# Patient Record
Sex: MALE | Race: BLACK OR AFRICAN AMERICAN | Employment: UNEMPLOYED | URBAN - METROPOLITAN AREA
[De-identification: names, ages, dates, MRNs, and addresses within clinical notes are randomized per-mention and may not be internally consistent; named-entity substitution may affect disease eponyms.]

---

## 2017-04-13 PROBLEM — G89.29 BILATERAL CHRONIC KNEE PAIN: Status: ACTIVE | Noted: 2017-04-13

## 2017-04-13 PROBLEM — E29.1 HYPOGONADISM IN MALE: Status: ACTIVE | Noted: 2017-04-13

## 2017-04-13 PROBLEM — M25.561 BILATERAL CHRONIC KNEE PAIN: Status: ACTIVE | Noted: 2017-04-13

## 2017-04-13 PROBLEM — M25.562 BILATERAL CHRONIC KNEE PAIN: Status: ACTIVE | Noted: 2017-04-13

## 2017-04-13 PROBLEM — M17.0 PRIMARY OSTEOARTHRITIS OF BOTH KNEES: Status: ACTIVE | Noted: 2017-04-13

## 2017-07-24 ENCOUNTER — HOSPITAL ENCOUNTER (OUTPATIENT)
Dept: GENERAL RADIOLOGY | Age: 52
Discharge: HOME | End: 2017-07-24
Payer: COMMERCIAL

## 2017-07-24 DIAGNOSIS — M25.511 RIGHT SHOULDER PAIN: ICD-10-CM

## 2017-07-24 PROCEDURE — 73030 X-RAY EXAM OF SHOULDER: CPT

## 2017-07-24 NOTE — PROGRESS NOTES
His xray of his right shoulder is normal.  He needs physical therapy. Please let me know after you've had a chance to talk w/ him so I can set this up.

## 2017-08-04 ENCOUNTER — HOSPITAL ENCOUNTER (OUTPATIENT)
Dept: PHYSICAL THERAPY | Age: 52
Discharge: HOME | End: 2017-08-04
Payer: COMMERCIAL

## 2017-08-04 DIAGNOSIS — M25.511 CHRONIC RIGHT SHOULDER PAIN: ICD-10-CM

## 2017-08-04 DIAGNOSIS — G89.29 CHRONIC RIGHT SHOULDER PAIN: ICD-10-CM

## 2017-08-04 PROCEDURE — 97162 PT EVAL MOD COMPLEX 30 MIN: CPT

## 2017-08-04 NOTE — PROGRESS NOTES
Sonia Jackson  : 1965 [facility] [address]  [address] [place] [address]  [phone]   [phone]        OUTPATIENT PHYSICAL THERAPY:Initial Assessment 2017        ICD-10: Treatment Diagnosis: Pain in right shoulder (M25.511); Sprain of right acromioclavicular joint, sequela (S43.51XS)  Precautions/Allergies:   Review of patient's allergies indicates no known allergies. Fall Risk Score: 1 (? 5 = High Risk)  MD Orders: evaluate and treat MEDICAL/REFERRING DIAGNOSIS:  Chronic right shoulder pain [M25.511, G89.29]   DATE OF ONSET: chronic, 6+ month history  REFERRING PHYSICIAN: Karlie Estrella*  RETURN PHYSICIAN APPOINTMENT: to be determined     INITIAL ASSESSMENT:  Mr. Kala Solis presents to therapy with right shoulder pain secondary to [facility] joint irritation, inflammation of the biceps tendon insertion and impaired dynamic stability of the glenohumeral joint. He has impaired shoulder mobility, specifically into flexion, secondary to increased compression of the biceps tendon on the acromion and restricted mobility of the [facility] joint. He has weakness throughout the posterior rotator cuff contributing to increased irritation with lifting and carrying tasks. Skilled therapy is required to return to prior level of function. PROBLEM LIST (Impacting functional limitations):  1. Decreased Strength  2. Decreased ADL/Functional Activities  3. Increased Pain  4. Decreased Activity Tolerance  5. Decreased Flexibility/Joint Mobility INTERVENTIONS PLANNED:  1. Cryotherapy  2. Electrical Stimulation  3. Family Education  4. Heat  5. Home Exercise Program (HEP)  6. Manual Therapy  7. Neuromuscular Re-education/Strengthening  8. Range of Motion (ROM)  9. Therapeutic Activites  10. Therapeutic Exercise/Strengthening  11. modalities   TREATMENT PLAN:  Effective Dates: 17 TO 17.   Frequency/Duration: 2 times a week for 6 weeks  GOALS: (Goals have been discussed and agreed upon with patient.)  Short-Term Functional Goals: Time Frame: 2 weeks  1. Patient will be independent with HEP. 2. Patient will report pain <3/10 with daily activity. Discharge Goals: Time Frame: 6 weeks  1. Patient will have no pain with palpation and mobilization of the [facility] joint to restore mobility and decrease symptom irritability. 2. Patient will improve shoulder ER to 5/5 to restore dynamic stability of the glenohumeral joint necessary for work related activities that involve lifting. 3. Patient will report no pain with daily activity. Rehabilitation Potential For Stated Goals: Excellent  Regarding Guero Bazan's therapy, I certify that the treatment plan above will be carried out by a therapist or under their direction. Thank you for this referral,  Bette Pat DPT       Referring Physician Signature: Pj Jean*              Date                      HISTORY:   History of Present Injury/Illness (Reason for Referral):  Patient presents to therapy with primary complaint of right shoulder pain. Initial symptoms began approximately 6 months ago but he denies any specific injury or mechanism. He does report he has been living with his sister and sleeping on the couch over the past few months and generally wakes with increased shoulder pain. 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 He denies any specific loss of strength, but does have pain with reaching overhead or to the side. .   Past Medical History/Comorbidities:   Mr. Emelina Bermudez  has a past medical history of Allergic rhinitis due to animal (cat) (dog) hair and dander (11/19/14); Allergic rhinitis due to other allergen (11/19/14); Allergic rhinitis due to pollen (11/19/14); Arthritis; Asthma; Chronic kidney disease, stage III (moderate); Diabetes ([facility] [address]  Diastolic CHF, chronic ([facility] (6/1/2016); Encounter for ophthalmic examination and evaluation (last summer 2014); Gout, unspecified (10/29/14); Hypertension; Hypertension, uncontrolled (4/6/2015); Morbid obesity ([facility] (08/24/2016); Sleep apnea; Type II or unspecified type diabetes mellitus without mention of complication, uncontrolled (11/03/14); Unspecified asthma (11/3/2014); Unspecified essential hypertension (11/3/2014); Unspecified sleep apnea; and Unspecified vitamin D deficiency (11/03/14). Mr. Teena Jade  has a past surgical history that includes tonsillectomy; premalig/benign skin lesion excision (Right); amputation (Right); colsc flx w/removal lesion by hot bx forceps (8/26/2016); and colonoscopy (N/A, 8/26/2016). Social History/Living Environment:     Patient lives independently with his family. Prior Level of Function/Work/Activity:  Patient is independent. He works part time. Dominant Side:         RIGHT    Current Medications:    Current Outpatient Prescriptions:     magnesium oxide (MAG-OX) 400 mg tablet, TAKE ONE TABLET BY MOUTH TWICE DAILY, Disp: 60 Tab, Rfl: 6    ketoconazole (NIZORAL) 2 % topical cream, Apply to feet daily up to 1 week until rash resolves, Disp: 30 g, Rfl: 5    carvedilol (COREG) 25 mg tablet, Take 1 Tab by mouth two (2) times daily (with meals). Indications: high blood pressure, Disp: 60 Tab, Rfl: 2    cloNIDine HCl (CATAPRES) 0.3 mg tablet, Take 1 Tab by mouth two (2) times a day. Indications: for high blood pressure, Disp: 60 Tab, Rfl: 2    allopurinol (ZYLOPRIM) 300 mg tablet, Take 1 Tab by mouth every morning. Indications: for gout attack prevention, Disp: 30 Tab, Rfl: 2    losartan (COZAAR) 100 mg tablet, Take 1 Tab by mouth every morning. Indications: for high blood pressure, Disp: 30 Tab, Rfl: 2    atorvastatin (LIPITOR) 10 mg tablet, Take 1 Tab by mouth every morning. Indications: high cholesterol, Disp: 30 Tab, Rfl: 2    meloxicam (MOBIC) 15 mg tablet, Take 1 Tab by mouth daily. Take w/ food  Indications: bilateral knee pain, Disp: 30 Tab, Rfl: 2    amLODIPine (NORVASC) 10 mg tablet, Take 1 Tab by mouth every morning. Indications: for high blood pressure, Disp: 90 Tab, Rfl: 0    metFORMIN (GLUCOPHAGE) 500 mg tablet, Take 1 Tab by mouth two (2) times a day. Indications: type 2 diabetes mellitus, Disp: 60 Tab, Rfl: 2    cholecalciferol, VITAMIN D3, (VITAMIN D3) 5,000 unit tab tablet, Take  by mouth daily. , Disp: , Rfl:     cpap machine kit, by Does Not Apply route., Disp: , Rfl:    Date Last Reviewed:  8/4/2017   # of Personal Factors/Comorbidities that affect the Plan of Care: 1-2: MODERATE COMPLEXITY   EXAMINATION:   Observation/Orthostatic Postural Assessment:          Right scapula is abducted relative to L. No atrophy or deformity is noted. Palpation:          Tenderness present at the posterior glenohumeral joint. ROM:     Shoulder flexion: 160 ° R with mild discomfort on overpressure; 175 ° L  Shoulder ER: 70 ° R; 80 ° L  Shoulder IR: 60 ° R; 80 ° L      Strength:     Scaption: 4/5 R; 5/5 L  ER: 4/5 R; 5/5 L  IR: 4+/5 R; 5/5 L  Elbow flexion: 5/5 B  Elbow extension: 5/5 B      Special Tests:          Neer (+); Avila (+); Biceps load (-). AC compression (+)  Neurological Screen:        Grossly intact  Functional Mobility:         Independent  Balance: WNL   Body Structures Involved:  1. Nerves  2. Bones  3. Joints  4. Muscles  5. Ligaments Body Functions Affected:  1. Sensory/Pain  2. Neuromusculoskeletal  3. Movement Related Activities and Participation Affected:  1. General Tasks and Demands  2. Mobility  3. Self Care  4. Domestic Life  5. Interpersonal Interactions and Relationships   # of elements that affect the Plan of Care: 3: MODERATE COMPLEXITY   CLINICAL PRESENTATION:   Presentation: Evolving clinical presentation with changing clinical characteristics: MODERATE COMPLEXITY   CLINICAL DECISION MAKING:   Outcome Measure:    Tool Used: SPADI  Score:  Initial: please fill out next visit Most Recent: X (Date: -- )   Interpretation of Score: N/A  Outcome Measure Conversion Site      Medical Necessity:   · Patient is expected to demonstrate progress in strength, range of motion, balance and coordination to increase independence with community mobility and return to prior level of function. Reason for Services/Other Comments:  · Patient has demonstrated an improvement in functional level by independent performance of HEP. Use of outcome tool(s) and clinical judgement create a POC that gives a: Questionable prediction of patient's progress: MODERATE COMPLEXITY   TREATMENT:   (In addition to Assessment/Re-Assessment sessions the following treatments were rendered)  THERAPEUTIC EXERCISE: (see flow sheet below for minutes):  Exercises per grid below to improve mobility, strength, balance and coordination. Required minimal verbal and manual cues to promote proper body alignment, promote proper body posture and promote proper body mechanics. Progressed resistance, range, repetitions and complexity of movement as indicated. MANUAL THERAPY: (see flow sheet below for minutes): Joint mobilization and Soft tissue mobilization was utilized and necessary because of the patient's restricted joint motion, painful spasm, loss of articular motion and restricted motion of soft tissue. MODALITIES: (see below for minutes):      to decrease pain     Date: 08/04/17       Modalities:                                Therapeutic Exercise: 5 min       Horizontal adduction stretch [id number]       Band pull aparts Blue tband 30x       External rotation Next visit       Extension Next visit       Row Next visit               Proprioceptive Activities:                                Manual Therapy:        Taping kineseotaping for [facility] joint stabilization               Therapeutic Activities:                                        HEP: see 08/04/17 flow sheet for specifics.   Treatment/Session Assessment:  Patient is independent with performance of above described home program.    · Pain/ Symptoms: Initial:   4/10 Post Session:  No increase following today's treatment session. ·   Compliance with Program/Exercises: Will assess as treatment progresses. · Recommendations/Intent for next treatment session: \"Next visit will focus on advancements to more challenging activities\".   Total Treatment Duration:PT Patient Time In/Time Out  Time In: 1095  Time Out: 632 [facility]

## 2017-08-04 NOTE — PROGRESS NOTES
Ambulatory/Rehab Services H2 Model Falls Risk Assessment    Risk Factor Pts. ·   Confusion/Disorientation/Impulsivity  []    4 ·   Symptomatic Depression  []   2 ·   Altered Elimination  []   1 ·   Dizziness/Vertigo  []   1 ·   Gender (Male)  [x]   1 ·   Any administered antiepileptics (anticonvulsants):  []   2 ·   Any administered benzodiazepines:  []   1 ·   Visual Impairment (specify):  []   1 ·   Portable Oxygen Use  []   1 ·   Orthostatic ? BP  []   1 ·   History of Recent Falls (within 3 mos.)  []   5     Ability to Rise from Chair (choose one) Pts. ·   Ability to rise in a single movement  [x]   0 ·   Pushes up, successful in one attempt  []   1 ·   Multiple attempts, but successful  []   3 ·   Unable to rise without assistance  []   4   Total: (5 or greater = High Risk) 1     Falls Prevention Plan:   []                Physical Limitations to Exercise (specify):   []                Mobility Assistance Device (type):   []                Exercise/Equipment Adaptation (specify):    ©2010 [facility] [place] Patent #[id number].  Federal Law prohibits the replication, distribution or use without written permission from [facility] [employer]

## 2017-09-12 NOTE — PROGRESS NOTES
Sonia Jackson   [phone]) [phone] [facility] @ [address]  [facility]  [phone]   [phone]           PHYSICIAN COMMUNICATION and discontinuation    REFERRING PHYSICIAN: Mark Allan*  Return Physician Appointment: to be determined  MEDICAL/REFERRING DIAGNOSIS:  · Chronic right shoulder pain [M25.511, G89.29]  ATTENDANCE: Sonia Jackson has attended 1 out of 2 visits, with 0 cancellation(s) and 1 no shows. ASSESSMENT:  DATE: 2017    PROGRESS: Sonia Jackson was seen for an initial physical therapy evaluation for right shoulder pain. He did not return for further scheduled treatment. At the time of his initial visit, he was instructed in self stretching and strengthening exercises to decrease his pain and improve his shoulder function. RECOMMENDATIONS: Will discharge back to your care PRN.      Thank you for this referral,  Ree Read DPT

## 2017-10-20 PROBLEM — E29.1 HYPOGONADISM IN MALE: Status: RESOLVED | Noted: 2017-04-13 | Resolved: 2017-10-20

## 2018-01-24 PROBLEM — E11.21 TYPE 2 DIABETES MELLITUS WITH NEPHROPATHY (HCC): Status: ACTIVE | Noted: 2018-01-24

## 2018-05-21 PROBLEM — R52 BODY ACHES: Status: ACTIVE | Noted: 2018-05-21

## 2018-05-21 PROBLEM — R60.0 BILATERAL LOWER EXTREMITY EDEMA: Status: ACTIVE | Noted: 2018-05-21

## 2018-08-10 PROBLEM — R60.0 BILATERAL LOWER EXTREMITY EDEMA: Status: RESOLVED | Noted: 2018-05-21 | Resolved: 2018-08-10

## 2018-08-27 ENCOUNTER — HOSPITAL ENCOUNTER (OUTPATIENT)
Dept: PHYSICAL THERAPY | Age: 53
End: 2018-08-27

## 2018-08-29 ENCOUNTER — APPOINTMENT (OUTPATIENT)
Dept: PHYSICAL THERAPY | Age: 53
End: 2018-08-29

## 2018-09-04 ENCOUNTER — APPOINTMENT (OUTPATIENT)
Dept: PHYSICAL THERAPY | Age: 53
End: 2018-09-04

## 2018-09-06 ENCOUNTER — APPOINTMENT (OUTPATIENT)
Dept: PHYSICAL THERAPY | Age: 53
End: 2018-09-06

## 2018-09-28 ENCOUNTER — HOSPITAL ENCOUNTER (OUTPATIENT)
Dept: PHYSICAL THERAPY | Age: 53
Discharge: HOME | End: 2018-09-28
Payer: COMMERCIAL

## 2018-09-28 DIAGNOSIS — H81.12 BENIGN PAROXYSMAL POSITIONAL VERTIGO OF LEFT EAR: ICD-10-CM

## 2018-09-28 DIAGNOSIS — M54.50 CHRONIC RIGHT-SIDED LOW BACK PAIN WITHOUT SCIATICA: ICD-10-CM

## 2018-09-28 DIAGNOSIS — G89.29 CHRONIC RIGHT-SIDED LOW BACK PAIN WITHOUT SCIATICA: ICD-10-CM

## 2018-09-28 PROCEDURE — 97162 PT EVAL MOD COMPLEX 30 MIN: CPT

## 2018-09-28 NOTE — PROGRESS NOTES
Ambulatory/Rehab Services H2 Model Falls Risk Assessment    Risk Factor Pts. ·   Confusion/Disorientation/Impulsivity  []    4 ·   Symptomatic Depression  []   2 ·   Altered Elimination  []   1 ·   Dizziness/Vertigo  [x]   1 ·   Gender (Male)  [x]   1 ·   Any administered antiepileptics (anticonvulsants):  []   2 ·   Any administered benzodiazepines:  []   1 ·   Visual Impairment (specify):  []   1 ·   Portable Oxygen Use  []   1 ·   Orthostatic ? BP  []   1 ·   History of Recent Falls (within 3 mos.)  []   5     Ability to Rise from Chair (choose one) Pts. ·   Ability to rise in a single movement  []   0 ·   Pushes up, successful in one attempt  [x]   1 ·   Multiple attempts, but successful  []   3 ·   Unable to rise without assistance  []   4   Total: (5 or greater = High Risk) 3     Falls Prevention Plan:   []                Physical Limitations to Exercise (specify):   []                Mobility Assistance Device (type):   []                Exercise/Equipment Adaptation (specify):    ©2010 [facility] [place] Patent #[id number].  Federal Law prohibits the replication, distribution or use without written permission from [facility] [employer]

## 2018-10-17 ENCOUNTER — HOSPITAL ENCOUNTER (OUTPATIENT)
Dept: PHYSICAL THERAPY | Age: 53
Discharge: HOME | End: 2018-10-17
Payer: COMMERCIAL

## 2018-10-17 PROCEDURE — 97110 THERAPEUTIC EXERCISES: CPT

## 2018-10-17 NOTE — PROGRESS NOTES
Luis Miguel Cadet : 1965 Primary: [place] Absolute Total Care Secondary:  [facility] at [facility], [address] [facility], [address] [phone]   [phone] OUTPATIENT PHYSICAL THERAPY:Progress Report 10/17/2018 ICD-10: Treatment Diagnosis: Dizziness and giddiness (R42) Low back pain (M54.5) Precautions/Allergies:  
Patient has no known allergies. Fall Risk Score: 3 (? 5 = High Risk) MD Orders: BPV x 2-3 weeks, lower back pain/tightness x 6+ months MEDICAL/REFERRING DIAGNOSIS: 
Benign paroxysmal vertigo, left ear [H81.12] Low back pain [M54.5] Other chronic pain [G89.29] DATE OF ONSET: few months ago for back and dizziness REFERRING PHYSICIAN: Duy Marcano* RETURN PHYSICIAN APPOINTMENT: 18 ASSESSMENT (Date: 10/17/18):  Mr. Kirk Lizarraga has been seen in physical therapy for 2 visits since 18. His vertigo is now resolved and he has met all of his short term goals. Therapy will now focus on his back pain as indicated below. At initial evaluation on 18: Mr. Kirk Lizarraga presents to physical therapy with complaints of dizziness and right sided low back pain. He was positive for BPPV on the R and would benefit from 2-3 therapy sessions to address his vertigo. Upon resolving the vertigo, he would benefit from a few months of skilled PT to address his back pain. Pain increases with extension and is localized to the right side of the low back. He would benefit from skilled PT to address his pain, improve his functional mobility and return to his PLOF. PROBLEM LIST (Impacting functional limitations): 1. Decreased Strength 2. Decreased ADL/Functional Activities 3. Decreased Transfer Abilities 4. Decreased Ambulation Ability/Technique 5. Increased Pain 6. Decreased Flexibility/Joint Mobility 7. Decreased Knowledge of Precautions 8.  Decreased [place] with Home Exercise Program INTERVENTIONS PLANNED: 
 1. Balance Exercise 2. Bed Mobility 3. Cold 4. Electrical Stimulation 5. Gait Training 6. Heat 7. Home Exercise Program (HEP) 8. Manual Therapy 9. Range of Motion (ROM) 10. Therapeutic Activites 11. Therapeutic Exercise/Strengthening 12. Transfer Training TREATMENT PLAN: 
Effective Dates: 9/28/2018 TO 11/27/2018 (60 days). Frequency/Duration: 2 times a week for 60 Days GOALS: (Goals have been discussed and agreed upon with patient.) Short-Term Functional Goals: Time Frame: 30 days 1. Patient will be compliant with HEP. MET 2. Patient will have a decrease on the [address] to 20/100 indicating a reduction in symptoms. MET 3. Patient will demonstrate sit to supine and supine to sit with no dizziness indicating symptoms are resolved. MET Discharge Goals: Time Frame: 60 days 1. Patient will be independent with HEP. 2. Patient will have a decrease on the Oswestry to 5/50 indicating improved functional mobility. 3. Patient will verbalize pain no greater than 3/10 while lying supine with modifications indicating improved position tolerance. Rehabilitation Potential For Stated Goals: Good Regarding Guero Bazan's therapy, I certify that the treatment plan above will be carried out by a therapist or under their direction. Thank you for this referral, 
Aaron Sen DPT, NCS    
  
    
 
 
HISTORY:  
Patient Stated Goal: 
      Get rid of the back pain and dizziness History of Present Injury/Illness (Reason for Referral): 
Patient reports his low back hurts. More on the R side. He reports he is staying with his sister and he is sleeping on a love seat. When he goes to his girlfriends house, he sleeps in a bed and it hurts. This has been going on for a couple of weeks. He takes muscle relaxer. Difficulty getting dressed when its hurting. Pain 8/10. Supine is worse than sitting or standing He gets room spinning dizziness when he wakes up.   Thinks it might be worse on his right side. Last 30 seconds to a minute. Past Medical History/Comorbidities:  
Mr. Lexi Ac  has a past medical history of Allergic rhinitis due to animal (cat) (dog) hair and dander, Allergic rhinitis due to other allergen, Allergic rhinitis due to pollen, Arthritis, Asthma, Chronic kidney disease, stage III (moderate), Diabetes ([address] Diastolic CHF, chronic ([address] Encounter for ophthalmic examination and evaluation, Gout, unspecified, Hypertension, Hypertension, uncontrolled, Morbid obesity ([address] Sleep apnea, Type II or unspecified type diabetes mellitus without mention of complication, uncontrolled, Unspecified asthma(493.90), Unspecified essential hypertension, Unspecified sleep apnea, and Unspecified vitamin D deficiency. Mr. Lexi Ac  has a past surgical history that includes hx tonsillectomy; hx premalig/benign skin lesion excision (Right); hx amputation (Right); pr colsc flx w/removal lesion by hot bx forceps (8/26/2016); COLONOSCOPY / BMI 47 (N/A, 8/26/2016); and ENDOSCOPIC POLYPECTOMY (N/A, 8/26/2016). Social History/Living Environment:  
  staying with his sister currently. Prior Level of Function/Work/Activity: 
Worked at [employer] Current Medications:   
Current Outpatient Medications:  
  spironolactone (ALDACTONE) 25 mg tablet, Take 1 Tab by mouth two (2) times a day., Disp: 60 Tab, Rfl: 5 
  cloNIDine HCl (CATAPRES) 0.2 mg tablet, Take 2 Tabs by mouth two (2) times a day., Disp: 120 Tab, Rfl: 5   cyclobenzaprine (FLEXERIL) 10 mg tablet, Take 1 Tab by mouth two (2) times daily as needed for Muscle Spasm(s). Indications: Muscle Spasm, Disp: 30 Tab, Rfl: 0 
  atorvastatin (LIPITOR) 10 mg tablet, Take 1 Tab by mouth every morning. Indications: high cholesterol, Disp: 30 Tab, Rfl: 2   carvedilol (COREG) 25 mg tablet, Take 1 Tab by mouth two (2) times daily (with meals). Indications: high blood pressure, Disp: 60 Tab, Rfl: 2   allopurinol (ZYLOPRIM) 100 mg tablet, Take 2 Tabs by mouth every morning. Indications: for gout attack prevention, Disp: 60 Tab, Rfl: 2 
  metFORMIN (GLUCOPHAGE) 500 mg tablet, Take 1 Tab by mouth two (2) times daily (with meals). , Disp: 60 Tab, Rfl: 2 
  losartan-hydroCHLOROthiazide (HYZAAR) 100-25 mg per tablet, Take 1 Tab by mouth daily. , Disp: 30 Tab, Rfl: 11 
  magnesium oxide (MAG-OX) 400 mg tablet, Take 1 Tab by mouth two (2) times a day., Disp: 60 Tab, Rfl: 11 
  cpap machine kit, by Does Not Apply route., Disp: , Rfl:   
 
Date Last Reviewed:  10/17/2018 Number of Personal Factors/Comorbidities that affect the Plan of Care: 1-2: MODERATE COMPLEXITY EXAMINATION:  
Observation/Orthostatic Postural Assessment:   
      Overweight Mental Status: WFL Palpation:   
      Tender to touch over R lateral low back coming around to the front Sensation:  
      Grossly intact with some reports of tingling in L fingers. Skin Integrity:   
      intact Vision:   
      Functional  
Balance:   
      Good static balance, fair dynamic balance Lower Extremity: 
 Strength PROM Action R L R  L Hip Flexion 4 4 Hip Extension Hip Abduction Hip Adduction Knee Flexion Knee Extension Dorsi Flexion Plantar Flexion Inversion Eversion Upper Extremity:  
      B shoulder flexion 4-/5 Functional Mobility:  
      Gait/Ambulation:  Ambulates with slow gait speed Transfers:  Push to stand, increased time Bed Mobility:  Dizziness and back pain with sit to supine Stairs:  NT Wheelchair:  NT  
       
Oculomotor Exam:· Eye Range of Motion:  full range of motion · Cervical Range of Motion:   diminished range of motion · Spontaneous nystagmus:  NO 
· Gaze holding nystagmus:  NO 
· Smooth Pursuit:   
  [x]Smooth Eye Movements []Delayed Eye Movements []Within Normal Limits []Other(comment): reports mild dizziness · Voluntary Saccades:   
  [x]Smooth Eye Movements []Delayed Eye Movements []Within Normal Limits []Other(comment): · Vestibular Ocular Reflex Testing:· Dynamic Visual Acuity Test:  NT line difference. Normal is less than or equal to 3 line difference. · Head Thrust Test: Negative to the right. Negativeto the left. · VOR Cancellation: [facility] Position Tests:· Hallpike-[place] testing presented as negative when head is turned to the right indicating that Benign Paroxysmal Positional Vertigo (BPPV) is resolved on the right. Body Structures Involved: 1. Eyes and Ears 2. Thoracic Cage 3. Bones 4. Joints 5. Muscles 6. Ligaments Body Functions Affected: 1. Sensory/Pain 2. Neuromusculoskeletal 
3. Movement Related Activities and Participation Affected: 1. Mobility 2. Self Care 3. Domestic Life 4. Interpersonal Interactions and Relationships Number of elements that affect the Plan of Care: 4+: HIGH COMPLEXITY CLINICAL PRESENTATION:  
Presentation: Evolving clinical presentation with changing clinical characteristics: MODERATE COMPLEXITY CLINICAL DECISION MAKING:  
Outcome Measure: Tool Used: Dizziness Handicap Index Score:  Initial: 50  Most Recent: 0  (Date: 10/1718 )- patient inially filled the questionnaire out 34/100 but then reported he was referencing his back pain. He reports no dizziness with all activities Interpretation of Score: To each item, the following scores may be assigned: No = 0, Sometimes = 2, Yes = 4. Scores greater than 10 points should be referred to balance specialists for further evaluation. Score 0 1-20 21-40 41-60 61-80 81-99 100 Modifier CH CI CJ CK CL CM CN Tool Used: Modified Oswestry Low Back Pain Questionnaire Score:  Initial: 15/50  Most Recent: X/50 (Date: -- ) Interpretation of Score: Each section is scored on a 0-5 scale, 5 representing the greatest disability. The scores of each section are added together for a total score of 50. Score 0 1-10 11-20 21-30 31-40 41-49 50 Modifier CH CI CJ CK CL CM CN Medical Necessity:  
· Patient is expected to demonstrate progress in strength, range of motion, balance and functional technique to decrease pain with functional mobility and decrease dizziness with bed mobility and transfers. · Patient demonstrates good rehab potential due to higher previous functional level. Reason for Services/Other Comments: 
· Patient continues to require modification of therapeutic interventions to increase complexity of exercises. Use of outcome tool(s) and clinical judgement create a POC that gives a: Questionable prediction of patient's progress: MODERATE COMPLEXITY  
TREATMENT:  
(In addition to Assessment/Re-Assessment sessions the following treatments were rendered) Pre Treatment Symptoms: patient reports no real dizziness since last visit. His back has been really bothering him. Rates current pain 1/10. States the pain is across his low back. Therapeutic Exercise: ( 30 minutes):  Exercises per grid below to improve mobility and strength. Required moderate visual, verbal and tactile cues to promote proper body alignment and promote proper body mechanics. Progressed complexity of movement as indicated. Date: 
10/17/18 Date: 
 Date: Activity/Exercise Parameters Parameters Parameters Nustep X 8 minutes with concurrent moist heat to back for pain relief LTR X 5 reps to each side TA activation  X 10 reps with 5 second hold- a lot of verbal and tactile cues needed to perform correctly Bridges  2 x 10 reps MODALITIES: (10 minutes minutes): *  Electrical Stimulation Therapy (interferential stimulation) was provided with intensity adjusted throughout treatment to patient tolerance.  patient in sitting for estim to low back, 4 electrodes, intensity 40.  used for pain relief. Treatment/Session Assessment:  No dizziness with hallpike jesi indicating BPPV has resolved. Patient reported no back pain post session today. He continues to benefit form skilled PT to improve his functional mobility with no increase in back pain. · Pain/ Symptoms: Initial:   1/10 back pain Post Session:  0/10 back pain · Compliance with Program/Exercises: Will assess as treatment progresses. · Recommendations/Intent for next treatment session: \"Next visit will focus on advancements to more challenging activities and reduction in assistance provided\". Total Treatment Duration: PT Patient Time In/Time Out Time In: 0062 Time Out: 1030 Anders Yi DPT

## 2018-10-24 ENCOUNTER — HOSPITAL ENCOUNTER (OUTPATIENT)
Dept: PHYSICAL THERAPY | Age: 53
Discharge: HOME | End: 2018-10-24
Payer: COMMERCIAL

## 2018-10-24 PROCEDURE — 97140 MANUAL THERAPY 1/> REGIONS: CPT

## 2018-10-24 PROCEDURE — 97110 THERAPEUTIC EXERCISES: CPT

## 2018-10-24 NOTE — PROGRESS NOTES
Christine Pappas : 1965 Primary: Sc Absolute Total Care Secondary:  Therapy Center at [facility], [address] [facility], [address] [phone]   [phone] OUTPATIENT PHYSICAL THERAPY:Daily Note 10/24/2018 ICD-10: Treatment Diagnosis: Dizziness and giddiness (R42) Low back pain (M54.5) Precautions/Allergies:  
Patient has no known allergies. Fall Risk Score: 3 (? 5 = High Risk) MD Orders: BPV x 2-3 weeks, lower back pain/tightness x 6+ months MEDICAL/REFERRING DIAGNOSIS: 
Benign paroxysmal vertigo of left ear [H81.12] Low back pain [M54.5] Other chronic pain [G89.29] DATE OF ONSET: few months ago for back and dizziness REFERRING PHYSICIAN: Melecio Wang* RETURN PHYSICIAN APPOINTMENT: 18 ASSESSMENT (Date: 10/17/18):  Mr. Jean-Baptiste has been seen in physical therapy for 2 visits since 18. His vertigo is now resolved and he has met all of his short term goals. Therapy will now focus on his back pain as indicated below. At initial evaluation on 18: Mr. Jean-Baptiste presents to physical therapy with complaints of dizziness and right sided low back pain. He was positive for BPPV on the R and would benefit from 2-3 therapy sessions to address his vertigo. Upon resolving the vertigo, he would benefit from a few months of skilled PT to address his back pain. Pain increases with extension and is localized to the right side of the low back. He would benefit from skilled PT to address his pain, improve his functional mobility and return to his PLOF. PROBLEM LIST (Impacting functional limitations): 1. Decreased Strength 2. Decreased ADL/Functional Activities 3. Decreased Transfer Abilities 4. Decreased Ambulation Ability/Technique 5. Increased Pain 6. Decreased Flexibility/Joint Mobility 7. Decreased Knowledge of Precautions 8.  Decreased [place] with Home Exercise Program INTERVENTIONS PLANNED: 
 1. Balance Exercise 2. Bed Mobility 3. Cold 4. Electrical Stimulation 5. Gait Training 6. Heat 7. Home Exercise Program (HEP) 8. Manual Therapy 9. Range of Motion (ROM) 10. Therapeutic Activites 11. Therapeutic Exercise/Strengthening 12. Transfer Training TREATMENT PLAN: 
Effective Dates: 9/28/2018 TO 11/27/2018 (60 days). Frequency/Duration: 2 times a week for 60 Days GOALS: (Goals have been discussed and agreed upon with patient.) Short-Term Functional Goals: Time Frame: 30 days 1. Patient will be compliant with HEP. MET 2. Patient will have a decrease on the [address] [facility] [address] to 20/100 indicating a reduction in symptoms. MET 3. Patient will demonstrate sit to supine and supine to sit with no dizziness indicating symptoms are resolved. MET Discharge Goals: Time Frame: 60 days 1. Patient will be independent with HEP. 2. Patient will have a decrease on the Oswestry to 5/50 indicating improved functional mobility. 3. Patient will verbalize pain no greater than 3/10 while lying supine with modifications indicating improved position tolerance. Rehabilitation Potential For Stated Goals: Good Regarding Guero Bazan's therapy, I certify that the treatment plan above will be carried out by a therapist or under their direction. Thank you for this referral, 
Charli Fragoso PTA, NCS    
  
    
 
 
HISTORY:  
Patient Stated Goal: 
      Get rid of the back pain and dizziness History of Present Injury/Illness (Reason for Referral): 
Patient reports his low back hurts. More on the R side. He reports he is staying with his sister and he is sleeping on a love seat. When he goes to his girlfriends house, he sleeps in a bed and it hurts. This has been going on for a couple of weeks. He takes muscle relaxer. Difficulty getting dressed when its hurting. Pain 8/10. Supine is worse than sitting or standing He gets room spinning dizziness when he wakes up.   Thinks it might be worse on his right side. Last 30 seconds to a minute. Past Medical History/Comorbidities:  
Mr. Mike Serrano  has a past medical history of Allergic rhinitis due to animal (cat) (dog) hair and dander, Allergic rhinitis due to other allergen, Allergic rhinitis due to pollen, Arthritis, Asthma, Chronic kidney disease, stage III (moderate), Diabetes ([address] Diastolic CHF, chronic ([address] Encounter for ophthalmic examination and evaluation, Gout, unspecified, Hypertension, Hypertension, uncontrolled, Morbid obesity ([address] Sleep apnea, Type II or unspecified type diabetes mellitus without mention of complication, uncontrolled, Unspecified asthma(493.90), Unspecified essential hypertension, Unspecified sleep apnea, and Unspecified vitamin D deficiency. Mr. Mike Serrano  has a past surgical history that includes hx tonsillectomy; hx premalig/benign skin lesion excision (Right); hx amputation (Right); pr colsc flx w/removal lesion by hot bx forceps (8/26/2016); COLONOSCOPY / BMI 47 (N/A, 8/26/2016); and ENDOSCOPIC POLYPECTOMY (N/A, 8/26/2016). Social History/Living Environment:  
  staying with his sister currently. Prior Level of Function/Work/Activity: 
Worked at [employer] Current Medications:   
Current Outpatient Medications:  
  spironolactone (ALDACTONE) 25 mg tablet, Take 1 Tab by mouth two (2) times a day., Disp: 60 Tab, Rfl: 5 
  cloNIDine HCl (CATAPRES) 0.2 mg tablet, Take 2 Tabs by mouth two (2) times a day., Disp: 120 Tab, Rfl: 5   cyclobenzaprine (FLEXERIL) 10 mg tablet, Take 1 Tab by mouth two (2) times daily as needed for Muscle Spasm(s). Indications: Muscle Spasm, Disp: 30 Tab, Rfl: 0 
  atorvastatin (LIPITOR) 10 mg tablet, Take 1 Tab by mouth every morning. Indications: high cholesterol, Disp: 30 Tab, Rfl: 2   carvedilol (COREG) 25 mg tablet, Take 1 Tab by mouth two (2) times daily (with meals). Indications: high blood pressure, Disp: 60 Tab, Rfl: 2   allopurinol (ZYLOPRIM) 100 mg tablet, Take 2 Tabs by mouth every morning. Indications: for gout attack prevention, Disp: 60 Tab, Rfl: 2 
  metFORMIN (GLUCOPHAGE) 500 mg tablet, Take 1 Tab by mouth two (2) times daily (with meals). , Disp: 60 Tab, Rfl: 2 
  losartan-hydroCHLOROthiazide (HYZAAR) 100-25 mg per tablet, Take 1 Tab by mouth daily. , Disp: 30 Tab, Rfl: 11 
  magnesium oxide (MAG-OX) 400 mg tablet, Take 1 Tab by mouth two (2) times a day., Disp: 60 Tab, Rfl: 11 
  cpap machine kit, by Does Not Apply route., Disp: , Rfl:   
 
Date Last Reviewed:  10/24/2018 Number of Personal Factors/Comorbidities that affect the Plan of Care: 1-2: MODERATE COMPLEXITY EXAMINATION:  
Observation/Orthostatic Postural Assessment:   
      Overweight Mental Status: WFL Palpation:   
      Tender to touch over R lateral low back coming around to the front Sensation:  
      Grossly intact with some reports of tingling in L fingers. Skin Integrity:   
      intact Vision:   
      Functional  
Balance:   
      Good static balance, fair dynamic balance Lower Extremity: 
 Strength PROM Action R L R  L Hip Flexion 4 4 Hip Extension Hip Abduction Hip Adduction Knee Flexion Knee Extension Dorsi Flexion Plantar Flexion Inversion Eversion Upper Extremity:  
      B shoulder flexion 4-/5 Functional Mobility:  
      Gait/Ambulation:  Ambulates with slow gait speed Transfers:  Push to stand, increased time Bed Mobility:  Dizziness and back pain with sit to supine Stairs:  NT Wheelchair:  NT  
       
Oculomotor Exam:· Eye Range of Motion:  full range of motion · Cervical Range of Motion:   diminished range of motion · Spontaneous nystagmus:  NO 
· Gaze holding nystagmus:  NO 
· Smooth Pursuit:   
  [x]Smooth Eye Movements []Delayed Eye Movements []Within Normal Limits []Other(comment): reports mild dizziness · Voluntary Saccades:   
  [x]Smooth Eye Movements []Delayed Eye Movements []Within Normal Limits []Other(comment): · Vestibular Ocular Reflex Testing:· Dynamic Visual Acuity Test:  NT line difference. Normal is less than or equal to 3 line difference. · Head Thrust Test: Negative to the right. Negativeto the left. · VOR Cancellation: [facility] Position Tests:· [place] testing presented as negative when head is turned to the right indicating that Benign Paroxysmal Positional Vertigo (BPPV) is resolved on the right. Body Structures Involved: 1. Eyes and Ears 2. Thoracic Cage 3. Bones 4. Joints 5. Muscles 6. Ligaments Body Functions Affected: 1. Sensory/Pain 2. Neuromusculoskeletal 
3. Movement Related Activities and Participation Affected: 1. Mobility 2. Self Care 3. Domestic Life 4. Interpersonal Interactions and Relationships Number of elements that affect the Plan of Care: 4+: HIGH COMPLEXITY CLINICAL PRESENTATION:  
Presentation: Evolving clinical presentation with changing clinical characteristics: MODERATE COMPLEXITY CLINICAL DECISION MAKING:  
Outcome Measure: Tool Used: Dizziness Handicap Index Score:  Initial: 50  Most Recent: 0  (Date: 10/1718 )- patient inially filled the questionnaire out 34/100 but then reported he was referencing his back pain. He reports no dizziness with all activities Interpretation of Score: To each item, the following scores may be assigned: No = 0, Sometimes = 2, Yes = 4. Scores greater than 10 points should be referred to balance specialists for further evaluation. Score 0 1-20 21-40 41-60 61-80 81-99 100 Modifier CH CI CJ CK CL CM CN Tool Used: Modified Oswestry Low Back Pain Questionnaire Score:  Initial: 15/50  Most Recent: X/50 (Date: -- ) Interpretation of Score: Each section is scored on a 0-5 scale, 5 representing the greatest disability. The scores of each section are added together for a total score of 50. Score 0 1-10 11-20 21-30 31-40 41-49 50 Modifier CH CI CJ CK CL CM CN Medical Necessity:  
· Patient is expected to demonstrate progress in strength, range of motion, balance and functional technique to decrease pain with functional mobility and decrease dizziness with bed mobility and transfers. · Patient demonstrates good rehab potential due to higher previous functional level. Reason for Services/Other Comments: 
· Patient continues to require modification of therapeutic interventions to increase complexity of exercises. Use of outcome tool(s) and clinical judgement create a POC that gives a: Questionable prediction of patient's progress: MODERATE COMPLEXITY  
TREATMENT:  
(In addition to Assessment/Re-Assessment sessions the following treatments were rendered) Pre Treatment Symptoms: patient reports no dizziness. Pain in low back is 2-3/10. Patient states pain is worse when he is lying down flat and pain level then is 10/10. I sleep propped up when I can. Going to gym 2 x a week. I do the treadmill, standing abdominal resistive exercises, leg raises supine and sit ups. Patient reports he has bad knees and they are bone on bone and wearing a brace on his right knee. Instructed patient to stop exercise that involves trunk flexion and extension at this time in the gym. Therapeutic Exercise: ( 25 minutes):  Exercises per grid below to improve mobility and strength. Required moderate visual, verbal and tactile cues to promote proper body alignment and promote proper body mechanics. Progressed complexity of movement as indicated. Date: 
10/17/18 Date: 
10/24/18 Date: Activity/Exercise Parameters Parameters Parameters Nustep X 8 minutes with concurrent moist heat to back for pain relief X 10 minutes Level 3 Concurrent with moist heat to low back LTR X 5 reps to each side 5 reps x 10 second hold B   
TA activation  X 10 reps with 5 second hold- a lot of verbal and tactile cues needed to perform correctly  5 second hold x 10 reps with a lot of verbal and tactile cues needed to perform correctly Bridges  2 x 10 reps  2 x 10 reps MODALITIES: (10 minutes) patient left side lying for ice pack to right lower lateral trunk and low back to help decrease pain. MANUAL THERAPY: (25 minutes) patient left side lying for STM and trigger point release to muscles in the right lower lateral trunk over the upper pelvic area and to the right low back all to help decrease tightness and pain and help improve mobility. Palpable tightness noted over right lower lateral trunk muscles. Treatment/Session Assessment:  No dizziness. Patient reports pain is in middle to right side of low back and points to area above uper pelvic area. . Patient tolerated manual therapy well with reports of decreased tightness and stated he felt better following treatment today. He continues to benefit form skilled PT to improve his functional mobility with no increase in back pain. · Pain/ Symptoms: Initial:   2-3/10 back pain Post Session: No number given but patient reported he felt better after treatment. ·  
Compliance with Program/Exercises: Will assess as treatment progresses. · Recommendations/Intent for next treatment session: \"Next visit will focus on advancements to more challenging activities and reduction in assistance provided\". Total Treatment Duration: PT Patient Time In/Time Out Time In: 1000 Time Out: 1100 Doroteo Richey PTA

## 2018-10-26 ENCOUNTER — HOSPITAL ENCOUNTER (OUTPATIENT)
Dept: PHYSICAL THERAPY | Age: 53
Discharge: HOME | End: 2018-10-26
Payer: COMMERCIAL

## 2018-10-26 NOTE — PROGRESS NOTES
Tiara Grande : 1965 Primary: Sc Absolute Total Care Secondary:  Therapy Center at [facility], [address] [facility], [address] [phone]   [phone] OUTPATIENT DAILY NOTE 
 
NAME/AGE/GENDER: Tiara Grande is a 46 y.o. male. DATE: 10/26/2018 SUBJECTIVE:  Patient called earlier to cancel for appointment today due to not having a ride to therapy today. Will plan to follow up on next scheduled visit.  
 
Perez Abraham, PT, DPT

## 2018-10-29 ENCOUNTER — HOSPITAL ENCOUNTER (OUTPATIENT)
Dept: PHYSICAL THERAPY | Age: 53
Discharge: HOME | End: 2018-10-29
Payer: COMMERCIAL

## 2018-10-29 PROCEDURE — 97110 THERAPEUTIC EXERCISES: CPT

## 2018-10-29 PROCEDURE — 97140 MANUAL THERAPY 1/> REGIONS: CPT

## 2018-10-29 NOTE — PROGRESS NOTES
Estrella Titus : 1965 Primary: Sc Absolute Total Care Secondary:  [facility] [address] [facility], [place] [address] [address] [facility] [phone]   [phone] OUTPATIENT PHYSICAL THERAPY:Daily Note 10/29/2018 ICD-10: Treatment Diagnosis: Dizziness and giddiness (R42) Low back pain (M54.5) Precautions/Allergies:  
Patient has no known allergies. Fall Risk Score: 3 (? 5 = High Risk) MD Orders: BPV x 2-3 weeks, lower back pain/tightness x 6+ months MEDICAL/REFERRING DIAGNOSIS: 
Benign paroxysmal vertigo of left ear [H81.12] Low back pain [M54.5] Other chronic pain [G89.29] DATE OF ONSET: few months ago for back and dizziness REFERRING PHYSICIAN: Marisol Odom* RETURN PHYSICIAN APPOINTMENT: 18 UPDATE (10/29/2018): Mr. Guerrero Estrada presents with complains of lower back pain along the belt line that does radiate in LEs. Pt notes his pain is slightly reduced with flexed legs and when \"pressing and carrying stomach\". Pt notes it is a strong dull pain and points directly over B SI joints. He displays tenderness along R lumbar parsapinals and tenderness over glute med R (R>L). He displays good strength and lumbar AROM but significantly decreased balance. He notes he suffers from severe B knee pain and therefore wears knee braces for stability. Patient states pain is worse when he is lying down flat and pain level then is 10/10. \"I sleep propped up when I can. Going to gym 2 x a week. I do the treadmill, standing abdominal resistive exercises, leg raises supine and sit ups. \" Patient reports he has bad knees and they are bone on bone and wearing a brace on his right knee. ASSESSMENT (Date: 10/17/18):  Mr. Guerrero Estrada has been seen in physical therapy for 2 visits since 18. His vertigo is now resolved and he has met all of his short term goals. Therapy will now focus on his back pain as indicated below. At initial evaluation on 9/28/18: Mr. Perdue Shoulder presents to physical therapy with complaints of dizziness and right sided low back pain. He was positive for BPPV on the R and would benefit from 2-3 therapy sessions to address his vertigo. Upon resolving the vertigo, he would benefit from a few months of skilled PT to address his back pain. Pain increases with extension and is localized to the right side of the low back. He would benefit from skilled PT to address his pain, improve his functional mobility and return to his PLOF. PROBLEM LIST (Impacting functional limitations): 1. Decreased Strength 2. Decreased ADL/Functional Activities 3. Decreased Transfer Abilities 4. Decreased Ambulation Ability/Technique 5. Increased Pain 6. Decreased Flexibility/Joint Mobility 7. Decreased Knowledge of Precautions 8. Decreased [place] with Home Exercise Program INTERVENTIONS PLANNED: 
1. Balance Exercise 2. Bed Mobility 3. Cold 4. Electrical Stimulation 5. Gait Training 6. Heat 7. Home Exercise Program (HEP) 8. Manual Therapy 9. Range of Motion (ROM) 10. Therapeutic Activites 11. Therapeutic Exercise/Strengthening 12. Transfer Training TREATMENT PLAN: 
Effective Dates: 9/28/2018 TO 11/27/2018 (60 days). Frequency/Duration: 2 times a week for 60 Days GOALS: (Goals have been discussed and agreed upon with patient.) Short-Term Functional Goals: Time Frame: 30 days 1. Patient will be compliant with HEP. MET 2. Patient will have a decrease on the [address] to 20/100 indicating a reduction in symptoms. MET 3. Patient will demonstrate sit to supine and supine to sit with no dizziness indicating symptoms are resolved. MET Discharge Goals: Time Frame: 60 days 1. Patient will be independent with HEP. 2. Patient will have a decrease on the Oswestry to 5/50 indicating improved functional mobility.  
3. Patient will verbalize pain no greater than 3/10 while lying supine with modifications indicating improved position tolerance. 4.  
Rehabilitation Potential For Stated Goals: Good Regarding Guero Bazan's therapy, I certify that the treatment plan above will be carried out by a therapist or under their direction. Thank you for this referral, 
Wojciech Napoles, PT, DPT HISTORY:  
Patient Stated Goal: 
      Get rid of the back pain and dizziness History of Present Injury/Illness (Reason for Referral): 
Patient reports his low back hurts. More on the R side. He reports he is staying with his sister and he is sleeping on a love seat. When he goes to his girlfriends house, he sleeps in a bed and it hurts. This has been going on for a couple of weeks. He takes muscle relaxer. Difficulty getting dressed when its hurting. Pain 8/10. Supine is worse than sitting or standing He gets room spinning dizziness when he wakes up. Thinks it might be worse on his right side. Last 30 seconds to a minute. Past Medical History/Comorbidities:  
Mr. Alissa Shoemaker  has a past medical history of Allergic rhinitis due to animal (cat) (dog) hair and dander, Allergic rhinitis due to other allergen, Allergic rhinitis due to pollen, Arthritis, Asthma, Chronic kidney disease, stage III (moderate), Diabetes ([address] Diastolic CHF, chronic ([address] Encounter for ophthalmic examination and evaluation, Gout, unspecified, Hypertension, Hypertension, uncontrolled, Morbid obesity ([address] Sleep apnea, Type II or unspecified type diabetes mellitus without mention of complication, uncontrolled, Unspecified asthma(493.90), Unspecified essential hypertension, Unspecified sleep apnea, and Unspecified vitamin D deficiency.   Mr. Alissa Shoemaker  has a past surgical history that includes hx tonsillectomy; hx premalig/benign skin lesion excision (Right); hx amputation (Right); pr colsc flx w/removal lesion by hot bx forceps (8/26/2016); COLONOSCOPY / BMI 47 (N/A, 8/26/2016); and ENDOSCOPIC POLYPECTOMY (N/A, 8/26/2016). Social History/Living Environment:  
  staying with his sister currently. Prior Level of Function/Work/Activity: 
Worked at [employer] Current Medications:   
Current Outpatient Medications:  
  spironolactone (ALDACTONE) 25 mg tablet, Take 1 Tab by mouth two (2) times a day., Disp: 60 Tab, Rfl: 5 
  cloNIDine HCl (CATAPRES) 0.2 mg tablet, Take 2 Tabs by mouth two (2) times a day., Disp: 120 Tab, Rfl: 5   cyclobenzaprine (FLEXERIL) 10 mg tablet, Take 1 Tab by mouth two (2) times daily as needed for Muscle Spasm(s). Indications: Muscle Spasm, Disp: 30 Tab, Rfl: 0 
  atorvastatin (LIPITOR) 10 mg tablet, Take 1 Tab by mouth every morning. Indications: high cholesterol, Disp: 30 Tab, Rfl: 2   carvedilol (COREG) 25 mg tablet, Take 1 Tab by mouth two (2) times daily (with meals). Indications: high blood pressure, Disp: 60 Tab, Rfl: 2 
  allopurinol (ZYLOPRIM) 100 mg tablet, Take 2 Tabs by mouth every morning. Indications: for gout attack prevention, Disp: 60 Tab, Rfl: 2 
  metFORMIN (GLUCOPHAGE) 500 mg tablet, Take 1 Tab by mouth two (2) times daily (with meals). , Disp: 60 Tab, Rfl: 2 
  losartan-hydroCHLOROthiazide (HYZAAR) 100-25 mg per tablet, Take 1 Tab by mouth daily. , Disp: 30 Tab, Rfl: 11 
  magnesium oxide (MAG-OX) 400 mg tablet, Take 1 Tab by mouth two (2) times a day., Disp: 60 Tab, Rfl: 11 
  cpap machine kit, by Does Not Apply route., Disp: , Rfl:   
 
Date Last Reviewed:  10/29/2018 Number of Personal Factors/Comorbidities that affect the Plan of Care: 1-2: MODERATE COMPLEXITY EXAMINATION:  
Observation/Orthostatic Postural Assessment:   
      Overweight Mental Status: WFL Palpation:   
      Tender to touch over R lateral low back coming around to the front Sensation:  
      Grossly intact with some reports of tingling in L fingers. Skin Integrity:   
      intact Vision:   
      Functional  
Balance: Good static balance, fair dynamic balance Lower Extremity: 
 Strength PROM Action R L R  L Hip Flexion 4 4 Hip Extension Hip Abduction Hip Adduction Knee Flexion Knee Extension Dorsi Flexion Plantar Flexion Inversion Eversion Upper Extremity:  
      B shoulder flexion 4-/5 Functional Mobility:  
      Gait/Ambulation:  Ambulates with slow gait speed Transfers:  Push to stand, increased time Bed Mobility:  Dizziness and back pain with sit to supine Stairs:  NT Wheelchair:  NT  
       
Oculomotor Exam:· Eye Range of Motion:  full range of motion · Cervical Range of Motion:   diminished range of motion · Spontaneous nystagmus:  NO 
· Gaze holding nystagmus:  NO 
· Smooth Pursuit:   
  [x]Smooth Eye Movements []Delayed Eye Movements []Within Normal Limits []Other(comment): reports mild dizziness · Voluntary Saccades:   
  [x]Smooth Eye Movements []Delayed Eye Movements []Within Normal Limits []Other(comment): · Vestibular Ocular Reflex Testing:· Dynamic Visual Acuity Test:  NT line difference. Normal is less than or equal to 3 line difference. · Head Thrust Test: Negative to the right. Negativeto the left. · [facility] C[facility]ation[facility]:· Hallpike-Carlo testing presented as negative when head is turned to the right indicating that Benign Paroxysmal Positional Vertigo (BPPV) is resolved on the right. PALPATION: Increased tone R lumbar paraspinal, increased lordosis in prone, increased mobility during PA glides to L3-L5 SPECIAL TESTS: (-) slump, (-) SLR, (+) corky B 
 
BALANCE: R 3s L 2s Lumbar AROM:15 degrees extension; pain in B hip flexion 55 degrees flexion. SB=WFL Rotation=WFL Body Structures Involved: 1. Eyes and Ears 2. Thoracic Cage 3. Bones 4. Joints 5. Muscles 6. Ligaments Body Functions Affected: 1. Sensory/Pain 2.  Neuromusculoskeletal 
 3. Movement Related Activities and Participation Affected: 1. Mobility 2. Self Care 3. Domestic Life 4. Interpersonal Interactions and Relationships Number of elements that affect the Plan of Care: 4+: HIGH COMPLEXITY CLINICAL PRESENTATION:  
Presentation: Evolving clinical presentation with changing clinical characteristics: MODERATE COMPLEXITY CLINICAL DECISION MAKING:  
Outcome Measure: Tool Used: Dizziness Handicap Index Score:  Initial: 50  Most Recent: 0  (Date: 10/1718 )- patient inially filled the questionnaire out 34/100 but then reported he was referencing his back pain. He reports no dizziness with all activities Interpretation of Score: To each item, the following scores may be assigned: No = 0, Sometimes = 2, Yes = 4. Scores greater than 10 points should be referred to balance specialists for further evaluation. Score 0 1-20 21-40 41-60 61-80 81-99 100 Modifier CH CI CJ CK CL CM CN Tool Used: Modified Oswestry Low Back Pain Questionnaire Score:  Initial: 15/50  Most Recent: X/50 (Date: -- ) Interpretation of Score: Each section is scored on a 0-5 scale, 5 representing the greatest disability. The scores of each section are added together for a total score of 50. Score 0 1-10 11-20 21-30 31-40 41-49 50 Modifier CH CI CJ CK CL CM CN Medical Necessity:  
· Patient is expected to demonstrate progress in strength, range of motion, balance and functional technique to decrease pain with functional mobility and decrease dizziness with bed mobility and transfers. · Patient demonstrates good rehab potential due to higher previous functional level. Reason for Services/Other Comments: 
· Patient continues to require modification of therapeutic interventions to increase complexity of exercises.   
Use of outcome tool(s) and clinical judgement create a POC that gives a: Questionable prediction of patient's progress: MODERATE COMPLEXITY  
TREATMENT:  
 (In addition to Assessment/Re-Assessment sessions the following treatments were rendered) Pre Treatment Symptoms: Pt notes he was slightly sore after last treatment session. Patient reports no dizziness. Pain in low back is 2-3/10. Therapeutic Exercise: ( 30 minutes):  Exercises per grid below to improve mobility and strength. Required moderate visual, verbal and tactile cues to promote proper body alignment and promote proper body mechanics. Progressed complexity of movement as indicated. Date: 
10/17/18 Date: 
10/24/18 Date: 
10/29/18 Activity/Exercise Parameters Parameters Parameters Assessment   AROM, Balance, Palpation, PA glides, POC Physiostep   12min Level 3 Nustep X 8 minutes with concurrent moist heat to back for pain relief X 10 minutes Level 3 Concurrent with moist heat to low back LTR X 5 reps to each side 5 reps x 10 second hold B   
TA activation  X 10 reps with 5 second hold- a lot of verbal and tactile cues needed to perform correctly  5 second hold x 10 reps with a lot of verbal and tactile cues needed to perform correctly Bridges  2 x 10 reps  2 x 10 reps MODALITIES: (0 minutes) patient left side lying for ice pack to right lower lateral trunk and low back to help decrease pain. MANUAL THERAPY: (15 minutes) pt prone for B SI joint mobilization. R SI joint mobilization with pt performing prone elbow press ups. All manual therapy performed to improve functional mobility. Treatment/Session Assessment:  No dizziness. Pt had pain during SI joint mobilization, Mobility improved following manual therapy. Pt educated on exercises to avoid at the gym for the time being until he is able to correctly brace and perform the movements. Pt weight displaced and increased load on lower back which is why his back is alleviated when he manually supports anterior fatty tissue in abdominal region. · Pain/ Symptoms: Initial:   2-3/10 back pain Post Session: 3/10 · Compliance with Program/Exercises: Will assess as treatment progresses. · Recommendations/Intent for next treatment session: \"Next visit will focus on advancements to more challenging activities and reduction in assistance provided\". Total Treatment Duration: PT Patient Time In/Time Out Time In: 1225 Time Out: 1400 Emerson Aparicio, PT, DPT

## 2018-11-02 ENCOUNTER — HOSPITAL ENCOUNTER (OUTPATIENT)
Dept: PHYSICAL THERAPY | Age: 53
Discharge: HOME | End: 2018-11-02
Payer: COMMERCIAL

## 2018-11-02 PROCEDURE — 97140 MANUAL THERAPY 1/> REGIONS: CPT

## 2018-11-02 PROCEDURE — 97110 THERAPEUTIC EXERCISES: CPT

## 2018-11-02 NOTE — PROGRESS NOTES
Pati Porter  : 1965  Primary: [employer]  Secondary:  [address]  at [facility]  Stephan[address], [address], [place], [facility]  [phone]   [phone]         OUTPATIENT PHYSICAL THERAPY:Daily Note 2018    ICD-10: Treatment Diagnosis: Dizziness and giddiness (R42)  Low back pain (M54.5)    Precautions/Allergies:   Patient has no known allergies. Fall Risk Score: 3 (? 5 = High Risk)  MD Orders: BPV x 2-3 weeks, lower back pain/tightness x 6+ months MEDICAL/REFERRING DIAGNOSIS:  Benign paroxysmal vertigo, left ear [H81.12]  Low back pain [M54.5]  Other chronic pain [G89.29]   DATE OF ONSET: few months ago for back and dizziness  REFERRING PHYSICIAN: Jennifer Estrella 82: 18     UPDATE (10/29/2018): Mr. Sherree Shone presents with complains of lower back pain along the belt line that does radiate in LEs. Pt notes his pain is slightly reduced with flexed legs and when \"pressing and carrying stomach\". Pt notes it is a strong dull pain and points directly over B SI joints. He displays tenderness along R lumbar parsapinals and tenderness over glute med R (R>L). He displays good strength and lumbar AROM but significantly decreased balance. He notes he suffers from severe B knee pain and therefore wears knee braces for stability. Patient states pain is worse when he is lying down flat and pain level then is 10/10. \"I sleep propped up when I can. Going to gym 2 x a week. I do the treadmill, standing abdominal resistive exercises, leg raises supine and sit ups. \" Patient reports he has bad knees and they are bone on bone and wearing a brace on his right knee. ASSESSMENT (Date: 10/17/18):  Mr. Sherree Shone has been seen in physical therapy for 2 visits since 18. His vertigo is now resolved and he has met all of his short term goals. Therapy will now focus on his back pain as indicated below.      At initial evaluation on 9/28/18: Mr. Lexi Ac presents to physical therapy with complaints of dizziness and right sided low back pain. He was positive for BPPV on the R and would benefit from 2-3 therapy sessions to address his vertigo. Upon resolving the vertigo, he would benefit from a few months of skilled PT to address his back pain. Pain increases with extension and is localized to the right side of the low back. He would benefit from skilled PT to address his pain, improve his functional mobility and return to his PLOF. PROBLEM LIST (Impacting functional limitations):  1. Decreased Strength  2. Decreased ADL/Functional Activities  3. Decreased Transfer Abilities  4. Decreased Ambulation Ability/Technique  5. Increased Pain  6. Decreased Flexibility/Joint Mobility  7. Decreased Knowledge of Precautions  8. Decreased Model with Home Exercise Program INTERVENTIONS PLANNED:  1. Balance Exercise  2. Bed Mobility  3. Cold  4. Electrical Stimulation  5. Gait Training  6. Heat  7. Home Exercise Program (HEP)  8. Manual Therapy  9. Range of Motion (ROM)  10. Therapeutic Activites  11. Therapeutic Exercise/Strengthening  12. Transfer Training   TREATMENT PLAN:  Effective Dates: 9/28/2018 TO 11/27/2018 (60 days). Frequency/Duration: 2 times a week for 60 Days  GOALS: (Goals have been discussed and agreed upon with patient.)  Short-Term Functional Goals: Time Frame: 30 days  1. Patient will be compliant with HEP. MET  2. Patient will have a decrease on the [address] to 20/100 indicating a reduction in symptoms. MET  3. Patient will demonstrate sit to supine and supine to sit with no dizziness indicating symptoms are resolved. MET  Discharge Goals: Time Frame: 60 days  1. Patient will be independent with HEP. 2. Patient will have a decrease on the Oswestry to 5/50 indicating improved functional mobility.   3. Patient will verbalize pain no greater than 3/10 while lying supine with modifications indicating improved position tolerance. 4.   Rehabilitation Potential For Stated Goals: Good    Regarding Guero Bazan's therapy, I certify that the treatment plan above will be carried out by a therapist or under their direction. Thank you for this referral,  Citlaly Brewer PTA                 HISTORY:   Patient Stated Goal:        Get rid of the back pain and dizziness  History of Present Injury/Illness (Reason for Referral):  Patient reports his low back hurts. More on the R side. He reports he is staying with his sister and he is sleeping on a love seat. When he goes to his girlfriends house, he sleeps in a bed and it hurts. This has been going on for a couple of weeks. He takes muscle relaxer. Difficulty getting dressed when its hurting. Pain 8/10. Supine is worse than sitting or standing    He gets room spinning dizziness when he wakes up. Thinks it might be worse on his right side. Last 30 seconds to a minute. Past Medical History/Comorbidities:   Mr. Julio Cesar Shaw  has a past medical history of Allergic rhinitis due to animal (cat) (dog) hair and dander, Allergic rhinitis due to other allergen, Allergic rhinitis due to pollen, Arthritis, Asthma, Chronic kidney disease, stage III (moderate), Diabetes ([address] Diastolic CHF, chronic ([address] Encounter for ophthalmic examination and evaluation, Gout, unspecified, Hypertension, Hypertension, uncontrolled, Morbid obesity ([address] Sleep apnea, Type II or unspecified type diabetes mellitus without mention of complication, uncontrolled, Unspecified asthma(493.90), Unspecified essential hypertension, Unspecified sleep apnea, and Unspecified vitamin D deficiency. Mr. Julio Cesar Shaw  has a past surgical history that includes hx tonsillectomy; hx premalig/benign skin lesion excision (Right); hx amputation (Right); pr colsc flx w/removal lesion by hot bx forceps (8/26/2016); COLONOSCOPY / BMI 47 (N/A, 8/26/2016); and ENDOSCOPIC POLYPECTOMY (N/A, 8/26/2016).   Social History/Living Environment: staying with his sister currently. Prior Level of Function/Work/Activity:  Worked at [employer]    Current Medications:    Current Outpatient Medications:     spironolactone (ALDACTONE) 25 mg tablet, Take 1 Tab by mouth two (2) times a day., Disp: 60 Tab, Rfl: 5    cloNIDine HCl (CATAPRES) 0.2 mg tablet, Take 2 Tabs by mouth two (2) times a day., Disp: 120 Tab, Rfl: 5    cyclobenzaprine (FLEXERIL) 10 mg tablet, Take 1 Tab by mouth two (2) times daily as needed for Muscle Spasm(s). Indications: Muscle Spasm, Disp: 30 Tab, Rfl: 0    atorvastatin (LIPITOR) 10 mg tablet, Take 1 Tab by mouth every morning. Indications: high cholesterol, Disp: 30 Tab, Rfl: 2    carvedilol (COREG) 25 mg tablet, Take 1 Tab by mouth two (2) times daily (with meals). Indications: high blood pressure, Disp: 60 Tab, Rfl: 2    allopurinol (ZYLOPRIM) 100 mg tablet, Take 2 Tabs by mouth every morning. Indications: for gout attack prevention, Disp: 60 Tab, Rfl: 2    metFORMIN (GLUCOPHAGE) 500 mg tablet, Take 1 Tab by mouth two (2) times daily (with meals). , Disp: 60 Tab, Rfl: 2    losartan-hydroCHLOROthiazide (HYZAAR) 100-25 mg per tablet, Take 1 Tab by mouth daily. , Disp: 30 Tab, Rfl: 11    magnesium oxide (MAG-OX) 400 mg tablet, Take 1 Tab by mouth two (2) times a day., Disp: 60 Tab, Rfl: 11    cpap machine kit, by Does Not Apply route., Disp: , Rfl:      Date Last Reviewed:  11/2/2018   Number of Personal Factors/Comorbidities that affect the Plan of Care: 1-2: MODERATE COMPLEXITY   EXAMINATION:   Observation/Orthostatic Postural Assessment:          Overweight     Mental Status:          WFL  Palpation:          Tender to touch over R lateral low back coming around to the front  Sensation:         Grossly intact with some reports of tingling in L fingers.     Skin Integrity:          intact  Vision:          Functional   Balance:          Good static balance, fair dynamic balance    Lower Extremity:   Strength PROM   Action R L R  L Hip Flexion 4 4     Hip Extension       Hip Abduction       Hip Adduction       Knee Flexion       Knee Extension       Dorsi Flexion       Plantar Flexion       Inversion       Eversion                 Upper Extremity:         B shoulder flexion 4-/5  Functional Mobility:         Gait/Ambulation:  Ambulates with slow gait speed        Transfers:  Push to stand, increased time        Bed Mobility:  Dizziness and back pain with sit to supine        Stairs:  NT        Wheelchair:  NT           Oculomotor Exam:  · Eye Range of Motion:  full range of motion  · Cervical Range of Motion:   diminished range of motion  · Spontaneous nystagmus:  NO  · Gaze holding nystagmus:  NO  · Smooth Pursuit:      [x]Smooth Eye Movements    []Delayed Eye Movements     []Within Normal Limits     []Other(comment): reports mild dizziness  · Voluntary Saccades:      [x]Smooth Eye Movements    []Delayed Eye Movements     []Within Normal Limits     []Other(comment): ·   Vestibular Ocular Reflex Testing:  · Dynamic Visual Acuity Test:  NT line difference. Normal is less than or equal to 3 line difference. · Head Thrust Test: Negative to the right. Negativeto the left. · VOR Cancellation: WFL  Position Tests:  · Hallpike-Odenton testing presented as negative when head is turned to the right indicating that Benign Paroxysmal Positional Vertigo (BPPV) is resolved on the right. PALPATION: Increased tone R lumbar paraspinal, increased lordosis in prone, increased mobility during PA glides to L3-L5    SPECIAL TESTS: (-) slump, (-) SLR, (+) corky B    BALANCE: R 3s L 2s    Lumbar AROM:15 degrees extension; pain in B hip flexion  55 degrees flexion. SB=WFL Rotation=WFL     Body Structures Involved:  1. Eyes and Ears  2. Thoracic Cage  3. Bones  4. Joints  5. Muscles  6. Ligaments Body Functions Affected:  1. Sensory/Pain  2. Neuromusculoskeletal  3. Movement Related Activities and Participation Affected:  1. Mobility  2. Self Care  3.  Domestic Life  4. Interpersonal Interactions and Relationships   Number of elements that affect the Plan of Care: 4+: HIGH COMPLEXITY   CLINICAL PRESENTATION:   Presentation: Evolving clinical presentation with changing clinical characteristics: MODERATE COMPLEXITY   CLINICAL DECISION MAKING:   Outcome Measure: Tool Used: Dizziness Handicap Index  Score:  Initial: 50  Most Recent: 0  (Date: 10/1718 )- patient inially filled the questionnaire out 34/100 but then reported he was referencing his back pain. He reports no dizziness with all activities   Interpretation of Score: To each item, the following scores may be assigned: No = 0, Sometimes = 2, Yes = 4. Scores greater than 10 points should be referred to balance specialists for further evaluation. Score 0 1-20 21-40 41-60 61-80 81-99 100   Modifier CH CI CJ CK CL CM CN         Tool Used: Modified Oswestry Low Back Pain Questionnaire  Score:  Initial: 15/50  Most Recent: X/50 (Date: -- )   Interpretation of Score: Each section is scored on a 0-5 scale, 5 representing the greatest disability. The scores of each section are added together for a total score of 50. Score 0 1-10 11-20 21-30 31-40 41-49 50   Modifier CH CI CJ CK CL CM CN         Medical Necessity:   · Patient is expected to demonstrate progress in strength, range of motion, balance and functional technique to decrease pain with functional mobility and decrease dizziness with bed mobility and transfers. · Patient demonstrates good rehab potential due to higher previous functional level. Reason for Services/Other Comments:  · Patient continues to require modification of therapeutic interventions to increase complexity of exercises.    Use of outcome tool(s) and clinical judgement create a POC that gives a: Questionable prediction of patient's progress: MODERATE COMPLEXITY   TREATMENT:   (In addition to Assessment/Re-Assessment sessions the following treatments were rendered)  Pre Treatment Symptoms: Patient reports no dizziness. Pain in low back is 4/10. Therapeutic Exercise: ( 25 minutes):  Exercises per grid below to improve mobility and strength. Required moderate visual, verbal and tactile cues to promote proper body alignment and promote proper body mechanics. Progressed complexity of movement as indicated. Date:  10/17/18 Date:  10/24/18 Date:  10/29/18 Date:  11-2-18   Activity/Exercise Parameters Parameters Parameters    Assessment   AROM, Balance, Palpation, PA glides, POC    Physiostep   12min  Level 3    Nustep X 8 minutes with concurrent moist heat to back for pain relief X 10 minutes  Level 3  Concurrent with moist heat to low back  Level 3  X 12 minutes   Concurrent with moist heat to low back    LTR X 5 reps to each side 5 reps x 10 second hold B  10 x 10 sec hold B   TA activation  X 10 reps with 5 second hold- a lot of verbal and tactile cues needed to perform correctly  5 second hold x 10 reps with a lot of verbal and tactile cues needed to perform correctly  10 x 5 sec hold with verbal cues to lift and perform correctly    Bridges  2 x 10 reps  2 x 10 reps    2 x 10 with 5 sec hold each one                           MODALITIES: (12 minutes) concurrent with patient on Nustep to increase mobility and decrease pain. Skin was clear and intact. MANUAL THERAPY: (16 minutes) patient positioned L side lying for soft tissue work manually and with thera-roller to R low back and gluteal region. Manual work performed to increase mobility and decrease pain and spasms. Treatment/Session Assessment:  No dizziness. Pt had some relief with manual work today. Pt educated on sleeping positions for bed. Continue to work on core stability. · Pain/ Symptoms: Initial:   4/10 back pain Post Session: 2/10 ·   Compliance with Program/Exercises: Will assess as treatment progresses. · Recommendations/Intent for next treatment session:  \"Next visit will focus on advancements to more challenging activities and reduction in assistance provided\".   Total Treatment Duration: 41 minutes   PT Patient Time In/Time Out  Time In: 1300  Time Out: 1341        Angelica Julio PTA

## 2018-11-08 ENCOUNTER — HOSPITAL ENCOUNTER (OUTPATIENT)
Dept: PHYSICAL THERAPY | Age: 53
Discharge: HOME | End: 2018-11-08
Payer: COMMERCIAL

## 2018-11-08 PROCEDURE — 97110 THERAPEUTIC EXERCISES: CPT

## 2018-11-08 NOTE — PROGRESS NOTES
Roque Frankel  : 1965  Primary: [employer]  Secondary:  [address], [place], [address] [facility]  [phone]   [phone]         OUTPATIENT PHYSICAL THERAPY:Daily Note 2018    ICD-10: Treatment Diagnosis: Dizziness and giddiness (R42)  Low back pain (M54.5)    Precautions/Allergies:   Patient has no known allergies. Fall Risk Score: 3 (? 5 = High Risk)  MD Orders: BPV x 2-3 weeks, lower back pain/tightness x 6+ months MEDICAL/REFERRING DIAGNOSIS:  Benign paroxysmal vertigo, left ear [H81.12]  Low back pain [M54.5]  Other chronic pain [G89.29]   DATE OF ONSET: few months ago for back and dizziness  REFERRING PHYSICIAN: Jennifer Estrella 82: 18     UPDATE (10/29/2018): Mr. Makenna Kumari presents with complains of lower back pain along the belt line that does radiate in LEs. Pt notes his pain is slightly reduced with flexed legs and when \"pressing and carrying stomach\". Pt notes it is a strong dull pain and points directly over B SI joints. He displays tenderness along R lumbar parsapinals and tenderness over glute med R (R>L). He displays good strength and lumbar AROM but significantly decreased balance. He notes he suffers from severe B knee pain and therefore wears knee braces for stability. Patient states pain is worse when he is lying down flat and pain level then is 10/10. \"I sleep propped up when I can. Going to gym 2 x a week. I do the treadmill, standing abdominal resistive exercises, leg raises supine and sit ups. \" Patient reports he has bad knees and they are bone on bone and wearing a brace on his right knee. ASSESSMENT (Date: 10/17/18):  Mr. Makenna Kumari has been seen in physical therapy for 2 visits since 18. His vertigo is now resolved and he has met all of his short term goals. Therapy will now focus on his back pain as indicated below.      At initial evaluation on 9/28/18: Mr. Lexi Ac presents to physical therapy with complaints of dizziness and right sided low back pain. He was positive for BPPV on the R and would benefit from 2-3 therapy sessions to address his vertigo. Upon resolving the vertigo, he would benefit from a few months of skilled PT to address his back pain. Pain increases with extension and is localized to the right side of the low back. He would benefit from skilled PT to address his pain, improve his functional mobility and return to his PLOF. PROBLEM LIST (Impacting functional limitations):  1. Decreased Strength  2. Decreased ADL/Functional Activities  3. Decreased Transfer Abilities  4. Decreased Ambulation Ability/Technique  5. Increased Pain  6. Decreased Flexibility/Joint Mobility  7. Decreased Knowledge of Precautions  8. Decreased [place] with Home Exercise Program INTERVENTIONS PLANNED:  1. Balance Exercise  2. Bed Mobility  3. Cold  4. Electrical Stimulation  5. Gait Training  6. Heat  7. Home Exercise Program (HEP)  8. Manual Therapy  9. Range of Motion (ROM)  10. Therapeutic Activites  11. Therapeutic Exercise/Strengthening  12. Transfer Training   TREATMENT PLAN:  Effective Dates: 9/28/2018 TO 11/27/2018 (60 days). Frequency/Duration: 2 times a week for 60 Days  GOALS: (Goals have been discussed and agreed upon with patient.)  Short-Term Functional Goals: Time Frame: 30 days  1. Patient will be compliant with HEP. MET  2. Patient will have a decrease on the [address] to 20/100 indicating a reduction in symptoms. MET  3. Patient will demonstrate sit to supine and supine to sit with no dizziness indicating symptoms are resolved. MET  Discharge Goals: Time Frame: 60 days  1. Patient will be independent with HEP. 2. Patient will have a decrease on the Oswestry to 5/50 indicating improved functional mobility.   3. Patient will verbalize pain no greater than 3/10 while lying supine with modifications indicating improved position tolerance. 4.   Rehabilitation Potential For Stated Goals: Good    Regarding Guero Bazan's therapy, I certify that the treatment plan above will be carried out by a therapist or under their direction. Thank you for this referral,  Geovanna Rees, PT, DPT                 HISTORY:   Patient Stated Goal:        Get rid of the back pain and dizziness  History of Present Injury/Illness (Reason for Referral):  Patient reports his low back hurts. More on the R side. He reports he is staying with his sister and he is sleeping on a love seat. When he goes to his girlfriends house, he sleeps in a bed and it hurts. This has been going on for a couple of weeks. He takes muscle relaxer. Difficulty getting dressed when its hurting. Pain 8/10. Supine is worse than sitting or standing    He gets room spinning dizziness when he wakes up. Thinks it might be worse on his right side. Last 30 seconds to a minute. Past Medical History/Comorbidities:   Mr. Makenna Kumari  has a past medical history of Allergic rhinitis due to animal (cat) (dog) hair and dander, Allergic rhinitis due to other allergen, Allergic rhinitis due to pollen, Arthritis, Asthma, Chronic kidney disease, stage III (moderate), Diabetes ([address] Diastolic CHF, chronic ([address] Encounter for ophthalmic examination and evaluation, Gout, unspecified, Hypertension, Hypertension, uncontrolled, Morbid obesity ([address] Sleep apnea, Type II or unspecified type diabetes mellitus without mention of complication, uncontrolled, Unspecified asthma(493.90), Unspecified essential hypertension, Unspecified sleep apnea, and Unspecified vitamin D deficiency. Mr. Makenna Kumari  has a past surgical history that includes hx tonsillectomy; hx premalig/benign skin lesion excision (Right); hx amputation (Right); pr colsc flx w/removal lesion by hot bx forceps (8/26/2016); COLONOSCOPY / BMI 47 (N/A, 8/26/2016); and ENDOSCOPIC POLYPECTOMY (N/A, 8/26/2016).   Social History/Living Environment: staying with his sister currently. Prior Level of Function/Work/Activity:  Worked at [employer]    Current Medications:    Current Outpatient Medications:     spironolactone (ALDACTONE) 25 mg tablet, Take 1 Tab by mouth two (2) times a day., Disp: 60 Tab, Rfl: 5    cloNIDine HCl (CATAPRES) 0.2 mg tablet, Take 2 Tabs by mouth two (2) times a day., Disp: 120 Tab, Rfl: 5    cyclobenzaprine (FLEXERIL) 10 mg tablet, Take 1 Tab by mouth two (2) times daily as needed for Muscle Spasm(s). Indications: Muscle Spasm, Disp: 30 Tab, Rfl: 0    atorvastatin (LIPITOR) 10 mg tablet, Take 1 Tab by mouth every morning. Indications: high cholesterol, Disp: 30 Tab, Rfl: 2    carvedilol (COREG) 25 mg tablet, Take 1 Tab by mouth two (2) times daily (with meals). Indications: high blood pressure, Disp: 60 Tab, Rfl: 2    allopurinol (ZYLOPRIM) 100 mg tablet, Take 2 Tabs by mouth every morning. Indications: for gout attack prevention, Disp: 60 Tab, Rfl: 2    metFORMIN (GLUCOPHAGE) 500 mg tablet, Take 1 Tab by mouth two (2) times daily (with meals). , Disp: 60 Tab, Rfl: 2    losartan-hydroCHLOROthiazide (HYZAAR) 100-25 mg per tablet, Take 1 Tab by mouth daily. , Disp: 30 Tab, Rfl: 11    magnesium oxide (MAG-OX) 400 mg tablet, Take 1 Tab by mouth two (2) times a day., Disp: 60 Tab, Rfl: 11    cpap machine kit, by Does Not Apply route., Disp: , Rfl:      Date Last Reviewed:  11/8/2018   Number of Personal Factors/Comorbidities that affect the Plan of Care: 1-2: MODERATE COMPLEXITY   EXAMINATION:   Observation/Orthostatic Postural Assessment:          Overweight     Mental Status:          WFL  Palpation:          Tender to touch over R lateral low back coming around to the front  Sensation:         Grossly intact with some reports of tingling in L fingers.     Skin Integrity:          intact  Vision:          Functional   Balance:          Good static balance, fair dynamic balance    Lower Extremity:   Strength PROM   Action R L R L    Hip Flexion 4 4     Hip Extension       Hip Abduction       Hip Adduction       Knee Flexion       Knee Extension       Dorsi Flexion       Plantar Flexion       Inversion       Eversion                 Upper Extremity:         B shoulder flexion 4-/5  Functional Mobility:         Gait/Ambulation:  Ambulates with slow gait speed        Transfers:  Push to stand, increased time        Bed Mobility:  Dizziness and back pain with sit to supine        Stairs:  NT        Wheelchair:  NT           Oculomotor Exam:  · Eye Range of Motion:  full range of motion  · Cervical Range of Motion:   diminished range of motion  · Spontaneous nystagmus:  NO  · Gaze holding nystagmus:  NO  · Smooth Pursuit:      [x]Smooth Eye Movements    []Delayed Eye Movements     []Within Normal Limits     []Other(comment): reports mild dizziness  · Voluntary Saccades:      [x]Smooth Eye Movements    []Delayed Eye Movements     []Within Normal Limits     []Other(comment): ·   Vestibular Ocular Reflex Testing:  · Dynamic Visual Acuity Test:  NT line difference. Normal is less than or equal to 3 line difference. · Head Thrust Test: Negative to the right. Negativeto the left. · VOR Cancellation: WFL  Position Tests:  · [place] testing presented as negative when head is turned to the right indicating that Benign Paroxysmal Positional Vertigo (BPPV) is resolved on the right. PALPATION: Increased tone R lumbar paraspinal, increased lordosis in prone, increased mobility during PA glides to L3-L5    SPECIAL TESTS: (-) slump, (-) SLR, (+) corky B    BALANCE: R 3s L 2s    Lumbar AROM:15 degrees extension; pain in B hip flexion  55 degrees flexion. SB=WFL Rotation=WFL     Body Structures Involved:  1. Eyes and Ears  2. Thoracic Cage  3. Bones  4. Joints  5. Muscles  6. Ligaments Body Functions Affected:  1. Sensory/Pain  2. Neuromusculoskeletal  3. Movement Related Activities and Participation Affected:  1. Mobility  2.  Self Care  3. Domestic Life  4. Interpersonal Interactions and Relationships   Number of elements that affect the Plan of Care: 4+: HIGH COMPLEXITY   CLINICAL PRESENTATION:   Presentation: Evolving clinical presentation with changing clinical characteristics: MODERATE COMPLEXITY   CLINICAL DECISION MAKING:   Outcome Measure: Tool Used: Dizziness Handicap Index  Score:  Initial: 50  Most Recent: 0  (Date: 10/1718 )- patient inially filled the questionnaire out 34/100 but then reported he was referencing his back pain. He reports no dizziness with all activities   Interpretation of Score: To each item, the following scores may be assigned: No = 0, Sometimes = 2, Yes = 4. Scores greater than 10 points should be referred to balance specialists for further evaluation. Score 0 1-20 21-40 41-60 61-80 81-99 100   Modifier CH CI CJ CK CL CM CN         Tool Used: Modified Oswestry Low Back Pain Questionnaire  Score:  Initial: 15/50  Most Recent: X/50 (Date: -- )   Interpretation of Score: Each section is scored on a 0-5 scale, 5 representing the greatest disability. The scores of each section are added together for a total score of 50. Score 0 1-10 11-20 21-30 31-40 41-49 50   Modifier CH CI CJ CK CL CM CN         Medical Necessity:   · Patient is expected to demonstrate progress in strength, range of motion, balance and functional technique to decrease pain with functional mobility and decrease dizziness with bed mobility and transfers. · Patient demonstrates good rehab potential due to higher previous functional level. Reason for Services/Other Comments:  · Patient continues to require modification of therapeutic interventions to increase complexity of exercises.    Use of outcome tool(s) and clinical judgement create a POC that gives a: Questionable prediction of patient's progress: MODERATE COMPLEXITY   TREATMENT:   (In addition to Assessment/Re-Assessment sessions the following treatments were rendered)  Pre Treatment Symptoms:  Patient states no improvements with low back pain. He notes he has been trying to go to the gym but has been unable to due to complications like traffic and life. Pt reports pain is 5/10 today in his low back but is having worse pain in B knees. Therapeutic Exercise: ( 45 minutes):  Exercises per grid below to improve mobility and strength. Required moderate visual, verbal and tactile cues to promote proper body alignment and promote proper body mechanics. Progressed complexity of movement as indicated. Date:  10/17/18 Date:  10/24/18 Date:  10/29/18 Date:  11-2-18 Date:  11/8/18   Activity/Exercise Parameters Parameters Parameters     Assessment   AROM, Balance, Palpation, PA glides, POC     Physiostep   12min  Level 3     Nustep X 8 minutes with concurrent moist heat to back for pain relief X 10 minutes  Level 3  Concurrent with moist heat to low back  Level 3  X 12 minutes   Concurrent with moist heat to low back  Level 4  X 13 minutes    LTR X 5 reps to each side 5 reps x 10 second hold B  10 x 10 sec hold B 10 x 10 sec hold B   TA activation  X 10 reps with 5 second hold- a lot of verbal and tactile cues needed to perform correctly  5 second hold x 10 reps with a lot of verbal and tactile cues needed to perform correctly  10 x 5 sec hold with verbal cues to lift and perform correctly  10 x 5 sec hold with verbal cues to lift and perform correctly    Bridges  2 x 10 reps  2 x 10 reps    2 x 10 with 5 sec hold each one     SLR flexion     1x10 reps   SLR abduction     1x10 reps   Bolster squeezes     1x10 reps  -hold 10s   Sit to stand     1x10 reps     MODALITIES: (0 minutes) concurrent with patient on Nustep to increase mobility and decrease pain. Skin was clear and intact. MANUAL THERAPY: (0 minutes) patient positioned L side lying for soft tissue work manually and with thera-roller to R low back and gluteal region.   Manual work performed to increase mobility and decrease pain and spasms. Treatment/Session Assessment:  Pt unable to perform SLR flexion on R LE very well due to level of difficulty. Pt required frequent rest breaks during bridging and bolster squeeze exercise. Pt unable to perform sit to stand with hands overhead due to difficulty and pain in lower back. Pt reported pain was /10 at the end of treatment session. Pt provided with updated HEP sheet. · Pain/ Symptoms: Initial:   5/10 back pain Post Session: 2/10 ·   Compliance with Program/Exercises: Will assess as treatment progresses. · Recommendations/Intent for next treatment session: \"Next visit will focus on advancements to more challenging activities and reduction in assistance provided\".   Total Treatment Duration: 41 minutes   PT Patient Time In/Time Out  Time In: 0845  Time Out: 0930        Ro November, PT, DPT

## 2018-11-09 ENCOUNTER — HOSPITAL ENCOUNTER (OUTPATIENT)
Dept: PHYSICAL THERAPY | Age: 53
Discharge: HOME | End: 2018-11-09
Payer: COMMERCIAL

## 2018-11-09 ENCOUNTER — HOSPITAL ENCOUNTER (OUTPATIENT)
Dept: GENERAL RADIOLOGY | Age: 53
Discharge: HOME | End: 2018-11-09
Payer: COMMERCIAL

## 2018-11-09 DIAGNOSIS — M54.50 LOW BACK PAIN: ICD-10-CM

## 2018-11-09 PROBLEM — G89.29 CHRONIC MIDLINE LOW BACK PAIN WITHOUT SCIATICA: Status: ACTIVE | Noted: 2018-11-09

## 2018-11-09 PROBLEM — M47.816 ARTHRITIS, LUMBAR SPINE: Status: ACTIVE | Noted: 2018-11-09

## 2018-11-09 PROBLEM — G89.29 CHRONIC RIGHT SHOULDER PAIN: Status: ACTIVE | Noted: 2018-11-09

## 2018-11-09 PROBLEM — M25.511 CHRONIC RIGHT SHOULDER PAIN: Status: ACTIVE | Noted: 2018-11-09

## 2018-11-09 PROCEDURE — 72100 X-RAY EXAM L-S SPINE 2/3 VWS: CPT

## 2018-11-09 PROCEDURE — 97110 THERAPEUTIC EXERCISES: CPT

## 2018-11-09 NOTE — PROGRESS NOTES
Brandi Smith  : 1965  Primary: [employer]  Secondary:  [address]  at [facility]  [address], [address], [place] [address] [address] [facility]  [phone]   [phone]         OUTPATIENT PHYSICAL THERAPY:Daily Note and Progress Report 2018    ICD-10: Treatment Diagnosis: Dizziness and giddiness (R42)  Low back pain (M54.5)    Precautions/Allergies:   Patient has no known allergies. Fall Risk Score: 3 (? 5 = High Risk)  MD Orders: BPV x 2-3 weeks, lower back pain/tightness x 6+ months MEDICAL/REFERRING DIAGNOSIS:  Benign paroxysmal vertigo, left ear [H81.12]  Low back pain [M54.5]  Other chronic pain [G89.29]   DATE OF ONSET: few months ago for back and dizziness  REFERRING PHYSICIAN: Jennifer Estrella 82: 18     UPDATE (10/29/2018): Mr. Juancho Newell did not improve on his outcome measure but has shown slight improvements in strength. Pt continues to be limited by pain levels in lower back and knee. Pt states he has also began to develop shoulder pain that is limiting him at this time. He states he is currently going to the gym to try and decrease weight. Pt is not compliant with HEP and only performs some exercises occasionally. Pt with chronic pain at this time and will continue efforts to improve strength and motor control to improve pelvic stabilization. Pt has not met long term goals at this time and they are still on going. ASSESSMENT (Date: 10/17/18):  Mr. Juancho Newell has not has been seen in physical therapy for 2 visits since 18. His vertigo is now resolved and he has met all of his short term goals. Therapy will now focus on his back pain as indicated below. At initial evaluation on 18: Mr. Juancho Newell presents to physical therapy with complaints of dizziness and right sided low back pain. He was positive for BPPV on the R and would benefit from 2-3 therapy sessions to address his vertigo.   Upon resolving the vertigo, he would benefit from a few months of skilled PT to address his back pain. Pain increases with extension and is localized to the right side of the low back. He would benefit from skilled PT to address his pain, improve his functional mobility and return to his PLOF. PROBLEM LIST (Impacting functional limitations):  1. Decreased Strength  2. Decreased ADL/Functional Activities  3. Decreased Transfer Abilities  4. Decreased Ambulation Ability/Technique  5. Increased Pain  6. Decreased Flexibility/Joint Mobility  7. Decreased Knowledge of Precautions  8. Decreased [place] with Home Exercise Program INTERVENTIONS PLANNED:  1. Balance Exercise  2. Bed Mobility  3. Cold  4. Electrical Stimulation  5. Gait Training  6. Heat  7. Home Exercise Program (HEP)  8. Manual Therapy  9. Range of Motion (ROM)  10. Therapeutic Activites  11. Therapeutic Exercise/Strengthening  12. Transfer Training   TREATMENT PLAN:  Effective Dates: 9/28/2018 TO 11/27/2018 (60 days). Frequency/Duration: 2 times a week for 60 Days  GOALS: (Goals have been discussed and agreed upon with patient.)  Short-Term Functional Goals: Time Frame: 30 days  1. Patient will be compliant with HEP. MET  2. Patient will have a decrease on the [address] to 20/100 indicating a reduction in symptoms. MET  3. Patient will demonstrate sit to supine and supine to sit with no dizziness indicating symptoms are resolved. MET  Discharge Goals: Time Frame: 60 days  1. Patient will be independent with HEP. NOT MET 11/9/2018  2. Patient will have a decrease on the Oswestry to 5/50 indicating improved functional mobility. NOT MET 11/9/2018  3. Patient will verbalize pain no greater than 3/10 while lying supine with modifications indicating improved position tolerance. NOT MET 11/9/2018  4.    Rehabilitation Potential For Stated Goals: Good    Regarding Guero Bazan's therapy, I certify that the treatment plan above will be carried out by a therapist or under their direction. Thank you for this referral,  Deena Vera, PT, DPT                 HISTORY:   Patient Stated Goal:        Get rid of the back pain and dizziness  History of Present Injury/Illness (Reason for Referral):  Patient reports his low back hurts. More on the R side. He reports he is staying with his sister and he is sleeping on a love seat. When he goes to his girlfriends house, he sleeps in a bed and it hurts. This has been going on for a couple of weeks. He takes muscle relaxer. Difficulty getting dressed when its hurting. Pain 8/10. Supine is worse than sitting or standing    He gets room spinning dizziness when he wakes up. Thinks it might be worse on his right side. Last 30 seconds to a minute. Past Medical History/Comorbidities:   Mr. Raimundo Funes  has a past medical history of Allergic rhinitis due to animal (cat) (dog) hair and dander, Allergic rhinitis due to other allergen, Allergic rhinitis due to pollen, Arthritis, Asthma, Chronic kidney disease, stage III (moderate) ([address] Diabetes ([address] Diastolic CHF, chronic ([address] Encounter for ophthalmic examination and evaluation, Gout, unspecified, Hypertension, Hypertension, uncontrolled, Morbid obesity ([address] Sleep apnea, Type II or unspecified type diabetes mellitus without mention of complication, uncontrolled, Unspecified asthma(493.90), Unspecified essential hypertension, Unspecified sleep apnea, and Unspecified vitamin D deficiency. Mr. Raimundo Funes  has a past surgical history that includes hx tonsillectomy; hx premalig/benign skin lesion excision (Right); hx amputation (Right); pr colsc flx w/removal lesion by hot bx forceps (8/26/2016); COLONOSCOPY / BMI 47 (N/A, 8/26/2016); and ENDOSCOPIC POLYPECTOMY (N/A, 8/26/2016). Social History/Living Environment:     staying with his sister currently.    Prior Level of Function/Work/Activity:  Worked at [employer]    Current Medications:    Current Outpatient Medications:     Cholecalciferol, Vitamin D3, (VITAMIN D3) 2,000 unit cap capsule, Take 4,000 Units by mouth two (2) times a day., Disp: 60 Cap, Rfl: 2    cloNIDine HCl (CATAPRES) 0.2 mg tablet, Take 2 Tabs by mouth two (2) times a day., Disp: 120 Tab, Rfl: 2    atorvastatin (LIPITOR) 10 mg tablet, Take 1 Tab by mouth every morning., Disp: 30 Tab, Rfl: 2    carvedilol (COREG) 25 mg tablet, Take 1 Tab by mouth two (2) times daily (with meals). , Disp: 60 Tab, Rfl: 2    allopurinol (ZYLOPRIM) 100 mg tablet, Take 2 Tabs by mouth every morning., Disp: 60 Tab, Rfl: 2    losartan-hydroCHLOROthiazide (HYZAAR) 100-25 mg per tablet, Take 1 Tab by mouth daily. , Disp: 30 Tab, Rfl: 11    metFORMIN (GLUCOPHAGE) 1,000 mg tablet, Take 1 Tab by mouth two (2) times daily (with meals). , Disp: 60 Tab, Rfl: 2    spironolactone (ALDACTONE) 25 mg tablet, Take 1 Tab by mouth two (2) times a day., Disp: 60 Tab, Rfl: 5    magnesium oxide (MAG-OX) 400 mg tablet, Take 1 Tab by mouth two (2) times a day., Disp: 60 Tab, Rfl: 11    cpap machine kit, by Does Not Apply route., Disp: , Rfl:      Date Last Reviewed:  11/9/2018   Number of Personal Factors/Comorbidities that affect the Plan of Care: 1-2: MODERATE COMPLEXITY   EXAMINATION:   Observation/Orthostatic Postural Assessment:          Overweight     Mental Status:          WFL  Palpation:  Z        Tender to touch over R lateral low back coming around to the front  Sensation:         Grossly intact with some reports of tingling in L fingers.     Skin Integrity:          intact  Vision:          Functional   Balance:          Good static balance, fair dynamic balance    Lower Extremity:   Strength PROM   Action R L R  L    Hip Flexion 4 4     Hip Extension       Hip Abduction       Hip Adduction       Knee Flexion       Knee Extension       Dorsi Flexion       Plantar Flexion       Inversion       Eversion                 Upper Extremity:         B shoulder flexion 4-/5  Functional Mobility:         Gait/Ambulation: Ambulates with slow gait speed        Transfers:  Push to stand, increased time        Bed Mobility:  Dizziness and back pain with sit to supine        Stairs:  NT        Wheelchair:  NT           Oculomotor Exam:  · Eye Range of Motion:  full range of motion  · Cervical Range of Motion:   diminished range of motion  · Spontaneous nystagmus:  NO  · Gaze holding nystagmus:  NO  · Smooth Pursuit:      [x]Smooth Eye Movements    []Delayed Eye Movements     []Within Normal Limits     []Other(comment): reports mild dizziness  · Voluntary Saccades:      [x]Smooth Eye Movements    []Delayed Eye Movements     []Within Normal Limits     []Other(comment): ·   Vestibular Ocular Reflex Testing:  · Dynamic Visual Acuity Test:  NT line difference. Normal is less than or equal to 3 line difference. · Head Thrust Test: Negative to the right. Negativeto the left. · VOR Cancellation: WFL  Position Tests:  · [place] testing presented as negative when head is turned to the right indicating that Benign Paroxysmal Positional Vertigo (BPPV) is resolved on the right. PALPATION: Increased tone R lumbar paraspinal, increased lordosis in prone, increased mobility during PA glides to L3-L5    SPECIAL TESTS: (-) slump, (-) SLR, (+) corky B    BALANCE: R 3s L 2s, (7s R, 2s L-11/9/2018)    Lumbar AROM: 15 degrees extension; pain in B hip flexion  55 degrees flexion. SB=WFL Rotation=WFL Measurements updated 11/9/2018-no change    LE strength: 4/5 all major muscle groups     Body Structures Involved:  1. Eyes and Ears  2. Thoracic Cage  3. Bones  4. Joints  5. Muscles  6. Ligaments Body Functions Affected:  1. Sensory/Pain  2. Neuromusculoskeletal  3. Movement Related Activities and Participation Affected:  1. Mobility  2. Self Care  3. Domestic Life  4.  Interpersonal Interactions and Relationships   Number of elements that affect the Plan of Care: 4+: HIGH COMPLEXITY   CLINICAL PRESENTATION:   Presentation: Evolving clinical presentation with changing clinical characteristics: MODERATE COMPLEXITY   CLINICAL DECISION MAKING:   Outcome Measure: Tool Used: Dizziness Handicap Index  Score:  Initial: 50  Most Recent: 0  (Date: 10/1718 )- patient inially filled the questionnaire out 34/100 but then reported he was referencing his back pain. He reports no dizziness with all activities   Interpretation of Score: To each item, the following scores may be assigned: No = 0, Sometimes = 2, Yes = 4. Scores greater than 10 points should be referred to balance specialists for further evaluation. Score 0 1-20 21-40 41-60 61-80 81-99 100   Modifier  CI CJ CK CL CM CN         Tool Used: Modified Oswestry Low Back Pain Questionnaire  Score:  Initial: 15/50  Most Recent: 15/50 (Date: 11/9/2018 )   Interpretation of Score: Each section is scored on a 0-5 scale, 5 representing the greatest disability. The scores of each section are added together for a total score of 50. Score 0 1-10 11-20 21-30 31-40 41-49 50   Modifier  CI CJ CK CL CM CN         Medical Necessity:   · Patient is expected to demonstrate progress in strength, range of motion, balance and functional technique to decrease pain with functional mobility and decrease dizziness with bed mobility and transfers. · Patient demonstrates good rehab potential due to higher previous functional level. Reason for Services/Other Comments:  · Patient continues to require modification of therapeutic interventions to increase complexity of exercises. Use of outcome tool(s) and clinical judgement create a POC that gives a: Questionable prediction of patient's progress: MODERATE COMPLEXITY   TREATMENT:   (In addition to Assessment/Re-Assessment sessions the following treatments were rendered)  Pre Treatment Symptoms:  No change as of 11/9/2018 Patient states no improvements with low back pain. Pt reports pain is 5/10 today in his low back but is having worse pain in B knees.     Therapeutic Exercise: ( 45 minutes):  Exercises per grid below to improve mobility and strength. Required moderate visual, verbal and tactile cues to promote proper body alignment and promote proper body mechanics. Progressed complexity of movement as indicated. Date:  10/17/18 Date:  10/24/18 Date:  10/29/18 Date:  11-2-18 Date:  11/8/18 Date:  11/9/2018   Activity/Exercise Parameters Parameters Parameters      Assessment   AROM, Balance, Palpation, PA glides, POC   Measurements for insurance   Physiostep   12min  Level 3      Nustep X 8 minutes with concurrent moist heat to back for pain relief X 10 minutes  Level 3  Concurrent with moist heat to low back  Level 3  X 12 minutes   Concurrent with moist heat to low back  Level 4  X 13 minutes  Level 4  X 13 minutes    LTR X 5 reps to each side 5 reps x 10 second hold B  10 x 10 sec hold B 10 x 10 sec hold B    TA activation  X 10 reps with 5 second hold- a lot of verbal and tactile cues needed to perform correctly  5 second hold x 10 reps with a lot of verbal and tactile cues needed to perform correctly  10 x 5 sec hold with verbal cues to lift and perform correctly  10 x 5 sec hold with verbal cues to lift and perform correctly     Bridges  2 x 10 reps  2 x 10 reps    2 x 10 with 5 sec hold each one      SLR flexion     1x10 reps    SLR abduction     1x10 reps    Bolster squeezes     1x10 reps  -hold 10s    Sit to stand     1x10 reps    Standing heel/toe raises      1x15 reps B   Standing hip flexion      1x15 reps B   Standing hip abduction      1x15 reps B   Standing hip extension      1x15 reps B   Standing marching      1x15 reps B   Standing calf stretch      3x30s              MODALITIES: (0 minutes) concurrent with patient on Nustep to increase mobility and decrease pain. Skin was clear and intact. MANUAL THERAPY: (0 minutes) patient positioned L side lying for soft tissue work manually and with thera-roller to R low back and gluteal region.   Manual work performed to increase mobility and decrease pain and spasms. Treatment/Session Assessment:  Pt unable to perform SLR flexion on R LE very well due to level of difficulty. Pt required frequent rest breaks during bridging and bolster squeeze exercise. Pt unable to perform sit to stand with hands overhead due to difficulty and pain in lower back. Pt reported pain was /10 at the end of treatment session. Pt provided with updated HEP sheet. · Pain/ Symptoms: Initial:   5/10 back pain Post Session: 2/10 ·   Compliance with Program/Exercises: Will assess as treatment progresses. · Recommendations/Intent for next treatment session: \"Next visit will focus on advancements to more challenging activities and reduction in assistance provided\".   Total Treatment Duration:   PT Patient Time In/Time Out  Time In: 1300  Time Out: 0[address] [facility] Wale Kennedy PT, DPT

## 2018-11-09 NOTE — PROGRESS NOTES
He has some arthritis in his lower spine. I would strongly advise weight loss (about 50 lbs), which would likely give him some relief from his back pain. Tylenol, NSAIDs can help as well, but weight loss is the most important thing he can do.

## 2018-11-12 ENCOUNTER — HOSPITAL ENCOUNTER (OUTPATIENT)
Dept: PHYSICAL THERAPY | Age: 53
Discharge: HOME | End: 2018-11-12
Payer: COMMERCIAL

## 2018-11-12 PROCEDURE — 97014 ELECTRIC STIMULATION THERAPY: CPT

## 2018-11-12 PROCEDURE — 97110 THERAPEUTIC EXERCISES: CPT

## 2018-11-12 NOTE — PROGRESS NOTES
Michelle Young  : 1965  Primary: [employer]  Secondary:  [address]  at [facility]  [address], [address], [place], [facility], [place]  [phone]   [phone]         OUTPATIENT PHYSICAL THERAPY:Daily Note 2018    ICD-10: Treatment Diagnosis: Dizziness and giddiness (R42)  Low back pain (M54.5)    Precautions/Allergies:   Patient has no known allergies. Fall Risk Score: 3 (? 5 = High Risk)  MD Orders: BPV x 2-3 weeks, lower back pain/tightness x 6+ months MEDICAL/REFERRING DIAGNOSIS:  Benign paroxysmal vertigo, left ear [H81.12]  Low back pain [M54.5]  Other chronic pain [G89.29]   DATE OF ONSET: few months ago for back and dizziness  REFERRING PHYSICIAN: Jennifer Estrella 82: 18     UPDATE (10/29/2018): Mr. Alon Renee presents with complains of lower back pain along the belt line that does radiate in LEs. Pt notes his pain is slightly reduced with flexed legs and when \"pressing and carrying stomach\". Pt notes it is a strong dull pain and points directly over B SI joints. He displays tenderness along R lumbar parsapinals and tenderness over glute med R (R>L). He displays good strength and lumbar AROM but significantly decreased balance. He notes he suffers from severe B knee pain and therefore wears knee braces for stability. Patient states pain is worse when he is lying down flat and pain level then is 10/10. \"I sleep propped up when I can. Going to gym 2 x a week. I do the treadmill, standing abdominal resistive exercises, leg raises supine and sit ups. \" Patient reports he has bad knees and they are bone on bone and wearing a brace on his right knee. ASSESSMENT (Date: 10/17/18):  Mr. Alon Renee has been seen in physical therapy for 2 visits since 18. His vertigo is now resolved and he has met all of his short term goals. Therapy will now focus on his back pain as indicated below.      At initial evaluation on 9/28/18: Mr. Swetha Oneal presents to physical therapy with complaints of dizziness and right sided low back pain. He was positive for BPPV on the R and would benefit from 2-3 therapy sessions to address his vertigo. Upon resolving the vertigo, he would benefit from a few months of skilled PT to address his back pain. Pain increases with extension and is localized to the right side of the low back. He would benefit from skilled PT to address his pain, improve his functional mobility and return to his PLOF. PROBLEM LIST (Impacting functional limitations):  1. Decreased Strength  2. Decreased ADL/Functional Activities  3. Decreased Transfer Abilities  4. Decreased Ambulation Ability/Technique  5. Increased Pain  6. Decreased Flexibility/Joint Mobility  7. Decreased Knowledge of Precautions  8. Decreased [place] with Home Exercise Program INTERVENTIONS PLANNED:  1. Balance Exercise  2. Bed Mobility  3. Cold  4. Electrical Stimulation  5. Gait Training  6. Heat  7. Home Exercise Program (HEP)  8. Manual Therapy  9. Range of Motion (ROM)  10. Therapeutic Activites  11. Therapeutic Exercise/Strengthening  12. Transfer Training   TREATMENT PLAN:  Effective Dates: 9/28/2018 TO 11/27/2018 (60 days). Frequency/Duration: 2 times a week for 60 Days  GOALS: (Goals have been discussed and agreed upon with patient.)  Short-Term Functional Goals: Time Frame: 30 days  1. Patient will be compliant with HEP. MET  2. Patient will have a decrease on the [address] to 20/100 indicating a reduction in symptoms. MET  3. Patient will demonstrate sit to supine and supine to sit with no dizziness indicating symptoms are resolved. MET  Discharge Goals: Time Frame: 60 days  1. Patient will be independent with HEP. 2. Patient will have a decrease on the Oswestry to 5/50 indicating improved functional mobility.   3. Patient will verbalize pain no greater than 3/10 while lying supine with modifications indicating improved position tolerance. 4.   Rehabilitation Potential For Stated Goals: Good    Regarding Guero Bazan's therapy, I certify that the treatment plan above will be carried out by a therapist or under their direction. Thank you for this referral,  Rossana Ibrahim PTA                 HISTORY:   Patient Stated Goal:        Get rid of the back pain and dizziness  History of Present Injury/Illness (Reason for Referral):  Patient reports his low back hurts. More on the R side. He reports he is staying with his sister and he is sleeping on a love seat. When he goes to his girlfriends house, he sleeps in a bed and it hurts. This has been going on for a couple of weeks. He takes muscle relaxer. Difficulty getting dressed when its hurting. Pain 8/10. Supine is worse than sitting or standing    He gets room spinning dizziness when he wakes up. Thinks it might be worse on his right side. Last 30 seconds to a minute. Past Medical History/Comorbidities:   Mr. Odin Mccormick  has a past medical history of Allergic rhinitis due to animal (cat) (dog) hair and dander, Allergic rhinitis due to other allergen, Allergic rhinitis due to pollen, Arthritis, Asthma, Chronic kidney disease, stage III (moderate) ([address] Diabetes ([address] Diastolic CHF, chronic ([address] Encounter for ophthalmic examination and evaluation, Gout, unspecified, Hypertension, Hypertension, uncontrolled, Morbid obesity ([address] Sleep apnea, Type II or unspecified type diabetes mellitus without mention of complication, uncontrolled, Unspecified asthma(493.90), Unspecified essential hypertension, Unspecified sleep apnea, and Unspecified vitamin D deficiency. Mr. Odin Mccormick  has a past surgical history that includes hx tonsillectomy; hx premalig/benign skin lesion excision (Right); hx amputation (Right); pr colsc flx w/removal lesion by hot bx forceps (8/26/2016); COLONOSCOPY / BMI 47 (N/A, 8/26/2016); and ENDOSCOPIC POLYPECTOMY (N/A, 8/26/2016).   Social History/Living Environment: staying with his sister currently. Prior Level of Function/Work/Activity:  Worked at [employer]    Current Medications:    Current Outpatient Medications:     Cholecalciferol, Vitamin D3, (VITAMIN D3) 2,000 unit cap capsule, Take 4,000 Units by mouth two (2) times a day., Disp: 60 Cap, Rfl: 2    cloNIDine HCl (CATAPRES) 0.2 mg tablet, Take 2 Tabs by mouth two (2) times a day., Disp: 120 Tab, Rfl: 2    atorvastatin (LIPITOR) 10 mg tablet, Take 1 Tab by mouth every morning., Disp: 30 Tab, Rfl: 2    carvedilol (COREG) 25 mg tablet, Take 1 Tab by mouth two (2) times daily (with meals). , Disp: 60 Tab, Rfl: 2    allopurinol (ZYLOPRIM) 100 mg tablet, Take 2 Tabs by mouth every morning., Disp: 60 Tab, Rfl: 2    losartan-hydroCHLOROthiazide (HYZAAR) 100-25 mg per tablet, Take 1 Tab by mouth daily. , Disp: 30 Tab, Rfl: 11    metFORMIN (GLUCOPHAGE) 1,000 mg tablet, Take 1 Tab by mouth two (2) times daily (with meals). , Disp: 60 Tab, Rfl: 2    spironolactone (ALDACTONE) 25 mg tablet, Take 1 Tab by mouth two (2) times a day., Disp: 60 Tab, Rfl: 5    magnesium oxide (MAG-OX) 400 mg tablet, Take 1 Tab by mouth two (2) times a day., Disp: 60 Tab, Rfl: 11    cpap machine kit, by Does Not Apply route., Disp: , Rfl:      Date Last Reviewed:  11/12/2018   Number of Personal Factors/Comorbidities that affect the Plan of Care: 1-2: MODERATE COMPLEXITY   EXAMINATION:   Observation/Orthostatic Postural Assessment:          Overweight     Mental Status:          WFL  Palpation:          Tender to touch over R lateral low back coming around to the front  Sensation:         Grossly intact with some reports of tingling in L fingers.     Skin Integrity:          intact  Vision:          Functional   Balance:          Good static balance, fair dynamic balance    Lower Extremity:   Strength PROM   Action R L R  L    Hip Flexion 4 4     Hip Extension       Hip Abduction       Hip Adduction       Knee Flexion       Knee Extension Dorsi Flexion       Plantar Flexion       Inversion       Eversion                 Upper Extremity:         B shoulder flexion 4-/5  Functional Mobility:         Gait/Ambulation:  Ambulates with slow gait speed        Transfers:  Push to stand, increased time        Bed Mobility:  Dizziness and back pain with sit to supine        Stairs:  NT        Wheelchair:  NT           Oculomotor Exam:  · Eye Range of Motion:  full range of motion  · Cervical Range of Motion:   diminished range of motion  · Spontaneous nystagmus:  NO  · Gaze holding nystagmus:  NO  · Smooth Pursuit:      [x]Smooth Eye Movements    []Delayed Eye Movements     []Within Normal Limits     []Other(comment): reports mild dizziness  · Voluntary Saccades:      [x]Smooth Eye Movements    []Delayed Eye Movements     []Within Normal Limits     []Other(comment): ·   Vestibular Ocular Reflex Testing:  · Dynamic Visual Acuity Test:  NT line difference. Normal is less than or equal to 3 line difference. · Head Thrust Test: Negative to the right. Negativeto the left. · VOR Cancellation: WFL  Position Tests:  · [place] testing presented as negative when head is turned to the right indicating that Benign Paroxysmal Positional Vertigo (BPPV) is resolved on the right. PALPATION: Increased tone R lumbar paraspinal, increased lordosis in prone, increased mobility during PA glides to L3-L5    SPECIAL TESTS: (-) slump, (-) SLR, (+) corky B    BALANCE: R 3s L 2s    Lumbar AROM:15 degrees extension; pain in B hip flexion  55 degrees flexion. SB=WFL Rotation=WFL     Body Structures Involved:  1. Eyes and Ears  2. Thoracic Cage  3. Bones  4. Joints  5. Muscles  6. Ligaments Body Functions Affected:  1. Sensory/Pain  2. Neuromusculoskeletal  3. Movement Related Activities and Participation Affected:  1. Mobility  2. Self Care  3. Domestic Life  4.  Interpersonal Interactions and Relationships   Number of elements that affect the Plan of Care: 4+: HIGH COMPLEXITY   CLINICAL PRESENTATION:   Presentation: Evolving clinical presentation with changing clinical characteristics: MODERATE COMPLEXITY   CLINICAL DECISION MAKING:   Outcome Measure: Tool Used: Dizziness Handicap Index  Score:  Initial: 50  Most Recent: 0  (Date: 10/1718 )- patient inially filled the questionnaire out 34/100 but then reported he was referencing his back pain. He reports no dizziness with all activities   Interpretation of Score: To each item, the following scores may be assigned: No = 0, Sometimes = 2, Yes = 4. Scores greater than 10 points should be referred to balance specialists for further evaluation. Score 0 1-20 21-40 41-60 61-80 81-99 100   Modifier CH CI CJ CK CL CM CN         Tool Used: Modified Oswestry Low Back Pain Questionnaire  Score:  Initial: 15/50  Most Recent: X/50 (Date: -- )   Interpretation of Score: Each section is scored on a 0-5 scale, 5 representing the greatest disability. The scores of each section are added together for a total score of 50. Score 0 1-10 11-20 21-30 31-40 41-49 50   Modifier  CI CJ CK CL CM CN         Medical Necessity:   · Patient is expected to demonstrate progress in strength, range of motion, balance and functional technique to decrease pain with functional mobility and decrease dizziness with bed mobility and transfers. · Patient demonstrates good rehab potential due to higher previous functional level. Reason for Services/Other Comments:  · Patient continues to require modification of therapeutic interventions to increase complexity of exercises. Use of outcome tool(s) and clinical judgement create a POC that gives a: Questionable prediction of patient's progress: MODERATE COMPLEXITY   TREATMENT:   (In addition to Assessment/Re-Assessment sessions the following treatments were rendered)  Pre Treatment Symptoms:  Patient states painful all over my body today especially my knees and low back.  Patient states pain in low back is 5/10 today. Patient states he had an X-ray of his     Therapeutic Exercise: ( 25 minutes):  Exercises per grid below to improve mobility and strength. Required moderate visual, verbal and tactile cues to promote proper body alignment and promote proper body mechanics. Progressed complexity of movement as indicated. Date:  10/17/18 Date:  10/24/18 Date:  10/29/18 Date:  11-2-18 Date:  11/8/18 Date  11/12/18   Activity/Exercise Parameters Parameters Parameters      Assessment   AROM, Balance, Palpation, PA glides, POC      Physiostep   12min  Level 3      Nustep X 8 minutes with concurrent moist heat to back for pain relief X 10 minutes  Level 3  Concurrent with moist heat to low back  Level 3  X 12 minutes   Concurrent with moist heat to low back  Level 4  X 13 minutes  Level 4  12 minutes     LTR X 5 reps to each side 5 reps x 10 second hold B  10 x 10 sec hold B 10 x 10 sec hold B 10 x 10 second hold    TA activation  X 10 reps with 5 second hold- a lot of verbal and tactile cues needed to perform correctly  5 second hold x 10 reps with a lot of verbal and tactile cues needed to perform correctly  10 x 5 sec hold with verbal cues to lift and perform correctly  10 x 5 sec hold with verbal cues to lift and perform correctly     Bridges  2 x 10 reps  2 x 10 reps    2 x 10 with 5 sec hold each one      Standing SLR flexion     1x10 reps 2 x 10 reps  B   Standing SLR extension      2 x 10 reps   B   Standing SLR abduction     1x10 reps 2 x 10 reps   B   Bolster squeezes     1x10 reps  -hold 10s    Sit to stand     1x10 reps      MODALITIES: (15 minutes) Patient supine for moist heat and IFES at 28 ma for 15 minutes to help decrease pain and tightness and help improve mobilityu. Skin was clear and intact. MANUAL THERAPY: (0 minutes) patient positioned L side lying for soft tissue work manually and with thera-roller to R low back and gluteal region.   Manual work performed to increase mobility and decrease pain and spasms. Treatment/Session Assessment:  Pt with increased complaints of pain in multiple body parts and states he also has a sinus infection. Patient reports he had difficulty sleeping last night due to increased pain in knees and low back. Patient tolerated moist heat and electrical stimulation well with reports of decreased pain and improved ambulation with less pain. Focus on pain relief today. Continue per plan of care. · Pain/ Symptoms: Initial:   5/10 back pain Post Session: Less painful but patient did not give a number. ·   Compliance with Program/Exercises: Will assess as treatment progresses. · Recommendations/Intent for next treatment session: \"Next visit will focus on advancements to more challenging activities and reduction in assistance provided\".   Total Treatment Duration: 40 minutes   PT Patient Time In/Time Out  Time In: 1300  Time Out: [address], [place]

## 2018-11-27 ENCOUNTER — HOSPITAL ENCOUNTER (OUTPATIENT)
Dept: PHYSICAL THERAPY | Age: 53
Discharge: HOME | End: 2018-11-27
Payer: COMMERCIAL

## 2018-11-27 PROCEDURE — 97110 THERAPEUTIC EXERCISES: CPT

## 2018-11-27 NOTE — PROGRESS NOTES
Shonda Young  : 1965  Primary: [employer]  Secondary:  [address]  at [address] [facility], [address], [place], [address]  [phone]   [phone]         OUTPATIENT PHYSICAL THERAPY:Daily Note 2018    ICD-10: Treatment Diagnosis: Dizziness and giddiness (R42)  Low back pain (M54.5)    Precautions/Allergies:   Patient has no known allergies. Fall Risk Score: 3 (? 5 = High Risk)  MD Orders: BPV x 2-3 weeks, lower back pain/tightness x 6+ months MEDICAL/REFERRING DIAGNOSIS:  Benign paroxysmal vertigo, left ear [H81.12]  Low back pain [M54.5]  Other chronic pain [G89.29]   DATE OF ONSET: few months ago for back and dizziness  REFERRING PHYSICIAN: Jennifer Estrella 82: 18     UPDATE (10/29/2018): Mr. Albertina Alva presents with complains of lower back pain along the belt line that does radiate in LEs. Pt notes his pain is slightly reduced with flexed legs and when \"pressing and carrying stomach\". Pt notes it is a strong dull pain and points directly over B SI joints. He displays tenderness along R lumbar parsapinals and tenderness over glute med R (R>L). He displays good strength and lumbar AROM but significantly decreased balance. He notes he suffers from severe B knee pain and therefore wears knee braces for stability. Patient states pain is worse when he is lying down flat and pain level then is 10/10. \"I sleep propped up when I can. Going to gym 2 x a week. I do the treadmill, standing abdominal resistive exercises, leg raises supine and sit ups. \" Patient reports he has bad knees and they are bone on bone and wearing a brace on his right knee. ASSESSMENT (Date: 10/17/18):  Mr. Albertina Alva has been seen in physical therapy for 2 visits since 18. His vertigo is now resolved and he has met all of his short term goals. Therapy will now focus on his back pain as indicated below.      At initial evaluation on 9/28/18: Mr. Missael Mariano presents to physical therapy with complaints of dizziness and right sided low back pain. He was positive for BPPV on the R and would benefit from 2-3 therapy sessions to address his vertigo. Upon resolving the vertigo, he would benefit from a few months of skilled PT to address his back pain. Pain increases with extension and is localized to the right side of the low back. He would benefit from skilled PT to address his pain, improve his functional mobility and return to his PLOF. PROBLEM LIST (Impacting functional limitations):  1. Decreased Strength  2. Decreased ADL/Functional Activities  3. Decreased Transfer Abilities  4. Decreased Ambulation Ability/Technique  5. Increased Pain  6. Decreased Flexibility/Joint Mobility  7. Decreased Knowledge of Precautions  8. Decreased Bertie with Home Exercise Program INTERVENTIONS PLANNED:  1. Balance Exercise  2. Bed Mobility  3. Cold  4. Electrical Stimulation  5. Gait Training  6. Heat  7. Home Exercise Program (HEP)  8. Manual Therapy  9. Range of Motion (ROM)  10. Therapeutic Activites  11. Therapeutic Exercise/Strengthening  12. Transfer Training   TREATMENT PLAN:  Effective Dates: 9/28/2018 TO 11/27/2018 (60 days). Frequency/Duration: 2 times a week for 60 Days  GOALS: (Goals have been discussed and agreed upon with patient.)  Short-Term Functional Goals: Time Frame: 30 days  1. Patient will be compliant with HEP. MET  2. Patient will have a decrease on the [address] to 20/100 indicating a reduction in symptoms. MET  3. Patient will demonstrate sit to supine and supine to sit with no dizziness indicating symptoms are resolved. MET  Discharge Goals: Time Frame: 60 days  1. Patient will be independent with HEP. 2. Patient will have a decrease on the Oswestry to 5/50 indicating improved functional mobility.   3. Patient will verbalize pain no greater than 3/10 while lying supine with modifications indicating improved position tolerance. 4.   Rehabilitation Potential For Stated Goals: Good    Regarding Guero Bazan's therapy, I certify that the treatment plan above will be carried out by a therapist or under their direction. Thank you for this referral,  Otf Lo, PT, DPT                 HISTORY:   Patient Stated Goal:        Get rid of the back pain and dizziness  History of Present Injury/Illness (Reason for Referral):  Patient reports his low back hurts. More on the R side. He reports he is staying with his sister and he is sleeping on a love seat. When he goes to his girlfriends house, he sleeps in a bed and it hurts. This has been going on for a couple of weeks. He takes muscle relaxer. Difficulty getting dressed when its hurting. Pain 8/10. Supine is worse than sitting or standing    He gets room spinning dizziness when he wakes up. Thinks it might be worse on his right side. Last 30 seconds to a minute. Past Medical History/Comorbidities:   Mr. Albertina Alva  has a past medical history of Allergic rhinitis due to animal (cat) (dog) hair and dander, Allergic rhinitis due to other allergen, Allergic rhinitis due to pollen, Arthritis, Asthma, Chronic kidney disease, stage III (moderate) ([address] Diabetes ([address] Diastolic CHF, chronic ([address] Encounter for ophthalmic examination and evaluation, Gout, unspecified, Hypertension, Hypertension, uncontrolled, Morbid obesity ([address] Sleep apnea, Type II or unspecified type diabetes mellitus without mention of complication, uncontrolled, Unspecified asthma(493.90), Unspecified essential hypertension, Unspecified sleep apnea, and Unspecified vitamin D deficiency. Mr. Albertina Alva  has a past surgical history that includes hx tonsillectomy; hx premalig/benign skin lesion excision (Right); hx amputation (Right); pr colsc flx w/removal lesion by hot bx forceps (8/26/2016); COLONOSCOPY / BMI 47 (N/A, 8/26/2016); and ENDOSCOPIC POLYPECTOMY (N/A, 8/26/2016).   Social History/Living Environment:     staying with his sister currently. Prior Level of Function/Work/Activity:  Worked at [employer]    Current Medications:    Current Outpatient Medications:     Cholecalciferol, Vitamin D3, (VITAMIN D3) 2,000 unit cap capsule, Take 4,000 Units by mouth two (2) times a day., Disp: 60 Cap, Rfl: 2    cloNIDine HCl (CATAPRES) 0.2 mg tablet, Take 2 Tabs by mouth two (2) times a day., Disp: 120 Tab, Rfl: 2    atorvastatin (LIPITOR) 10 mg tablet, Take 1 Tab by mouth every morning., Disp: 30 Tab, Rfl: 2    carvedilol (COREG) 25 mg tablet, Take 1 Tab by mouth two (2) times daily (with meals). , Disp: 60 Tab, Rfl: 2    allopurinol (ZYLOPRIM) 100 mg tablet, Take 2 Tabs by mouth every morning., Disp: 60 Tab, Rfl: 2    losartan-hydroCHLOROthiazide (HYZAAR) 100-25 mg per tablet, Take 1 Tab by mouth daily. , Disp: 30 Tab, Rfl: 11    metFORMIN (GLUCOPHAGE) 1,000 mg tablet, Take 1 Tab by mouth two (2) times daily (with meals). , Disp: 60 Tab, Rfl: 2    spironolactone (ALDACTONE) 25 mg tablet, Take 1 Tab by mouth two (2) times a day., Disp: 60 Tab, Rfl: 5    magnesium oxide (MAG-OX) 400 mg tablet, Take 1 Tab by mouth two (2) times a day., Disp: 60 Tab, Rfl: 11    cpap machine kit, by Does Not Apply route., Disp: , Rfl:      Date Last Reviewed:  11/27/2018   Number of Personal Factors/Comorbidities that affect the Plan of Care: 1-2: MODERATE COMPLEXITY   EXAMINATION:   Observation/Orthostatic Postural Assessment:          Overweight     Mental Status:          WFL  Palpation:          Tender to touch over R lateral low back coming around to the front  Sensation:         Grossly intact with some reports of tingling in L fingers.     Skin Integrity:          intact  Vision:          Functional   Balance:          Good static balance, fair dynamic balance    Lower Extremity:   Strength PROM   Action R L R  L    Hip Flexion 4 4     Hip Extension       Hip Abduction       Hip Adduction       Knee Flexion       Knee Extension       Dorsi Flexion       Plantar Flexion       Inversion       Eversion                 Upper Extremity:         B shoulder flexion 4-/5  Functional Mobility:         Gait/Ambulation:  Ambulates with slow gait speed        Transfers:  Push to stand, increased time        Bed Mobility:  Dizziness and back pain with sit to supine        Stairs:  NT        Wheelchair:  NT           Oculomotor Exam:  · Eye Range of Motion:  full range of motion  · Cervical Range of Motion:   diminished range of motion  · Spontaneous nystagmus:  NO  · Gaze holding nystagmus:  NO  · Smooth Pursuit:      [x]Smooth Eye Movements    []Delayed Eye Movements     []Within Normal Limits     []Other(comment): reports mild dizziness  · Voluntary Saccades:      [x]Smooth Eye Movements    []Delayed Eye Movements     []Within Normal Limits     []Other(comment): ·   Vestibular Ocular Reflex Testing:  · Dynamic Visual Acuity Test:  NT line difference. Normal is less than or equal to 3 line difference. · Head Thrust Test: Negative to the right. Negativeto the left. · VOR Cancellation: WFL  Position Tests:  · Hallpike-[place] testing presented as negative when head is turned to the right indicating that Benign Paroxysmal Positional Vertigo (BPPV) is resolved on the right. PALPATION: Increased tone R lumbar paraspinal, increased lordosis in prone, increased mobility during PA glides to L3-L5    SPECIAL TESTS: (-) slump, (-) SLR, (+) corky B    BALANCE: R 3s L 2s    Lumbar AROM:15 degrees extension; pain in B hip flexion  55 degrees flexion. SB=WFL Rotation=WFL     Body Structures Involved:  1. Eyes and Ears  2. Thoracic Cage  3. Bones  4. Joints  5. Muscles  6. Ligaments Body Functions Affected:  1. Sensory/Pain  2. Neuromusculoskeletal  3. Movement Related Activities and Participation Affected:  1. Mobility  2. Self Care  3. Domestic Life  4.  Interpersonal Interactions and Relationships   Number of elements that affect the Plan of Care: 4+: HIGH COMPLEXITY   CLINICAL PRESENTATION:   Presentation: Evolving clinical presentation with changing clinical characteristics: MODERATE COMPLEXITY   CLINICAL DECISION MAKING:   Outcome Measure: Tool Used: Dizziness Handicap Index  Score:  Initial: 50  Most Recent: 0  (Date: 10/1718 )- patient inially filled the questionnaire out 34/100 but then reported he was referencing his back pain. He reports no dizziness with all activities   Interpretation of Score: To each item, the following scores may be assigned: No = 0, Sometimes = 2, Yes = 4. Scores greater than 10 points should be referred to balance specialists for further evaluation. Score 0 1-20 21-40 41-60 61-80 81-99 100   Modifier CH CI CJ CK CL CM CN         Tool Used: Modified Oswestry Low Back Pain Questionnaire  Score:  Initial: 15/50  Most Recent: X/50 (Date: -- )   Interpretation of Score: Each section is scored on a 0-5 scale, 5 representing the greatest disability. The scores of each section are added together for a total score of 50. Score 0 1-10 11-20 21-30 31-40 41-49 50   Modifier CH CI CJ CK CL CM CN         Medical Necessity:   · Patient is expected to demonstrate progress in strength, range of motion, balance and functional technique to decrease pain with functional mobility and decrease dizziness with bed mobility and transfers. · Patient demonstrates good rehab potential due to higher previous functional level. Reason for Services/Other Comments:  · Patient continues to require modification of therapeutic interventions to increase complexity of exercises. Use of outcome tool(s) and clinical judgement create a POC that gives a: Questionable prediction of patient's progress: MODERATE COMPLEXITY   TREATMENT:   (In addition to Assessment/Re-Assessment sessions the following treatments were rendered)  Pre Treatment Symptoms:  Patient states no improvements in low back pain.  He states he had x-ray of lower back which revealed \"arthritis\". Pt states he has not been performing HEP and has not been going to the gym regularly. Patient states pain in low back is 3/10 today. Therapeutic Exercise: ( 40 minutes):  Exercises per grid below to improve mobility and strength. Required moderate visual, verbal and tactile cues to promote proper body alignment and promote proper body mechanics. Progressed complexity of movement as indicated. Date:  11-2-18 Date:  11/8/18 Date  11/12/18 Date  11/27/2018   Activity/Exercise       Assessment       Physiostep    13min  Level 3   Nustep Level 3  X 12 minutes   Concurrent with moist heat to low back  Level 4  X 13 minutes  Level 4  12 minutes      LTR 10 x 10 sec hold B 10 x 10 sec hold B 10 x 10 second hold     TA activation  10 x 5 sec hold with verbal cues to lift and perform correctly  10 x 5 sec hold with verbal cues to lift and perform correctly      Bridges  2 x 10 with 5 sec hold each one       Standing heel toe raises    1x20 reps B   Standing SLR flexion  1x10 reps 2 x 10 reps  B 1 x 20 reps  B   Standing SLR extension   2 x 10 reps   B 1 x 20 reps  B   Standing SLR abduction  1x10 reps 2 x 10 reps   B 1 x 20 reps  B   Standing marching    1x20 reps B   Step-up    2x10 reps B   Calf stretch    3x1min   Supine hamstring stretch    3x30s B   Supine piriformis stretch       Bolster squeezes  1x10 reps  -hold 10s     Sit to stand  1x10 reps       MODALITIES: (0 minutes) Patient supine for moist heat and IFES at 28 ma for 15 minutes to help decrease pain and tightness and help improve mobility. Skin was clear and intact. MANUAL THERAPY: (0 minutes) patient positioned L side lying for soft tissue work manually and with thera-roller to R low back and gluteal region. Manual work performed to increase mobility and decrease pain and spasms. Treatment/Session Assessment:  Pt with complaints of pain in R knee following standing exercises.  Patient noted soreness and fatigue in B LEs and back but did not complain of pain in lower back. tolerated moist heat and electrical stimulation well with reports of decreased pain and improved ambulation with less pain. Focus on pain relief today. Continue per plan of care. · Pain/ Symptoms: Initial:   3/10 back pain Post Session: 1/10 ·   Compliance with Program/Exercises: Will assess as treatment progresses. · Recommendations/Intent for next treatment session: \"Next visit will focus on advancements to more challenging activities and reduction in assistance provided\".     Total Treatment Duration:   PT Patient Time In/Time Out  Time In: 1030  Time Out: [address] [facility] Wale Kennedy, PT, DPT

## 2018-11-29 ENCOUNTER — HOSPITAL ENCOUNTER (OUTPATIENT)
Dept: PHYSICAL THERAPY | Age: 53
Discharge: HOME | End: 2018-11-29
Payer: COMMERCIAL

## 2018-11-29 PROCEDURE — 97110 THERAPEUTIC EXERCISES: CPT

## 2018-11-29 NOTE — PROGRESS NOTES
Aminta Cornelius  : 1965  Primary: [employer]  Secondary:  [address]  at [facility]  Stephanie [address], [address], [place], [facility]  [phone]   [phone]         OUTPATIENT PHYSICAL THERAPY:Daily Note 2018    ICD-10: Treatment Diagnosis: Dizziness and giddiness (R42)  Low back pain (M54.5)    Precautions/Allergies:   Patient has no known allergies. Fall Risk Score: 3 (? 5 = High Risk)  MD Orders: BPV x 2-3 weeks, lower back pain/tightness x 6+ months MEDICAL/REFERRING DIAGNOSIS:  Benign paroxysmal vertigo, left ear [H81.12]  Low back pain [M54.5]  Other chronic pain [G89.29]   DATE OF ONSET: few months ago for back and dizziness  REFERRING PHYSICIAN: Jennifer Estrella 82: 18     UPDATE (10/29/2018): Mr. Chelo Noe presents with complains of lower back pain along the belt line that does radiate in LEs. Pt notes his pain is slightly reduced with flexed legs and when \"pressing and carrying stomach\". Pt notes it is a strong dull pain and points directly over B SI joints. He displays tenderness along R lumbar parsapinals and tenderness over glute med R (R>L). He displays good strength and lumbar AROM but significantly decreased balance. He notes he suffers from severe B knee pain and therefore wears knee braces for stability. Patient states pain is worse when he is lying down flat and pain level then is 10/10. \"I sleep propped up when I can. Going to gym 2 x a week. I do the treadmill, standing abdominal resistive exercises, leg raises supine and sit ups. \" Patient reports he has bad knees and they are bone on bone and wearing a brace on his right knee. ASSESSMENT (Date: 10/17/18):  Mr. Chelo Noe has been seen in physical therapy for 2 visits since 18. His vertigo is now resolved and he has met all of his short term goals. Therapy will now focus on his back pain as indicated below.      At initial evaluation on 9/28/18: Mr. Juancho Newell presents to physical therapy with complaints of dizziness and right sided low back pain. He was positive for BPPV on the R and would benefit from 2-3 therapy sessions to address his vertigo. Upon resolving the vertigo, he would benefit from a few months of skilled PT to address his back pain. Pain increases with extension and is localized to the right side of the low back. He would benefit from skilled PT to address his pain, improve his functional mobility and return to his PLOF. PROBLEM LIST (Impacting functional limitations):  1. Decreased Strength  2. Decreased ADL/Functional Activities  3. Decreased Transfer Abilities  4. Decreased Ambulation Ability/Technique  5. Increased Pain  6. Decreased Flexibility/Joint Mobility  7. Decreased Knowledge of Precautions  8. Decreased [place] with Home Exercise Program INTERVENTIONS PLANNED:  1. Balance Exercise  2. Bed Mobility  3. Cold  4. Electrical Stimulation  5. Gait Training  6. Heat  7. Home Exercise Program (HEP)  8. Manual Therapy  9. Range of Motion (ROM)  10. Therapeutic Activites  11. Therapeutic Exercise/Strengthening  12. Transfer Training   TREATMENT PLAN:  Effective Dates: 9/28/2018 TO 11/27/2018 (60 days). Frequency/Duration: 2 times a week for 60 Days  GOALS: (Goals have been discussed and agreed upon with patient.)  Short-Term Functional Goals: Time Frame: 30 days  1. Patient will be compliant with HEP. MET  2. Patient will have a decrease on the [address] to 20/100 indicating a reduction in symptoms. MET  3. Patient will demonstrate sit to supine and supine to sit with no dizziness indicating symptoms are resolved. MET  Discharge Goals: Time Frame: 60 days  1. Patient will be independent with HEP. 2. Patient will have a decrease on the Oswestry to 5/50 indicating improved functional mobility.   3. Patient will verbalize pain no greater than 3/10 while lying supine with modifications indicating improved position tolerance. 4.   Rehabilitation Potential For Stated Goals: Good    Regarding Guero Bazan's therapy, I certify that the treatment plan above will be carried out by a therapist or under their direction. Thank you for this referral,  Ro Grossman, PT, DPT                 HISTORY:   Patient Stated Goal:        Get rid of the back pain and dizziness  History of Present Injury/Illness (Reason for Referral):  Patient reports his low back hurts. More on the R side. He reports he is staying with his sister and he is sleeping on a love seat. When he goes to his girlfriends house, he sleeps in a bed and it hurts. This has been going on for a couple of weeks. He takes muscle relaxer. Difficulty getting dressed when its hurting. Pain 8/10. Supine is worse than sitting or standing    He gets room spinning dizziness when he wakes up. Thinks it might be worse on his right side. Last 30 seconds to a minute. Past Medical History/Comorbidities:   Mr. Dixie James  has a past medical history of Allergic rhinitis due to animal (cat) (dog) hair and dander, Allergic rhinitis due to other allergen, Allergic rhinitis due to pollen, Arthritis, Asthma, Chronic kidney disease, stage III (moderate) ([address] Diabetes ([address] Diastolic CHF, chronic ([address] Encounter for ophthalmic examination and evaluation, Gout, unspecified, Hypertension, Hypertension, uncontrolled, Morbid obesity ([address] Sleep apnea, Type II or unspecified type diabetes mellitus without mention of complication, uncontrolled, Unspecified asthma(493.90), Unspecified essential hypertension, Unspecified sleep apnea, and Unspecified vitamin D deficiency. Mr. Dixie James  has a past surgical history that includes hx tonsillectomy; hx premalig/benign skin lesion excision (Right); hx amputation (Right); pr colsc flx w/removal lesion by hot bx forceps (8/26/2016); COLONOSCOPY / BMI 47 (N/A, 8/26/2016); and ENDOSCOPIC POLYPECTOMY (N/A, 8/26/2016).   Social History/Living Environment:     staying with his sister currently. Prior Level of Function/Work/Activity:  Worked at [employer] re[employer]    Current Medications:    Current Outpatient Medications:     Cholecalciferol, Vitamin D3, (VITAMIN D3) 2,000 unit cap capsule, Take 4,000 Units by mouth two (2) times a day., Disp: 60 Cap, Rfl: 2    cloNIDine HCl (CATAPRES) 0.2 mg tablet, Take 2 Tabs by mouth two (2) times a day., Disp: 120 Tab, Rfl: 2    atorvastatin (LIPITOR) 10 mg tablet, Take 1 Tab by mouth every morning., Disp: 30 Tab, Rfl: 2    carvedilol (COREG) 25 mg tablet, Take 1 Tab by mouth two (2) times daily (with meals). , Disp: 60 Tab, Rfl: 2    allopurinol (ZYLOPRIM) 100 mg tablet, Take 2 Tabs by mouth every morning., Disp: 60 Tab, Rfl: 2    losartan-hydroCHLOROthiazide (HYZAAR) 100-25 mg per tablet, Take 1 Tab by mouth daily. , Disp: 30 Tab, Rfl: 11    metFORMIN (GLUCOPHAGE) 1,000 mg tablet, Take 1 Tab by mouth two (2) times daily (with meals). , Disp: 60 Tab, Rfl: 2    spironolactone (ALDACTONE) 25 mg tablet, Take 1 Tab by mouth two (2) times a day., Disp: 60 Tab, Rfl: 5    magnesium oxide (MAG-OX) 400 mg tablet, Take 1 Tab by mouth two (2) times a day., Disp: 60 Tab, Rfl: 11    cpap machine kit, by Does Not Apply route., Disp: , Rfl:      Date Last Reviewed:  11/29/2018   Number of Personal Factors/Comorbidities that affect the Plan of Care: 1-2: MODERATE COMPLEXITY   EXAMINATION:   Observation/Orthostatic Postural Assessment:          Overweight     Mental Status:          WFL  Palpation:          Tender to touch over R lateral low back coming around to the front  Sensation:         Grossly intact with some reports of tingling in L fingers.     Skin Integrity:          intact  Vision:          Functional   Balance:          Good static balance, fair dynamic balance    Lower Extremity:   Strength PROM   Action R L R  L    Hip Flexion 4 4     Hip Extension       Hip Abduction       Hip Adduction       Knee Flexion       Knee Extension       Dorsi Flexion       Plantar Flexion       Inversion       Eversion                 Upper Extremity:         B shoulder flexion 4-/5  Functional Mobility:         Gait/Ambulation:  Ambulates with slow gait speed        Transfers:  Push to stand, increased time        Bed Mobility:  Dizziness and back pain with sit to supine        Stairs:  NT        Wheelchair:  NT           Oculomotor Exam:  · Eye Range of Motion:  full range of motion  · Cervical Range of Motion:   diminished range of motion  · Spontaneous nystagmus:  NO  · Gaze holding nystagmus:  NO  · Smooth Pursuit:      [x]Smooth Eye Movements    []Delayed Eye Movements     []Within Normal Limits     []Other(comment): reports mild dizziness  · Voluntary Saccades:      [x]Smooth Eye Movements    []Delayed Eye Movements     []Within Normal Limits     []Other(comment): ·   Vestibular Ocular Reflex Testing:  · Dynamic Visual Acuity Test:  NT line difference. Normal is less than or equal to 3 line difference. · Head Thrust Test: Negative to the right. Negativeto the left. · VOR Cancellation: WFL  Position Tests:  · [place] testing presented as negative when head is turned to the right indicating that Benign Paroxysmal Positional Vertigo (BPPV) is resolved on the right. PALPATION: Increased tone R lumbar paraspinal, increased lordosis in prone, increased mobility during PA glides to L3-L5    SPECIAL TESTS: (-) slump, (-) SLR, (+) corky B    BALANCE: R 3s L 2s    Lumbar AROM:15 degrees extension; pain in B hip flexion  55 degrees flexion. SB=WFL Rotation=WFL     Body Structures Involved:  1. Eyes and Ears  2. Thoracic Cage  3. Bones  4. Joints  5. Muscles  6. Ligaments Body Functions Affected:  1. Sensory/Pain  2. Neuromusculoskeletal  3. Movement Related Activities and Participation Affected:  1. Mobility  2. Self Care  3. Domestic Life  4.  Interpersonal Interactions and Relationships   Number of elements that affect the Plan of Care: 4+: HIGH COMPLEXITY   CLINICAL PRESENTATION:   Presentation: Evolving clinical presentation with changing clinical characteristics: MODERATE COMPLEXITY   CLINICAL DECISION MAKING:   Outcome Measure: Tool Used: Dizziness Handicap Index  Score:  Initial: 50  Most Recent: 0  (Date: 10/1718 )- patient inially filled the questionnaire out 34/100 but then reported he was referencing his back pain. He reports no dizziness with all activities   Interpretation of Score: To each item, the following scores may be assigned: No = 0, Sometimes = 2, Yes = 4. Scores greater than 10 points should be referred to balance specialists for further evaluation. Score 0 1-20 21-40 41-60 61-80 81-99 100   Modifier CH CI CJ CK CL CM CN         Tool Used: Modified Oswestry Low Back Pain Questionnaire  Score:  Initial: 15/50  Most Recent: X/50 (Date: -- )   Interpretation of Score: Each section is scored on a 0-5 scale, 5 representing the greatest disability. The scores of each section are added together for a total score of 50. Score 0 1-10 11-20 21-30 31-40 41-49 50   Modifier CH CI CJ CK CL CM CN         Medical Necessity:   · Patient is expected to demonstrate progress in strength, range of motion, balance and functional technique to decrease pain with functional mobility and decrease dizziness with bed mobility and transfers. · Patient demonstrates good rehab potential due to higher previous functional level. Reason for Services/Other Comments:  · Patient continues to require modification of therapeutic interventions to increase complexity of exercises. Use of outcome tool(s) and clinical judgement create a POC that gives a: Questionable prediction of patient's progress: MODERATE COMPLEXITY   TREATMENT:   (In addition to Assessment/Re-Assessment sessions the following treatments were rendered)  Pre Treatment Symptoms: Pt notes he is sore in his lower back from going to the gym every day.  He states no improvements in low back pain at this time. Patient states pain in low back is 3/10 today. Therapeutic Exercise: ( 40 minutes):  Exercises per grid below to improve mobility and strength. Required moderate visual, verbal and tactile cues to promote proper body alignment and promote proper body mechanics. Progressed complexity of movement as indicated. Date:  11-2-18 Date:  11/8/18 Date  11/12/18 Date  11/27/2018 Date  11/29/2018   Activity/Exercise        Assessment        Physiostep    13min  Level 3 12 min  Level 3   Nustep Level 3  X 12 minutes   Concurrent with moist heat to low back  Level 4  X 13 minutes  Level 4  12 minutes       LTR 10 x 10 sec hold B 10 x 10 sec hold B 10 x 10 second hold      TA activation  10 x 5 sec hold with verbal cues to lift and perform correctly  10 x 5 sec hold with verbal cues to lift and perform correctly       Bridges  2 x 10 with 5 sec hold each one        Standing heel toe raises    1x20 reps B 1x20 reps B   Standing SLR flexion  1x10 reps 2 x 10 reps  B 1 x 20 reps  B 1 x 20 reps  B  -2# ankle weights   Standing SLR extension   2 x 10 reps   B 1 x 20 reps  B 1 x 20 reps  B  -2# ankle weights   Standing SLR abduction  1x10 reps 2 x 10 reps   B 1 x 20 reps  B 1 x 20 reps  B  -2# ankle weights   Standing marching    1x20 reps B 1 x 20 reps  B  -2# ankle weights  -blue airex   Step-up    2x10 reps B 2x10 reps B  -2# ankle weights   Calf stretch    3x1min 3x1min   Supine hamstring stretch    3x30s B    Supine piriformis stretch        Bolster squeezes  1x10 reps  -hold 10s      Sit to stand  1x10 reps      Matrix walkouts      10 laps B  -7.5#   Hallway walkout squat with weighted medicine ball     2 laps             MODALITIES: (0 minutes) Patient supine for moist heat and IFES at 27 ma for 15 minutes to help decrease pain and tightness and help improve mobility. Skin was clear and intact.     MANUAL THERAPY: (0 minutes) patient positioned L side lying for soft tissue work manually and with thera-roller to R low back and gluteal region. Manual work performed to increase mobility and decrease pain and spasms. Treatment/Session Assessment:  Pt with increased B knee pain during hallway exercise. Increased fatigue during all Matrix exercises and SOB following all exercises. Pt with complaints of pain in R knee following standing exercises. Patient noted soreness and fatigue in B LEs but did not complain of pain in lower back. Continue working on endurance and strength. · Pain/ Symptoms: Initial:   3/10 back pain Post Session: 1/10 ·   Compliance with Program/Exercises: Will assess as treatment progresses. · Recommendations/Intent for next treatment session: \"Next visit will focus on advancements to more challenging activities and reduction in assistance provided\".     Total Treatment Duration:   PT Patient Time In/Time Out  Time In: 1300  Time Out: [address] [facility] Rima Martínez, PT, DPT

## 2018-12-04 ENCOUNTER — HOSPITAL ENCOUNTER (OUTPATIENT)
Dept: PHYSICAL THERAPY | Age: 53
Discharge: HOME | End: 2018-12-04
Payer: COMMERCIAL

## 2018-12-04 PROCEDURE — 97110 THERAPEUTIC EXERCISES: CPT

## 2018-12-04 NOTE — PROGRESS NOTES
Estrella Titus  : 1965  Primary: [employer]  Secondary:  [address]  at [facility]  [address], [address], [place], [facility]  [phone]   [phone]         OUTPATIENT PHYSICAL THERAPY:Daily Note 2018    ICD-10: Treatment Diagnosis: Dizziness and giddiness (R42)  Low back pain (M54.5)    Precautions/Allergies:   Patient has no known allergies. Fall Risk Score: 3 (? 5 = High Risk)  MD Orders: BPV x 2-3 weeks, lower back pain/tightness x 6+ months MEDICAL/REFERRING DIAGNOSIS:  Benign paroxysmal vertigo, left ear [H81.12]  Low back pain [M54.5]  Other chronic pain [G89.29]   DATE OF ONSET: few months ago for back and dizziness  REFERRING PHYSICIAN: Jennifer Estrella 81: 18     UPDATE (10/29/2018): Mr. Guerrero Estrada presents with complains of lower back pain along the belt line that does radiate in LEs. Pt notes his pain is slightly reduced with flexed legs and when \"pressing and carrying stomach\". Pt notes it is a strong dull pain and points directly over B SI joints. He displays tenderness along R lumbar parsapinals and tenderness over glute med R (R>L). He displays good strength and lumbar AROM but significantly decreased balance. He notes he suffers from severe B knee pain and therefore wears knee braces for stability. Patient states pain is worse when he is lying down flat and pain level then is 10/10. \"I sleep propped up when I can. Going to gym 2 x a week. I do the treadmill, standing abdominal resistive exercises, leg raises supine and sit ups. \" Patient reports he has bad knees and they are bone on bone and wearing a brace on his right knee. ASSESSMENT (Date: 10/17/18):  Mr. Guerrero Estrada has been seen in physical therapy for 2 visits since 18. His vertigo is now resolved and he has met all of his short term goals. Therapy will now focus on his back pain as indicated below.      At initial evaluation on 9/28/18: Mr. Swetha Oneal presents to physical therapy with complaints of dizziness and right sided low back pain. He was positive for BPPV on the R and would benefit from 2-3 therapy sessions to address his vertigo. Upon resolving the vertigo, he would benefit from a few months of skilled PT to address his back pain. Pain increases with extension and is localized to the right side of the low back. He would benefit from skilled PT to address his pain, improve his functional mobility and return to his PLOF. PROBLEM LIST (Impacting functional limitations):  1. Decreased Strength  2. Decreased ADL/Functional Activities  3. Decreased Transfer Abilities  4. Decreased Ambulation Ability/Technique  5. Increased Pain  6. Decreased Flexibility/Joint Mobility  7. Decreased Knowledge of Precautions  8. Decreased [place] with Home Exercise Program INTERVENTIONS PLANNED:  1. Balance Exercise  2. Bed Mobility  3. Cold  4. Electrical Stimulation  5. Gait Training  6. Heat  7. Home Exercise Program (HEP)  8. Manual Therapy  9. Range of Motion (ROM)  10. Therapeutic Activites  11. Therapeutic Exercise/Strengthening  12. Transfer Training   TREATMENT PLAN:  Effective Dates: 9/28/2018 TO 11/27/2018 (60 days). Frequency/Duration: 2 times a week for 60 Days  GOALS: (Goals have been discussed and agreed upon with patient.)  Short-Term Functional Goals: Time Frame: 30 days  1. Patient will be compliant with HEP. MET  2. Patient will have a decrease on the [address] to 20/100 indicating a reduction in symptoms. MET  3. Patient will demonstrate sit to supine and supine to sit with no dizziness indicating symptoms are resolved. MET  Discharge Goals: Time Frame: 60 days  1. Patient will be independent with HEP. 2. Patient will have a decrease on the Oswestry to 5/50 indicating improved functional mobility.   3. Patient will verbalize pain no greater than 3/10 while lying supine with modifications indicating improved position tolerance. 4.   Rehabilitation Potential For Stated Goals: Good    Regarding Guero Bazan's therapy, I certify that the treatment plan above will be carried out by a therapist or under their direction. Thank you for this referral,  Deena Vera, PT, DPT                 HISTORY:   Patient Stated Goal:        Get rid of the back pain and dizziness  History of Present Injury/Illness (Reason for Referral):  Patient reports his low back hurts. More on the R side. He reports he is staying with his sister and he is sleeping on a love seat. When he goes to his girlfriends house, he sleeps in a bed and it hurts. This has been going on for a couple of weeks. He takes muscle relaxer. Difficulty getting dressed when its hurting. Pain 8/10. Supine is worse than sitting or standing    He gets room spinning dizziness when he wakes up. Thinks it might be worse on his right side. Last 30 seconds to a minute. Past Medical History/Comorbidities:   Mr. Raimundo Funes  has a past medical history of Allergic rhinitis due to animal (cat) (dog) hair and dander, Allergic rhinitis due to other allergen, Allergic rhinitis due to pollen, Arthritis, Asthma, Chronic kidney disease, stage III (moderate) ([address] Diabetes ([address] Diastolic CHF, chronic ([address] Encounter for ophthalmic examination and evaluation, Gout, unspecified, Hypertension, Hypertension, uncontrolled, Morbid obesity ([address] Sleep apnea, Type II or unspecified type diabetes mellitus without mention of complication, uncontrolled, Unspecified asthma(493.90), Unspecified essential hypertension, Unspecified sleep apnea, and Unspecified vitamin D deficiency. Mr. Raimundo Funes  has a past surgical history that includes hx tonsillectomy; hx premalig/benign skin lesion excision (Right); hx amputation (Right); pr colsc flx w/removal lesion by hot bx forceps (8/26/2016); COLONOSCOPY / BMI 47 (N/A, 8/26/2016); and ENDOSCOPIC POLYPECTOMY (N/A, 8/26/2016).   Social History/Living Environment:     staying with his sister currently. Prior Level of Function/Work/Activity:  Worked at [employer] re[employer]    Current Medications:    Current Outpatient Medications:     diclofenac sodium (PENNSAID) 20 mg/gram /actuation(2 %) sopm, 2 Pump(s) by Apply Externally route two (2) times a day. To each knee, Disp: 1 Bottle, Rfl: 5    Cholecalciferol, Vitamin D3, (VITAMIN D3) 2,000 unit cap capsule, Take 4,000 Units by mouth two (2) times a day., Disp: 60 Cap, Rfl: 2    cloNIDine HCl (CATAPRES) 0.2 mg tablet, Take 2 Tabs by mouth two (2) times a day., Disp: 120 Tab, Rfl: 2    atorvastatin (LIPITOR) 10 mg tablet, Take 1 Tab by mouth every morning., Disp: 30 Tab, Rfl: 2    carvedilol (COREG) 25 mg tablet, Take 1 Tab by mouth two (2) times daily (with meals). , Disp: 60 Tab, Rfl: 2    allopurinol (ZYLOPRIM) 100 mg tablet, Take 2 Tabs by mouth every morning., Disp: 60 Tab, Rfl: 2    losartan-hydroCHLOROthiazide (HYZAAR) 100-25 mg per tablet, Take 1 Tab by mouth daily. , Disp: 30 Tab, Rfl: 11    metFORMIN (GLUCOPHAGE) 1,000 mg tablet, Take 1 Tab by mouth two (2) times daily (with meals). , Disp: 60 Tab, Rfl: 2    spironolactone (ALDACTONE) 25 mg tablet, Take 1 Tab by mouth two (2) times a day., Disp: 60 Tab, Rfl: 5    magnesium oxide (MAG-OX) 400 mg tablet, Take 1 Tab by mouth two (2) times a day., Disp: 60 Tab, Rfl: 11    cpap machine kit, by Does Not Apply route., Disp: , Rfl:      Date Last Reviewed:  12/4/2018   Number of Personal Factors/Comorbidities that affect the Plan of Care: 1-2: MODERATE COMPLEXITY   EXAMINATION:   Observation/Orthostatic Postural Assessment:          Overweight     Mental Status:          WFL  Palpation:          Tender to touch over R lateral low back coming around to the front  Sensation:         Grossly intact with some reports of tingling in L fingers.     Skin Integrity:          intact  Vision:          Functional   Balance:          Good static balance, fair dynamic balance    Lower Extremity:   Strength PROM   Action R L R  L    Hip Flexion 4 4     Hip Extension       Hip Abduction       Hip Adduction       Knee Flexion       Knee Extension       Dorsi Flexion       Plantar Flexion       Inversion       Eversion                 Upper Extremity:         B shoulder flexion 4-/5  Functional Mobility:         Gait/Ambulation:  Ambulates with slow gait speed        Transfers:  Push to stand, increased time        Bed Mobility:  Dizziness and back pain with sit to supine        Stairs:  NT        Wheelchair:  NT           Oculomotor Exam:  · Eye Range of Motion:  full range of motion  · Cervical Range of Motion:   diminished range of motion  · Spontaneous nystagmus:  NO  · Gaze holding nystagmus:  NO  · Smooth Pursuit:      [x]Smooth Eye Movements    []Delayed Eye Movements     []Within Normal Limits     []Other(comment): reports mild dizziness  · Voluntary Saccades:      [x]Smooth Eye Movements    []Delayed Eye Movements     []Within Normal Limits     []Other(comment): ·   Vestibular Ocular Reflex Testing:  · Dynamic Visual Acuity Test:  NT line difference. Normal is less than or equal to 3 line difference. · Head Thrust Test: Negative to the right. Negativeto the left. · VOR Cancellation: WFL  Position Tests:  · [place] testing presented as negative when head is turned to the right indicating that Benign Paroxysmal Positional Vertigo (BPPV) is resolved on the right. PALPATION: Increased tone R lumbar paraspinal, increased lordosis in prone, increased mobility during PA glides to L3-L5    SPECIAL TESTS: (-) slump, (-) SLR, (+) corky B    BALANCE: R 3s L 2s    Lumbar AROM:15 degrees extension; pain in B hip flexion  55 degrees flexion. SB=WFL Rotation=WFL     Body Structures Involved:  1. Eyes and Ears  2. Thoracic Cage  3. Bones  4. Joints  5. Muscles  6. Ligaments Body Functions Affected:  1. Sensory/Pain  2. Neuromusculoskeletal  3.  Movement Related Activities and Participation Affected:  1. Mobility  2. Self Care  3. Domestic Life  4. Interpersonal Interactions and Relationships   Number of elements that affect the Plan of Care: 4+: HIGH COMPLEXITY   CLINICAL PRESENTATION:   Presentation: Evolving clinical presentation with changing clinical characteristics: MODERATE COMPLEXITY   CLINICAL DECISION MAKING:   Outcome Measure: Tool Used: Dizziness Handicap Index  Score:  Initial: 50  Most Recent: 0  (Date: 10/1718 )- patient inially filled the questionnaire out 34/100 but then reported he was referencing his back pain. He reports no dizziness with all activities   Interpretation of Score: To each item, the following scores may be assigned: No = 0, Sometimes = 2, Yes = 4. Scores greater than 10 points should be referred to balance specialists for further evaluation. Score 0 1-20 21-40 41-60 61-80 81-99 100   Modifier CH CI CJ CK CL CM CN         Tool Used: Modified Oswestry Low Back Pain Questionnaire  Score:  Initial: 15/50  Most Recent: X/50 (Date: -- )   Interpretation of Score: Each section is scored on a 0-5 scale, 5 representing the greatest disability. The scores of each section are added together for a total score of 50. Score 0 1-10 11-20 21-30 31-40 41-49 50   Modifier CH CI CJ CK CL CM CN         Medical Necessity:   · Patient is expected to demonstrate progress in strength, range of motion, balance and functional technique to decrease pain with functional mobility and decrease dizziness with bed mobility and transfers. · Patient demonstrates good rehab potential due to higher previous functional level. Reason for Services/Other Comments:  · Patient continues to require modification of therapeutic interventions to increase complexity of exercises.    Use of outcome tool(s) and clinical judgement create a POC that gives a: Questionable prediction of patient's progress: MODERATE COMPLEXITY   TREATMENT:   (In addition to Assessment/Re-Assessment sessions the following treatments were rendered)  Pre Treatment Symptoms: Pt notes continued soreness in his lower back. He states he wakes up stiff in his lower back and rates stiffness 2/10 today. He reports he did not wear his knee brace today. Therapeutic Exercise: ( 38 minutes):  Exercises per grid below to improve mobility and strength. Required moderate visual, verbal and tactile cues to promote proper body alignment and promote proper body mechanics. Progressed complexity of movement as indicated.    Date:  11-2-18 Date:  11/8/18 Date  11/12/18 Date  11/27/2018 Date  11/29/2018 Date:  12/4/2018   Activity/Exercise         Assessment         Physiostep    13min  Level 3 12 min  Level 3    Nustep Level 3  X 12 minutes   Concurrent with moist heat to low back  Level 4  X 13 minutes  Level 4  12 minutes     Level 4  12 minutes   LTR 10 x 10 sec hold B 10 x 10 sec hold B 10 x 10 second hold       TA activation  10 x 5 sec hold with verbal cues to lift and perform correctly  10 x 5 sec hold with verbal cues to lift and perform correctly        Bridges  2 x 10 with 5 sec hold each one         Standing heel toe raises    1x20 reps B 1x20 reps B    Standing SLR flexion  1x10 reps 2 x 10 reps  B 1 x 20 reps  B 1 x 20 reps  B  -2# ankle weights 1 x 20 reps  B  -2# ankle weights  -blue airex   Standing SLR extension   2 x 10 reps   B 1 x 20 reps  B 1 x 20 reps  B  -2# ankle weights 1 x 20 reps  B  -2# ankle weights  -blue airex   Standing SLR abduction  1x10 reps 2 x 10 reps   B 1 x 20 reps  B 1 x 20 reps  B  -2# ankle weights 1 x 20 reps  B  -2# ankle weights  -blue airex   Standing marching    1x20 reps B 1 x 20 reps  B  -2# ankle weights  -blue airex 1 x 20 reps  B  -2# ankle weights  -blue airex   Step-up    2x10 reps B 2x10 reps B  -2# ankle weights 2x10 reps  B  -2# ankle weights  4in step   Calf stretch    3x1min 3x1min 3x1min   Supine hamstring stretch    3x30s B     Supine piriformis stretch         Bolster squeezes 1x10 reps  -hold 10s       Sit to stand  1x10 reps       Matrix walkouts      10 laps B  -7.5#    Hallway walkout squat with weighted medicine ball     2 laps               MODALITIES: (0 minutes) Patient supine for moist heat and IFES at 27 ma for 15 minutes to help decrease pain and tightness and help improve mobility. Skin was clear and intact. MANUAL THERAPY: (0 minutes) patient positioned L side lying for soft tissue work manually and with thera-roller to R low back and gluteal region. Manual work performed to increase mobility and decrease pain and spasms. Treatment/Session Assessment:  Pt more cautious today since he did not have his knee brace. He tolerated step-ups well with mild difficulty due to knee pain and weakness. Pt did not complain of low back throughout session. Pt displayed improved tolerance to physical activity with decreased rest breaks today. · Pain/ Symptoms: Initial:   3/10 back pain Post Session: 1/10 ·   Compliance with Program/Exercises: Will assess as treatment progresses. · Recommendations/Intent for next treatment session: \"Next visit will focus on advancements to more challenging activities and reduction in assistance provided\".     Total Treatment Duration:   PT Patient Time In/Time Out  Time In: 1015  [facility] Out: [facility], PT, DPT

## 2018-12-06 ENCOUNTER — HOSPITAL ENCOUNTER (OUTPATIENT)
Dept: PHYSICAL THERAPY | Age: 53
Discharge: HOME | End: 2018-12-06
Payer: COMMERCIAL

## 2018-12-06 PROBLEM — I35.1 NONRHEUMATIC AORTIC VALVE REGURGITATION: Status: ACTIVE | Noted: 2018-12-06

## 2018-12-06 PROCEDURE — 97110 THERAPEUTIC EXERCISES: CPT

## 2018-12-06 NOTE — PROGRESS NOTES
Shira Delay  : 1965  Primary: [employer]  Secondary:  [address]  at [facility]  [address], [address], [place], [facility] [address] [facility]  [phone]   [phone]         OUTPATIENT PHYSICAL THERAPY:Daily Note 2018    ICD-10: Treatment Diagnosis: Dizziness and giddiness (R42)  Low back pain (M54.5)    Precautions/Allergies:   Patient has no known allergies. Fall Risk Score: 3 (? 5 = High Risk)  MD Orders: BPV x 2-3 weeks, lower back pain/tightness x 6+ months MEDICAL/REFERRING DIAGNOSIS:  Benign paroxysmal vertigo, left ear [H81.12]  Low back pain [M54.5]  Other chronic pain [G89.29]   DATE OF ONSET: few months ago for back and dizziness  REFERRING PHYSICIAN: Jennifer Estrella 82: 18     UPDATE (10/29/2018): Mr. Alissa Shoemaker presents with complains of lower back pain along the belt line that does radiate in LEs. Pt notes his pain is slightly reduced with flexed legs and when \"pressing and carrying stomach\". Pt notes it is a strong dull pain and points directly over B SI joints. He displays tenderness along R lumbar parsapinals and tenderness over glute med R (R>L). He displays good strength and lumbar AROM but significantly decreased balance. He notes he suffers from severe B knee pain and therefore wears knee braces for stability. Patient states pain is worse when he is lying down flat and pain level then is 10/10. \"I sleep propped up when I can. Going to gym 2 x a week. I do the treadmill, standing abdominal resistive exercises, leg raises supine and sit ups. \" Patient reports he has bad knees and they are bone on bone and wearing a brace on his right knee. ASSESSMENT (Date: 10/17/18):  Mr. Alissa Shoemaker has been seen in physical therapy for 2 visits since 18. His vertigo is now resolved and he has met all of his short term goals. Therapy will now focus on his back pain as indicated below.      At initial evaluation on 9/28/18: Mr. Greggory Aschoff presents to physical therapy with complaints of dizziness and right sided low back pain. He was positive for BPPV on the R and would benefit from 2-3 therapy sessions to address his vertigo. Upon resolving the vertigo, he would benefit from a few months of skilled PT to address his back pain. Pain increases with extension and is localized to the right side of the low back. He would benefit from skilled PT to address his pain, improve his functional mobility and return to his PLOF. PROBLEM LIST (Impacting functional limitations):  1. Decreased Strength  2. Decreased ADL/Functional Activities  3. Decreased Transfer Abilities  4. Decreased Ambulation Ability/Technique  5. Increased Pain  6. Decreased Flexibility/Joint Mobility  7. Decreased Knowledge of Precautions  8. Decreased [place] with Home Exercise Program INTERVENTIONS PLANNED:  1. Balance Exercise  2. Bed Mobility  3. Cold  4. Electrical Stimulation  5. Gait Training  6. Heat  7. Home Exercise Program (HEP)  8. Manual Therapy  9. Range of Motion (ROM)  10. Therapeutic Activites  11. Therapeutic Exercise/Strengthening  12. Transfer Training   TREATMENT PLAN:  Effective Dates: 9/28/2018 TO 11/27/2018 (60 days). Frequency/Duration: 2 times a week for 60 Days  GOALS: (Goals have been discussed and agreed upon with patient.)  Short-Term Functional Goals: Time Frame: 30 days  1. Patient will be compliant with HEP. MET  2. Patient will have a decrease on the [address] to 20/100 indicating a reduction in symptoms. MET  3. Patient will demonstrate sit to supine and supine to sit with no dizziness indicating symptoms are resolved. MET  Discharge Goals: Time Frame: 60 days  1. Patient will be independent with HEP. 2. Patient will have a decrease on the Oswestry to 5/50 indicating improved functional mobility.   3. Patient will verbalize pain no greater than 3/10 while lying supine with modifications indicating improved position tolerance. 4.   Rehabilitation Potential For Stated Goals: Good    Regarding Guero Bazan's therapy, I certify that the treatment plan above will be carried out by a therapist or under their direction. Thank you for this referral,  Kimberly Reyes, PT, DPT                 HISTORY:   Patient Stated Goal:        Get rid of the back pain and dizziness  History of Present Injury/Illness (Reason for Referral):  Patient reports his low back hurts. More on the R side. He reports he is staying with his sister and he is sleeping on a love seat. When he goes to his girlfriends house, he sleeps in a bed and it hurts. This has been going on for a couple of weeks. He takes muscle relaxer. Difficulty getting dressed when its hurting. Pain 8/10. Supine is worse than sitting or standing    He gets room spinning dizziness when he wakes up. Thinks it might be worse on his right side. Last 30 seconds to a minute. Past Medical History/Comorbidities:   Mr. Muniz Monday  has a past medical history of Allergic rhinitis due to animal (cat) (dog) hair and dander, Allergic rhinitis due to other allergen, Allergic rhinitis due to pollen, Arthritis, Asthma, Chronic kidney disease, stage III (moderate) ([address] Diabetes ([address] Diastolic CHF, chronic ([address] Encounter for ophthalmic examination and evaluation, Gout, unspecified, Hypertension, Hypertension, uncontrolled, Morbid obesity ([address] Sleep apnea, Type II or unspecified type diabetes mellitus without mention of complication, uncontrolled, Unspecified asthma(493.90), Unspecified essential hypertension, Unspecified sleep apnea, and Unspecified vitamin D deficiency. Mr. Muniz Monday  has a past surgical history that includes hx tonsillectomy; hx premalig/benign skin lesion excision (Right); hx amputation (Right); pr colsc flx w/removal lesion by hot bx forceps (8/26/2016); COLONOSCOPY / BMI 47 (N/A, 8/26/2016); and ENDOSCOPIC POLYPECTOMY (N/A, 8/26/2016).   Social History/Living Environment:     staying with his sister currently. Prior Level of Function/Work/Activity:  Worked at [employer] re[employer]    Current Medications:    Current Outpatient Medications:     diclofenac sodium (PENNSAID) 20 mg/gram /actuation(2 %) sopm, 2 Pump(s) by Apply Externally route two (2) times a day. To each knee, Disp: 1 Bottle, Rfl: 5    Cholecalciferol, Vitamin D3, (VITAMIN D3) 2,000 unit cap capsule, Take 4,000 Units by mouth two (2) times a day., Disp: 60 Cap, Rfl: 2    cloNIDine HCl (CATAPRES) 0.2 mg tablet, Take 2 Tabs by mouth two (2) times a day., Disp: 120 Tab, Rfl: 2    atorvastatin (LIPITOR) 10 mg tablet, Take 1 Tab by mouth every morning., Disp: 30 Tab, Rfl: 2    carvedilol (COREG) 25 mg tablet, Take 1 Tab by mouth two (2) times daily (with meals). , Disp: 60 Tab, Rfl: 2    allopurinol (ZYLOPRIM) 100 mg tablet, Take 2 Tabs by mouth every morning., Disp: 60 Tab, Rfl: 2    losartan-hydroCHLOROthiazide (HYZAAR) 100-25 mg per tablet, Take 1 Tab by mouth daily. , Disp: 30 Tab, Rfl: 11    metFORMIN (GLUCOPHAGE) 1,000 mg tablet, Take 1 Tab by mouth two (2) times daily (with meals). , Disp: 60 Tab, Rfl: 2    spironolactone (ALDACTONE) 25 mg tablet, Take 1 Tab by mouth two (2) times a day., Disp: 60 Tab, Rfl: 5    magnesium oxide (MAG-OX) 400 mg tablet, Take 1 Tab by mouth two (2) times a day., Disp: 60 Tab, Rfl: 11    cpap machine kit, by Does Not Apply route., Disp: , Rfl:      Date Last Reviewed:  12/6/2018   Number of Personal Factors/Comorbidities that affect the Plan of Care: 1-2: MODERATE COMPLEXITY   EXAMINATION:   Observation/Orthostatic Postural Assessment:          Overweight     Mental Status:          WFL  Palpation:          Tender to touch over R lateral low back coming around to the front  Sensation:         Grossly intact with some reports of tingling in L fingers.     Skin Integrity:          intact  Vision:          Functional   Balance:          Good static balance, fair dynamic balance    Lower Extremity:   Strength PROM   Action R L R  L    Hip Flexion 4 4     Hip Extension       Hip Abduction       Hip Adduction       Knee Flexion       Knee Extension       Dorsi Flexion       Plantar Flexion       Inversion       Eversion                 Upper Extremity:         B shoulder flexion 4-/5  Functional Mobility:         Gait/Ambulation:  Ambulates with slow gait speed        Transfers:  Push to stand, increased time        Bed Mobility:  Dizziness and back pain with sit to supine        Stairs:  NT        Wheelchair:  NT           Oculomotor Exam:  · Eye Range of Motion:  full range of motion  · Cervical Range of Motion:   diminished range of motion  · Spontaneous nystagmus:  NO  · Gaze holding nystagmus:  NO  · Smooth Pursuit:      [x]Smooth Eye Movements    []Delayed Eye Movements     []Within Normal Limits     []Other(comment): reports mild dizziness  · Voluntary Saccades:      [x]Smooth Eye Movements    []Delayed Eye Movements     []Within Normal Limits     []Other(comment): ·   Vestibular Ocular Reflex Testing:  · Dynamic Visual Acuity Test:  NT line difference. Normal is less than or equal to 3 line difference. · Head Thrust Test: Negative to the right. Negativeto the left. · VOR Cancellation: WFL  Position Tests:  · Hallpike-Greenwood testing presented as negative when head is turned to the right indicating that Benign Paroxysmal Positional Vertigo (BPPV) is resolved on the right. PALPATION: Increased tone R lumbar paraspinal, increased lordosis in prone, increased mobility during PA glides to L3-L5    SPECIAL TESTS: (-) slump, (-) SLR, (+) corky B    BALANCE: R 3s L 2s    Lumbar AROM:15 degrees extension; pain in B hip flexion  55 degrees flexion. SB=WFL Rotation=WFL     Body Structures Involved:  1. Eyes and Ears  2. Thoracic Cage  3. Bones  4. Joints  5. Muscles  6. Ligaments Body Functions Affected:  1. Sensory/Pain  2. Neuromusculoskeletal  3.  Movement Related Activities and Participation Affected:  1. Mobility  2. Self Care  3. Domestic Life  4. Interpersonal Interactions and Relationships   Number of elements that affect the Plan of Care: 4+: HIGH COMPLEXITY   CLINICAL PRESENTATION:   Presentation: Evolving clinical presentation with changing clinical characteristics: MODERATE COMPLEXITY   CLINICAL DECISION MAKING:   Outcome Measure: Tool Used: Dizziness Handicap Index  Score:  Initial: 50  Most Recent: 0  (Date: 10/1718 )- patient inially filled the questionnaire out 34/100 but then reported he was referencing his back pain. He reports no dizziness with all activities   Interpretation of Score: To each item, the following scores may be assigned: No = 0, Sometimes = 2, Yes = 4. Scores greater than 10 points should be referred to balance specialists for further evaluation. Score 0 1-20 21-40 41-60 61-80 81-99 100   Modifier CH CI CJ CK CL CM CN         Tool Used: Modified Oswestry Low Back Pain Questionnaire  Score:  Initial: 15/50  Most Recent: X/50 (Date: -- )   Interpretation of Score: Each section is scored on a 0-5 scale, 5 representing the greatest disability. The scores of each section are added together for a total score of 50. Score 0 1-10 11-20 21-30 31-40 41-49 50   Modifier CH CI CJ CK CL CM CN         Medical Necessity:   · Patient is expected to demonstrate progress in strength, range of motion, balance and functional technique to decrease pain with functional mobility and decrease dizziness with bed mobility and transfers. · Patient demonstrates good rehab potential due to higher previous functional level. Reason for Services/Other Comments:  · Patient continues to require modification of therapeutic interventions to increase complexity of exercises.    Use of outcome tool(s) and clinical judgement create a POC that gives a: Questionable prediction of patient's progress: MODERATE COMPLEXITY   TREATMENT:   (In addition to Assessment/Re-Assessment sessions the following treatments were rendered)  Pre-Treatment Symptoms: Pt notes he had to spend a long period of time laying supine yesterday while at the cardiologist which aggravated his lower back pain. Pt notes his pain is 5/10. He reports he is trying to not wear his knee brace as often to become less dependent on it. Therapeutic Exercise: ( 38 minutes):  Exercises per grid below to improve mobility and strength. Required moderate visual, verbal and tactile cues to promote proper body alignment and promote proper body mechanics. Progressed complexity of movement as indicated.    Date:  11-2-18 Date:  11/8/18 Date  11/12/18 Date  11/27/2018 Date  11/29/2018 Date:  12/4/2018 Date:  12/6/2018   Activity/Exercise          Assessment          Physiostep    13min  Level 3 12 min  Level 3     Nustep Level 3  X 12 minutes   Concurrent with moist heat to low back  Level 4  X 13 minutes  Level 4  12 minutes     Level 4  12 minutes Level 4  12 minutes   LTR 10 x 10 sec hold B 10 x 10 sec hold B 10 x 10 second hold        TA activation  10 x 5 sec hold with verbal cues to lift and perform correctly  10 x 5 sec hold with verbal cues to lift and perform correctly         Bridges  2 x 10 with 5 sec hold each one          Standing heel toe raises    1x20 reps B 1x20 reps B     Standing SLR flexion  1x10 reps 2 x 10 reps  B 1 x 20 reps  B 1 x 20 reps  B  -2# ankle weights 1 x 20 reps  B  -2# ankle weights  -blue airex 1 x 20 reps  B  -2# ankle weights  -blue airex   Standing SLR extension   2 x 10 reps   B 1 x 20 reps  B 1 x 20 reps  B  -2# ankle weights 1 x 20 reps  B  -2# ankle weights  -blue airex 1 x 20 reps  B  -2# ankle weights  -blue airex   Standing SLR abduction  1x10 reps 2 x 10 reps   B 1 x 20 reps  B 1 x 20 reps  B  -2# ankle weights 1 x 20 reps  B  -2# ankle weights  -blue airex 1 x 20 reps  B  -2# ankle weights  -blue airex   Standing marching    1x20 reps B 1 x 20 reps  B  -2# ankle weights  -blue airex 1 x 20 reps  B  -2# ankle weights  -blue airex 1 x 20 reps  B  -2# ankle weights  -blue airex  -one hand support   Step-up    2x10 reps B 2x10 reps B  -2# ankle weights 2x10 reps  B  -2# ankle weights  4in step 2x10 reps  B  -2# ankle weights  6in step   Calf stretch    3x1min 3x1min 3x1min 3x1min   Supine hamstring stretch    3x30s B      Supine piriformis stretch          Bolster squeezes  1x10 reps  -hold 10s        Sit to stand  1x10 reps        Matrix walkouts      10 laps B  -7.5#  10 laps B  -7.5#   Hallway walkout squat with weighted medicine ball     2 laps                 MODALITIES: (10 minutes) Patient supine for ice to sacral region following today's session to provide analgesia. MANUAL THERAPY: (0 minutes) patient positioned L side lying for soft tissue work manually and with thera-roller to R low back and gluteal region. Manual work performed to increase mobility and decrease pain and spasms. Treatment/Session Assessment:  Pt fatigued with Matrix walk outs and stated it \"felt like a work out\". Pt with improved confidence during step-up exercises and not as cautious today. He tolerated step-ups well with mild difficulty due to knee pain and weakness. Pt did not complain of low back pain throughout session. · Pain/ Symptoms: Initial:  5/10 back pain Post Session: 2/10 ·   Compliance with Program/Exercises: Will assess as treatment progresses. · Recommendations/Intent for next treatment session: \"Next visit will focus on advancements to more challenging activities and reduction in assistance provided\".     Total Treatment Duration:   PT Patient Time In/Time Out  Time In: 1015  [facility], [facility]

## 2018-12-11 ENCOUNTER — HOSPITAL ENCOUNTER (OUTPATIENT)
Dept: PHYSICAL THERAPY | Age: 53
Discharge: HOME | End: 2018-12-11
Payer: COMMERCIAL

## 2018-12-11 PROCEDURE — 97110 THERAPEUTIC EXERCISES: CPT

## 2018-12-11 NOTE — PROGRESS NOTES
Trev Allen  : 1965  Primary: [employer]  Secondary:  Kassidy Russell at [facility] and [facility], [address], [place], [facility]  [phone]   [phone]         OUTPATIENT PHYSICAL THERAPY:Daily Note 2018    ICD-10: Treatment Diagnosis: Dizziness and giddiness (R42)  Low back pain (M54.5)    Precautions/Allergies:   Patient has no known allergies. Fall Risk Score: 3 (? 5 = High Risk)  MD Orders: BPV x 2-3 weeks, lower back pain/tightness x 6+ months MEDICAL/REFERRING DIAGNOSIS:  Benign paroxysmal vertigo, left ear [H81.12]  Low back pain [M54.5]  Other chronic pain [G89.29]   DATE OF ONSET: few months ago for back and dizziness  REFERRING PHYSICIAN: Jennifer Estrella 82: 18     UPDATE (10/29/2018): Mr. Odin Mccormick presents with complains of lower back pain along the belt line that does radiate in LEs. Pt notes his pain is slightly reduced with flexed legs and when \"pressing and carrying stomach\". Pt notes it is a strong dull pain and points directly over B SI joints. He displays tenderness along R lumbar parsapinals and tenderness over glute med R (R>L). He displays good strength and lumbar AROM but significantly decreased balance. He notes he suffers from severe B knee pain and therefore wears knee braces for stability. Patient states pain is worse when he is lying down flat and pain level then is 10/10. \"I sleep propped up when I can. Going to gym 2 x a week. I do the treadmill, standing abdominal resistive exercises, leg raises supine and sit ups. \" Patient reports he has bad knees and they are bone on bone and wearing a brace on his right knee. ASSESSMENT (Date: 10/17/18):  Mr. Odin Mccormick has been seen in physical therapy for 2 visits since 18. His vertigo is now resolved and he has met all of his short term goals. Therapy will now focus on his back pain as indicated below.      At initial evaluation on 9/28/18: Mr. Dixie James presents to physical therapy with complaints of dizziness and right sided low back pain. He was positive for BPPV on the R and would benefit from 2-3 therapy sessions to address his vertigo. Upon resolving the vertigo, he would benefit from a few months of skilled PT to address his back pain. Pain increases with extension and is localized to the right side of the low back. He would benefit from skilled PT to address his pain, improve his functional mobility and return to his PLOF. PROBLEM LIST (Impacting functional limitations):  1. Decreased Strength  2. Decreased ADL/Functional Activities  3. Decreased Transfer Abilities  4. Decreased Ambulation Ability/Technique  5. Increased Pain  6. Decreased Flexibility/Joint Mobility  7. Decreased Knowledge of Precautions  8. Decreased Myrtle with Home Exercise Program INTERVENTIONS PLANNED:  1. Balance Exercise  2. Bed Mobility  3. Cold  4. Electrical Stimulation  5. Gait Training  6. Heat  7. Home Exercise Program (HEP)  8. Manual Therapy  9. Range of Motion (ROM)  10. Therapeutic Activites  11. Therapeutic Exercise/Strengthening  12. Transfer Training   TREATMENT PLAN:  Effective Dates: 9/28/2018 TO 11/27/2018 (60 days). Frequency/Duration: 2 times a week for 60 Days  GOALS: (Goals have been discussed and agreed upon with patient.)  Short-Term Functional Goals: Time Frame: 30 days  1. Patient will be compliant with HEP. MET  2. Patient will have a decrease on the [address] to 20/100 indicating a reduction in symptoms. MET  3. Patient will demonstrate sit to supine and supine to sit with no dizziness indicating symptoms are resolved. MET  Discharge Goals: Time Frame: 60 days  1. Patient will be independent with HEP. 2. Patient will have a decrease on the Oswestry to 5/50 indicating improved functional mobility.   3. Patient will verbalize pain no greater than 3/10 while lying supine with modifications indicating improved position tolerance. 4.   Rehabilitation Potential For Stated Goals: Good    Regarding Guero Bazan's therapy, I certify that the treatment plan above will be carried out by a therapist or under their direction. Thank you for this referral,  Destini Darnell, PT, DPT                 HISTORY:   Patient Stated Goal:        Get rid of the back pain and dizziness  History of Present Injury/Illness (Reason for Referral):  Patient reports his low back hurts. More on the R side. He reports he is staying with his sister and he is sleeping on a love seat. When he goes to his girlfriends house, he sleeps in a bed and it hurts. This has been going on for a couple of weeks. He takes muscle relaxer. Difficulty getting dressed when its hurting. Pain 8/10. Supine is worse than sitting or standing    He gets room spinning dizziness when he wakes up. Thinks it might be worse on his right side. Last 30 seconds to a minute. Past Medical History/Comorbidities:   Mr. Tyler Goldberg  has a past medical history of Allergic rhinitis due to animal (cat) (dog) hair and dander, Allergic rhinitis due to other allergen, Allergic rhinitis due to pollen, Arthritis, Asthma, Chronic kidney disease, stage III (moderate) ([address] Diabetes ([address] Diastolic CHF, chronic ([address] Encounter for ophthalmic examination and evaluation, Gout, unspecified, Hypertension, Hypertension, uncontrolled, Morbid obesity ([address] Sleep apnea, Type II or unspecified type diabetes mellitus without mention of complication, uncontrolled, Unspecified asthma(493.90), Unspecified essential hypertension, Unspecified sleep apnea, and Unspecified vitamin D deficiency. Mr. Tyler Goldberg  has a past surgical history that includes hx tonsillectomy; hx premalig/benign skin lesion excision (Right); hx amputation (Right); pr colsc flx w/removal lesion by hot bx forceps (8/26/2016); COLONOSCOPY / BMI 47 (N/A, 8/26/2016); and ENDOSCOPIC POLYPECTOMY (N/A, 8/26/2016).   Social History/Living Environment:     staying with his sister currently. Prior Level of Function/Work/Activity:  Worked at [employer]    Current Medications:    Current Outpatient Medications:     diclofenac sodium (PENNSAID) 20 mg/gram /actuation(2 %) sopm, 2 Pump(s) by Apply Externally route two (2) times a day. To each knee, Disp: 1 Bottle, Rfl: 5    Cholecalciferol, Vitamin D3, (VITAMIN D3) 2,000 unit cap capsule, Take 4,000 Units by mouth two (2) times a day., Disp: 60 Cap, Rfl: 2    cloNIDine HCl (CATAPRES) 0.2 mg tablet, Take 2 Tabs by mouth two (2) times a day., Disp: 120 Tab, Rfl: 2    atorvastatin (LIPITOR) 10 mg tablet, Take 1 Tab by mouth every morning., Disp: 30 Tab, Rfl: 2    carvedilol (COREG) 25 mg tablet, Take 1 Tab by mouth two (2) times daily (with meals). , Disp: 60 Tab, Rfl: 2    allopurinol (ZYLOPRIM) 100 mg tablet, Take 2 Tabs by mouth every morning., Disp: 60 Tab, Rfl: 2    losartan-hydroCHLOROthiazide (HYZAAR) 100-25 mg per tablet, Take 1 Tab by mouth daily. , Disp: 30 Tab, Rfl: 11    metFORMIN (GLUCOPHAGE) 1,000 mg tablet, Take 1 Tab by mouth two (2) times daily (with meals). , Disp: 60 Tab, Rfl: 2    spironolactone (ALDACTONE) 25 mg tablet, Take 1 Tab by mouth two (2) times a day., Disp: 60 Tab, Rfl: 5    magnesium oxide (MAG-OX) 400 mg tablet, Take 1 Tab by mouth two (2) times a day., Disp: 60 Tab, Rfl: 11    cpap machine kit, by Does Not Apply route., Disp: , Rfl:      Date Last Reviewed:  12/11/2018   Number of Personal Factors/Comorbidities that affect the Plan of Care: 1-2: MODERATE COMPLEXITY   EXAMINATION:   Observation/Orthostatic Postural Assessment:          Overweight     Mental Status:          WFL  Palpation:          Tender to touch over R lateral low back coming around to the front  Sensation:         Grossly intact with some reports of tingling in L fingers.     Skin Integrity:          intact  Vision:          Functional   Balance:          Good static balance, fair dynamic balance    Lower Extremity:   Strength PROM   Action R L R  L    Hip Flexion 4 4     Hip Extension       Hip Abduction       Hip Adduction       Knee Flexion       Knee Extension       Dorsi Flexion       Plantar Flexion       Inversion       Eversion                 Upper Extremity:         B shoulder flexion 4-/5  Functional Mobility:         Gait/Ambulation:  Ambulates with slow gait speed        Transfers:  Push to stand, increased time        Bed Mobility:  Dizziness and back pain with sit to supine        Stairs:  NT        Wheelchair:  NT           Oculomotor Exam:  · Eye Range of Motion:  full range of motion  · Cervical Range of Motion:   diminished range of motion  · Spontaneous nystagmus:  NO  · Gaze holding nystagmus:  NO  · Smooth Pursuit:      [x]Smooth Eye Movements    []Delayed Eye Movements     []Within Normal Limits     []Other(comment): reports mild dizziness  · Voluntary Saccades:      [x]Smooth Eye Movements    []Delayed Eye Movements     []Within Normal Limits     []Other(comment): ·   Vestibular Ocular Reflex Testing:  · Dynamic Visual Acuity Test:  NT line difference. Normal is less than or equal to 3 line difference. · Head Thrust Test: Negative to the right. Negativeto the left. · VOR Cancellation: WFL  Position Tests:  · Hallpike[place] testing presented as negative when head is turned to the right indicating that Benign Paroxysmal Positional Vertigo (BPPV) is resolved on the right. PALPATION: Increased tone R lumbar paraspinal, increased lordosis in prone, increased mobility during PA glides to L3-L5    SPECIAL TESTS: (-) slump, (-) SLR, (+) corky B    BALANCE: R 3s L 2s    Lumbar AROM:15 degrees extension; pain in B hip flexion  55 degrees flexion. SB=WFL Rotation=WFL     Body Structures Involved:  1. Eyes and Ears  2. Thoracic Cage  3. Bones  4. Joints  5. Muscles  6. Ligaments Body Functions Affected:  1. Sensory/Pain  2. Neuromusculoskeletal  3.  Movement Related Activities and Participation Affected:  1. Mobility  2. Self Care  3. Domestic Life  4. Interpersonal Interactions and Relationships   Number of elements that affect the Plan of Care: 4+: HIGH COMPLEXITY   CLINICAL PRESENTATION:   Presentation: Evolving clinical presentation with changing clinical characteristics: MODERATE COMPLEXITY   CLINICAL DECISION MAKING:   Outcome Measure: Tool Used: Dizziness Handicap Index  Score:  Initial: 50  Most Recent: 0  (Date: 10/1718 )- patient inially filled the questionnaire out 34/100 but then reported he was referencing his back pain. He reports no dizziness with all activities   Interpretation of Score: To each item, the following scores may be assigned: No = 0, Sometimes = 2, Yes = 4. Scores greater than 10 points should be referred to balance specialists for further evaluation. Score 0 1-20 21-40 41-60 61-80 81-99 100   Modifier CH CI CJ CK CL CM CN         Tool Used: Modified Oswestry Low Back Pain Questionnaire  Score:  Initial: 15/50  Most Recent: X/50 (Date: -- )   Interpretation of Score: Each section is scored on a 0-5 scale, 5 representing the greatest disability. The scores of each section are added together for a total score of 50. Score 0 1-10 11-20 21-30 31-40 41-49 50   Modifier CH CI CJ CK CL CM CN         Medical Necessity:   · Patient is expected to demonstrate progress in strength, range of motion, balance and functional technique to decrease pain with functional mobility and decrease dizziness with bed mobility and transfers. · Patient demonstrates good rehab potential due to higher previous functional level. Reason for Services/Other Comments:  · Patient continues to require modification of therapeutic interventions to increase complexity of exercises.    Use of outcome tool(s) and clinical judgement create a POC that gives a: Questionable prediction of patient's progress: MODERATE COMPLEXITY   TREATMENT:   (In addition to Assessment/Re-Assessment sessions the following treatments were rendered)  Pre-Treatment Symptoms: Pt notes he is feeling good today with minimal discomfort in his lower back. Pt rates pain 2/10 today. Therapeutic Exercise: ( 38 minutes):  Exercises per grid below to improve mobility and strength. Required moderate visual, verbal and tactile cues to promote proper body alignment and promote proper body mechanics. Progressed complexity of movement as indicated.    Date  11/27/2018 Date  11/29/2018 Date:  12/4/2018 Date:  12/6/2018 Date:  12/11/2018   Activity/Exercise        Assessment        Physiostep 13min  Level 3 12 min  Level 3      Nustep   Level 4  12 minutes Level 4  12 minutes Level 4  12 minutes   LTR        TA activation         Bridges         Standing heel toe raises 1x20 reps B 1x20 reps B      Standing SLR flexion 1 x 20 reps  B 1 x 20 reps  B  -2# ankle weights 1 x 20 reps  B  -2# ankle weights  -blue airex 1 x 20 reps  B  -2# ankle weights  -blue airex 1 x 20 reps  B  -2.5# ankle weights  -blue airex   Standing SLR extension 1 x 20 reps  B 1 x 20 reps  B  -2# ankle weights 1 x 20 reps  B  -2# ankle weights  -blue airex 1 x 20 reps  B  -2# ankle weights  -blue airex 1 x 20 reps  B  -2.5# ankle weights  -blue airex   Standing SLR abduction 1 x 20 reps  B 1 x 20 reps  B  -2# ankle weights 1 x 20 reps  B  -2# ankle weights  -blue airex 1 x 20 reps  B  -2# ankle weights  -blue airex 1 x 20 reps  B  -2.5# ankle weights  -blue airex   Standing marching 1x20 reps B 1 x 20 reps  B  -2# ankle weights  -blue airex 1 x 20 reps  B  -2# ankle weights  -blue airex 1 x 20 reps  B  -2# ankle weights  -blue airex  -one hand support 1 x 20 reps  B  -2.5# ankle weights  -blue airex  -one hand support   Step-up 2x10 reps B 2x10 reps B  -2# ankle weights 2x10 reps  B  -2# ankle weights  4in step 2x10 reps  B  -2# ankle weights  6in step 2x10 reps  B  -2.5# ankle weights  6in step   Physio-ball alphabet     1 set B  -red physioball   Calf stretch 3x1min 3x1min 3x1min 3x1min 3x1min   Supine hamstring stretch 3x30s B       Supine piriformis stretch        Bolster squeezes        Sit to stand        Matrix walkouts   10 laps B  -7.5#  10 laps B  -7.5#    Hallway walkout squat with weighted medicine ball  2 laps                MODALITIES: (0 minutes) Patient supine for ice to sacral region following today's session to provide analgesia. MANUAL THERAPY: (0 minutes) patient positioned L side lying for soft tissue work manually and with thera-roller to R low back and gluteal region. Manual work performed to increase mobility and decrease pain and spasms. Treatment/Session Assessment:  Pt required increased verbal cues with PAN hartman alphabet. He did not complain of any low back pain during exercises. Discuss D/C next visit. Pt rated pain 1/10 at the end of treatment session. · Pain/ Symptoms: Initial:  3/10 back pain Post Session: 2/10 ·   Compliance with Program/Exercises: Will assess as treatment progresses. · Recommendations/Intent for next treatment session: \"Next visit will focus on advancements to more challenging activities and reduction in assistance provided\".     Total Treatment Duration:   PT Patient Time In/Time Out  Time In: 1100  Time Out: [address] Kristie Jimenes DPT

## 2018-12-13 ENCOUNTER — HOSPITAL ENCOUNTER (OUTPATIENT)
Dept: PHYSICAL THERAPY | Age: 53
Discharge: HOME | End: 2018-12-13
Payer: COMMERCIAL

## 2018-12-13 PROCEDURE — 97110 THERAPEUTIC EXERCISES: CPT

## 2018-12-13 NOTE — THERAPY DISCHARGE
Clinton Barry  : 1965  Primary: [employer]  Secondary:  [address] [place]  at [facility]  [address], [address], [place], [facility]  [phone]   [phone]         OUTPATIENT PHYSICAL THERAPY:Discharge 2018    ICD-10: Treatment Diagnosis: Dizziness and giddiness (R42)  Low back pain (M54.5)    Precautions/Allergies:   Patient has no known allergies. Fall Risk Score: 3 (? 5 = High Risk)  MD Orders: BPV x 2-3 weeks, lower back pain/tightness x 6+ months MEDICAL/REFERRING DIAGNOSIS:  Benign paroxysmal vertigo, left ear [H81.12]  Low back pain [M54.5]  Other chronic pain [G89.29]   DATE OF ONSET: few months ago for back and dizziness  REFERRING PHYSICIAN: Jennifer Estrella 81: 18     UPDATE (10/29/2018): Mr. Donovan Kenny has shown improvements in exercise tolerance, balance, and independence with HEP however continues to have pain with laying supine and performing supine to sit transfer. He notes no pain with daily activities but continued difficulty getting comfortable in bed. Pt has not shown improvements in lumbar AROM but has shown improvements in strength and functional mobility. Although he notes difficulty with laying supine he tolerates strengthening and dynamic exercises well with no complaints of pain or discomfort. Pt states he enjoys the exercise portion of treatment and is looking into training to continue performing exercises at the gym. He met 2/3 of his long term goals with the last goal being the subjective outcome measure. Pt no longer complains of dizziness and states it subsided following initial evaluation. PROBLEM LIST (Impacting functional limitations):  1. Decreased Strength  2. Decreased ADL/Functional Activities  3. Decreased Transfer Abilities  4. Decreased Ambulation Ability/Technique  5. Increased Pain  6. Decreased Flexibility/Joint Mobility  7.  Decreased Knowledge of Precautions  8. Decreased [place] with Home Exercise Program INTERVENTIONS PLANNED:  1. Balance Exercise  2. Bed Mobility  3. Cold  4. Electrical Stimulation  5. Gait Training  6. Heat  7. Home Exercise Program (HEP)  8. Manual Therapy  9. Range of Motion (ROM)  10. Therapeutic Activites  11. Therapeutic Exercise/Strengthening  12. Transfer Training   TREATMENT PLAN:  Effective Dates: 9/28/2018 TO 11/27/2018 (60 days). Frequency/Duration: 2 times a week for 60 Days  GOALS: (Goals have been discussed and agreed upon with patient.)  Short-Term Functional Goals: Time Frame: 30 days  1. Patient will be compliant with HEP. MET  2. Patient will have a decrease on the [address] to 20/100 indicating a reduction in symptoms. MET  3. Patient will demonstrate sit to supine and supine to sit with no dizziness indicating symptoms are resolved. MET  Discharge Goals: Time Frame: 60 days  1. Patient will be independent with HEP.-MET 12/13/2018  2. Patient will have a decrease on the Oswestry to 5/50 indicating improved functional mobility. -NOT MET 12/13/2018  3. Patient will verbalize pain no greater than 3/10 while lying supine with modifications indicating improved position tolerance. -NOT MET 12/13/2018    Rehabilitation Potential For Stated Goals: Good    Regarding Guero Bazan's therapy, I certify that the treatment plan above will be carried out by a therapist or under their direction. Thank you for this referral,  Jason Sorensen, PT, DPT                 HISTORY:   Patient Stated Goal:        Get rid of the back pain and dizziness  History of Present Injury/Illness (Reason for Referral):  Patient reports his low back hurts. More on the R side. He reports he is staying with his sister and he is sleeping on a love seat. When he goes to his girlfriends house, he sleeps in a bed and it hurts. This has been going on for a couple of weeks. He takes muscle relaxer. Difficulty getting dressed when its hurting.   Pain 8/10.  Supine is worse than sitting or standing    He gets room spinning dizziness when he wakes up. Thinks it might be worse on his right side. Last 30 seconds to a minute. Past Medical History/Comorbidities:   Mr. Shawn Perez  has a past medical history of Allergic rhinitis due to animal (cat) (dog) hair and dander, Allergic rhinitis due to other allergen, Allergic rhinitis due to pollen, Arthritis, Asthma, Chronic kidney disease, stage III (moderate) ([address] Diabetes ([address] Diastolic CHF, chronic ([address] Encounter for ophthalmic examination and evaluation, Gout, unspecified, Hypertension, Hypertension, uncontrolled, Morbid obesity ([address] Sleep apnea, Type II or unspecified type diabetes mellitus without mention of complication, uncontrolled, Unspecified asthma(493.90), Unspecified essential hypertension, Unspecified sleep apnea, and Unspecified vitamin D deficiency. Mr. Shawn Perez  has a past surgical history that includes hx tonsillectomy; hx premalig/benign skin lesion excision (Right); hx amputation (Right); pr colsc flx w/removal lesion by hot bx forceps (8/26/2016); COLONOSCOPY / BMI 47 (N/A, 8/26/2016); and ENDOSCOPIC POLYPECTOMY (N/A, 8/26/2016). Social History/Living Environment:     staying with his sister currently. Prior Level of Function/Work/Activity:  Worked at [employer] re[employer]    Current Medications:    Current Outpatient Medications:     diclofenac sodium (PENNSAID) 20 mg/gram /actuation(2 %) sopm, 2 Pump(s) by Apply Externally route two (2) times a day.  To each knee, Disp: 1 Bottle, Rfl: 5    Cholecalciferol, Vitamin D3, (VITAMIN D3) 2,000 unit cap capsule, Take 4,000 Units by mouth two (2) times a day., Disp: 60 Cap, Rfl: 2    cloNIDine HCl (CATAPRES) 0.2 mg tablet, Take 2 Tabs by mouth two (2) times a day., Disp: 120 Tab, Rfl: 2    atorvastatin (LIPITOR) 10 mg tablet, Take 1 Tab by mouth every morning., Disp: 30 Tab, Rfl: 2    carvedilol (COREG) 25 mg tablet, Take 1 Tab by mouth two (2) times daily (with meals). , Disp: 60 Tab, Rfl: 2    allopurinol (ZYLOPRIM) 100 mg tablet, Take 2 Tabs by mouth every morning., Disp: 60 Tab, Rfl: 2    losartan-hydroCHLOROthiazide (HYZAAR) 100-25 mg per tablet, Take 1 Tab by mouth daily. , Disp: 30 Tab, Rfl: 11    metFORMIN (GLUCOPHAGE) 1,000 mg tablet, Take 1 Tab by mouth two (2) times daily (with meals). , Disp: 60 Tab, Rfl: 2    spironolactone (ALDACTONE) 25 mg tablet, Take 1 Tab by mouth two (2) times a day., Disp: 60 Tab, Rfl: 5    magnesium oxide (MAG-OX) 400 mg tablet, Take 1 Tab by mouth two (2) times a day., Disp: 60 Tab, Rfl: 11    cpap machine kit, by Does Not Apply route., Disp: , Rfl:      Date Last Reviewed:  12/13/2018   Number of Personal Factors/Comorbidities that affect the Plan of Care: 1-2: MODERATE COMPLEXITY   EXAMINATION:   Observation/Orthostatic Postural Assessment:          Overweight     Mental Status:          WFL  Palpation:          Tender to touch over R lateral low back coming around to the front  Sensation:         Grossly intact with some reports of tingling in L fingers.     Skin Integrity:          intact  Vision:          Functional   Balance:          Good static balance, fair dynamic balance    Lower Extremity: 12/13/2018   Strength PROM   Action R L R  L    Hip Flexion 4+/5 4+/5     Hip Extension 4+/5 4+/5     Hip Abduction 4+/5 4+/5     Hip Adduction 4+/5 4+/5     Knee Flexion 4+/5 4+/5     Knee Extension 4+/5 4+/5     Dorsi Flexion 4+/5 4+/5     Plantar Flexion 4+/5 4+/5     Inversion 4+/5 4+/5     Eversion 4+/5 4+/5               Upper Extremity:         B shoulder flexion 4-/5  Functional Mobility:         Gait/Ambulation:  Ambulates with slow gait speed        Transfers:  Push to stand, increased time        Bed Mobility:  Dizziness and back pain with sit to supine        Stairs:  NT        Wheelchair:  NT           Oculomotor Exam:  · Eye Range of Motion:  full range of motion  · Cervical Range of Motion:   diminished range of motion  · Spontaneous nystagmus:  NO  · Gaze holding nystagmus:  NO  · Smooth Pursuit:      [x]Smooth Eye Movements    []Delayed Eye Movements     []Within Normal Limits     []Other(comment): reports mild dizziness  · Voluntary Saccades:      [x]Smooth Eye Movements    []Delayed Eye Movements     []Within Normal Limits     []Other(comment): ·   Vestibular Ocular Reflex Testing:  · Dynamic Visual Acuity Test:  NT line difference. Normal is less than or equal to 3 line difference. · Head Thrust Test: Negative to the right. Negativeto the left. · VOR Cancellation: WFL  Position Tests:  · [place] testing presented as negative when head is turned to the right indicating that Benign Paroxysmal Positional Vertigo (BPPV) is resolved on the right. PALPATION: Increased tone R lumbar paraspinal, increased lordosis in prone, increased mobility during PA glides to L3-L5    SPECIAL TESTS: (-) slump, (-) SLR, (+) corky B    BALANCE: R 7s L 4s    Lumbar AROM:15 degrees extension; pain in B hip flexion  55 degrees flexion. SB=WFL Rotation=WFL (12/13/2018)     Body Structures Involved:  1. Eyes and Ears  2. Thoracic Cage  3. Bones  4. Joints  5. Muscles  6. Ligaments Body Functions Affected:  1. Sensory/Pain  2. Neuromusculoskeletal  3. Movement Related Activities and Participation Affected:  1. Mobility  2. Self Care  3. Domestic Life  4. Interpersonal Interactions and Relationships   Number of elements that affect the Plan of Care: 4+: HIGH COMPLEXITY   CLINICAL PRESENTATION:   Presentation: Evolving clinical presentation with changing clinical characteristics: MODERATE COMPLEXITY   CLINICAL DECISION MAKING:   Outcome Measure: Tool Used: Dizziness Handicap Index  Score:  Initial: 50  Most Recent: 0  (Date: 10/1718 )- patient inially filled the questionnaire out 34/100 but then reported he was referencing his back pain. He reports no dizziness with all activities   Interpretation of Score:  To each item, the following scores may be assigned: No = 0, Sometimes = 2, Yes = 4. Scores greater than 10 points should be referred to balance specialists for further evaluation. Score 0 1-20 21-40 41-60 61-80 81-99 100   Modifier CH CI CJ CK CL CM CN         Tool Used: Modified Oswestry Low Back Pain Questionnaire  Score:  Initial: 15/50  Most Recent: 15/50 (Date: 11/9/18 ) Most Recent: 20/50 (Date: 12/13/2018)   Interpretation of Score: Each section is scored on a 0-5 scale, 5 representing the greatest disability. The scores of each section are added together for a total score of 50. Score 0 1-10 11-20 21-30 31-40 41-49 50   Modifier CH CI CJ CK CL CM CN         Medical Necessity:   · Patient is expected to demonstrate progress in strength, range of motion, balance and functional technique to decrease pain with functional mobility and decrease dizziness with bed mobility and transfers. · Patient demonstrates good rehab potential due to higher previous functional level. Reason for Services/Other Comments:  · Patient continues to require modification of therapeutic interventions to increase complexity of exercises. Use of outcome tool(s) and clinical judgement create a POC that gives a: Questionable prediction of patient's progress: MODERATE COMPLEXITY   TREATMENT:   (In addition to Assessment/Re-Assessment sessions the following treatments were rendered)  Pre-Treatment Symptoms: Pt notes he is feeling good today with minimal discomfort in his lower back. Pt rates pain 2/10 today. Therapeutic Exercise: ( 38 minutes):  Exercises per grid below to improve mobility and strength. Required moderate visual, verbal and tactile cues to promote proper body alignment and promote proper body mechanics. Progressed complexity of movement as indicated.    Date  11/27/2018 Date  11/29/2018 Date:  12/4/2018 Date:  12/6/2018 Date:  12/11/2018 Date:  12/13/2018   Activity/Exercise         Assessment      D/c measurements   Physiostep 13min  Level 3 12 min  Level 3       Nustep   Level 4  12 minutes Level 4  12 minutes Level 4  12 minutes Level 4  12 minutes   LTR         TA activation          Bridges          Standing heel toe raises 1x20 reps B 1x20 reps B       Standing SLR flexion 1 x 20 reps  B 1 x 20 reps  B  -2# ankle weights 1 x 20 reps  B  -2# ankle weights  -blue airex 1 x 20 reps  B  -2# ankle weights  -blue airex 1 x 20 reps  B  -2.5# ankle weights  -blue airex    Standing SLR extension 1 x 20 reps  B 1 x 20 reps  B  -2# ankle weights 1 x 20 reps  B  -2# ankle weights  -blue airex 1 x 20 reps  B  -2# ankle weights  -blue airex 1 x 20 reps  B  -2.5# ankle weights  -blue airex    Standing SLR abduction 1 x 20 reps  B 1 x 20 reps  B  -2# ankle weights 1 x 20 reps  B  -2# ankle weights  -blue airex 1 x 20 reps  B  -2# ankle weights  -blue airex 1 x 20 reps  B  -2.5# ankle weights  -blue airex    Standing marching 1x20 reps B 1 x 20 reps  B  -2# ankle weights  -blue airex 1 x 20 reps  B  -2# ankle weights  -blue airex 1 x 20 reps  B  -2# ankle weights  -blue airex  -one hand support 1 x 20 reps  B  -2.5# ankle weights  -blue airex  -one hand support 1 x 20 reps  B  -2.5# ankle weights  -blue airex  -one hand support   Step-up 2x10 reps B 2x10 reps B  -2# ankle weights 2x10 reps  B  -2# ankle weights  4in step 2x10 reps  B  -2# ankle weights  6in step 2x10 reps  B  -2.5# ankle weights  6in step    Physio-ball alphabet     1 set B  -red physioball 1 set B  -red physioball   Calf stretch 3x1min 3x1min 3x1min 3x1min 3x1min 3x1min   Supine hamstring stretch 3x30s B        Supine piriformis stretch         Bolster squeezes         Sit to stand         Matrix walkouts   10 laps B  -7.5#  10 laps B  -7.5#  10 laps B  -17.5#   Matrix abdominal stabilization with extending and flexing elbow      1x10 reps B  -7.5#   Hallway walkout squat with weighted medicine ball  2 laps                  MODALITIES: (0 minutes) Patient supine for ice to sacral region following today's session to provide analgesia. MANUAL THERAPY: (0 minutes) patient positioned L side lying for soft tissue work manually and with thera-roller to R low back and gluteal region. Manual work performed to increase mobility and decrease pain and spasms. Treatment/Session Assessment:  Pt tolerated all exercises well with no complaints of lower back pain. He progressed well during physical therapy but continues to have pain with laying supine. Subjective outcome measure completed by patient does not adequately represent pt currently function and tolerance for activities/exercise. Discuss D/C next visit. Pt rated pain 1/10 at the end of treatment session. · Pain/ Symptoms: Initial:  3/10 back pain Post Session: 2/10 ·   Compliance with Program/Exercises: Will assess as treatment progresses. · Recommendations/Intent for next treatment session: \"Next visit will focus on advancements to more challenging activities and reduction in assistance provided\".     Total Treatment Duration:   PT Patient Time In/Time Out  Time In: 1015  Time Out: [address] [employer] [facility], PT, DPT

## 2018-12-19 PROBLEM — N18.30 STAGE 3 CHRONIC KIDNEY DISEASE (HCC): Status: ACTIVE | Noted: 2018-12-19

## 2019-02-05 PROBLEM — B35.1 TINEA UNGUIUM: Status: ACTIVE | Noted: 2019-02-05

## 2019-03-28 PROBLEM — E11.22 CKD STAGE 3 DUE TO TYPE 2 DIABETES MELLITUS (HCC): Status: ACTIVE | Noted: 2018-01-24

## 2019-03-28 PROBLEM — N18.30 CKD STAGE 3 DUE TO TYPE 2 DIABETES MELLITUS (HCC): Status: ACTIVE | Noted: 2018-01-24

## 2019-05-30 ENCOUNTER — HOSPITAL ENCOUNTER (OUTPATIENT)
Dept: GENERAL RADIOLOGY | Age: 54
Discharge: HOME | End: 2019-05-30
Payer: COMMERCIAL

## 2019-05-30 DIAGNOSIS — M25.512 BILATERAL SHOULDER PAIN: ICD-10-CM

## 2019-05-30 DIAGNOSIS — M25.511 BILATERAL SHOULDER PAIN: ICD-10-CM

## 2019-05-30 PROCEDURE — 73030 X-RAY EXAM OF SHOULDER: CPT

## 2019-05-30 NOTE — PROGRESS NOTES
xrays of both shoulders are normal.  No arthritis noted. Would expect pain to improve if he would start sleeping on a mattress instead of a couch. Please ask him if he'd like to try Physical Therapy.

## 2019-06-13 ENCOUNTER — HOSPITAL ENCOUNTER (OUTPATIENT)
Dept: PHYSICAL THERAPY | Age: 54
Discharge: HOME | End: 2019-06-13
Payer: COMMERCIAL

## 2019-06-13 DIAGNOSIS — M25.512 CHRONIC PAIN OF BOTH SHOULDERS: ICD-10-CM

## 2019-06-13 DIAGNOSIS — G89.29 CHRONIC PAIN OF BOTH SHOULDERS: ICD-10-CM

## 2019-06-13 DIAGNOSIS — M25.511 CHRONIC PAIN OF BOTH SHOULDERS: ICD-10-CM

## 2019-06-13 PROCEDURE — 97162 PT EVAL MOD COMPLEX 30 MIN: CPT

## 2019-06-27 PROBLEM — H25.13 NUCLEAR SCLEROTIC CATARACT OF BOTH EYES: Status: ACTIVE | Noted: 2019-06-27

## 2019-07-31 PROBLEM — E11.40 TYPE 2 DIABETES MELLITUS WITH DIABETIC NEUROPATHY (HCC): Status: ACTIVE | Noted: 2019-07-31

## 2019-08-08 NOTE — THERAPY EVALUATION
Kate Stokes  : 1965  Primary: [employer]  Secondary:  [address]  at [facility]  [address], [address] [facility], [address]  [phone]   [phone]          OUTPATIENT PHYSICAL THERAPY:Discharge Summary 2019   ICD-10: Treatment Diagnosis: Pain in left shoulder (M25.512)  Pain in right shoulder (M25.511)  Stiffness of left shoulder, not elsewhere classified (M25.612)  Stiffness of right shoulder, not elsewhere classified (M25.611)  Precautions/Allergies:   Patient has no known allergies. TREATMENT PLAN:  Effective Dates: 2019 TO 2019 (90 days). Frequency/Duration: 2 times a week for 90 Day(s) MEDICAL/REFERRING DIAGNOSIS:  Chronic pain of both shoulders [M25.511, G89.29, M25.512]   DATE OF ONSET: unknown  REFERRING PHYSICIAN: Brian Estrella*  MD Orders: eval only  Return MD Appointment: unknown     INITIAL ASSESSMENT:  Kate Stokes has been seen in physical therapy from 19 to 19 for 1 visits. Treatment has been discontinued at this time due to patient failing to return for additional treatment. The below goals were met prior to discontinuation. Some goals were not met due to dc. Thank you for this referral.        PROBLEM LIST (Impacting functional limitations):  1. Decreased Strength  2. Decreased ADL/Functional Activities  3. Decreased Transfer Abilities  4. Decreased Ambulation Ability/Technique  5. Increased Pain  6. Decreased Activity Tolerance  7. Decreased Flexibility/Joint Mobility INTERVENTIONS PLANNED: (Treatment may consist of any combination of the following)  1. Home Exercise Program (HEP)  2. Manual Therapy  3. Neuromuscular Re-education/Strengthening  4. Range of Motion (ROM)  5. Therapeutic Activites  6. Therapeutic Exercise/Strengthening     GOALS: (Goals have been discussed and agreed upon with patient.)  Short-Term Functional Goals: Time Frame: 45 days  1.  Decrease pain below 5/10 for the left shoulder with beginning home ADL's / shoulder mobility. 2. Decrease DASH from 29 to 5 in the right shoulder to indicate functional improvement and to demonstrate improved range of motion and functional improvement of the shoulder. 3. Patient to be independent with an initial HEP for the right shoulder for mobility and strengthening. DISCHARGE GOALS:   90 days      1. Decrease pain below 4/10 for the left shoulder with home ADL's / shoulder mobility. 2. Decrease DASH to 3 in the right shoulder to indicate functional improvement and to demonstrate improved strength and mobility of the right shoulder    3. Patient to be independent with an advanced HEP for the right shoulder for mobility and strengthening. OUTCOME MEASURE:   Tool Used: Disabilities of the Arm, Shoulder and Hand (DASH) Questionnaire - Quick Version  Score:  Initial: 8/55  Most Recent: X/55 (Date: -- )   Interpretation of Score: The DASH is designed to measure the activities of daily living in person's with upper extremity dysfunction or pain. Each section is scored on a 1-5 scale, 5 representing the greatest disability. The scores of each section are added together for a total score of 55. MEDICAL NECESSITY:   · Skilled intervention continues to be required due to decreased function. REASON FOR SERVICES/OTHER COMMENTS:  · Patient continues to require skilled intervention due to medical complications and patient unable to attend/participate in therapy as expected. Total Duration:  PT Patient Time In/Time Out  Time In: 1055  Time Out: 1145    Rehabilitation Potential For Stated Goals: Good  Regarding Guero Bazan's therapy, I certify that the treatment plan above will be carried out by a therapist or under their direction.   Thank you for this referral,  Renato Nolen DPT     Referring Physician Signature: Demetra Jean Baptiste _______________________________ Date _____________

## 2020-06-14 ENCOUNTER — APPOINTMENT (OUTPATIENT)
Dept: GENERAL RADIOLOGY | Age: 55
DRG: 134 | End: 2020-06-14
Attending: EMERGENCY MEDICINE
Payer: COMMERCIAL

## 2020-06-14 ENCOUNTER — APPOINTMENT (OUTPATIENT)
Dept: CT IMAGING | Age: 55
DRG: 134 | End: 2020-06-14
Attending: EMERGENCY MEDICINE
Payer: COMMERCIAL

## 2020-06-14 ENCOUNTER — HOSPITAL ENCOUNTER (INPATIENT)
Age: 55
LOS: 4 days | Discharge: HOME | DRG: 134 | End: 2020-06-18
Attending: EMERGENCY MEDICINE | Admitting: HOSPITALIST
Payer: COMMERCIAL

## 2020-06-14 DIAGNOSIS — K81.9 CHOLECYSTITIS: ICD-10-CM

## 2020-06-14 DIAGNOSIS — I10 ACCELERATED HYPERTENSION: ICD-10-CM

## 2020-06-14 DIAGNOSIS — I26.94 MULTIPLE SUBSEGMENTAL PULMONARY EMBOLI WITHOUT ACUTE COR PULMONALE (HCC): Primary | ICD-10-CM

## 2020-06-14 DIAGNOSIS — I26.99 ACUTE PULMONARY EMBOLISM WITHOUT ACUTE COR PULMONALE, UNSPECIFIED PULMONARY EMBOLISM TYPE (HCC): ICD-10-CM

## 2020-06-14 PROBLEM — R07.89 ATYPICAL CHEST PAIN: Status: ACTIVE | Noted: 2020-06-14

## 2020-06-14 PROBLEM — R06.02 SHORTNESS OF BREATH: Status: ACTIVE | Noted: 2020-06-14

## 2020-06-14 PROBLEM — E66.01 MORBID OBESITY (HCC): Status: ACTIVE | Noted: 2020-06-14

## 2020-06-14 PROBLEM — I16.1 HYPERTENSIVE EMERGENCY: Status: ACTIVE | Noted: 2020-06-14

## 2020-06-14 LAB
ALBUMIN SERPL-MCNC: 3.8 G/DL (ref 3.5–5)
ALBUMIN/GLOB SERPL: 0.8 {RATIO} (ref 1.2–3.5)
ALP SERPL-CCNC: 120 U/L (ref 50–136)
ALT SERPL-CCNC: 23 U/L (ref 12–65)
ANION GAP SERPL CALC-SCNC: 7 MMOL/L (ref 7–16)
APTT PPP: 30.8 SEC (ref 24.3–35.4)
AST SERPL-CCNC: 16 U/L (ref 15–37)
BASOPHILS # BLD: 0 K/UL (ref 0–0.2)
BASOPHILS NFR BLD: 0 % (ref 0–2)
BILIRUB SERPL-MCNC: 0.5 MG/DL (ref 0.2–1.1)
BUN SERPL-MCNC: 18 MG/DL (ref 6–23)
CALCIUM SERPL-MCNC: 10.2 MG/DL (ref 8.3–10.4)
CHLORIDE SERPL-SCNC: 98 MMOL/L (ref 98–107)
CO2 SERPL-SCNC: 31 MMOL/L (ref 21–32)
CREAT SERPL-MCNC: 1.7 MG/DL (ref 0.8–1.5)
DIFFERENTIAL METHOD BLD: ABNORMAL
EOSINOPHIL # BLD: 0.8 K/UL (ref 0–0.8)
EOSINOPHIL NFR BLD: 7 % (ref 0.5–7.8)
ERYTHROCYTE [DISTWIDTH] IN BLOOD BY AUTOMATED COUNT: 14.3 % (ref 11.9–14.6)
GLOBULIN SER CALC-MCNC: 4.7 G/DL (ref 2.3–3.5)
GLUCOSE SERPL-MCNC: 126 MG/DL (ref 65–100)
HCT VFR BLD AUTO: 42.8 % (ref 41.1–50.3)
HGB BLD-MCNC: 13.7 G/DL (ref 13.6–17.2)
IMM GRANULOCYTES # BLD AUTO: 0 K/UL (ref 0–0.5)
IMM GRANULOCYTES NFR BLD AUTO: 0 % (ref 0–5)
LIPASE SERPL-CCNC: 100 U/L (ref 73–393)
LYMPHOCYTES # BLD: 2.6 K/UL (ref 0.5–4.6)
LYMPHOCYTES NFR BLD: 23 % (ref 13–44)
MCH RBC QN AUTO: 26.6 PG (ref 26.1–32.9)
MCHC RBC AUTO-ENTMCNC: 32 G/DL (ref 31.4–35)
MCV RBC AUTO: 82.9 FL (ref 79.6–97.8)
MONOCYTES # BLD: 1 K/UL (ref 0.1–1.3)
MONOCYTES NFR BLD: 9 % (ref 4–12)
NEUTS SEG # BLD: 7 K/UL (ref 1.7–8.2)
NEUTS SEG NFR BLD: 61 % (ref 43–78)
NRBC # BLD: 0 K/UL (ref 0–0.2)
PLATELET # BLD AUTO: 324 K/UL (ref 150–450)
PMV BLD AUTO: 10.6 FL (ref 9.4–12.3)
POTASSIUM SERPL-SCNC: 3.9 MMOL/L (ref 3.5–5.1)
PROT SERPL-MCNC: 8.5 G/DL (ref 6.3–8.2)
RBC # BLD AUTO: 5.16 M/UL (ref 4.23–5.6)
SODIUM SERPL-SCNC: 136 MMOL/L (ref 136–145)
TROPONIN-HIGH SENSITIVITY: 19.4 PG/ML (ref 0–14)
WBC # BLD AUTO: 11.4 K/UL (ref 4.3–11.1)

## 2020-06-14 PROCEDURE — 99285 EMERGENCY DEPT VISIT HI MDM: CPT

## 2020-06-14 PROCEDURE — 71260 CT THORAX DX C+: CPT

## 2020-06-14 PROCEDURE — 85025 COMPLETE CBC W/AUTO DIFF WBC: CPT

## 2020-06-14 PROCEDURE — 96376 TX/PRO/DX INJ SAME DRUG ADON: CPT

## 2020-06-14 PROCEDURE — 96375 TX/PRO/DX INJ NEW DRUG ADDON: CPT

## 2020-06-14 PROCEDURE — 80053 COMPREHEN METABOLIC PANEL: CPT

## 2020-06-14 PROCEDURE — 71046 X-RAY EXAM CHEST 2 VIEWS: CPT

## 2020-06-14 PROCEDURE — 84484 ASSAY OF TROPONIN QUANT: CPT

## 2020-06-14 PROCEDURE — 85730 THROMBOPLASTIN TIME PARTIAL: CPT

## 2020-06-14 PROCEDURE — 93005 ELECTROCARDIOGRAM TRACING: CPT | Performed by: EMERGENCY MEDICINE

## 2020-06-14 PROCEDURE — 65610000006 HC RM INTENSIVE CARE

## 2020-06-14 PROCEDURE — 96374 THER/PROPH/DIAG INJ IV PUSH: CPT

## 2020-06-14 PROCEDURE — 83690 ASSAY OF LIPASE: CPT

## 2020-06-14 PROCEDURE — 74011000250 HC RX REV CODE- 250: Performed by: EMERGENCY MEDICINE

## 2020-06-14 PROCEDURE — 74011000258 HC RX REV CODE- 258: Performed by: EMERGENCY MEDICINE

## 2020-06-14 PROCEDURE — 74011250636 HC RX REV CODE- 250/636: Performed by: EMERGENCY MEDICINE

## 2020-06-14 PROCEDURE — 74011636320 HC RX REV CODE- 636/320: Performed by: EMERGENCY MEDICINE

## 2020-06-14 RX ORDER — HEPARIN SODIUM 5000 [USP'U]/ML
60 INJECTION, SOLUTION INTRAVENOUS; SUBCUTANEOUS
Status: COMPLETED | OUTPATIENT
Start: 2020-06-14 | End: 2020-06-15

## 2020-06-14 RX ORDER — ONDANSETRON 2 MG/ML
4 INJECTION INTRAMUSCULAR; INTRAVENOUS
Status: COMPLETED | OUTPATIENT
Start: 2020-06-14 | End: 2020-06-14

## 2020-06-14 RX ORDER — SODIUM CHLORIDE 0.9 % (FLUSH) 0.9 %
10 SYRINGE (ML) INJECTION
Status: COMPLETED | OUTPATIENT
Start: 2020-06-14 | End: 2020-06-14

## 2020-06-14 RX ORDER — HEPARIN SODIUM 5000 [USP'U]/100ML
18-36 INJECTION, SOLUTION INTRAVENOUS
Status: DISCONTINUED | OUTPATIENT
Start: 2020-06-14 | End: 2020-06-17

## 2020-06-14 RX ORDER — METRONIDAZOLE 500 MG/100ML
500 INJECTION, SOLUTION INTRAVENOUS EVERY 8 HOURS
Status: DISCONTINUED | OUTPATIENT
Start: 2020-06-14 | End: 2020-06-15

## 2020-06-14 RX ORDER — HYDRALAZINE HYDROCHLORIDE 20 MG/ML
10 INJECTION INTRAMUSCULAR; INTRAVENOUS
Status: COMPLETED | OUTPATIENT
Start: 2020-06-14 | End: 2020-06-14

## 2020-06-14 RX ORDER — HYDROMORPHONE HYDROCHLORIDE 1 MG/ML
1 INJECTION, SOLUTION INTRAMUSCULAR; INTRAVENOUS; SUBCUTANEOUS
Status: COMPLETED | OUTPATIENT
Start: 2020-06-14 | End: 2020-06-14

## 2020-06-14 RX ADMIN — SODIUM CHLORIDE 1000 ML: 9 INJECTION, SOLUTION INTRAVENOUS at 21:26

## 2020-06-14 RX ADMIN — ONDANSETRON 4 MG: 2 INJECTION INTRAMUSCULAR; INTRAVENOUS at 21:24

## 2020-06-14 RX ADMIN — HYDROMORPHONE HYDROCHLORIDE 1 MG: 1 INJECTION, SOLUTION INTRAMUSCULAR; INTRAVENOUS; SUBCUTANEOUS at 23:00

## 2020-06-14 RX ADMIN — IOPAMIDOL 100 ML: 755 INJECTION, SOLUTION INTRAVENOUS at 22:18

## 2020-06-14 RX ADMIN — Medication 10 ML: at 22:18

## 2020-06-14 RX ADMIN — SODIUM CHLORIDE 100 ML: 900 INJECTION, SOLUTION INTRAVENOUS at 22:18

## 2020-06-14 RX ADMIN — HYDROMORPHONE HYDROCHLORIDE 1 MG: 1 INJECTION, SOLUTION INTRAMUSCULAR; INTRAVENOUS; SUBCUTANEOUS at 21:21

## 2020-06-14 RX ADMIN — HYDRALAZINE HYDROCHLORIDE 10 MG: 20 INJECTION, SOLUTION INTRAMUSCULAR; INTRAVENOUS at 21:23

## 2020-06-15 ENCOUNTER — APPOINTMENT (OUTPATIENT)
Dept: ULTRASOUND IMAGING | Age: 55
DRG: 134 | End: 2020-06-15
Attending: HOSPITALIST
Payer: COMMERCIAL

## 2020-06-15 LAB
ALBUMIN SERPL-MCNC: 3.6 G/DL (ref 3.5–5)
ALBUMIN/GLOB SERPL: 0.8 {RATIO} (ref 1.2–3.5)
ALP SERPL-CCNC: 116 U/L (ref 50–136)
ALT SERPL-CCNC: 18 U/L (ref 12–65)
ANION GAP SERPL CALC-SCNC: 9 MMOL/L (ref 7–16)
APTT PPP: 105.7 SEC (ref 24.3–35.4)
APTT PPP: 108.2 SEC (ref 24.3–35.4)
APTT PPP: 126.5 SEC (ref 24.3–35.4)
AST SERPL-CCNC: 13 U/L (ref 15–37)
ATRIAL RATE: 69 BPM
BILIRUB SERPL-MCNC: 0.4 MG/DL (ref 0.2–1.1)
BUN SERPL-MCNC: 14 MG/DL (ref 6–23)
CALCIUM SERPL-MCNC: 9.6 MG/DL (ref 8.3–10.4)
CALCULATED P AXIS, ECG09: 29 DEGREES
CALCULATED R AXIS, ECG10: -39 DEGREES
CALCULATED T AXIS, ECG11: 46 DEGREES
CHLORIDE SERPL-SCNC: 99 MMOL/L (ref 98–107)
CO2 SERPL-SCNC: 27 MMOL/L (ref 21–32)
CREAT SERPL-MCNC: 1.45 MG/DL (ref 0.8–1.5)
DIAGNOSIS, 93000: NORMAL
ERYTHROCYTE [DISTWIDTH] IN BLOOD BY AUTOMATED COUNT: 14 % (ref 11.9–14.6)
GLOBULIN SER CALC-MCNC: 4.6 G/DL (ref 2.3–3.5)
GLUCOSE BLD STRIP.AUTO-MCNC: 106 MG/DL (ref 65–100)
GLUCOSE BLD STRIP.AUTO-MCNC: 143 MG/DL (ref 65–100)
GLUCOSE BLD STRIP.AUTO-MCNC: 152 MG/DL (ref 65–100)
GLUCOSE BLD STRIP.AUTO-MCNC: 171 MG/DL (ref 65–100)
GLUCOSE SERPL-MCNC: 168 MG/DL (ref 65–100)
HCT VFR BLD AUTO: 42.8 % (ref 41.1–50.3)
HGB BLD-MCNC: 14.3 G/DL (ref 13.6–17.2)
MCH RBC QN AUTO: 27 PG (ref 26.1–32.9)
MCHC RBC AUTO-ENTMCNC: 33.4 G/DL (ref 31.4–35)
MCV RBC AUTO: 80.9 FL (ref 79.6–97.8)
NRBC # BLD: 0 K/UL (ref 0–0.2)
P-R INTERVAL, ECG05: 178 MS
PLATELET # BLD AUTO: 276 K/UL (ref 150–450)
PMV BLD AUTO: 10.3 FL (ref 9.4–12.3)
POTASSIUM SERPL-SCNC: 3.5 MMOL/L (ref 3.5–5.1)
PROT SERPL-MCNC: 8.2 G/DL (ref 6.3–8.2)
Q-T INTERVAL, ECG07: 402 MS
QRS DURATION, ECG06: 100 MS
QTC CALCULATION (BEZET), ECG08: 430 MS
RBC # BLD AUTO: 5.29 M/UL (ref 4.23–5.6)
SODIUM SERPL-SCNC: 135 MMOL/L (ref 136–145)
VENTRICULAR RATE, ECG03: 69 BPM
WBC # BLD AUTO: 13.5 K/UL (ref 4.3–11.1)

## 2020-06-15 PROCEDURE — C1751 CATH, INF, PER/CENT/MIDLINE: HCPCS

## 2020-06-15 PROCEDURE — 87040 BLOOD CULTURE FOR BACTERIA: CPT

## 2020-06-15 PROCEDURE — 82962 GLUCOSE BLOOD TEST: CPT

## 2020-06-15 PROCEDURE — 94660 CPAP INITIATION&MGMT: CPT

## 2020-06-15 PROCEDURE — 74011250637 HC RX REV CODE- 250/637: Performed by: HOSPITALIST

## 2020-06-15 PROCEDURE — 74011000258 HC RX REV CODE- 258: Performed by: EMERGENCY MEDICINE

## 2020-06-15 PROCEDURE — 74011000258 HC RX REV CODE- 258: Performed by: HOSPITALIST

## 2020-06-15 PROCEDURE — 74011250636 HC RX REV CODE- 250/636: Performed by: EMERGENCY MEDICINE

## 2020-06-15 PROCEDURE — 99222 1ST HOSP IP/OBS MODERATE 55: CPT | Performed by: INTERNAL MEDICINE

## 2020-06-15 PROCEDURE — 80053 COMPREHEN METABOLIC PANEL: CPT

## 2020-06-15 PROCEDURE — 65660000000 HC RM CCU STEPDOWN

## 2020-06-15 PROCEDURE — 85730 THROMBOPLASTIN TIME PARTIAL: CPT

## 2020-06-15 PROCEDURE — 93970 EXTREMITY STUDY: CPT

## 2020-06-15 PROCEDURE — 85027 COMPLETE CBC AUTOMATED: CPT

## 2020-06-15 PROCEDURE — C8929 TTE W OR WO FOL WCON,DOPPLER: HCPCS

## 2020-06-15 PROCEDURE — 74011000250 HC RX REV CODE- 250: Performed by: HOSPITALIST

## 2020-06-15 PROCEDURE — 74011000250 HC RX REV CODE- 250: Performed by: EMERGENCY MEDICINE

## 2020-06-15 PROCEDURE — 74011250636 HC RX REV CODE- 250/636: Performed by: HOSPITALIST

## 2020-06-15 PROCEDURE — 36415 COLL VENOUS BLD VENIPUNCTURE: CPT

## 2020-06-15 RX ORDER — CLONIDINE HYDROCHLORIDE 0.2 MG/1
0.4 TABLET ORAL 2 TIMES DAILY
Status: DISCONTINUED | OUTPATIENT
Start: 2020-06-15 | End: 2020-06-15

## 2020-06-15 RX ORDER — ADHESIVE BANDAGE
30 BANDAGE TOPICAL DAILY PRN
Status: DISCONTINUED | OUTPATIENT
Start: 2020-06-15 | End: 2020-06-18 | Stop reason: HOSPADM

## 2020-06-15 RX ORDER — CLONIDINE HYDROCHLORIDE 0.2 MG/1
0.2 TABLET ORAL
Status: DISCONTINUED | OUTPATIENT
Start: 2020-06-15 | End: 2020-06-18 | Stop reason: HOSPADM

## 2020-06-15 RX ORDER — MORPHINE SULFATE 2 MG/ML
2 INJECTION, SOLUTION INTRAMUSCULAR; INTRAVENOUS
Status: DISCONTINUED | OUTPATIENT
Start: 2020-06-15 | End: 2020-06-18 | Stop reason: HOSPADM

## 2020-06-15 RX ORDER — SODIUM CHLORIDE 0.9 % (FLUSH) 0.9 %
5-40 SYRINGE (ML) INJECTION AS NEEDED
Status: DISCONTINUED | OUTPATIENT
Start: 2020-06-15 | End: 2020-06-18 | Stop reason: HOSPADM

## 2020-06-15 RX ORDER — DIPHENHYDRAMINE HYDROCHLORIDE 50 MG/ML
12.5 INJECTION, SOLUTION INTRAMUSCULAR; INTRAVENOUS
Status: DISCONTINUED | OUTPATIENT
Start: 2020-06-15 | End: 2020-06-18 | Stop reason: HOSPADM

## 2020-06-15 RX ORDER — INSULIN LISPRO 100 [IU]/ML
INJECTION, SOLUTION INTRAVENOUS; SUBCUTANEOUS
Status: DISCONTINUED | OUTPATIENT
Start: 2020-06-15 | End: 2020-06-18 | Stop reason: HOSPADM

## 2020-06-15 RX ORDER — LISINOPRIL 20 MG/1
20 TABLET ORAL DAILY
Status: DISCONTINUED | OUTPATIENT
Start: 2020-06-15 | End: 2020-06-18 | Stop reason: HOSPADM

## 2020-06-15 RX ORDER — METOCLOPRAMIDE HYDROCHLORIDE 5 MG/ML
5 INJECTION INTRAMUSCULAR; INTRAVENOUS EVERY 6 HOURS
Status: COMPLETED | OUTPATIENT
Start: 2020-06-15 | End: 2020-06-16

## 2020-06-15 RX ORDER — HYDRALAZINE HYDROCHLORIDE 50 MG/1
50 TABLET, FILM COATED ORAL 2 TIMES DAILY
Status: DISCONTINUED | OUTPATIENT
Start: 2020-06-15 | End: 2020-06-15

## 2020-06-15 RX ORDER — CARVEDILOL 25 MG/1
25 TABLET ORAL 2 TIMES DAILY WITH MEALS
Status: DISCONTINUED | OUTPATIENT
Start: 2020-06-15 | End: 2020-06-15

## 2020-06-15 RX ORDER — HYDRALAZINE HYDROCHLORIDE 20 MG/ML
10 INJECTION INTRAMUSCULAR; INTRAVENOUS
Status: DISCONTINUED | OUTPATIENT
Start: 2020-06-15 | End: 2020-06-18 | Stop reason: HOSPADM

## 2020-06-15 RX ORDER — ROSUVASTATIN CALCIUM 10 MG/1
10 TABLET, COATED ORAL
Status: DISCONTINUED | OUTPATIENT
Start: 2020-06-15 | End: 2020-06-15

## 2020-06-15 RX ORDER — LABETALOL 100 MG/1
100 TABLET, FILM COATED ORAL EVERY 12 HOURS
Status: DISCONTINUED | OUTPATIENT
Start: 2020-06-15 | End: 2020-06-18 | Stop reason: HOSPADM

## 2020-06-15 RX ORDER — METOCLOPRAMIDE HYDROCHLORIDE 5 MG/ML
5 INJECTION INTRAMUSCULAR; INTRAVENOUS EVERY 6 HOURS
Status: DISCONTINUED | OUTPATIENT
Start: 2020-06-15 | End: 2020-06-15

## 2020-06-15 RX ORDER — NALOXONE HYDROCHLORIDE 0.4 MG/ML
0.4 INJECTION, SOLUTION INTRAMUSCULAR; INTRAVENOUS; SUBCUTANEOUS AS NEEDED
Status: DISCONTINUED | OUTPATIENT
Start: 2020-06-15 | End: 2020-06-18 | Stop reason: HOSPADM

## 2020-06-15 RX ORDER — ACETAMINOPHEN 325 MG/1
650 TABLET ORAL
Status: DISCONTINUED | OUTPATIENT
Start: 2020-06-15 | End: 2020-06-18 | Stop reason: HOSPADM

## 2020-06-15 RX ORDER — ONDANSETRON 2 MG/ML
4 INJECTION INTRAMUSCULAR; INTRAVENOUS
Status: DISCONTINUED | OUTPATIENT
Start: 2020-06-15 | End: 2020-06-18 | Stop reason: HOSPADM

## 2020-06-15 RX ORDER — HYDROCODONE BITARTRATE AND ACETAMINOPHEN 10; 325 MG/1; MG/1
1 TABLET ORAL
Status: DISCONTINUED | OUTPATIENT
Start: 2020-06-15 | End: 2020-06-18 | Stop reason: HOSPADM

## 2020-06-15 RX ORDER — LABETALOL 200 MG/1
200 TABLET, FILM COATED ORAL EVERY 12 HOURS
Status: DISCONTINUED | OUTPATIENT
Start: 2020-06-15 | End: 2020-06-15

## 2020-06-15 RX ORDER — IPRATROPIUM BROMIDE AND ALBUTEROL SULFATE 2.5; .5 MG/3ML; MG/3ML
3 SOLUTION RESPIRATORY (INHALATION)
Status: DISCONTINUED | OUTPATIENT
Start: 2020-06-15 | End: 2020-06-18 | Stop reason: HOSPADM

## 2020-06-15 RX ORDER — IPRATROPIUM BROMIDE AND ALBUTEROL SULFATE 2.5; .5 MG/3ML; MG/3ML
3 SOLUTION RESPIRATORY (INHALATION)
Status: DISCONTINUED | OUTPATIENT
Start: 2020-06-15 | End: 2020-06-15

## 2020-06-15 RX ORDER — SODIUM CHLORIDE 0.9 % (FLUSH) 0.9 %
5-40 SYRINGE (ML) INJECTION EVERY 8 HOURS
Status: DISCONTINUED | OUTPATIENT
Start: 2020-06-15 | End: 2020-06-18 | Stop reason: HOSPADM

## 2020-06-15 RX ORDER — LANOLIN ALCOHOL/MO/W.PET/CERES
400 CREAM (GRAM) TOPICAL 2 TIMES DAILY
Status: DISCONTINUED | OUTPATIENT
Start: 2020-06-15 | End: 2020-06-15

## 2020-06-15 RX ADMIN — NICARDIPINE HYDROCHLORIDE 15 MG/HR: 25 INJECTION INTRAVENOUS at 01:00

## 2020-06-15 RX ADMIN — PERFLUTREN 1 ML: 6.52 INJECTION, SUSPENSION INTRAVENOUS at 13:00

## 2020-06-15 RX ADMIN — METOCLOPRAMIDE HYDROCHLORIDE 5 MG: 5 INJECTION INTRAMUSCULAR; INTRAVENOUS at 05:36

## 2020-06-15 RX ADMIN — CEFTRIAXONE SODIUM 2 G: 2 INJECTION, POWDER, FOR SOLUTION INTRAMUSCULAR; INTRAVENOUS at 02:01

## 2020-06-15 RX ADMIN — CEFTRIAXONE SODIUM 2 G: 2 INJECTION, POWDER, FOR SOLUTION INTRAMUSCULAR; INTRAVENOUS at 23:29

## 2020-06-15 RX ADMIN — HYDROCODONE BITARTRATE AND ACETAMINOPHEN 1 TABLET: 10; 325 TABLET ORAL at 01:41

## 2020-06-15 RX ADMIN — LABETALOL HYDROCHLORIDE 200 MG: 200 TABLET, FILM COATED ORAL at 04:31

## 2020-06-15 RX ADMIN — LABETALOL HYDROCHLORIDE 100 MG: 100 TABLET, FILM COATED ORAL at 21:56

## 2020-06-15 RX ADMIN — METOCLOPRAMIDE HYDROCHLORIDE 5 MG: 5 INJECTION INTRAMUSCULAR; INTRAVENOUS at 12:17

## 2020-06-15 RX ADMIN — METOCLOPRAMIDE HYDROCHLORIDE 5 MG: 5 INJECTION INTRAMUSCULAR; INTRAVENOUS at 17:00

## 2020-06-15 RX ADMIN — HEPARIN SODIUM 6350 UNITS: 5000 INJECTION INTRAVENOUS; SUBCUTANEOUS at 01:48

## 2020-06-15 RX ADMIN — METRONIDAZOLE 500 MG: 500 INJECTION, SOLUTION INTRAVENOUS at 02:27

## 2020-06-15 RX ADMIN — Medication 10 ML: at 21:35

## 2020-06-15 RX ADMIN — Medication 10 ML: at 05:37

## 2020-06-15 RX ADMIN — HEPARIN SODIUM 18 UNITS/KG/HR: 5000 INJECTION, SOLUTION INTRAVENOUS at 01:42

## 2020-06-15 RX ADMIN — CLONIDINE HYDROCHLORIDE 0.4 MG: 0.2 TABLET ORAL at 02:27

## 2020-06-15 RX ADMIN — HYDRALAZINE HYDROCHLORIDE 50 MG: 50 TABLET, FILM COATED ORAL at 02:26

## 2020-06-15 RX ADMIN — NICARDIPINE HYDROCHLORIDE 15 MG/HR: 25 INJECTION INTRAVENOUS at 01:53

## 2020-06-15 RX ADMIN — HEPARIN SODIUM 18 UNITS/KG/HR: 5000 INJECTION, SOLUTION INTRAVENOUS at 14:55

## 2020-06-15 RX ADMIN — HEPARIN SODIUM 18 UNITS/KG/HR: 5000 INJECTION, SOLUTION INTRAVENOUS at 09:59

## 2020-06-15 RX ADMIN — Medication 10 ML: at 02:33

## 2020-06-15 RX ADMIN — NICARDIPINE HYDROCHLORIDE 5 MG/HR: 25 INJECTION INTRAVENOUS at 00:00

## 2020-06-15 RX ADMIN — HYDROCODONE BITARTRATE AND ACETAMINOPHEN 1 TABLET: 10; 325 TABLET ORAL at 15:57

## 2020-06-15 RX ADMIN — METOCLOPRAMIDE HYDROCHLORIDE 5 MG: 5 INJECTION INTRAMUSCULAR; INTRAVENOUS at 23:28

## 2020-06-15 RX ADMIN — NICARDIPINE HYDROCHLORIDE 15 MG/HR: 25 INJECTION INTRAVENOUS at 04:12

## 2020-06-15 RX ADMIN — Medication 10 ML: at 15:57

## 2020-06-15 RX ADMIN — METOCLOPRAMIDE HYDROCHLORIDE 5 MG: 5 INJECTION INTRAMUSCULAR; INTRAVENOUS at 01:51

## 2020-06-15 RX ADMIN — CARVEDILOL 25 MG: 25 TABLET, FILM COATED ORAL at 02:26

## 2020-06-15 NOTE — ED TRIAGE NOTES
Arrives with face mask in place. Reports epigastric pain, chills. Onset yesterday. States constant since onset. Describes as \"aching\". Denies shortness of breath, cough, fevers, n/v/d. Denies hx of same. No relieving or aggravating factors. Attempted gasX, tums, tylenol without relief. +etoh yesterday, reports once/week etoh consumption. HTN on arrival, reports compliance with meds.

## 2020-06-15 NOTE — PROGRESS NOTES
06/15/20 1015   Dual Skin Pressure Injury Assessment   Dual Skin Pressure Injury Assessment WDL   Second Care Provider (Based on [address] [place] [address]) Pernell Guadarrama RN   Skin Integumentary   Skin Integumentary (WDL) X   Skin Color Appropriate for ethnicity   Skin Condition/Temp Warm;Dry   Skin Integrity Scars (comment)   Turgor Non-tenting   Hair Growth Sparce   Nails WDL   Varicosities Absent    Pressure  Injury Documentation No Pressure Injury Noted-Pressure Ulcer Prevention Initiated   Wound Prevention and Protection Methods   Orientation of Wound Prevention Posterior   Location of Wound Prevention Sacrum/Coccyx   Dressing Present  No   Wound Offloading (Prevention Methods) Bed, pressure redistribution/air

## 2020-06-15 NOTE — CONSULTS
[facility] Hematology & Oncology        Inpatient Hematology / Oncology Consult Note    Reason for Consult:  Hypertensive emergency [I16.1]  Acute pulmonary embolism ([facility] [I26.99]  Morbid obesity ([facility] [E66.01]  Shortness of breath [R06.02]  Atypical chest pain [R07.89]  Referring Physician:  Adam Huerta MD    History of Present Illness:  Mr. Fredis Guzman is a 63 y.o. male admitted on 6/14/2020. The primary encounter diagnosis was Multiple subsegmental pulmonary emboli without acute cor pulmonale ([facility] Diagnoses of Accelerated hypertension and Cholecystitis were also pertinent to this visit. .      Mr Fredis Guzman has a PMH of diastolic CHF, ORALIA, HTN, DM. He presented to the ED 6/14 for epigastric/chest pain x24 hours and mild shortness of breath. CT CAP showed RLL PE and dilated gallbladder concerning for cholecystitis. He was started on a heparin gtt and is now on Rocephin for possible cholecystitis, surgical consult pending. Echo and BLE dopplers pending to complete PE work-up. He denied family history of blood clots as well as predisposing factors for development of PE. He denies sedentary lifestyle - cares for mother and works out at the gym 3x/week. No recent prolonged sitting/travel. Denies infectious symptoms. We have been consulted for hypercoaguable work-up given new history of PE without known risk factors. Review of Systems:  Constitutional Denies fever, chills, weight loss, appetite changes, fatigue, night sweats. HEENT Denies trauma, blurry vision, hearing loss, ear pain, nosebleeds, sore throat, neck pain. Skin Denies lesions or rashes. Lungs Denies dyspnea, cough, sputum production or hemoptysis. Cardiovascular +diffuse chest pain. Denies palpitations or lower extremity edema. Gastrointestinal +epigastric pain. Denies nausea, vomiting, changes in bowel habits, bloody or black stools, abdominal pain.  Denies dysuria, frequency or hesitancy of urination.    Neuro Denies headaches, visual changes or ataxia. Denies dizziness, tingling, tremors, sensory change, speech change, focal weakness or headaches. Hematology Denies easy bruising or bleeding, denies gingival bleeding or epistaxis. Endo Denies heat/cold intolerance, denies diabetes or thyroid abnormalities. MSK Denies back pain, arthralgias, myalgias or frequent falls. Psychiatric/Behavioral Denies depression and substance abuse. The patient is not nervous/anxious. No Known Allergies  Past Medical History:   Diagnosis Date    Allergic rhinitis due to animal (cat) (dog) hair and dander 88/15/29    Diastolic CHF, chronic ([facility] 6/1/2016    ORALIA on CPAP     Study done 4/17/2012;  AHI 86.7, RDI 86.7 (same)     Past Surgical History:   Procedure Laterality Date    COLONOSCOPY N/A 8/26/2016    COLONOSCOPY / BMI 47 performed by Reena Frank [facility] at [facility] ENDOSCOPY    HX AMPUTATION Right     Right index finger happened at work    HX PREMALIG/BENIGN SKIN LESION EXCISION Right     Behind right ear    HX TONSILLECTOMY       [facility] FLX W/REMOVAL LESION BY HOT BX FORCEPS  8/26/2016          Family History   Problem Relation Age of Onset    Cancer Sister     Diabetes Mother     Hypertension Mother     No Known Problems Father     No Known Problems Brother     No Known Problems Sister     Diabetes Other     Other Other         Renal Failure     Hypertension Other     Colon Cancer Neg Hx      Social History     Socioeconomic History    Marital status: SINGLE     Spouse name: Not on file    Number of children: Not on file    Years of education: Not on file    Highest education level: Not on file   Occupational History    Not on file   Social Needs    Financial resource strain: Not on file    Food insecurity     Worry: Not on file     Inability: Not on file    Transportation needs     Medical: Not on file     Non-medical: Not on file   Tobacco Use    Smoking status: Never Smoker    Smokeless tobacco: Never Used Substance and Sexual Activity    Alcohol use:  Yes     Alcohol/week: 10.0 standard drinks     Types: 10 Cans of beer per week     Comment: occ    Drug use: No    Sexual activity: Yes     Partners: Female   Lifestyle    Physical activity     Days per week: Not on file     Minutes per session: Not on file    Stress: Not on file   Relationships    Social connections     Talks on phone: Not on file     Gets together: Not on file     Attends Holiness service: Not on file     Active member of club or organization: Not on file     Attends meetings of clubs or organizations: Not on file     Relationship status: Not on file    Intimate partner violence     Fear of current or ex partner: Not on file     Emotionally abused: Not on file     Physically abused: Not on file     Forced sexual activity: Not on file   Other Topics Concern    Not on file   Social History Narrative    Not on file     Current Facility-Administered Medications   Medication Dose Route Frequency Provider Last Rate Last Dose    sodium chloride (NS) flush 5-40 mL  5-40 mL IntraVENous Q8H Dom Perez MD   10 mL at 06/15/20 0537    sodium chloride (NS) flush 5-40 mL  5-40 mL IntraVENous PRN Dom Perez MD        tuberculin injection 5 Units  5 Units IntraDERMal Imtiaz Bautista MD        acetaminophen (TYLENOL) tablet 650 mg  650 mg Oral Q4H PRN Dom Perez MD        HYDROcodone-acetaminophen [facility])  mg tablet 1 Tab  1 Tab Oral Q6H PRN Dom Perez MD   1 Tab at 06/15/20 0141    morphine injection 2 mg  2 mg IntraVENous Q4H PRN Dom Perez MD        naloxone [facility]) injection 0.4 mg  0.4 mg IntraVENous PRN Dom Perez MD        diphenhydrAMINE (BENADRYL) injection 12.5 mg  12.5 mg IntraVENous Q4H PRN Dom Perez MD        ondansetron [facility]) injection 4 mg  4 mg IntraVENous Q4H PRN Dom Perez MD        magnesium hydroxide (MILK OF MAGNESIA) 400 mg/5 mL oral suspension 30 mL  30 mL Oral DAILY PRN Elena Pierre MD        insulin lispro (HUMALOG) injection   SubCUTAneous AC&HS Elena Pierre MD        metoclopramide HCl (REGLAN) injection 5 mg  5 mg IntraVENous Q6H Elena Pierre MD   5 mg at 06/15/20 0536    lisinopriL (PRINIVIL, ZESTRIL) tablet 20 mg  20 mg Oral DAILY Elena Pierre MD        labetaloL (NORMODYNE) tablet 100 mg  100 mg Oral Q12H Felipe Reed MD        cloNIDine HCL (CATAPRES) tablet 0.2 mg  0.2 mg Oral BID PRN Felipe Reed MD        hydrALAZINE (APRESOLINE) 20 mg/mL injection 10 mg  10 mg IntraVENous Q6H PRN Felipe Reed MD        albuterol-ipratropium (DUO-NEB) 2.5 MG-0.5 MG/3 ML  3 mL Nebulization Q4H PRN Felipe Reed MD        heparin 25,000 units in dextrose 500 mL infusion  18-36 Units/kg/hr (Adjusted) IntraVENous TITRATE Mary Shoemaker MD 38.2 mL/hr at 06/15/20 0708 18 Units/kg/hr at 06/15/20 0708    cefTRIAXone (ROCEPHIN) 2 g in 0.9% sodium chloride (MBP/ADV) 50 mL  2 g IntraVENous Q24H Alexandrea Welch  mL/hr at 06/15/20 0201 2 g at 06/15/20 0201       OBJECTIVE:  Patient Vitals for the past 8 hrs:   BP Pulse Resp SpO2   06/15/20 0631  75 16 95 %   06/15/20 0630 106/59 73 13 95 %   06/15/20 0629  74 14 94 %   06/15/20 0628  76 13 94 %   06/15/20 0614 110/61 78 19 95 %   06/15/20 0613  81 15 93 %   06/15/20 0559 99/57 77 13 93 %   06/15/20 0558  77 13 93 %   06/15/20 0546  79 14 93 %   06/15/20 0545 108/56 80 13 93 %   06/15/20 0544  80 14 93 %   06/15/20 0517  85 18 96 %   06/15/20 0516  93 16 93 %   06/15/20 0515 129/66 86 10 96 %   06/15/20 0514  88 15 93 %   06/15/20 0501  97 17 96 %   06/15/20 0500  98 17 96 %   06/15/20 0459 170/87 99 17 97 %   06/15/20 0458  (!) 118 12 97 %   06/15/20 0446  (!) 103 14 96 %   06/15/20 0445 155/86 97 16 96 %   06/15/20 0444  99 16 95 %   06/15/20 0430 138/75 (!) 103 16 98 %   06/15/20 0429  97 15 96 %   06/15/20 0415  (!) 101 17 97 %   06/15/20 0414 149/81 100 15 97 % 06/15/20 0413  99 17 97 %   06/15/20 0412 (!) 168/91 (!) 110     06/15/20 0400  (!) 104 17 97 %   06/15/20 0359 (!) 168/91 (!) 106 17 98 %   06/15/20 0358  (!) 105 16 98 %   06/15/20 0348  (!) 131 18 100 %   06/15/20 0347  (!) 110 17 98 %   06/15/20 0346 (!) 191/115 (!) 110 17 99 %   06/15/20 0345  (!) 120 12 100 %   06/15/20 0332  (!) 119 13 100 %   06/15/20 0331  (!) 121 19 98 %   06/15/20 0330 170/88 (!) 102 16 98 %   06/15/20 0329  100 16 96 %   06/15/20 0316  (!) 102 18 96 %   06/15/20 0315  100 17 97 %   06/15/20 0314 160/88 (!) 101 16 97 %   06/15/20 0313  (!) 104 17 96 %   06/15/20 0306    94 %   06/15/20 0301  (!) 109 15 94 %   06/15/20 0300  (!) 104 17 95 %   06/15/20 0259 157/89 (!) 103 23 94 %   06/15/20 0258  (!) 104 25 95 %   06/15/20 0250  (!) 105 17 91 %   06/15/20 0249  (!) 103 17 92 %   06/15/20 0248 (!) 175/100 (!) 103 15 94 %   06/15/20 0230 (!) 174/97 (!) 104 15 95 %   06/15/20 0229  (!) 106 18 93 %   06/15/20 0226 (!) 182/97 (!) 104     06/15/20 0216  (!) 104 16 96 %   06/15/20 0215  (!) 104 18 98 %   06/15/20 0214 (!) 182/97 (!) 106 15 97 %   06/15/20 0213  (!) 104 19 95 %   06/15/20 0203  (!) 115 22 94 %   06/15/20 0202 (!) 169/107 (!) 115 13 93 %   06/15/20 0201  (!) 115 16 94 %   06/15/20 0153 (!) 157/102 (!) 108     06/15/20 0147  (!) 107 17 98 %   06/15/20 0146  (!) 106 11 98 %   06/15/20 0145 (!) 157/102 (!) 106 12 98 %   06/15/20 0144  (!) 108 17 98 %   06/15/20 0131  (!) 110 17 96 %   06/15/20 0130 (!) 185/107 (!) 112 14 96 %   06/15/20 0129  (!) 109 16 95 %   06/15/20 0121  (!) 112 17 95 %   06/15/20 0120 (!) 186/111 (!) 115 12 95 %   06/15/20 0119  (!) 117 13 94 %   06/15/20 0100  (!) 114 11 96 %   06/15/20 0059 (!) 228/111 (!) 108 14 97 %   06/15/20 0058  (!) 111 13 96 %     Temp (24hrs), Av.8 °F (37.1 °C), Min:98.3 °F (36.8 °C), Max:99.3 °F (37.4 °C)    No intake/output data recorded.     Physical Exam:  Constitutional: Well developed, obese male in no acute distress, sitting comfortably in the hospital bed. HEENT: Normocephalic and atraumatic. Oropharynx is clear, mucous membranes are moist. Extraocular muscles are intact. Sclerae anicteric. Neck supple without JVD. No thyromegaly present. Lymph node   Deferred   Skin Warm and dry. No bruising and no rash noted. No erythema. No pallor. Respiratory Lungs are clear to auscultation bilaterally without wheezes, rales or rhonchi, normal air exchange without accessory muscle use. CVS Normal rate, regular rhythm and normal S1 and S2. No murmurs, gallops, or rubs. Abdomen Soft, nontender and nondistended, normoactive bowel sounds. No palpable mass. No hepatosplenomegaly. Neuro Grossly nonfocal with no obvious sensory or motor deficits. MSK Normal range of motion in general.  No edema and no tenderness. Psych Appropriate mood and affect.         Labs:    Recent Results (from the past 24 hour(s))   EKG, 12 LEAD, INITIAL    Collection Time: 06/14/20  8:01 PM   Result Value Ref Range    Ventricular Rate 69 BPM    Atrial Rate 69 BPM    P-R Interval 178 ms    QRS Duration 100 ms    Q-T Interval 402 ms    QTC Calculation (Bezet) 430 ms    Calculated P Axis 29 degrees    Calculated R Axis -39 degrees    Calculated T Axis 46 degrees    Diagnosis       Normal sinus rhythm  Left axis deviation  Possible Anterior infarct (cited on or before 14-JUN-2020)  Abnormal ECG  When compared with ECG of 14-JUN-2020 19:59,  No significant change was found     PTT    Collection Time: 06/14/20  8:10 PM   Result Value Ref Range    aPTT 30.8 24.3 - 35.4 SEC   CBC WITH AUTOMATED DIFF    Collection Time: 06/14/20  8:13 PM   Result Value Ref Range    WBC 11.4 (H) 4.3 - 11.1 K/uL    RBC 5.16 4.23 - 5.6 M/uL    HGB 13.7 13.6 - 17.2 g/dL    HCT 42.8 41.1 - 50.3 %    MCV 82.9 79.6 - 97.8 FL    MCH 26.6 26.1 - 32.9 PG    MCHC 32.0 31.4 - 35.0 g/dL    RDW 14.3 11.9 - 14.6 %    PLATELET 071 213 - 698 K/uL    MPV 10.6 9.4 - 12.3 FL    ABSOLUTE NRBC 0.00 0.0 - 0.2 K/uL    DF AUTOMATED      NEUTROPHILS 61 43 - 78 %    LYMPHOCYTES 23 13 - 44 %    MONOCYTES 9 4.0 - 12.0 %    EOSINOPHILS 7 0.5 - 7.8 %    BASOPHILS 0 0.0 - 2.0 %    IMMATURE GRANULOCYTES 0 0.0 - 5.0 %    ABS. NEUTROPHILS 7.0 1.7 - 8.2 K/UL    ABS. LYMPHOCYTES 2.6 0.5 - 4.6 K/UL    ABS. MONOCYTES 1.0 0.1 - 1.3 K/UL    ABS. EOSINOPHILS 0.8 0.0 - 0.8 K/UL    ABS. BASOPHILS 0.0 0.0 - 0.2 K/UL    ABS. IMM. GRANS. 0.0 0.0 - 0.5 K/UL   METABOLIC PANEL, COMPREHENSIVE    Collection Time: 06/14/20  8:13 PM   Result Value Ref Range    Sodium 136 136 - 145 mmol/L    Potassium 3.9 3.5 - 5.1 mmol/L    Chloride 98 98 - 107 mmol/L    CO2 31 21 - 32 mmol/L    Anion gap 7 7 - 16 mmol/L    Glucose 126 (H) 65 - 100 mg/dL    BUN 18 6 - 23 MG/DL    Creatinine 1.70 (H) 0.8 - 1.5 MG/DL    GFR est AA 54 (L) >60 ml/min/1.73m2    GFR est non-AA 45 (L) >60 ml/min/1.73m2    Calcium 10.2 8.3 - 10.4 MG/DL    Bilirubin, total 0.5 0.2 - 1.1 MG/DL    ALT (SGPT) 23 12 - 65 U/L    AST (SGOT) 16 15 - 37 U/L    Alk.  phosphatase 120 50 - 136 U/L    Protein, total 8.5 (H) 6.3 - 8.2 g/dL    Albumin 3.8 3.5 - 5.0 g/dL    Globulin 4.7 (H) 2.3 - 3.5 g/dL    A-G Ratio 0.8 (L) 1.2 - 3.5     TROPONIN-HIGH SENSITIVITY    Collection Time: 06/14/20  8:13 PM   Result Value Ref Range    Troponin-High Sensitivity 19.4 (H) 0 - 14 pg/mL   LIPASE    Collection Time: 06/14/20  8:13 PM   Result Value Ref Range    Lipase 100 73 - 266 U/L   METABOLIC PANEL, COMPREHENSIVE    Collection Time: 06/15/20  3:23 AM   Result Value Ref Range    Sodium 135 (L) 136 - 145 mmol/L    Potassium 3.5 3.5 - 5.1 mmol/L    Chloride 99 98 - 107 mmol/L    CO2 27 21 - 32 mmol/L    Anion gap 9 7 - 16 mmol/L    Glucose 168 (H) 65 - 100 mg/dL    BUN 14 6 - 23 MG/DL    Creatinine 1.45 0.8 - 1.5 MG/DL    GFR est AA >60 >60 ml/min/1.73m2    GFR est non-AA 54 (L) >60 ml/min/1.73m2    Calcium 9.6 8.3 - 10.4 MG/DL    Bilirubin, total 0.4 0.2 - 1.1 MG/DL ALT (SGPT) 18 12 - 65 U/L    AST (SGOT) 13 (L) 15 - 37 U/L    Alk.  phosphatase 116 50 - 136 U/L    Protein, total 8.2 6.3 - 8.2 g/dL    Albumin 3.6 3.5 - 5.0 g/dL    Globulin 4.6 (H) 2.3 - 3.5 g/dL    A-G Ratio 0.8 (L) 1.2 - 3.5     CBC W/O DIFF    Collection Time: 06/15/20  3:23 AM   Result Value Ref Range    WBC 13.5 (H) 4.3 - 11.1 K/uL    RBC 5.29 4.23 - 5.6 M/uL    HGB 14.3 13.6 - 17.2 g/dL    HCT 42.8 41.1 - 50.3 %    MCV 80.9 79.6 - 97.8 FL    MCH 27.0 26.1 - 32.9 PG    MCHC 33.4 31.4 - 35.0 g/dL    RDW 14.0 11.9 - 14.6 %    PLATELET 710 541 - 841 K/uL    MPV 10.3 9.4 - 12.3 FL    ABSOLUTE NRBC 0.00 0.0 - 0.2 K/uL   GLUCOSE, POC    Collection Time: 06/15/20  8:36 AM   Result Value Ref Range    Glucose (POC) 106 (H) 65 - 100 mg/dL       Imaging:  [unfilled]    ASSESSMENT:  Problem List  Date Reviewed: 3/4/2020          Codes Class Noted    Morbid obesity ([facility] ICD-10-CM: E66.01  ICD-9-CM: 278.01  6/14/2020        Shortness of breath ICD-10-CM: R06.02  ICD-9-CM: 786.05  6/14/2020        Atypical chest pain ICD-10-CM: R07.89  ICD-9-CM: 786.59  6/14/2020        * (Principal) Acute pulmonary embolism ([facility] ICD-10-CM: I26.99  ICD-9-CM: 415.19  6/14/2020        Hypertensive emergency ICD-10-CM: I16.1  ICD-9-CM: 401.9  6/14/2020        Type 2 diabetes mellitus with diabetic neuropathy, without long-term current use of insulin (HCC) ICD-10-CM: E11.40  ICD-9-CM: 250.60, 357.2  7/31/2019        Nuclear sclerotic cataract of both eyes ICD-10-CM: H25.13  ICD-9-CM: 366.16  6/27/2019        Tinea unguium ICD-10-CM: B35.1  ICD-9-CM: 110.1  2/5/2019    Overview Signed 2/5/2019 12:30 PM by CAT Salgado     bilateral--all nails             Nonrheumatic aortic valve regurgitation ICD-10-CM: I35.1  ICD-9-CM: 424.1  12/6/2018        Chronic right shoulder pain ICD-10-CM: M25.511, G89.29  ICD-9-CM: 719.41, 338.29  11/9/2018        Chronic midline low back pain without sciatica ICD-10-CM: M54.5, G89.29  ICD-9-CM: 724.2, 338.29  11/9/2018        Arthritis, lumbar spine ICD-10-CM: M47.816  ICD-9-CM: 721.3  11/9/2018        Type 2 diabetes mellitus with stage 3 chronic kidney disease ([facility] ICD-10-CM: E11.22, N18.3  ICD-9-CM: 250.40, 585.3  1/24/2018        Primary osteoarthritis of both knees (L>R) ICD-10-CM: M17.0  ICD-9-CM: 715.16  4/13/2017        Bilateral chronic knee pain ICD-10-CM: M25.561, M25.562, G89.29  ICD-9-CM: 719.46, 338.29  4/78/7287        Diastolic CHF, chronic (HCC)--Dr. Romulo North (cardiology) ICD-10-CM: I50.32  ICD-9-CM: 428.32, 428.0  6/1/2016    Overview Signed 6/1/2016  1:53 PM by Jose Peck     Remain on BID lasix, stable now. recent labs show Mg 1.6 and we will start Mag Oxide 400 BID.  K ok. Cr 1.4. Check labs again in 6 mos. BNP also is 19. Edema stable. Working out, [address], this is key for him. Check labs in 6 mos before follow up. Back off on lasix as he loses weight. Getting new PCP soon.     Noted 07/09/15             Dyslipidemia ICD-10-CM: E78.5  ICD-9-CM: 272.4  4/22/2016        Erectile dysfunction associated with type 2 diabetes mellitus ([facility] ICD-10-CM: E11.69, N52.1  ICD-9-CM: 250.80, 607.84  10/30/2015        Benign hypertension with CKD (chronic kidney disease) stage III ([facility] ICD-10-CM: I12.9, N18.3  ICD-9-CM: 403.10, 585.3  4/6/2015    Overview Signed 6/1/2016  1:49 PM by Jose Peck     Echo 11/2012: EF 60% with severe LVH  Renal US 2012: poor study               Morbid obesity with BMI of 45.0-49.9, adult [facility]) ICD-10-CM: E66.01, Z68.42  ICD-9-CM: 278.01, V85.42  4/6/2015        Allergic rhinitis due to animal (cat) (dog) hair and dander ICD-10-CM: J30.81  ICD-9-CM: 477.2  11/19/2014        Vitamin D deficiency ICD-10-CM: E55.9  ICD-9-CM: 268.9  Unknown        ORALIA on CPAP ICD-10-CM: G47.33, Z99.89  ICD-9-CM: 327.23, V46.8  Unknown    Overview Signed 6/1/2016  1:50 PM by Jose Peck     Compliant with CPAP               Mild intermittent asthma without complication IOK-54-LL: S10.75  ICD-9-CM: 493.90  11/3/2014        Gout, unspecified ICD-10-CM: M10.9  ICD-9-CM: 274.9  11/3/2014        Controlled type 2 diabetes mellitus with microalbuminuria, without long-term current use of insulin (HCC) ICD-10-CM: E11.29, R80.9  ICD-9-CM: 250.40, 791.0  11/3/2014                RECOMMENDATIONS:    Pulmonary embolus  - CT chest 6/14 shows RLL PE; LE doppler negative for DVT; echo pending  - On heparin gtt - eventually transition to oral anticoagulation when able, pending need for surgical/radiology intervention.  - Hype-coaguable work-up as outpatient. Possible cholecystitis  - On rocephin  - Surgical consult completed - recommend US for further evaluation for possible cholecystitis and possible drain per IR if +cholecystitis given acute PE/current clinical condition. Lab studies and imaging studies were personally reviewed. Pertinent old records were reviewed. Thank you for allowing us to participate in the care of Mr. Ziggy Reed. He will need to follow-up with our office as an outpatient upon discharge for hyper-coaguable work-up. Our office will arrange this follow-up. We will continue to follow while inpatient.             TAMARA Epperson [address] Hematology & Oncology  [address] [address]  Office : [phone]  Fax : [phone]

## 2020-06-15 NOTE — PROGRESS NOTES
Orders received, chart reviewed. Patient here for acute pulmonary embolism. Will hold until >24 hours on Heparin drip.     Freddy Echeverria, OT

## 2020-06-15 NOTE — PROGRESS NOTES
Physical Therapy Note:    Orders received, chart reviewed. Patient here for acute pulmonary embolism. Will hold until >24 hours on Heparin drip.     Thank you,  Tej Galindo, PT, DPT

## 2020-06-15 NOTE — PROGRESS NOTES
Report from Michelle Mcclellan, RN  Pt alert and oriented  C/o pain to bilateral knees 9/10  cardene continues at 15mg/hr. Hypertensive on monitor, tachycardic  Assisted pt to standing position to void in urinal, hr 140's when standing. D/w pt placing a condom cath, pt agrees. Will continue care and assessment.

## 2020-06-15 NOTE — PROGRESS NOTES
M met with patient to discuss d/c plan. Patient alert/orient to name, place and . Patient verified PCP>  CM update patient contact information and added sister Ronnell Mattson) as . Patient states he lives with is sister Verlie Merlin) in a one level home with three steps to enter into the home. Patient states he's independent with his ADL's and do has DME's (cpap machine). Patient have no needs identified at this time. CM will continue to monitor. Care Management Interventions  PCP Verified by CM: Yes(Delmi Estrella PA)  Mode of Transport at Discharge:  Other (see comment)(family)  Transition of Care Consult (CM Consult): Discharge Planning  Discharge Durable Medical Equipment: No(Patient currently just has Cpap machine)  Physical Therapy Consult: Yes  Occupational Therapy Consult: Yes  Speech Therapy Consult: No  Current Support Network: Relative's Home(Patient states eh lives with his sister Verlie Merlin))  Confirm Follow Up Transport: Family  The Patient and/or Patient Representative was Provided with a Choice of Provider and Agrees with the Discharge Plan?: No  Freedom of Choice List was Provided with Basic Dialogue that Supports the Patient's Individualized Plan of Care/Goals, Treatment Preferences and Shares the Quality Data Associated with the Providers?: No  Discharge Location  Discharge Placement: Unable to determine at this time

## 2020-06-15 NOTE — ED PROVIDER NOTES
Presents with complaint of chest and abdominal pain. Patient states the pain is constant and starts in his epigastrium and spreads downward. He denies any nausea vomiting or diarrhea. He states his been taking his blood pressure medicines. He denies any shortness of breath or cough. He is never had this before. Started 6/12. He is a poor historian. No fever. The history is provided by the patient. The history is limited by the condition of the patient (He appears uncomfortable. ). Epigastric Pain    This is a new problem. The current episode started yesterday. The problem occurs constantly. The problem has been gradually worsening. The pain is located in the epigastric region. The quality of the pain is sharp, shooting, aching, dull and pressure-like. The pain is severe. Associated symptoms include chest pain. Pertinent negatives include no fever, no diarrhea, no nausea, no vomiting, no constipation, no dysuria and no frequency. Nothing worsens the pain. The pain is relieved by nothing. The patient's surgical history non-contributory. Past Medical History:   Diagnosis Date    Allergic rhinitis due to animal (cat) (dog) hair and dander 59/45/29    Diastolic CHF, chronic ([address] 6/1/2016    ORALIA on CPAP     Study done 4/17/2012;  AHI 86.7, RDI 86.7 (same)       Past Surgical History:   Procedure Laterality Date    COLONOSCOPY N/A 8/26/2016    COLONOSCOPY / BMI 47 performed by Lorena Springer MD at [facility] ENDOSCOPY    HX AMPUTATION Right     Right index finger happened at work    HX PREMALIG/BENIGN SKIN LESION EXCISION Right     Behind right ear    HX TONSILLECTOMY       [facility] FLX W/REMOVAL LESION BY HOT BX FORCEPS  8/26/2016              Family History:   Problem Relation Age of Onset    Cancer Sister     Diabetes Mother     Hypertension Mother     No Known Problems Father     No Known Problems Brother     No Known Problems Sister     Diabetes Other     Other Other         Renal Failure  Hypertension Other     Colon Cancer Neg Hx        Social History     Socioeconomic History    Marital status: SINGLE     Spouse name: Not on file    Number of children: Not on file    Years of education: Not on file    Highest education level: Not on file   Occupational History    Not on file   Social Needs    Financial resource strain: Not on file    Food insecurity     Worry: Not on file     Inability: Not on file    Transportation needs     Medical: Not on file     Non-medical: Not on file   Tobacco Use    Smoking status: Never Smoker    Smokeless tobacco: Never Used   Substance and Sexual Activity    Alcohol use: Yes     Alcohol/week: 10.0 standard drinks     Types: 10 Cans of beer per week     Comment: occ    Drug use: No    Sexual activity: Yes     Partners: Female   Lifestyle    Physical activity     Days per week: Not on file     Minutes per session: Not on file    Stress: Not on file   Relationships    Social connections     Talks on phone: Not on file     Gets together: Not on file     Attends Gnosticism service: Not on file     Active member of club or organization: Not on file     Attends meetings of clubs or organizations: Not on file     Relationship status: Not on file    Intimate partner violence     Fear of current or ex partner: Not on file     Emotionally abused: Not on file     Physically abused: Not on file     Forced sexual activity: Not on file   Other Topics Concern    Not on file   Social History Narrative    Not on file         ALLERGIES: Patient has no known allergies. Review of Systems   Constitutional: Negative for chills and fever. Cardiovascular: Positive for chest pain. Negative for leg swelling. Gastrointestinal: Negative for constipation, diarrhea, nausea and vomiting. Genitourinary: Negative for dysuria and frequency. All other systems reviewed and are negative.       Vitals:    06/14/20 2123 06/14/20 2154 06/14/20 2203 06/14/20 2300   BP: (!) 241/126 (!) 236/118 (!) 243/118 (!) 238/129   Pulse: (!) 59 90 85 99   Resp:       Temp:       SpO2: 95% 91% 92% 94%   Weight:       Height:                Physical Exam  Constitutional:       General: He is in acute distress. Appearance: He is obese. He is ill-appearing. HENT:      Head: Normocephalic and atraumatic. Nose: Nose normal.      Mouth/Throat:      Mouth: Mucous membranes are moist.   Eyes:      Extraocular Movements: Extraocular movements intact. Conjunctiva/sclera: Conjunctivae normal.   Neck:      Musculoskeletal: Normal range of motion and neck supple. Cardiovascular:      Rate and Rhythm: Normal rate and regular rhythm. Pulmonary:      Effort: Respiratory distress (Mild) present. Breath sounds: No wheezing, rhonchi or rales. Abdominal:      Palpations: Abdomen is soft. Tenderness: There is abdominal tenderness. There is no guarding or rebound. Musculoskeletal:      Right lower leg: Edema present. Left lower leg: Edema present. Skin:     General: Skin is warm and dry. Capillary Refill: Capillary refill takes less than 2 seconds. Neurological:      General: No focal deficit present. Mental Status: He is alert and oriented to person, place, and time. MDM  Number of Diagnoses or Management Options  Accelerated hypertension:   Cholecystitis:   Multiple subsegmental pulmonary emboli without acute cor pulmonale [facility]):   Diagnosis management comments: Given his body habitus his pain is difficult to localize. Who is oxygen dropped into the upper 80s and lower 90s and he was placed on O2. He has an elevated white count without significantly elevated LFTs or lipase. CT chest abdomen pelvis was performed with right lower lobe PE and concern for cholecystitis.   Given his blood clot he will be placed on heparin, given antibiotics for his gallbladder, Cardene for his blood pressure that did not improved with hydralazine and pain medication, and discussed with the hospitalist for admission and surgery consult in the morning. His white count is not significantly elevated and his LFTs are not elevated so I do not feel compelled to emergently consult surgery tonight. Seems more comfortable after the pain medication. D/w pt and family.        Amount and/or Complexity of Data Reviewed  Clinical lab tests: ordered and reviewed  Tests in the radiology section of CPT®: ordered and reviewed  Review and summarize past medical records: yes (Hx of diastolic dysfunction and previous blood pressure check in the office was within normal range.)  Discuss the patient with other providers: yes  Independent visualization of images, tracings, or specimens: yes (nsr no st elevation)    Risk of Complications, Morbidity, and/or Mortality  Presenting problems: high  Diagnostic procedures: moderate  Management options: high    Patient Progress  Patient progress: improved         Procedures

## 2020-06-15 NOTE — ED NOTES
TRANSFER - OUT REPORT:    Verbal report given to DORYS Garcia on Amina Joseph  being transferred to [address] [place] [address] for routine progression of care       Report consisted of patients Situation, Background, Assessment and   Recommendations(SBAR). Information from the following report(s) SBAR, ED Summary, [facility] and Recent Results was reviewed with the receiving nurse. Lines:   Peripheral IV 06/14/20 Left Antecubital (Active)   Site Assessment Clean, dry, & intact 6/14/2020  8:13 PM   Phlebitis Assessment 0 6/14/2020  8:13 PM   Infiltration Assessment 0 6/14/2020  8:13 PM   Dressing Status Clean, dry, & intact 6/14/2020  8:13 PM   Dressing Type Transparent 6/14/2020  8:13 PM   Hub Color/Line Status Red 6/14/2020  8:13 PM        Opportunity for questions and clarification was provided.       Patient transported with:   Monitor  O2 @ 2 liters  Registered Nurse

## 2020-06-15 NOTE — PROGRESS NOTES
Bedside report to Johanny Shetty, RN  Pt arouses to voice, oriented, denies needs.   Dual gtt verification, vss.

## 2020-06-15 NOTE — CONSULTS
H&P/Consult Note/Progress Note/Office Note:   Sandi Kan  MRN: [id number]  :1965  Age:55 y.o.    HPI: Sandi Kan is a 63 y.o. male who we are asked by Dr. Julio César Cortes to see for acute cholecystitis. 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 The patient was found to have acute right sided PE as well as dilated GB with some pericholecystic fluid with concerns of cholecystitis. He is tender to palpation of the RUQ and right flank. WBC 13.5. AF. Denies nausea, vomiting, pain with PO intake, fever, or chills. He is on a heparin gtt for PE. Past Medical History:   Diagnosis Date    Allergic rhinitis due to animal (cat) (dog) hair and dander     Diastolic CHF, chronic ([address] 2016    ORALIA on CPAP     Study done 2012;  AHI 86.7, RDI 86.7 (same)     Past Surgical History:   Procedure Laterality Date    COLONOSCOPY N/A 2016    COLONOSCOPY / BMI 47 performed by Lacy Srivastava MD at [facility] ENDOSCOPY    HX AMPUTATION Right     Right index finger happened at work    HX PREMALIG/BENIGN SKIN LESION EXCISION Right     Behind right ear    HX TONSILLECTOMY      [facility] FLX W/REMOVAL LESION BY HOT BX FORCEPS  2016          Current Facility-Administered Medications   Medication Dose Route Frequency    sodium chloride (NS) flush 5-40 mL  5-40 mL IntraVENous Q8H    sodium chloride (NS) flush 5-40 mL  5-40 mL IntraVENous PRN    tuberculin injection 5 Units  5 Units IntraDERMal ONCE    acetaminophen (TYLENOL) tablet 650 mg  650 mg Oral Q4H PRN    HYDROcodone-acetaminophen (NORCO)  mg tablet 1 Tab  1 Tab Oral Q6H PRN    morphine injection 2 mg  2 mg IntraVENous Q4H PRN    naloxone (NARCAN) injection 0.4 mg  0.4 mg IntraVENous PRN    diphenhydrAMINE (BENADRYL) injection 12.5 mg  12.5 mg IntraVENous Q4H PRN    ondansetron (ZOFRAN) injection 4 mg  4 mg IntraVENous Q4H PRN    magnesium hydroxide (MILK OF MAGNESIA) 400 mg/5 mL oral suspension 30 mL  30 mL Oral DAILY PRN    insulin lispro (HUMALOG) injection   SubCUTAneous AC&HS    metoclopramide HCl (REGLAN) injection 5 mg  5 mg IntraVENous Q6H    lisinopriL (PRINIVIL, ZESTRIL) tablet 20 mg  20 mg Oral DAILY    labetaloL (NORMODYNE) tablet 100 mg  100 mg Oral Q12H    cloNIDine HCL (CATAPRES) tablet 0.2 mg  0.2 mg Oral BID PRN    hydrALAZINE (APRESOLINE) 20 mg/mL injection 10 mg  10 mg IntraVENous Q6H PRN    albuterol-ipratropium (DUO-NEB) 2.5 MG-0.5 MG/3 ML  3 mL Nebulization Q4H PRN    heparin 25,000 units in dextrose 500 mL infusion  18-36 Units/kg/hr (Adjusted) IntraVENous TITRATE    cefTRIAXone (ROCEPHIN) 2 g in 0.9% sodium chloride (MBP/ADV) 50 mL  2 g IntraVENous Q24H     Patient has no known allergies. Social History     Socioeconomic History    Marital status: SINGLE     Spouse name: Not on file    Number of children: Not on file    Years of education: Not on file    Highest education level: Not on file   Tobacco Use    Smoking status: Never Smoker    Smokeless tobacco: Never Used   Substance and Sexual Activity    Alcohol use: Yes     Alcohol/week: 10.0 standard drinks     Types: 10 Cans of beer per week     Comment: occ    Drug use: No    Sexual activity: Yes     Partners: Female     Social History     Tobacco Use   Smoking Status Never Smoker   Smokeless Tobacco Never Used     Family History   Problem Relation Age of Onset    Cancer Sister     Diabetes Mother     Hypertension Mother     No Known Problems Father     No Known Problems Brother     No Known Problems Sister     Diabetes Other     Other Other         Renal Failure     Hypertension Other     Colon Cancer Neg Hx      ROS: The patient has no difficulty with chest pain or shortness of breath. No fever or chills. Comprehensive review of systems was otherwise unremarkable except as noted above.     Physical Exam:   Visit Vitals  /76 (BP 1 Location: Right arm, BP Patient Position: At rest)   Pulse 90   Temp 97.9 °F (36.6 °C)   Resp 18   Ht 6' 1\" (1.854 m)   Wt 309 lb 1.4 oz (140.2 kg)   SpO2 94%   BMI 40.78 kg/m²     Constitutional: Alert, oriented, cooperative patient in no acute distress; appears stated age    Eyes:Sclera are clear. EOMs intact  ENMT: no external lesions gross hearing normal; no obvious neck masses, no ear or lip lesions, nares normal  CV: RRR. Normal perfusion  Resp: No JVD. Breathing is  non-labored; no audible wheezing. GI: soft and non-distended, obese, ttp RUQ/right flank     Musculoskeletal: unremarkable with normal function. No embolic signs or cyanosis.    Neuro:  Oriented; moves all 4; no focal deficits  Psychiatric: normal affect and mood, no memory impairment    Recent vitals (if inpt):  Patient Vitals for the past 24 hrs:   BP Temp Pulse Resp SpO2 Height Weight   06/15/20 1043 139/76 97.9 °F (36.6 °C) 90 18 94 %     06/15/20 0945 111/59         06/15/20 0845 104/58  79 17 91 %     06/15/20 0830 103/59  77 14 97 %     06/15/20 0815 117/70  79 14 97 %     06/15/20 0759 135/80  83 16 96 %     06/15/20 0631   75 16 95 %     06/15/20 0630 106/59  73 13 95 %     06/15/20 0629   74 14 94 %     06/15/20 0628   76 13 94 %     06/15/20 0614 110/61  78 19 95 %     06/15/20 0613   81 15 93 %     06/15/20 0559 99/57  77 13 93 %     06/15/20 0558   77 13 93 %     06/15/20 0546   79 14 93 %     06/15/20 0545 108/56  80 13 93 %     06/15/20 0544   80 14 93 %     06/15/20 0517   85 18 96 %     06/15/20 0516   93 16 93 %     06/15/20 0515 129/66  86 10 96 %     06/15/20 0514   88 15 93 %     06/15/20 0501   97 17 96 %     06/15/20 0500   98 17 96 %     06/15/20 0459 170/87  99 17 97 %     06/15/20 0458   (!) 118 12 97 %     06/15/20 0446   (!) 103 14 96 %     06/15/20 0445 155/86  97 16 96 %     06/15/20 0444   99 16 95 %     06/15/20 0430 138/75  (!) 103 16 98 %     06/15/20 0429   97 15 96 %     06/15/20 0415   (!) 101 17 97 %     06/15/20 0414 149/81  100 15 97 %     06/15/20 0413   99 17 97 %     06/15/20 0412 (!) 168/91  (!) 110       06/15/20 0400   (!) 104 17 97 %     06/15/20 0359 (!) 168/91  (!) 106 17 98 %     06/15/20 0358   (!) 105 16 98 %     06/15/20 0348   (!) 131 18 100 %     06/15/20 0347   (!) 110 17 98 %     06/15/20 0346 (!) 191/115  (!) 110 17 99 %     06/15/20 0345   (!) 120 12 100 %     06/15/20 0332   (!) 119 13 100 %     06/15/20 0331   (!) 121 19 98 %     06/15/20 0330 170/88  (!) 102 16 98 %     06/15/20 0329   100 16 96 %     06/15/20 0316   (!) 102 18 96 %     06/15/20 0315   100 17 97 %     06/15/20 0314 160/88  (!) 101 16 97 %     06/15/20 0313   (!) 104 17 96 %     06/15/20 0306     94 %     06/15/20 0301   (!) 109 15 94 %     06/15/20 0300   (!) 104 17 95 %     06/15/20 0259 157/89  (!) 103 23 94 %     06/15/20 0258   (!) 104 25 95 %     06/15/20 0250   (!) 105 17 91 %     06/15/20 0249   (!) 103 17 92 %     06/15/20 0248 (!) 175/100  (!) 103 15 94 %     06/15/20 0230 (!) 174/97  (!) 104 15 95 %     06/15/20 0229   (!) 106 18 93 %     06/15/20 0226 (!) 182/97  (!) 104       06/15/20 0216   (!) 104 16 96 %     06/15/20 0215   (!) 104 18 98 %     06/15/20 0214 (!) 182/97  (!) 106 15 97 %     06/15/20 0213   (!) 104 19 95 %     06/15/20 0203   (!) 115 22 94 %     06/15/20 0202 (!) 169/107  (!) 115 13 93 %     06/15/20 0201   (!) 115 16 94 %     06/15/20 0153 (!) 157/102  (!) 108       06/15/20 0147   (!) 107 17 98 %     06/15/20 0146   (!) 106 11 98 %     06/15/20 0145 (!) 157/102  (!) 106 12 98 %     06/15/20 0144   (!) 108 17 98 %     06/15/20 0131   (!) 110 17 96 %     06/15/20 0130 (!) 185/107  (!) 112 14 96 %     06/15/20 0129   (!) 109 16 95 %     06/15/20 0121   (!) 112 17 95 %     06/15/20 0120 (!) 186/111  (!) 115 12 95 %     06/15/20 0119   (!) 117 13 94 %     06/15/20 0100   (!) 114 11 96 %     06/15/20 0059 (!) 228/111  (!) 108 14 97 %     06/15/20 0058   (!) 111 13 96 %     06/15/20 0053 (!) 227/127  (!) 109 13 97 %     06/15/20 0047 (!) 178/145    95 %     06/15/20 0045  99.3 °F (37.4 °C) (!) 124 (!) 70 97 % 6' 1\" (1.854 m) 309 lb 1.4 oz (140.2 kg)   06/15/20 0024 181/90  (!) 108       06/15/20 0020 181/90  (!) 108  98 %     06/15/20 0010 (!) 195/112  98 20 98 %     06/15/20 0000 (!) 187/111  95  98 %     06/14/20 2317 (!) 189/111  98       06/14/20 2300 (!) 238/129  99  94 %     06/14/20 2203 (!) 243/118  85  92 %     06/14/20 2154 (!) 236/118  90  91 %     06/14/20 2123 (!) 241/126  (!) 59  95 %     06/14/20 2113 (!) 242/128  61  97 %     06/14/20 2043 (!) 245/131  62  96 %     06/14/20 2034 (!) 239/133  72  97 %     06/14/20 2001 (!) 218/131 98.3 °F (36.8 °C) 68 20 97 % 6' 1\" (1.854 m) 321 lb (145.6 kg)       Labs:  Recent Labs     06/15/20  0850 06/15/20  0323 06/14/20 2013   WBC  --  13.5* 11.4*   HGB  --  14.3 13.7   PLT  --  276 324   NA  --  135* 136   K  --  3.5 3.9   CL  --  99 98   CO2  --  27 31   BUN  --  14 18   CREA  --  1.45 1.70*   GLU  --  168* 126*   APTT 105.7*  --   --    TBILI  --  0.4 0.5   ALT  --  18 23   AP  --  116 120   LPSE  --   --  100       Lab Results   Component Value Date/Time    WBC 13.5 (H) 06/15/2020 03:23 AM    HGB 14.3 06/15/2020 03:23 AM    PLATELET 732 11/25/0922 03:23 AM    Sodium 135 (L) 06/15/2020 03:23 AM    Potassium 3.5 06/15/2020 03:23 AM    Chloride 99 06/15/2020 03:23 AM    CO2 27 06/15/2020 03:23 AM    BUN 14 06/15/2020 03:23 AM    Creatinine 1.45 06/15/2020 03:23 AM    Glucose 168 (H) 06/15/2020 03:23 AM    aPTT 105.7 (H) 06/15/2020 08:50 AM    Bilirubin, total 0.4 06/15/2020 03:23 AM    ALT (SGPT) 18 06/15/2020 03:23 AM    Alk. phosphatase 116 06/15/2020 03:23 AM    Lipase 100 06/14/2020 08:13 PM       I reviewed recent labs and recent radiologic studies. I independently reviewed radiology images for studies I described above or studies I have ordered.    Admission date (for inpatients): 6/14/2020   * No surgery found *  * No surgery found *    ASSESSMENT/PLAN:  Problem List  Date Reviewed: 3/4/2020          Codes Class Noted    Morbid obesity ([facility] ICD-10-CM: E66.01  ICD-9-CM: 278.01  6/14/2020        Shortness of breath ICD-10-CM: R06.02  ICD-9-CM: 786.05  6/14/2020        Atypical chest pain ICD-10-CM: R07.89  ICD-9-CM: 786.59  6/14/2020        * (Principal) Acute pulmonary embolism ([facility] ICD-10-CM: I26.99  ICD-9-CM: 415.19  6/14/2020        Hypertensive emergency ICD-10-CM: I16.1  ICD-9-CM: 401.9  6/14/2020        Type 2 diabetes mellitus with diabetic neuropathy, without long-term current use of insulin ([facility] ICD-10-CM: E11.40  ICD-9-CM: 250.60, 357.2  7/31/2019        Nuclear sclerotic cataract of both eyes ICD-10-CM: H25.13  ICD-9-CM: 366.16  6/27/2019        Tinea unguium ICD-10-CM: B35.1  ICD-9-CM: 110.1  2/5/2019    Overview Signed 2/5/2019 12:30 PM by CAT Grover     bilateral--all nails             Nonrheumatic aortic valve regurgitation ICD-10-CM: I35.1  ICD-9-CM: 424.1  12/6/2018        Chronic right shoulder pain ICD-10-CM: M25.511, G89.29  ICD-9-CM: 719.41, 338.29  11/9/2018        Chronic midline low back pain without sciatica ICD-10-CM: M54.5, G89.29  ICD-9-CM: 724.2, 338.29  11/9/2018        Arthritis, lumbar spine ICD-10-CM: M47.816  ICD-9-CM: 721.3  11/9/2018        Type 2 diabetes mellitus with stage 3 chronic kidney disease ([facility] ICD-10-CM: E11.22, N18.3  ICD-9-CM: 250.40, 585.3  1/24/2018        Primary osteoarthritis of both knees (L>R) ICD-10-CM: M17.0  ICD-9-CM: 715.16  4/13/2017        Bilateral chronic knee pain ICD-10-CM: M25.561, M25.562, G89.29  ICD-9-CM: 719.46, 338.29  1/26/9488        Diastolic CHF, chronic (HCC)--Dr. Vale Perla (cardiology) ICD-10-CM: I50.32  ICD-9-CM: 428.32, 428.0  6/1/2016    Overview Signed 6/1/2016  1:53 PM by Dylon Nation     Remain on BID lasix, stable now. recent labs show Mg 1.6 and we will start Mag Oxide 400 BID.  K ok. Cr 1.4. Check labs again in 6 mos. BNP also is 19. Edema stable. Working out, [address], this is key for him. Check labs in 6 mos before follow up. Back off on lasix as he loses weight. Getting new PCP soon.     Noted 07/09/15             Dyslipidemia ICD-10-CM: E78.5  ICD-9-CM: 272.4  4/22/2016        Erectile dysfunction associated with type 2 diabetes mellitus ([facility] ICD-10-CM: E11.69, N52.1  ICD-9-CM: 250.80, 607.84  10/30/2015        Benign hypertension with CKD (chronic kidney disease) stage III ([facility] ICD-10-CM: I12.9, N18.3  ICD-9-CM: 403.10, 585.3  4/6/2015    Overview Signed 6/1/2016  1:49 PM by Dylon Nation     Echo 11/2012: EF 60% with severe LVH  Renal US 2012: poor study               Morbid obesity with BMI of 45.0-49.9, adult [facility]) ICD-10-CM: E66.01, Z68.42  ICD-9-CM: 278.01, V85.42  4/6/2015        Allergic rhinitis due to animal (cat) (dog) hair and dander ICD-10-CM: J30.81  ICD-9-CM: 477.2  11/19/2014        Vitamin D deficiency ICD-10-CM: E55.9  ICD-9-CM: 268.9  Unknown        ORALIA on CPAP ICD-10-CM: G47.33, Z99.89  ICD-9-CM: 327.23, V46.8  Unknown    Overview Signed 6/1/2016  1:50 PM by Dylon Nation     Compliant with CPAP               Mild intermittent asthma without complication EBS-31-TG: N59.66  ICD-9-CM: 493.90  11/3/2014        Gout, unspecified ICD-10-CM: M10.9  ICD-9-CM: 274.9  11/3/2014        Controlled type 2 diabetes mellitus with microalbuminuria, without long-term current use of insulin ([facility]) ICD-10-CM: E11.29, R80.9  ICD-9-CM: 250.40, 791.0  11/3/2014            Principal Problem:    Acute pulmonary embolism ([facility] (6/14/2020)    Active Problems:    Mild intermittent asthma without complication (20/6/4684)      Gout, unspecified (11/3/2014)      ORALIA on CPAP ()      Overview: Compliant with CPAP            Controlled type 2 diabetes mellitus with microalbuminuria, without long-term current use of insulin ([facility] (11/3/2014)      Benign hypertension with CKD (chronic kidney disease) stage III ([facility] (4/6/2015)      Overview: Echo 11/2012: EF 60% with severe LVH      Renal US 2012: poor study            Morbid obesity with BMI of 45.0-49.9, adult ([facility] (4/6/2015)      Dyslipidemia (5/61/0465)      Diastolic CHF, chronic (HCC)--Dr. Kaylee Love (cardiology) (6/1/2016)      Overview: Remain on BID lasix, stable now. recent labs show Mg 1.6 and we will       start Mag Oxide 400 BID.  K ok. Cr 1.4. Check labs again in 6 mos. BNP       also is 19. Edema stable. Working out, [address], this is       key for him. Check labs in 6 mos before follow up. Back off on lasix as       he loses weight. Getting new PCP soon. Noted 07/09/15      Morbid obesity ([facility] (6/14/2020)      Shortness of breath (6/14/2020)      Atypical chest pain (6/14/2020)      Hypertensive emergency (6/14/2020)       Findings could represent acute cholecystitis but would be better evaluated with U/S  Patient has RUQ pain but no other clinical indicators for acute cholecystitis  Given health status and acute PE, if acute cholecystitis remains a concern would recommend IR for drain placement.      Continue NPO  Empiric Abx  Will continue to follow    Signed:  Gayle Berg NP

## 2020-06-15 NOTE — ED NOTES
Multiple attempts by different RNs to obtain additional blood work and secondary IV line, all unsuccessful.  Patient difficult stick

## 2020-06-15 NOTE — H&P
HOSPITALIST H&P/CONSULT  NAME:  Anne Sims   Age:  63 y.o.  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 Pt is currently sleepy after receiving IV morphine for pain. Most information obtained from pt's sister and ER physician. Per sister, pt is not much physically active at baseline, drinks occasionally, reported of dull chest and epigastric pain for past 24 hours. As per records, pt reported of mild SOB, no cough or fever. No syncopal event, lightheadedness/dizziness, nausea/vomiting, diaphoresis, urinary symptoms, diarrhea reported. ER work up showed CT chest with evidence of acute right PE with dilated GB with some pericholecystic fluid with concerns of cholecystitis. Initial /131, WBC 11K, Cr 1.7. Complete ROS could not be obtained as pt is somnolent and unable to answer many questions  Past Medical History:   Diagnosis Date    Allergic rhinitis due to animal (cat) (dog) hair and dander     Diastolic CHF, chronic ([facility] [address] 2016    ORALIA on CPAP     Study done 2012; AHI 86.7, RDI 86.7 (same)      Past Surgical History:   Procedure Laterality Date    COLONOSCOPY N/A 2016    COLONOSCOPY / BMI 47 performed by Azael Cruz MD at [facility] ENDOSCOPY    HX AMPUTATION Right     Right index finger happened at work    HX PREMALIG/BENIGN SKIN LESION EXCISION Right     Behind right ear    HX TONSILLECTOMY      CA COLSC FLX W/REMOVAL LESION BY HOT BX FORCEPS  2016           Prior to Admission Medications   Prescriptions Last Dose Informant Patient Reported? Taking?   allopurinoL (ZYLOPRIM) 100 mg tablet   No No   Sig: Take 2 Tabs by mouth daily.  Indications: for gout attack prevention   carvediloL (COREG) 25 mg tablet   No No   Sig: Take 1 Tab by mouth two (2) times daily (with meals). Indications: high blood pressure   cloNIDine HCL (CATAPRES) 0.2 mg tablet   No No   Sig: Take 2 Tabs by mouth two (2) times a day. cpap machine kit   Yes No   Sig: by Does Not Apply route. hydrALAZINE (APRESOLINE) 50 mg tablet   No No   Sig: Take 1 Tab by mouth two (2) times a day. lisinopril-hydroCHLOROthiazide (PRINZIDE, ZESTORETIC) 20-12.5 mg per tablet   No No   Sig: Take 2 tablets by mouth once daily   magnesium oxide (MAG-OX) 400 mg tablet   No No   Sig: Take 1 Tab by mouth two (2) times a day. metFORMIN (GLUCOPHAGE) 500 mg tablet   No No   Sig: Take 1 Tab by mouth two (2) times daily (with meals). rosuvastatin (CRESTOR) 10 mg tablet   No No   Sig: Take 1 Tab by mouth nightly. sildenafil citrate (VIAGRA) 100 mg tablet   No No   Sig: Take 1 Tab by mouth as needed for Other (as needed for erectile dysfunction). Facility-Administered Medications: None     No Known Allergies   Social History     Tobacco Use    Smoking status: Never Smoker    Smokeless tobacco: Never Used   Substance Use Topics    Alcohol use:  Yes     Alcohol/week: 10.0 standard drinks     Types: 10 Cans of beer per week     Comment: occ      Family History   Problem Relation Age of Onset    Cancer Sister     Diabetes Mother     Hypertension Mother     No Known Problems Father     No Known Problems Brother     No Known Problems Sister     Diabetes Other     Other Other         Renal Failure     Hypertension Other     Colon Cancer Neg Hx       Objective:     Visit Vitals  BP (!) 238/129   Pulse 99   Temp 98.3 °F (36.8 °C)   Resp 20   Ht 6' 1\" (1.854 m)   Wt 145.6 kg (321 lb)   SpO2 94%   BMI 42.35 kg/m²      Temp (24hrs), Av.3 °F (36.8 °C), Min:98.3 °F (36.8 °C), Max:98.3 °F (36.8 °C)    Oxygen Therapy  O2 Sat (%): 94 % (20 230)  Pulse via Oximetry: 97 beats per minute (20)  O2 Device: Room air (06/14/20 2001)  Physical Exam:  General:    Somnolent, lethargic, on 2 ltrs NC, NAD, morbidly obese   Head:   Normocephalic, without obvious abnormality, atraumatic. Nose:  Nares normal. No drainage or sinus tenderness. Lungs:   Clear to auscultation bilaterally. No Wheezing or Rhonchi. No rales. Heart:   Regular rate and rhythm,  no murmur, rub or gallop. Abdomen:   Soft, obese, tender in RUQ and epigastric region, no guarding or rigidity. Bowel sounds normal.   Extremities: No cyanosis. No edema. No clubbing  Skin:     Texture, turgor normal. No rashes or lesions. Not Jaundiced  Neurologic: Somnolent, would open eyes to name but falling asleep   Psych:             Unable to assess  Data Review:   Recent Results (from the past 24 hour(s))   PTT    Collection Time: 06/14/20  8:10 PM   Result Value Ref Range    aPTT 30.8 24.3 - 35.4 SEC   CBC WITH AUTOMATED DIFF    Collection Time: 06/14/20  8:13 PM   Result Value Ref Range    WBC 11.4 (H) 4.3 - 11.1 K/uL    RBC 5.16 4.23 - 5.6 M/uL    HGB 13.7 13.6 - 17.2 g/dL    HCT 42.8 41.1 - 50.3 %    MCV 82.9 79.6 - 97.8 FL    MCH 26.6 26.1 - 32.9 PG    MCHC 32.0 31.4 - 35.0 g/dL    RDW 14.3 11.9 - 14.6 %    PLATELET 343 644 - 137 K/uL    MPV 10.6 9.4 - 12.3 FL    ABSOLUTE NRBC 0.00 0.0 - 0.2 K/uL    DF AUTOMATED      NEUTROPHILS 61 43 - 78 %    LYMPHOCYTES 23 13 - 44 %    MONOCYTES 9 4.0 - 12.0 %    EOSINOPHILS 7 0.5 - 7.8 %    BASOPHILS 0 0.0 - 2.0 %    IMMATURE GRANULOCYTES 0 0.0 - 5.0 %    ABS. NEUTROPHILS 7.0 1.7 - 8.2 K/UL    ABS. LYMPHOCYTES 2.6 0.5 - 4.6 K/UL    ABS. MONOCYTES 1.0 0.1 - 1.3 K/UL    ABS. EOSINOPHILS 0.8 0.0 - 0.8 K/UL    ABS. BASOPHILS 0.0 0.0 - 0.2 K/UL    ABS. IMM.  GRANS. 0.0 0.0 - 0.5 K/UL   METABOLIC PANEL, COMPREHENSIVE    Collection Time: 06/14/20  8:13 PM   Result Value Ref Range    Sodium 136 136 - 145 mmol/L    Potassium 3.9 3.5 - 5.1 mmol/L    Chloride 98 98 - 107 mmol/L    CO2 31 21 - 32 mmol/L    Anion gap 7 7 - 16 mmol/L    Glucose 126 (H) 65 - 100 mg/dL    BUN 18 6 - 23 MG/DL    Creatinine 1.70 (H) 0.8 - 1.5 MG/DL    GFR est AA 54 (L) >60 ml/min/1.73m2    GFR est non-AA 45 (L) >60 ml/min/1.73m2    Calcium 10.2 8.3 - 10.4 MG/DL    Bilirubin, total 0.5 0.2 - 1.1 MG/DL    ALT (SGPT) 23 12 - 65 U/L    AST (SGOT) 16 15 - 37 U/L    Alk. phosphatase 120 50 - 136 U/L    Protein, total 8.5 (H) 6.3 - 8.2 g/dL    Albumin 3.8 3.5 - 5.0 g/dL    Globulin 4.7 (H) 2.3 - 3.5 g/dL    A-G Ratio 0.8 (L) 1.2 - 3.5     TROPONIN-HIGH SENSITIVITY    Collection Time: 06/14/20  8:13 PM   Result Value Ref Range    Troponin-High Sensitivity 19.4 (H) 0 - 14 pg/mL   LIPASE    Collection Time: 06/14/20  8:13 PM   Result Value Ref Range    Lipase 100 73 - 393 U/L     Imaging /Procedures /Studies   CT chest/abd/pelvis:  IMPRESSION:      Acute right lower lobe pulmonary emboli. No evidence of right ventricular  strain.     Findings worrisome for acute cholecystitis.     Bilateral simple renal cysts.     Avascular necrosis of both femoral heads with secondary degenerative changes.     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 Assessment and Plan: Active Hospital Problems    Diagnosis Date Noted    Morbid obesity ([facility] [address] 06/14/2020    Shortness of breath 06/14/2020    Atypical chest pain 06/14/2020    Acute pulmonary embolism ([facility] [address] 06/14/2020    Hypertensive emergency 43/63/7862    Diastolic CHF, chronic (HCC)--Dr. Che Srivastava (cardiology) 06/01/2016     Remain on BID lasix, stable now. recent labs show Mg 1.6 and we will start Mag Oxide 400 BID.  K ok. Cr 1.4. Check labs again in 6 mos. BNP also is 19. Edema stable. Working out, [address], this is key for him. Check labs in 6 mos before follow up. Back off on lasix as he loses weight. Getting new PCP soon.     Noted 07/09/15      Dyslipidemia 04/22/2016    Benign hypertension with CKD (chronic kidney disease) stage III ([facility] [address] 04/06/2015     Echo 11/2012: EF 60% with severe LVH  Renal US 2012: poor study        Morbid obesity with BMI of 45.0-49.9, adult ([facility] 04/06/2015    ORALIA on CPAP      Compliant with CPAP        Controlled type 2 diabetes mellitus with microalbuminuria, without long-term current use of insulin ([facility] 11/03/2014    Gout, unspecified 11/03/2014    Mild intermittent asthma without complication 88/06/8428       PLAN  · Admit to icu for acute right PE, hypertensive emergency and ? Cholecystitis  · Start on heparin drip  · Hypercoagulable work up with hematology consult in AM  · No family hx of hypercoagulable condition  · Start cardene gtt for elevated BP. Restart oral antihypertensives in AM (hydralazine, coreg, clonidine, lisinopril)  · NPO for now.  Surgical consult in AM  · Empiric IV cipro and flagyl for questionable cholecystitis  · Blood cultures x 2 sets ordered  · Continue supplemental oxygen, wean off as able  · Check venous doppler or B/L LE and ECHO  · Resume allopurinol for gout  · CPAP for ORALIA  · humalog SS for DM-2, poc glucose qachs  · PT/OT eval  · DVT prophylaxis with heparin gtt  · Pain control with prn narcotics    Code Status: full  High risk    Anticipated discharge: >2MN    Signed By: Reynaldo Sharma MD     June 14, 2020

## 2020-06-15 NOTE — PROGRESS NOTES
TRANSFER - IN REPORT:    Verbal report received from Torres Kennedy RN on Kelvin Collins  being received from ED(unit) for routine progression of care      Report consisted of patients Situation, Background, Assessment and   Recommendations(SBAR). Information from the following report(s) SBAR, Kardex, ED Summary, MAR, Accordion, Recent Results and Med Rec Status was reviewed with the receiving nurse. Opportunity for questions and clarification was provided. Assessment completed upon patients arrival to unit and care assumed.

## 2020-06-15 NOTE — ED NOTES
Pt c/o chest and abdominal pain since Saturday. Denies sob. htn noted. Discussed poc.  Family at bedside

## 2020-06-15 NOTE — PROGRESS NOTES
HOSPITALIST  NAME:  Mariluz Arnold   Age:  63 y.o.  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 Pt is currently sleepy after receiving IV morphine for pain. Most information obtained from pt's sister and ER physician. Per sister, pt is not much physically active at baseline, drinks occasionally, reported of dull chest and epigastric pain for past 24 hours. As per records, pt reported of mild SOB, no cough or fever. No syncopal event, lightheadedness/dizziness, nausea/vomiting, diaphoresis, urinary symptoms, diarrhea reported. ER work up showed CT chest with evidence of acute right PE with dilated GB with some pericholecystic fluid with concerns of cholecystitis. Initial /131, WBC 11K, Cr 1.7. Today, BP controlled, off cardene drip, no CP, doing well on room air. Objective:     Visit Vitals  /76 (BP 1 Location: Right arm, BP Patient Position: At rest)   Pulse 90   Temp 97.9 °F (36.6 °C)   Resp 18   Ht 6' 1\" (1.854 m)   Wt 140.2 kg (309 lb 1.4 oz)   SpO2 94%   BMI 40.78 kg/m²      Temp (24hrs), Av.5 °F (36.9 °C), Min:97.9 °F (36.6 °C), Max:99.3 °F (37.4 °C)    Oxygen Therapy  O2 Sat (%): 94 % (06/15/20 1043)  Pulse via Oximetry: 80 beats per minute (06/15/20 0845)  O2 Device: Room air (06/15/20 1043)  O2 Flow Rate (L/min): 4 l/min (06/15/20 0306)  Physical Exam:  General:    AAOx3, morbidly obese, moderate distress  Lungs:   Clear to auscultation bilaterally. No Wheezing or Rhonchi. No rales. Heart:   Regular rate and rhythm,  no murmur, rub or gallop. Abdomen:   Soft, obese, mildly tender in RUQ, no guarding or rigidity. Bowel sounds normal.   Extremities: No cyanosis. No edema. No clubbing  Skin:     Texture, turgor normal. No rashes or lesions.   Not Jaundiced    Data Review:   Recent Results (from the past 24 hour(s))   EKG, 12 LEAD, INITIAL    Collection Time: 06/14/20  8:01 PM   Result Value Ref Range    Ventricular Rate 69 BPM    Atrial Rate 69 BPM    P-R Interval 178 ms    QRS Duration 100 ms    Q-T Interval 402 ms    QTC Calculation (Bezet) 430 ms    Calculated P Axis 29 degrees    Calculated R Axis -39 degrees    Calculated T Axis 46 degrees    Diagnosis       Normal sinus rhythm  Left axis deviation  Possible Anterior infarct (cited on or before 14-JUN-2020)  Nonspecific ST abnormality  When compared with ECG of 14-JUN-2020 19:59,  No significant change was found  Confirmed by Matthew Cannon MD (), YOJANA ELDRIDGE ([id number]) on 6/15/2020 9:05:53 AM     PTT    Collection Time: 06/14/20  8:10 PM   Result Value Ref Range    aPTT 30.8 24.3 - 35.4 SEC   CBC WITH AUTOMATED DIFF    Collection Time: 06/14/20  8:13 PM   Result Value Ref Range    WBC 11.4 (H) 4.3 - 11.1 K/uL    RBC 5.16 4.23 - 5.6 M/uL    HGB 13.7 13.6 - 17.2 g/dL    HCT 42.8 41.1 - 50.3 %    MCV 82.9 79.6 - 97.8 FL    MCH 26.6 26.1 - 32.9 PG    MCHC 32.0 31.4 - 35.0 g/dL    RDW 14.3 11.9 - 14.6 %    PLATELET 515 923 - 192 K/uL    MPV 10.6 9.4 - 12.3 FL    ABSOLUTE NRBC 0.00 0.0 - 0.2 K/uL    DF AUTOMATED      NEUTROPHILS 61 43 - 78 %    LYMPHOCYTES 23 13 - 44 %    MONOCYTES 9 4.0 - 12.0 %    EOSINOPHILS 7 0.5 - 7.8 %    BASOPHILS 0 0.0 - 2.0 %    IMMATURE GRANULOCYTES 0 0.0 - 5.0 %    ABS. NEUTROPHILS 7.0 1.7 - 8.2 K/UL    ABS. LYMPHOCYTES 2.6 0.5 - 4.6 K/UL    ABS. MONOCYTES 1.0 0.1 - 1.3 K/UL    ABS. EOSINOPHILS 0.8 0.0 - 0.8 K/UL    ABS. BASOPHILS 0.0 0.0 - 0.2 K/UL    ABS. IMM.  GRANS. 0.0 0.0 - 0.5 K/UL   METABOLIC PANEL, COMPREHENSIVE    Collection Time: 06/14/20  8:13 PM   Result Value Ref Range    Sodium 136 136 - 145 mmol/L    Potassium 3.9 3.5 - 5.1 mmol/L    Chloride 98 98 - 107 mmol/L    CO2 31 21 - 32 mmol/L    Anion gap 7 7 - 16 mmol/L    Glucose 126 (H) 65 - 100 mg/dL    BUN 18 6 - 23 MG/DL    Creatinine 1.70 (H) 0.8 - 1.5 MG/DL    GFR est AA 54 (L) >60 ml/min/1.73m2    GFR est non-AA 45 (L) >60 ml/min/1.73m2    Calcium 10.2 8.3 - 10.4 MG/DL    Bilirubin, total 0.5 0.2 - 1.1 MG/DL    ALT (SGPT) 23 12 - 65 U/L    AST (SGOT) 16 15 - 37 U/L    Alk. phosphatase 120 50 - 136 U/L    Protein, total 8.5 (H) 6.3 - 8.2 g/dL    Albumin 3.8 3.5 - 5.0 g/dL    Globulin 4.7 (H) 2.3 - 3.5 g/dL    A-G Ratio 0.8 (L) 1.2 - 3.5     TROPONIN-HIGH SENSITIVITY    Collection Time: 06/14/20  8:13 PM   Result Value Ref Range    Troponin-High Sensitivity 19.4 (H) 0 - 14 pg/mL   LIPASE    Collection Time: 06/14/20  8:13 PM   Result Value Ref Range    Lipase 100 73 - 949 U/L   METABOLIC PANEL, COMPREHENSIVE    Collection Time: 06/15/20  3:23 AM   Result Value Ref Range    Sodium 135 (L) 136 - 145 mmol/L    Potassium 3.5 3.5 - 5.1 mmol/L    Chloride 99 98 - 107 mmol/L    CO2 27 21 - 32 mmol/L    Anion gap 9 7 - 16 mmol/L    Glucose 168 (H) 65 - 100 mg/dL    BUN 14 6 - 23 MG/DL    Creatinine 1.45 0.8 - 1.5 MG/DL    GFR est AA >60 >60 ml/min/1.73m2    GFR est non-AA 54 (L) >60 ml/min/1.73m2    Calcium 9.6 8.3 - 10.4 MG/DL    Bilirubin, total 0.4 0.2 - 1.1 MG/DL    ALT (SGPT) 18 12 - 65 U/L    AST (SGOT) 13 (L) 15 - 37 U/L    Alk.  phosphatase 116 50 - 136 U/L    Protein, total 8.2 6.3 - 8.2 g/dL    Albumin 3.6 3.5 - 5.0 g/dL    Globulin 4.6 (H) 2.3 - 3.5 g/dL    A-G Ratio 0.8 (L) 1.2 - 3.5     CBC W/O DIFF    Collection Time: 06/15/20  3:23 AM   Result Value Ref Range    WBC 13.5 (H) 4.3 - 11.1 K/uL    RBC 5.29 4.23 - 5.6 M/uL    HGB 14.3 13.6 - 17.2 g/dL    HCT 42.8 41.1 - 50.3 %    MCV 80.9 79.6 - 97.8 FL    MCH 27.0 26.1 - 32.9 PG    MCHC 33.4 31.4 - 35.0 g/dL    RDW 14.0 11.9 - 14.6 %    PLATELET 756 131 - 909 K/uL    MPV 10.3 9.4 - 12.3 FL    ABSOLUTE NRBC 0.00 0.0 - 0.2 K/uL   GLUCOSE, POC    Collection Time: 06/15/20  8:36 AM   Result Value Ref Range    Glucose (POC) 106 (H) 65 - 100 mg/dL   PTT    Collection Time: 06/15/20  8:50 AM   Result Value Ref Range aPTT 105.7 (H) 24.3 - 35.4 SEC   GLUCOSE, POC    Collection Time: 06/15/20 11:48 AM   Result Value Ref Range    Glucose (POC) 152 (H) 65 - 100 mg/dL     Imaging /Procedures /Studies   CT chest/abd/pelvis:  IMPRESSION:      Acute right lower lobe pulmonary emboli. No evidence of right ventricular  strain.     Findings worrisome for acute cholecystitis.     Bilateral simple renal cysts.     Avascular necrosis of both femoral heads with secondary degenerative changes.     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 Assessment and Plan:     Dx:  1- New right pulmonary embolism, no evidence of DVT, no previous hx of thromboembolism, morbidly obese, doing well on room air. Echo shown LVH, likely due to long standing uncontrolled HTN. 2- Hypertension, uncontrolled on arrival and chronically at times as well, off cardene drip improved.   3- Likely acute cholecystitis per CT, seen by surgery who recommends IR drainage if clinically indicated later on  4- NIDDM, controlled  5- Chronic diastolic dysfunction and ORALIA- stable    Rx:  Heparin gtt  Rocephin iv  Liquid diet  Insulin scale  BP control as ordered  Telemetry     Dispo: home    Signed By: Timmy Chapman MD     Maryanne 15, 2020

## 2020-06-16 ENCOUNTER — APPOINTMENT (OUTPATIENT)
Dept: ULTRASOUND IMAGING | Age: 55
DRG: 134 | End: 2020-06-16
Attending: SURGERY
Payer: COMMERCIAL

## 2020-06-16 LAB
ALBUMIN SERPL-MCNC: 3 G/DL (ref 3.5–5)
ALBUMIN/GLOB SERPL: 0.7 {RATIO} (ref 1.2–3.5)
ALP SERPL-CCNC: 105 U/L (ref 50–136)
ALT SERPL-CCNC: 24 U/L (ref 12–65)
ANION GAP SERPL CALC-SCNC: 11 MMOL/L (ref 7–16)
APTT PPP: 102.7 SEC (ref 24.3–35.4)
APTT PPP: 104.8 SEC (ref 24.3–35.4)
AST SERPL-CCNC: 27 U/L (ref 15–37)
BILIRUB SERPL-MCNC: 0.4 MG/DL (ref 0.2–1.1)
BUN SERPL-MCNC: 20 MG/DL (ref 6–23)
CALCIUM SERPL-MCNC: 8.8 MG/DL (ref 8.3–10.4)
CHLORIDE SERPL-SCNC: 103 MMOL/L (ref 98–107)
CO2 SERPL-SCNC: 21 MMOL/L (ref 21–32)
CREAT SERPL-MCNC: 1.86 MG/DL (ref 0.8–1.5)
ERYTHROCYTE [DISTWIDTH] IN BLOOD BY AUTOMATED COUNT: 14.3 % (ref 11.9–14.6)
GLOBULIN SER CALC-MCNC: 4.1 G/DL (ref 2.3–3.5)
GLUCOSE BLD STRIP.AUTO-MCNC: 107 MG/DL (ref 65–100)
GLUCOSE BLD STRIP.AUTO-MCNC: 110 MG/DL (ref 65–100)
GLUCOSE BLD STRIP.AUTO-MCNC: 110 MG/DL (ref 65–100)
GLUCOSE BLD STRIP.AUTO-MCNC: 200 MG/DL (ref 65–100)
GLUCOSE SERPL-MCNC: 87 MG/DL (ref 65–100)
HCT VFR BLD AUTO: 39.8 % (ref 41.1–50.3)
HGB BLD-MCNC: 13.1 G/DL (ref 13.6–17.2)
MAGNESIUM SERPL-MCNC: 1.9 MG/DL (ref 1.8–2.4)
MCH RBC QN AUTO: 27 PG (ref 26.1–32.9)
MCHC RBC AUTO-ENTMCNC: 32.9 G/DL (ref 31.4–35)
MCV RBC AUTO: 81.9 FL (ref 79.6–97.8)
NRBC # BLD: 0 K/UL (ref 0–0.2)
PLATELET # BLD AUTO: 264 K/UL (ref 150–450)
PMV BLD AUTO: 11 FL (ref 9.4–12.3)
POTASSIUM SERPL-SCNC: 5.4 MMOL/L (ref 3.5–5.1)
PROT SERPL-MCNC: 7.1 G/DL (ref 6.3–8.2)
RBC # BLD AUTO: 4.86 M/UL (ref 4.23–5.6)
SODIUM SERPL-SCNC: 135 MMOL/L (ref 136–145)
WBC # BLD AUTO: 9.4 K/UL (ref 4.3–11.1)

## 2020-06-16 PROCEDURE — 85027 COMPLETE CBC AUTOMATED: CPT

## 2020-06-16 PROCEDURE — 36415 COLL VENOUS BLD VENIPUNCTURE: CPT

## 2020-06-16 PROCEDURE — 94660 CPAP INITIATION&MGMT: CPT

## 2020-06-16 PROCEDURE — 65660000000 HC RM CCU STEPDOWN

## 2020-06-16 PROCEDURE — 97161 PT EVAL LOW COMPLEX 20 MIN: CPT

## 2020-06-16 PROCEDURE — 82962 GLUCOSE BLOOD TEST: CPT

## 2020-06-16 PROCEDURE — 74011250636 HC RX REV CODE- 250/636: Performed by: INTERNAL MEDICINE

## 2020-06-16 PROCEDURE — 83735 ASSAY OF MAGNESIUM: CPT

## 2020-06-16 PROCEDURE — 94761 N-INVAS EAR/PLS OXIMETRY MLT: CPT

## 2020-06-16 PROCEDURE — 85730 THROMBOPLASTIN TIME PARTIAL: CPT

## 2020-06-16 PROCEDURE — 74011250637 HC RX REV CODE- 250/637: Performed by: HOSPITALIST

## 2020-06-16 PROCEDURE — 77030040831 HC BAG URINE DRNG MDII -A

## 2020-06-16 PROCEDURE — 97535 SELF CARE MNGMENT TRAINING: CPT

## 2020-06-16 PROCEDURE — 74011250636 HC RX REV CODE- 250/636: Performed by: HOSPITALIST

## 2020-06-16 PROCEDURE — 76705 ECHO EXAM OF ABDOMEN: CPT

## 2020-06-16 PROCEDURE — 77030040829 HC CATH EXT URINE MDII -B

## 2020-06-16 PROCEDURE — 74011250637 HC RX REV CODE- 250/637: Performed by: INTERNAL MEDICINE

## 2020-06-16 PROCEDURE — 97165 OT EVAL LOW COMPLEX 30 MIN: CPT

## 2020-06-16 PROCEDURE — 80053 COMPREHEN METABOLIC PANEL: CPT

## 2020-06-16 PROCEDURE — 74011000258 HC RX REV CODE- 258: Performed by: HOSPITALIST

## 2020-06-16 PROCEDURE — 74011636637 HC RX REV CODE- 636/637: Performed by: HOSPITALIST

## 2020-06-16 RX ORDER — HYDRALAZINE HYDROCHLORIDE 50 MG/1
50 TABLET, FILM COATED ORAL 2 TIMES DAILY
Status: DISCONTINUED | OUTPATIENT
Start: 2020-06-16 | End: 2020-06-18 | Stop reason: HOSPADM

## 2020-06-16 RX ORDER — METRONIDAZOLE 500 MG/100ML
500 INJECTION, SOLUTION INTRAVENOUS EVERY 12 HOURS
Status: DISCONTINUED | OUTPATIENT
Start: 2020-06-16 | End: 2020-06-17

## 2020-06-16 RX ADMIN — CEFTRIAXONE SODIUM 2 G: 2 INJECTION, POWDER, FOR SOLUTION INTRAMUSCULAR; INTRAVENOUS at 23:23

## 2020-06-16 RX ADMIN — ACETAMINOPHEN 650 MG: 325 TABLET, FILM COATED ORAL at 01:19

## 2020-06-16 RX ADMIN — LISINOPRIL 20 MG: 20 TABLET ORAL at 08:51

## 2020-06-16 RX ADMIN — Medication 10 ML: at 05:36

## 2020-06-16 RX ADMIN — METOCLOPRAMIDE HYDROCHLORIDE 5 MG: 5 INJECTION INTRAMUSCULAR; INTRAVENOUS at 18:13

## 2020-06-16 RX ADMIN — Medication 10 ML: at 21:03

## 2020-06-16 RX ADMIN — HYDROCODONE BITARTRATE AND ACETAMINOPHEN 1 TABLET: 10; 325 TABLET ORAL at 21:52

## 2020-06-16 RX ADMIN — HEPARIN SODIUM 16 UNITS/KG/HR: 5000 INJECTION, SOLUTION INTRAVENOUS at 04:39

## 2020-06-16 RX ADMIN — CLONIDINE HYDROCHLORIDE 0.2 MG: 0.2 TABLET ORAL at 12:21

## 2020-06-16 RX ADMIN — LABETALOL HYDROCHLORIDE 100 MG: 100 TABLET, FILM COATED ORAL at 21:00

## 2020-06-16 RX ADMIN — METOCLOPRAMIDE HYDROCHLORIDE 5 MG: 5 INJECTION INTRAMUSCULAR; INTRAVENOUS at 12:21

## 2020-06-16 RX ADMIN — METRONIDAZOLE 500 MG: 500 INJECTION, SOLUTION INTRAVENOUS at 21:01

## 2020-06-16 RX ADMIN — METRONIDAZOLE 500 MG: 500 INJECTION, SOLUTION INTRAVENOUS at 14:57

## 2020-06-16 RX ADMIN — METOCLOPRAMIDE HYDROCHLORIDE 5 MG: 5 INJECTION INTRAMUSCULAR; INTRAVENOUS at 05:36

## 2020-06-16 RX ADMIN — LABETALOL HYDROCHLORIDE 100 MG: 100 TABLET, FILM COATED ORAL at 08:51

## 2020-06-16 RX ADMIN — HYDRALAZINE HYDROCHLORIDE 50 MG: 50 TABLET, FILM COATED ORAL at 18:13

## 2020-06-16 RX ADMIN — HEPARIN SODIUM 16 UNITS/KG/HR: 5000 INJECTION, SOLUTION INTRAVENOUS at 19:01

## 2020-06-16 RX ADMIN — HYDRALAZINE HYDROCHLORIDE 50 MG: 50 TABLET, FILM COATED ORAL at 14:57

## 2020-06-16 RX ADMIN — Medication 10 ML: at 13:50

## 2020-06-16 NOTE — PROGRESS NOTES
This RN just received a call from [facility] from [address] to keep pt NPO this am until US is complete.

## 2020-06-16 NOTE — PROGRESS NOTES
[facility] CRITICAL CARE OUTREACH NURSE PROGRESS REPORT      SUBJECTIVE: Called to assess patient secondary to outreach protocol. MEWS Score: 1 (06/15/20 1531)  Vitals:    06/15/20 0945 06/15/20 1043 06/15/20 1531 06/15/20 1932   BP: 111/59 139/76 116/72 135/77   Pulse:  90 85 82   Resp:  18 18 18   Temp:  97.9 °F (36.6 °C) 99 °F (37.2 °C) 98.2 °F (36.8 °C)   SpO2:  94% 93% 95%   Weight:       Height:            LAB DATA:    Recent Labs     06/15/20  0323 06/14/20  2013   * 136   K 3.5 3.9   CL 99 98   CO2 27 31   AGAP 9 7   * 126*   BUN 14 18   CREA 1.45 1.70*   GFRAA >60 54*   GFRNA 54* 45*   CA 9.6 10.2   ALB 3.6 3.8   TP 8.2 8.5*   GLOB 4.6* 4.7*   AGRAT 0.8* 0.8*   ALT 18 23        Recent Labs     06/15/20  0323 06/14/20  2013   WBC 13.5* 11.4*   HGB 14.3 13.7   HCT 42.8 42.8    324          OBJECTIVE: On arrival to room, I found patient to be resting in bed watching TV. Pain Assessment  Pain Intensity 1: 0 (06/15/20 1922)  Pain Location 1: Knee  Pain Intervention(s) 1: Medication (see MAR), Emotional support, Repositioned, Rest  Patient Stated Pain Goal: 0                                 ASSESSMENT:  Pt is axox4. O2= 94% on RA. HR= 83. Pt on heparin gtt. No obvious signs of bleeding noted. Pt stated that his RLE pain was better after the PRN pain medication earlier in the day. Pt requesting soup. Primary RN notified. PLAN:  Will continue to monitor per protocol.

## 2020-06-16 NOTE — PROGRESS NOTES
Interdisciplinary Rounds completed. Nursing, Case Management, and Physician  present. Plan of care reviewed and updated. Possible d/c Thursday.

## 2020-06-16 NOTE — PROGRESS NOTES
Hourly rounds completed this shift. Heparin gtt infusing. All needs met at this time. Bed low/locked. Call light within reach. Will continue to monitor and give bedside report to oncoming nurse.

## 2020-06-16 NOTE — PROGRESS NOTES
END OF SHIFT NOTE:    INTAKE/OUTPUT  06/15 0701 - 06/16 0700  In: 480 [P.O.:480]  Out: 2500 [Urine:2500]  Voiding: YES-condom cath  Catheter: condom cath  Color: clear  Drain:   Condom Catheter 06/15/20 (Active)   Indications for Use Accurate measurement of urinary output 6/16/2020 10:30 AM   Status Draining;Patent 6/16/2020  7:09 PM   Site Condition No abnormalities 6/16/2020 10:30 AM   Drainage Tube Clipped to Bed No 6/16/2020 10:30 AM   Catheter Secured to Thigh No 6/16/2020 10:30 AM   Tamper Seal Intact No 6/16/2020 10:30 AM   Bag Below Bladder/Not on Floor Yes 6/16/2020 10:30 AM   Lack of Dependent Loop in Tubing Yes 6/16/2020 10:30 AM   Drainage Bag Less Than Half Full Yes 6/16/2020 10:30 AM   Sterile Solution Used for  Irrigation N/A 6/15/2020  7:22 PM   Urine Output (mL) 1000 ml 6/16/2020 10:30 AM               DIET  regular    Flatus: Patient does have flatus present. Stool:  0 occurrences. Characteristics:  Stool Assessment  Stool Appearance: Formed    Ambulating  Yes    Emesis: 0 occurrences.     Characteristics:          VITAL SIGNS  Patient Vitals for the past 12 hrs:   Temp Pulse Resp BP SpO2   06/16/20 1529 98.2 °F (36.8 °C) 81 20 142/90 96 %   06/16/20 1447    164/61    06/16/20 1219 98.4 °F (36.9 °C) 68 20 196/83 95 %       Pain Assessment  Pain Intensity 1: 0 (06/16/20 1909)  Pain Location 1: Knee  Pain Intervention(s) 1: Repositioned  Patient Stated Pain Goal: 0            Olga Pappas RN

## 2020-06-16 NOTE — PROGRESS NOTES
H&P/Consult Note/Progress Note/Office Note:   Rosie Roth  MRN: [id number]  :1965  Age:55 y.o.    HPI: Rosie Roth is a 63 y.o. male who we are asked by Dr. Uriel Degroot to see for acute cholecystitis. 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 The patient was found to have acute right sided PE as well as dilated GB with some pericholecystic fluid with concerns of cholecystitis. He is tender to palpation of the RUQ and right flank. WBC 13.5. AF. Denies nausea, vomiting, pain with PO intake, fever, or chills. He is on a heparin gtt for PE.    2020 Pain significantly improved today. No n/v. WBC 9.4. Awaiting US    Past Medical History:   Diagnosis Date    Allergic rhinitis due to animal (cat) (dog) hair and dander     Diastolic CHF, chronic ([facility] [address] 2016    ORALIA on CPAP     Study done 2012;  AHI 86.7, RDI 86.7 (same)     Past Surgical History:   Procedure Laterality Date    COLONOSCOPY N/A 2016    COLONOSCOPY / BMI 47 performed by Babatunde Pappas MD at [facility] ENDOSCOPY    HX AMPUTATION Right     Right index finger happened at work    HX PREMALIG/BENIGN SKIN LESION EXCISION Right     Behind right ear    HX TONSILLECTOMY      PA COLSC FLX W/REMOVAL LESION BY HOT BX FORCEPS  2016          Current Facility-Administered Medications   Medication Dose Route Frequency    sodium chloride (NS) flush 5-40 mL  5-40 mL IntraVENous Q8H    sodium chloride (NS) flush 5-40 mL  5-40 mL IntraVENous PRN    tuberculin injection 5 Units  5 Units IntraDERMal ONCE    acetaminophen (TYLENOL) tablet 650 mg  650 mg Oral Q4H PRN    HYDROcodone-acetaminophen (NORCO)  mg tablet 1 Tab  1 Tab Oral Q6H PRN    morphine injection 2 mg  2 mg IntraVENous Q4H PRN    naloxone (NARCAN) injection 0.4 mg  0.4 mg IntraVENous PRN    diphenhydrAMINE (BENADRYL) injection 12.5 mg  12.5 mg IntraVENous Q4H PRN    ondansetron (ZOFRAN) injection 4 mg  4 mg IntraVENous Q4H PRN    magnesium hydroxide (MILK OF MAGNESIA) 400 mg/5 mL oral suspension 30 mL  30 mL Oral DAILY PRN    insulin lispro (HUMALOG) injection   SubCUTAneous AC&HS    metoclopramide HCl (REGLAN) injection 5 mg  5 mg IntraVENous Q6H    lisinopriL (PRINIVIL, ZESTRIL) tablet 20 mg  20 mg Oral DAILY    labetaloL (NORMODYNE) tablet 100 mg  100 mg Oral Q12H    cloNIDine HCL (CATAPRES) tablet 0.2 mg  0.2 mg Oral BID PRN    hydrALAZINE (APRESOLINE) 20 mg/mL injection 10 mg  10 mg IntraVENous Q6H PRN    albuterol-ipratropium (DUO-NEB) 2.5 MG-0.5 MG/3 ML  3 mL Nebulization Q4H PRN    heparin 25,000 units in dextrose 500 mL infusion  18-36 Units/kg/hr (Adjusted) IntraVENous TITRATE    cefTRIAXone (ROCEPHIN) 2 g in 0.9% sodium chloride (MBP/ADV) 50 mL  2 g IntraVENous Q24H     Patient has no known allergies. Social History     Socioeconomic History    Marital status: SINGLE     Spouse name: Not on file    Number of children: Not on file    Years of education: Not on file    Highest education level: Not on file   Tobacco Use    Smoking status: Never Smoker    Smokeless tobacco: Never Used   Substance and Sexual Activity    Alcohol use: Yes     Alcohol/week: 10.0 standard drinks     Types: 10 Cans of beer per week     Comment: occ    Drug use: No    Sexual activity: Yes     Partners: Female     Social History     Tobacco Use   Smoking Status Never Smoker   Smokeless Tobacco Never Used     Family History   Problem Relation Age of Onset    Cancer Sister     Diabetes Mother     Hypertension Mother     No Known Problems Father     No Known Problems Brother     No Known Problems Sister     Diabetes Other     Other Other         Renal Failure     Hypertension Other     Colon Cancer Neg Hx      ROS: The patient has no difficulty with chest pain or shortness of breath. No fever or chills.   Comprehensive review of systems was otherwise unremarkable except as noted above. Physical Exam:   Visit Vitals  /89 (BP 1 Location: Right arm, BP Patient Position: At rest)   Pulse 69   Temp 98.2 °F (36.8 °C)   Resp 18   Ht 6' 1\" (1.854 m)   Wt 346 lb 12.8 oz (157.3 kg)   SpO2 93%   BMI 45.75 kg/m²     Constitutional: Alert, oriented, cooperative patient in no acute distress; appears stated age    Eyes:Sclera are clear. EOMs intact  ENMT: no external lesions gross hearing normal; no obvious neck masses, no ear or lip lesions, nares normal  CV: RRR. Normal perfusion  Resp: No JVD. Breathing is  non-labored; no audible wheezing. GI: soft and non-distended, obese, improved ttp RUQ/right flank     Musculoskeletal: unremarkable with normal function. No embolic signs or cyanosis. Neuro:  Oriented; moves all 4; no focal deficits  Psychiatric: normal affect and mood, no memory impairment    Recent vitals (if inpt):  Patient Vitals for the past 24 hrs:   BP Temp Pulse Resp SpO2 Weight   06/16/20 0652 140/89 98.2 °F (36.8 °C) 69 18 93 %    06/16/20 0611      346 lb 12.8 oz (157.3 kg)   06/16/20 0415 142/82 98.3 °F (36.8 °C) 71 18 94 %    06/16/20 0145     95 %    06/15/20 2310 (!) 147/92 98.4 °F (36.9 °C) 79 18 94 %    06/15/20 1932 135/77 98.2 °F (36.8 °C) 82 18 95 %    06/15/20 1531 116/72 99 °F (37.2 °C) 85 18 93 %        Labs:  Recent Labs     06/16/20  0419  06/14/20 2013   WBC 9.4   < > 11.4*   HGB 13.1*   < > 13.7      < > 324   *   < > 136   K 5.4*   < > 3.9      < > 98   CO2 21   < > 31   BUN 20   < > 18   CREA 1.86*   < > 1.70*   GLU 87   < > 126*   APTT 102.7*   < >  --    TBILI 0.4   < > 0.5   ALT 24   < > 23      < > 120   LPSE  --   --  100    < > = values in this interval not displayed.        Lab Results   Component Value Date/Time    WBC 9.4 06/16/2020 04:19 AM    HGB 13.1 (L) 06/16/2020 04:19 AM    PLATELET 707 52/12/6628 04:19 AM    Sodium 135 (L) 06/16/2020 04:19 AM    Potassium 5.4 (H) 06/16/2020 04:19 AM    Chloride 103 06/16/2020 04:19 AM    CO2 21 06/16/2020 04:19 AM    BUN 20 06/16/2020 04:19 AM    Creatinine 1.86 (H) 06/16/2020 04:19 AM    Glucose 87 06/16/2020 04:19 AM    aPTT 102.7 (H) 06/16/2020 04:19 AM    Bilirubin, total 0.4 06/16/2020 04:19 AM    ALT (SGPT) 24 06/16/2020 04:19 AM    Alk. phosphatase 105 06/16/2020 04:19 AM    Lipase 100 06/14/2020 08:13 PM       I reviewed recent labs and recent radiologic studies. I independently reviewed radiology images for studies I described above or studies I have ordered.    Admission date (for inpatients): 6/14/2020   * No surgery found *  * No surgery found *    ASSESSMENT/PLAN:  Problem List  Date Reviewed: 3/4/2020          Codes Class Noted    Morbid obesity ([facility] ICD-10-CM: E66.01  ICD-9-CM: 278.01  6/14/2020        Shortness of breath ICD-10-CM: R06.02  ICD-9-CM: 786.05  6/14/2020        Atypical chest pain ICD-10-CM: R07.89  ICD-9-CM: 786.59  6/14/2020        * (Principal) Acute pulmonary embolism ([facility] ICD-10-CM: I26.99  ICD-9-CM: 415.19  6/14/2020        Hypertensive emergency ICD-10-CM: I16.1  ICD-9-CM: 401.9  6/14/2020        Type 2 diabetes mellitus with diabetic neuropathy, without long-term current use of insulin ([facility] ICD-10-CM: E11.40  ICD-9-CM: 250.60, 357.2  7/31/2019        Nuclear sclerotic cataract of both eyes ICD-10-CM: H25.13  ICD-9-CM: 366.16  6/27/2019        Tinea unguium ICD-10-CM: B35.1  ICD-9-CM: 110.1  2/5/2019    Overview Signed 2/5/2019 12:30 PM by CAT Raya     bilateral--all nails             Nonrheumatic aortic valve regurgitation ICD-10-CM: I35.1  ICD-9-CM: 424.1  12/6/2018        Chronic right shoulder pain ICD-10-CM: M25.511, G89.29  ICD-9-CM: 719.41, 338.29  11/9/2018        Chronic midline low back pain without sciatica ICD-10-CM: M54.5, G89.29  ICD-9-CM: 724.2, 338.29  11/9/2018        Arthritis, lumbar spine ICD-10-CM: M47.816  ICD-9-CM: 721.3  11/9/2018        Type 2 diabetes mellitus with stage 3 chronic kidney disease ([facility] ICD-10-CM: E11.22, N18.3  ICD-9-CM: 250.40, 585.3  1/24/2018        Primary osteoarthritis of both knees (L>R) ICD-10-CM: M17.0  ICD-9-CM: 715.16  4/13/2017        Bilateral chronic knee pain ICD-10-CM: M25.561, M25.562, G89.29  ICD-9-CM: 719.46, 338.29  9/19/2706        Diastolic CHF, chronic (HCC)--Dr. Farida Golden (cardiology) ICD-10-CM: I50.32  ICD-9-CM: 428.32, 428.0  6/1/2016    Overview Signed 6/1/2016  1:53 PM by Polo Query     Remain on BID lasix, stable now. recent labs show Mg 1.6 and we will start Mag Oxide 400 BID.  K ok. Cr 1.4. Check labs again in 6 mos. BNP also is 19. Edema stable. Working out, [address], this is key for him. Check labs in 6 mos before follow up. Back off on lasix as he loses weight. Getting new PCP soon.     Noted 07/09/15             Dyslipidemia ICD-10-CM: E78.5  ICD-9-CM: 272.4  4/22/2016        Erectile dysfunction associated with type 2 diabetes mellitus ([facility] ICD-10-CM: E11.69, N52.1  ICD-9-CM: 250.80, 607.84  10/30/2015        Benign hypertension with CKD (chronic kidney disease) stage III ([facility] ICD-10-CM: I12.9, N18.3  ICD-9-CM: 403.10, 585.3  4/6/2015    Overview Signed 6/1/2016  1:49 PM by Polo Query     Echo 11/2012: EF 60% with severe LVH  Renal US 2012: poor study               Morbid obesity with BMI of 45.0-49.9, adult [facility]) ICD-10-CM: E66.01, Z68.42  ICD-9-CM: 278.01, V85.42  4/6/2015        Allergic rhinitis due to animal (cat) (dog) hair and dander ICD-10-CM: J30.81  ICD-9-CM: 477.2  11/19/2014        Vitamin D deficiency ICD-10-CM: E55.9  ICD-9-CM: 268.9  Unknown        ORALIA on CPAP ICD-10-CM: G47.33, Z99.89  ICD-9-CM: 327.23, V46.8  Unknown    Overview Signed 6/1/2016  1:50 PM by Gonzales Araya     Compliant with CPAP               Mild intermittent asthma without complication XBF-67-BP: I92.90  ICD-9-CM: 493.90  11/3/2014        Gout, unspecified ICD-10-CM: M10.9  ICD-9-CM: 274.9 11/3/2014        Controlled type 2 diabetes mellitus with microalbuminuria, without long-term current use of insulin (HCC) ICD-10-CM: E11.29, R80.9  ICD-9-CM: 250.40, 791.0  11/3/2014            Principal Problem:    Acute pulmonary embolism ([facility] [address] (6/14/2020)    Active Problems:    Mild intermittent asthma without complication (52/6/4860)      Gout, unspecified (11/3/2014)      ORALIA on CPAP ()      Overview: Compliant with CPAP            Controlled type 2 diabetes mellitus with microalbuminuria, without long-term current use of insulin ([facility] [address] (11/3/2014)      Benign hypertension with CKD (chronic kidney disease) stage III ([facility] [address] (4/6/2015)      Overview: Echo 11/2012: EF 60% with severe LVH      Renal US 2012: poor study            Morbid obesity with BMI of 45.0-49.9, adult ([facility] [address] (4/6/2015)      Dyslipidemia (3/26/4384)      Diastolic CHF, chronic (HCC)--Dr. Javier Guillen (cardiology) (6/1/2016)      Overview: Remain on BID lasix, stable now. recent labs show Mg 1.6 and we will       start Mag Oxide 400 BID.  K ok. Cr 1.4. Check labs again in 6 mos. BNP       also is 19. Edema stable. Working out, [address], this is       key for him. Check labs in 6 mos before follow up. Back off on lasix as       he loses weight. Getting new PCP soon. Noted 07/09/15      Morbid obesity ([facility] [address] (6/14/2020)      Shortness of breath (6/14/2020)      Atypical chest pain (6/14/2020)      Hypertensive emergency (6/14/2020)       Given acute PE, if acute cholecystitis remains a concern would recommend IR for drain placement.      Continue NPO  Empiric Abx  Await U/S  Will continue to follow    Signed:  Inessa Herrera NP

## 2020-06-16 NOTE — PROGRESS NOTES
Problem: Mobility Impaired (Adult and Pediatric)  Goal: *Acute Goals and Plan of Care (Insert Text)  Description: No goals set due to patient functioning independently. Outcome: Resolved/Met     PHYSICAL THERAPY: Initial Assessment and Discharge 6/16/2020  INPATIENT:    Payor: [employer]OL[employer] / [place] [employer] / Product Type: Managed Care Medicaid /       NAME/AGE/GENDER: Collette Cornwall is a 63 y.o. male   PRIMARY DIAGNOSIS: Hypertensive emergency [I16.1]  Acute pulmonary embolism ([facility] [address] [I26.99]  Morbid obesity ([facility] [address] [E66.01]  Shortness of breath [R06.02]  Atypical chest pain [R07.89] Acute pulmonary embolism ([facility] [address] Acute pulmonary embolism (HCC)        ICD-10: Treatment Diagnosis:    Other abnormalities of gait and mobility (R26.89)   Precaution/Allergies:  Patient has no known allergies. ASSESSMENT:     Mr. Wilfrido Blancas presents supine in bed, pleasant and agreeable for PT. He lives with his sister and is independent in home and community. He reports that his knees are \"bone on bone\" and painful, but he doesn't use any assistive device to ambulate. Patient transitioned to sit with modified independence. He stood independently and ambulated 250' independently. Patient appears to be functioning at baseline, will discontinue skilled PT. Encouraged patient to ambulate in halls while in acute care as long as he checks with RN first.    This section established at most recent assessment            REHAB RECOMMENDATIONS (at time of discharge pending progress):    Placement: It is my opinion, based on this patient's performance to date, that Mr. Wilfrido Blancas may benefit from being discharged with NO further skilled therapy due to a proven ability to function at baseline.   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 Pt is currently sleepy after receiving IV morphine for pain. Most information obtained from pt's sister and ER physician. Per sister, pt is not much physically active at baseline, drinks occasionally, reported of dull chest and epigastric pain for past 24 hours. As per records, pt reported of mild SOB, no cough or fever. No syncopal event, lightheadedness/dizziness, nausea/vomiting, diaphoresis, urinary symptoms, diarrhea reported. ER work up showed CT chest with evidence of acute right PE with dilated GB with some pericholecystic fluid with concerns of cholecystitis. Initial /131, WBC 11K, Cr 1. 7. \"  Past Medical History/Comorbidities:   Mr. Declan Ashraf  has a past medical history of Allergic rhinitis due to animal (cat) (dog) hair and dander (78/26/48), Diastolic CHF, chronic ([address] (6/1/2016), and ORALIA on CPAP. Mr. Declan Ashraf  has a past surgical history that includes hx tonsillectomy; hx premalig/benign skin lesion excision (Right); hx amputation (Right); pr colsc flx w/removal lesion by hot bx forceps (8/26/2016); and colonoscopy (N/A, 8/26/2016). Social History/Living Environment:   Home Environment: Private residence  One/Two Story Residence: One story  Living Alone: No  Support Systems: Parent  Patient Expects to be Discharged to[de-identified] Private residence  Current DME Used/Available at Home: None  Prior Level of Function/Work/Activity:  He lives with his sister and is independent in home and community.          Number of Personal Factors/Comorbidities that affect the Plan of Care: 0: LOW COMPLEXITY   EXAMINATION:   Most Recent Physical Functioning:   Gross Assessment:                  Posture:     Balance:  Sitting: Intact  Standing: Intact Bed Mobility:  Supine to Sit: Modified independent  Scooting: Independent  Wheelchair Mobility:     Transfers:  Sit to Stand: Independent  Stand to Sit: Independent  Gait:     Base of Support: Widened  Speed/Naina: Slow  Step Length: Right shortened;Left shortened  Gait Abnormalities: Decreased step clearance  Distance (ft): 250 Feet (ft)  Ambulation - Level of Assistance: Independent      Body Structures Involved:  None Body Functions Affected:  None Activities and Participation Affected:  None   Number of elements that affect the Plan of Care: 1-2: LOW COMPLEXITY   CLINICAL PRESENTATION:   Presentation: Stable and uncomplicated: LOW COMPLEXITY   CLINICAL DECISION MAKIN [facility] Inpatient Short Form  How much difficulty does the patient currently have. .. Unable A Lot A Little None   1. Turning over in bed (including adjusting bedclothes, sheets and blankets)? [] 1   [] 2   [] 3   [x] 4   2. Sitting down on and standing up from a chair with arms ( e.g., wheelchair, bedside commode, etc.)   [] 1   [] 2   [] 3   [x] 4   3. Moving from lying on back to sitting on the side of the bed? [] 1   [] 2   [] 3   [x] 4   How much help from another person does the patient currently need. .. Total A Lot A Little None   4. Moving to and from a bed to a chair (including a wheelchair)? [] 1   [] 2   [] 3   [x] 4   5. Need to walk in hospital room? [] 1   [] 2   [] 3   [x] 4   6. Climbing 3-5 steps with a railing? [] 1   [] 2   [] 3   [x] 4   © , Trustees of [address] [place] [address], under license to [employer]. All rights reserved      Score:  Initial: 24 Most Recent: X (Date: -- )    Interpretation of Tool:  Represents activities that are increasingly more difficult (i.e. Bed mobility, Transfers, Gait). Use of outcome tool(s) and clinical judgement create a POC that gives a: Clear prediction of patient's progress: LOW COMPLEXITY            TREATMENT:   (In addition to Assessment/Re-Assessment sessions the following treatments were rendered)   Pre-treatment Symptoms/Complaints:  none.   Pain: Initial:   Pain Intensity 1: 8  Pain Location 1: Knee  Pain Orientation 1: Right  Pain Intervention(s) 1: Repositioned  Post Session:  8     Assessment/Reassessment only, no treatment provided today    Braces/Orthotics/Lines/Etc:   IV  Treatment/Session Assessment:    Response to Treatment:  pleasant and cooperative. Interdisciplinary Collaboration:   Physical Therapist  Occupational Therapist  Registered Nurse  After treatment position/precautions:   Up in chair  Bed/Chair-wheels locked  Caregiver at bedside  Call light within reach  RN notified   .   Total Treatment Duration:  PT Patient Time In/Time Out  Time In: 1355  Time Out: [address], PT, DPT

## 2020-06-16 NOTE — PROGRESS NOTES
Problem: Pulmonary Embolism Care Plan (Adult)  Goal: *Improvement of existing pulmonary embolism  Outcome: Progressing Towards Goal  Goal: *Absence of bleeding  Outcome: Progressing Towards Goal  Goal: *Labs within defined limits  Outcome: Progressing Towards Goal     Problem: Patient Education: Go to Patient Education Activity  Goal: Patient/Family Education  Outcome: Progressing Towards Goal     Problem: Falls - Risk of  Goal: *Absence of Falls  Description: Document Corry Cormier Fall Risk and appropriate interventions in the flowsheet.   Outcome: Progressing Towards Goal  Note: Fall Risk Interventions:  Mobility Interventions: Communicate number of staff needed for ambulation/transfer, OT consult for ADLs, Patient to call before getting OOB, PT Consult for mobility concerns, PT Consult for assist device competence         Medication Interventions: Evaluate medications/consider consulting pharmacy, Patient to call before getting OOB, Teach patient to arise slowly    Elimination Interventions: Call light in reach, Patient to call for help with toileting needs, Stay With Me (per policy), Toilet paper/wipes in reach, Toileting schedule/hourly rounds, Urinal in reach              Problem: Patient Education: Go to Patient Education Activity  Goal: Patient/Family Education  Outcome: Progressing Towards Goal

## 2020-06-16 NOTE — PROGRESS NOTES
Problem: Self Care Deficits Care Plan (Adult)  Goal: *Acute Goals and Plan of Care (Insert Text)  Description:   1. Patient will complete lower body bathing and dressing with Set Up/Mod I.  2. Patient will complete grooming with Set Up/Mod I.  3. Patient will tolerate 15 minutes of OT treatment with no rest breaks to increase activity tolerance for ADLs. 4. Patient will complete upper body bathing with Set Up/Mod I.  5. Patient will complete functional transfers with Mod I and no loss of balance. 6. Patient will complete in room mobility in preparation for standing ADLs with Mod I and no loss of balance. Timeframe: 7 visits      Outcome: Resolved/Met     OCCUPATIONAL THERAPY: Initial Assessment, Daily Note, Discharge, and PM 6/16/2020  INPATIENT: OT Visit Days: 1  Payor: [employer] / [place] [employer] / Product Type: Managed Care Medicaid /      NAME/AGE/GENDER: Amina Joseph is a 63 y.o. male   PRIMARY DIAGNOSIS:  Hypertensive emergency [I16.1]  Acute pulmonary embolism ([facility] [address] [I26.99]  Morbid obesity ([facility] [address] [E66.01]  Shortness of breath [R06.02]  Atypical chest pain [R07.89] Acute pulmonary embolism ([facility] [address] Acute pulmonary embolism (HCC)    ICD-10: Treatment Diagnosis:    · Generalized Muscle Weakness (M62.81)  · Difficulty in walking, Not elsewhere classified (R26.2)   Precautions/Allergies:    Patient has no known allergies. ASSESSMENT:     Mr. Marcie Paniagua is a 63 y.o. male admitted for Hypertensive emergency and above diagnoses. At baseline, patient lives with sister in one story home with 0 PRUDENCIO. Pt is typically Mod I to independent for ADLs. + driving and grocery shopping. Reports dependence on sister for meal preparation and home management but reports that he can assist as needed. Patient uses no DME for functional mobility. Amina Joseph found walking back into hospital room finishing session with PT. Pt alert and agreeable to OT evaluation. Reports 8/10 pain in R LE.  Patient completes stand to sit in recliner chair with Mod I and use of hand rails. Pt with intact static and dynamic seated balance. BUE WFL for ROM, strength, and coordination. Pt reports increased desire to perform bathing. Pt requires Set Up/Mod I to complete upper body bathing in seated position. Pt requires Set Up (secondary to IV)/Mod I to don clean hospital gown in seated position. Pt requires Set Up/Mod I to perform lower body bathing and complete sock management. Pt requires Mod I with use of hand rails to complete sit to stand. Upon standing, pt with intact static and dynamic standing balance. Pt completes perineal bathing with Set Up (i.e provided bathing cloths) in standing. Pt with no loss of balance observed when performing standing ADL task. Pt requires Mod I with use of hand rail to complete stand to sit. Pt left seated in recliner chair with all needs met and within reach. Pt appears to be functioning at baseline for performance of ADLs and functional mobility. Pt most appropriate for D/C from caseload due to no further acute OT needs. Patient was educated on role and plan of care of occupational therapy. This section established at most recent assessment   PROBLEM LIST (Impairments causing functional limitations):  1. None   INTERVENTIONS PLANNED: (Benefits and precautions of occupational therapy have been discussed with the patient.)  1. None     TREATMENT PLAN: Frequency/Duration: Follow patient 1x visit to address above goals. Rehabilitation Potential For Stated Goals: Good     REHAB RECOMMENDATIONS (at time of discharge pending progress):    Placement: It is my opinion, based on this patient's performance to date, that Mr. Omari Petty may benefit from being discharged with NO further skilled therapy due to a proven ability to function at baseline and all goals being met.   Equipment:    None at this time              OCCUPATIONAL PROFILE AND HISTORY:   History of Present Injury/Illness (Reason for Referral):  See H & P  Past Medical History/Comorbidities:   Mr. Brit Madrigal  has a past medical history of Allergic rhinitis due to animal (cat) (dog) hair and dander (44/58/95), Diastolic CHF, chronic ([facility] [address] (6/1/2016), and ORALIA on CPAP. Mr. Brit Madrigal  has a past surgical history that includes hx tonsillectomy; hx premalig/benign skin lesion excision (Right); hx amputation (Right); pr colsc flx w/removal lesion by hot bx forceps (8/26/2016); and colonoscopy (N/A, 8/26/2016). Social History/Living Environment:   Home Environment: Private residence  # Steps to Enter: 3  One/Two Story Residence: One story  Living Alone: No  Support Systems: Family member(s)  Patient Expects to be Discharged to[de-identified] Private residence  Current DME Used/Available at Home: None  Tub or Shower Type: Tub/Shower combination  Prior Level of Function/Work/Activity:  At baseline, patient lives with sister in one story home with 0 PRUDENCIO. Pt is typically Mod I to independent for ADLs. + driving and grocery shopping. Reports dependence on sister for meal preparation and home management but reports that he can assist as needed. Patient uses no DME for functional mobility. Personal Factors:          Sex:  male        Age:  63 y.o.    Number of Personal Factors/Comorbidities that affect the Plan of Care: Extensive review of physical, cognitive, and psychosocial performance (3+):  HIGH COMPLEXITY   ASSESSMENT OF OCCUPATIONAL PERFORMANCE[de-identified]   Activities of Daily Living:   Basic ADLs (From Assessment) Complex ADLs (From Assessment)   Feeding: Independent  Oral Facial Hygiene/Grooming: Independent  Bathing: Modified independent  Upper Body Dressing: Modified independent  Lower Body Dressing: Modified independent  Toileting: Modified independent Instrumental ADL  Meal Preparation: Minimum assistance  Homemaking: Minimum assistance   Grooming/Bathing/Dressing Activities of Daily Living         Upper Body Bathing  Bathing Assistance: Set-up  Position Performed: Seated in chair     Lower Body Bathing  Bathing Assistance: Set-up  Perineal  : Set-up  Position Performed: Seated in chair;Standing(Standing for perineal)           Lower Body Dressing Assistance  Socks: Set-up Bed/Mat Mobility  Supine to Sit: Modified independent  Sit to Stand: Independent  Stand to Sit: Independent  Scooting: Independent     Most Recent Physical Functioning:   Gross Assessment:  AROM: Within functional limits  Strength: Within functional limits  Coordination: Within functional limits  Sensation: Intact               Posture:     Balance:  Sitting: Intact  Standing: Intact Bed Mobility:  Supine to Sit: Modified independent  Scooting: Independent  Wheelchair Mobility:     Transfers:  Sit to Stand: Independent  Stand to Sit: Independent            Patient Vitals for the past 6 hrs:   BP BP Patient Position SpO2 Pulse   20 1219 196/83  95 % 68   20 1447 164/61 Sitting         Mental Status  Neurologic State: Alert  Orientation Level: Oriented X4  Cognition: Follows commands                          Physical Skills Involved:  1. None Cognitive Skills Affected (resulting in the inability to perform in a timely and safe manner):  1. None Psychosocial Skills Affected:  1. None   Number of elements that affect the Plan of Care: 1-3:  LOW COMPLEXITY   CLINICAL DECISION MAKIN[address] [place] [address] AM-PAC 6 Clicks   Daily Activity Inpatient Short Form  How much help from another person does the patient currently need. .. Total A Lot A Little None   1. Putting on and taking off regular lower body clothing? [] 1   [] 2   [] 3   [x] 4   2. Bathing (including washing, rinsing, drying)? [] 1   [] 2   [] 3   [x] 4   3. Toileting, which includes using toilet, bedpan or urinal?   [] 1   [] 2   [] 3   [x] 4   4. Putting on and taking off regular upper body clothing? [] 1   [] 2   [] 3   [x] 4   5. Taking care of personal grooming such as brushing teeth? [] 1   [] 2   [] 3   [x] 4   6.   Eating meals?   [] 1   [] 2   [] 3   [x] 4   © 2007, Trustees of [address] [place] [address] [address] [address], under license to [employer]. All rights reserved      Score:  Initial: 24, completed, 6/16/2020 Most Recent: X (Date: -- )    Interpretation of Tool:  Represents activities that are increasingly more difficult (i.e. Bed mobility, Transfers, Gait). Medical Necessity:     · N/A. Pt appears to be functioning at baseline for performance of ADLs. .  Reason for Services/Other Comments:  · N/A. Pt most appropriate for D/C from caseload with no further acute OT needs. .   Use of outcome tool(s) and clinical judgement create a POC that gives a: LOW COMPLEXITY         TREATMENT:   (In addition to Assessment/Re-Assessment sessions the following treatments were rendered)     Pre-treatment Symptoms/Complaints:  Pt reports pain in R knee and reports that he needs knee surgery. Pain: Initial:   Pain Intensity 1: 8  Pain Location 1: Knee  Pain Orientation 1: Right  Post Session:  Unchanged     Self Care: (15 minutes): Procedure(s) utilized to improve and/or restore self-care/home management as related to dressing, bathing and grooming. Required minimal visual and verbal cueing to facilitate activities of daily living skills. Pt requires Set Up/Mod I to complete upper body bathing in seated position. Pt requires Set Up (secondary to IV)/Mod I to don clean hospital gown in seated position. Pt requires Set Up/Mod I to perform lower body bathing and complete sock management. Pt requires Mod I with use of hand rails to complete sit to stand. Upon standing, pt with intact static and dynamic standing balance. Pt completes perineal bathing with Set Up (i.e provided bathing cloths) in standing. Pt with no loss of balance observed when performing standing ADL task. Braces/Orthotics/Lines/Etc:   · IV  · elizabeth catheter  · 02 Device: Room Air  Treatment/Session Assessment:    · Response to Treatment:  Pt good participant.  No adverse reaction. · Interdisciplinary Collaboration:   o Physical Therapist  o Occupational Therapist  o Registered Nurse  · After treatment position/precautions:   o Up in chair  o Bed/Chair-wheels locked  o Bed in low position  o Call light within reach  o RN notified   · Compliance with Program/Exercises: Compliant all of the time. · Recommendations/Intent for next treatment session:  N/A. Pt functioning at baseline for performance of ADLs. No further acute OT needs.   Total Treatment Duration:  OT Patient Time In/Time Out  Time In: 1406  Time Out: 1430     TRINIDAD Elkins/AMBIKA

## 2020-06-17 LAB
ALBUMIN SERPL-MCNC: 1.4 G/DL (ref 3.5–5)
ALBUMIN/GLOB SERPL: 0.2 {RATIO} (ref 1.2–3.5)
ALP SERPL-CCNC: 91 U/L (ref 50–136)
ALT SERPL-CCNC: 25 U/L (ref 12–65)
ANION GAP SERPL CALC-SCNC: 14 MMOL/L (ref 7–16)
APTT PPP: 125.6 SEC (ref 24.3–35.4)
APTT PPP: 35.3 SEC (ref 24.3–35.4)
AST SERPL-CCNC: 17 U/L (ref 15–37)
BILIRUB SERPL-MCNC: 0.3 MG/DL (ref 0.2–1.1)
BUN SERPL-MCNC: 20 MG/DL (ref 6–23)
CALCIUM SERPL-MCNC: 8.8 MG/DL (ref 8.3–10.4)
CHLORIDE SERPL-SCNC: 107 MMOL/L (ref 98–107)
CO2 SERPL-SCNC: 19 MMOL/L (ref 21–32)
CREAT SERPL-MCNC: 1.72 MG/DL (ref 0.8–1.5)
ERYTHROCYTE [DISTWIDTH] IN BLOOD BY AUTOMATED COUNT: 14.3 % (ref 11.9–14.6)
GLOBULIN SER CALC-MCNC: 5.7 G/DL (ref 2.3–3.5)
GLUCOSE BLD STRIP.AUTO-MCNC: 110 MG/DL (ref 65–100)
GLUCOSE BLD STRIP.AUTO-MCNC: 123 MG/DL (ref 65–100)
GLUCOSE BLD STRIP.AUTO-MCNC: 146 MG/DL (ref 65–100)
GLUCOSE BLD STRIP.AUTO-MCNC: 249 MG/DL (ref 65–100)
GLUCOSE SERPL-MCNC: 116 MG/DL (ref 65–100)
HCT VFR BLD AUTO: 38 % (ref 41.1–50.3)
HGB BLD-MCNC: 12.6 G/DL (ref 13.6–17.2)
MAGNESIUM SERPL-MCNC: 1.7 MG/DL (ref 1.8–2.4)
MCH RBC QN AUTO: 27.2 PG (ref 26.1–32.9)
MCHC RBC AUTO-ENTMCNC: 33.2 G/DL (ref 31.4–35)
MCV RBC AUTO: 82.1 FL (ref 79.6–97.8)
NRBC # BLD: 0 K/UL (ref 0–0.2)
PLATELET # BLD AUTO: 259 K/UL (ref 150–450)
PMV BLD AUTO: 10.1 FL (ref 9.4–12.3)
POTASSIUM SERPL-SCNC: 3.8 MMOL/L (ref 3.5–5.1)
PROT SERPL-MCNC: 7.1 G/DL (ref 6.3–8.2)
RBC # BLD AUTO: 4.63 M/UL (ref 4.23–5.6)
SODIUM SERPL-SCNC: 140 MMOL/L (ref 136–145)
WBC # BLD AUTO: 9 K/UL (ref 4.3–11.1)

## 2020-06-17 PROCEDURE — 85027 COMPLETE CBC AUTOMATED: CPT

## 2020-06-17 PROCEDURE — 74011250637 HC RX REV CODE- 250/637: Performed by: INTERNAL MEDICINE

## 2020-06-17 PROCEDURE — 94660 CPAP INITIATION&MGMT: CPT

## 2020-06-17 PROCEDURE — 74011250637 HC RX REV CODE- 250/637: Performed by: HOSPITALIST

## 2020-06-17 PROCEDURE — 85730 THROMBOPLASTIN TIME PARTIAL: CPT

## 2020-06-17 PROCEDURE — 82962 GLUCOSE BLOOD TEST: CPT

## 2020-06-17 PROCEDURE — 36415 COLL VENOUS BLD VENIPUNCTURE: CPT

## 2020-06-17 PROCEDURE — 65660000000 HC RM CCU STEPDOWN

## 2020-06-17 PROCEDURE — 80053 COMPREHEN METABOLIC PANEL: CPT

## 2020-06-17 PROCEDURE — 83735 ASSAY OF MAGNESIUM: CPT

## 2020-06-17 PROCEDURE — 99231 SBSQ HOSP IP/OBS SF/LOW 25: CPT | Performed by: INTERNAL MEDICINE

## 2020-06-17 RX ORDER — NIFEDIPINE 30 MG/1
60 TABLET, EXTENDED RELEASE ORAL DAILY
Status: DISCONTINUED | OUTPATIENT
Start: 2020-06-17 | End: 2020-06-18 | Stop reason: HOSPADM

## 2020-06-17 RX ADMIN — Medication 10 ML: at 05:17

## 2020-06-17 RX ADMIN — LABETALOL HYDROCHLORIDE 100 MG: 100 TABLET, FILM COATED ORAL at 08:28

## 2020-06-17 RX ADMIN — Medication 10 ML: at 13:59

## 2020-06-17 RX ADMIN — HYDRALAZINE HYDROCHLORIDE 50 MG: 50 TABLET, FILM COATED ORAL at 08:28

## 2020-06-17 RX ADMIN — LABETALOL HYDROCHLORIDE 100 MG: 100 TABLET, FILM COATED ORAL at 20:52

## 2020-06-17 RX ADMIN — MAGNESIUM HYDROXIDE 30 ML: 2400 SUSPENSION ORAL at 17:01

## 2020-06-17 RX ADMIN — HYDROCODONE BITARTRATE AND ACETAMINOPHEN 1 TABLET: 10; 325 TABLET ORAL at 23:18

## 2020-06-17 RX ADMIN — LISINOPRIL 20 MG: 20 TABLET ORAL at 08:28

## 2020-06-17 RX ADMIN — APIXABAN 10 MG: 5 TABLET, FILM COATED ORAL at 08:28

## 2020-06-17 RX ADMIN — HYDRALAZINE HYDROCHLORIDE 50 MG: 50 TABLET, FILM COATED ORAL at 16:55

## 2020-06-17 RX ADMIN — NIFEDIPINE 60 MG: 30 TABLET, FILM COATED, EXTENDED RELEASE ORAL at 12:22

## 2020-06-17 RX ADMIN — APIXABAN 10 MG: 5 TABLET, FILM COATED ORAL at 16:55

## 2020-06-17 RX ADMIN — Medication 10 ML: at 20:54

## 2020-06-17 NOTE — PROGRESS NOTES
Hourly rounds completed this shift. Heparin gtt infusing at 14 units/kg/hr. All needs met at this time. Bed low/locked. Call light within reach. Will continue to monitor and give bedside report to oncoming nurse.

## 2020-06-17 NOTE — PROGRESS NOTES
Patient continues to refuse insulin. BP well controlled. Discontinued heparin drip. All hourly rounds performed. Call light within reach. No complaints at this time. Will continue with plan of care and give report to oncoming nurse.

## 2020-06-17 NOTE — PROGRESS NOTES
Problem: Pulmonary Embolism Care Plan (Adult)  Goal: *Improvement of existing pulmonary embolism  Outcome: Progressing Towards Goal  Goal: *Absence of bleeding  Outcome: Progressing Towards Goal  Goal: *Labs within defined limits  Outcome: Progressing Towards Goal     Problem: Patient Education: Go to Patient Education Activity  Goal: Patient/Family Education  Outcome: Progressing Towards Goal     Problem: Falls - Risk of  Goal: *Absence of Falls  Description: Document Ash Garcia Fall Risk and appropriate interventions in the flowsheet.   Outcome: Progressing Towards Goal  Note: Fall Risk Interventions:  Mobility Interventions: Communicate number of staff needed for ambulation/transfer, OT consult for ADLs, Patient to call before getting OOB, PT Consult for mobility concerns, PT Consult for assist device competence         Medication Interventions: Evaluate medications/consider consulting pharmacy, Patient to call before getting OOB, Teach patient to arise slowly    Elimination Interventions: Call light in reach, Patient to call for help with toileting needs, Stay With Me (per policy), Toilet paper/wipes in reach, Toileting schedule/hourly rounds, Urinal in reach              Problem: Patient Education: Go to Patient Education Activity  Goal: Patient/Family Education  Outcome: Progressing Towards Goal     Problem: Patient Education: Go to Patient Education Activity  Goal: Patient/Family Education  Outcome: Progressing Towards Goal     Problem: Patient Education: Go to Patient Education Activity  Goal: Patient/Family Education  Outcome: Progressing Towards Goal

## 2020-06-17 NOTE — PROGRESS NOTES
06/17/20 0120   Oxygen Therapy   O2 Sat (%) 94 %   Pulse via Oximetry 75 beats per minute   O2 Device CPAP nasal   PEEP/CPAP (cm H2O)   (auto set)   FIO2 (%) 21 %   Respiratory   Respiratory (WDL) WDL   Respiratory Pattern Regular   Chest/Tracheal Assessment Chest expansion, symmetrical   Breath Sounds Bilateral Clear   Breath Sounds Right Diminished   CPAP/BIPAP   CPAP/BIPAP Start/Stop On   Device Mode CPAP, auto-titrating   $$ CPAP Daily Yes   Mask Type and Size Nasal mask   Skin Condition intact   EPAP (cm H2O) 6 cm H2O   Total RR (Spontaneous) 16 breaths per minute   Pt's Home Machine No   Biomedical Check Performed Yes   Settings Verified Yes

## 2020-06-17 NOTE — PROGRESS NOTES
[facility] Hematology & Oncology        Inpatient Hematology / Oncology Progress Note    Reason for Consult:  Hypertensive emergency [I16.1]  Acute pulmonary embolism ([facility] [I26.99]  Morbid obesity ([facility] [E66.01]  Shortness of breath [R06.02]  Atypical chest pain [R07.89]  Referring Physician:  Sameer Zelaya MD    24 Hour Events:  US without further evidence of acute cholecystitis  Heparin gtt stopped - started on Eliquis  Pain/symptoms resolved  VSS       ROS:  Constitutional: Negative for fever, chills, weakness, malaise, fatigue. CV: Negative for chest pain, palpitations, edema. Respiratory: Negative for dyspnea, cough, wheezing. GI: Negative for nausea, abdominal pain, diarrhea. 10 point review of systems is otherwise negative with the exception of the elements mentioned above in the HPI. No Known Allergies  Past Medical History:   Diagnosis Date    Allergic rhinitis due to animal (cat) (dog) hair and dander 83/91/76    Diastolic CHF, chronic ([facility] 6/1/2016    ORALIA on CPAP     Study done 4/17/2012;  AHI 86.7, RDI 86.7 (same)     Past Surgical History:   Procedure Laterality Date    COLONOSCOPY N/A 8/26/2016    COLONOSCOPY / BMI 47 performed by Shannon Bush MD at [facility] ENDOSCOPY    HX AMPUTATION Right     Right index finger happened at work    HX PREMALIG/BENIGN SKIN LESION EXCISION Right     Behind right ear    HX TONSILLECTOMY       [place] FLX W/REMOVAL LESION BY HOT BX FORCEPS  8/26/2016          Family History   Problem Relation Age of Onset    Cancer Sister     Diabetes Mother     Hypertension Mother     No Known Problems Father     No Known Problems Brother     No Known Problems Sister     Diabetes Other     Other Other         Renal Failure     Hypertension Other     Colon Cancer Neg Hx      Social History     Socioeconomic History    Marital status: SINGLE     Spouse name: Not on file    Number of children: Not on file    Years of education: Not on file    Highest education level: Not on file   Occupational History    Not on file   Social Needs    Financial resource strain: Not on file    Food insecurity     Worry: Not on file     Inability: Not on file    Transportation needs     Medical: Not on file     Non-medical: Not on file   Tobacco Use    Smoking status: Never Smoker    Smokeless tobacco: Never Used   Substance and Sexual Activity    Alcohol use:  Yes     Alcohol/week: 10.0 standard drinks     Types: 10 Cans of beer per week     Comment: occ    Drug use: No    Sexual activity: Yes     Partners: Female   Lifestyle    Physical activity     Days per week: Not on file     Minutes per session: Not on file    Stress: Not on file   Relationships    Social connections     Talks on phone: Not on file     Gets together: Not on file     Attends Mosque service: Not on file     Active member of club or organization: Not on file     Attends meetings of clubs or organizations: Not on file     Relationship status: Not on file    Intimate partner violence     Fear of current or ex partner: Not on file     Emotionally abused: Not on file     Physically abused: Not on file     Forced sexual activity: Not on file   Other Topics Concern    Not on file   Social History Narrative    Not on file     Current Facility-Administered Medications   Medication Dose Route Frequency Provider Last Rate Last Dose    apixaban (ELIQUIS) tablet 10 mg  10 mg Oral BID Kadi Travis MD   10 mg at 06/17/20 0828    hydrALAZINE (APRESOLINE) tablet 50 mg  50 mg Oral BID Inge Lombard, Arnoldo Rimes, MD   50 mg at 06/17/20 0828    sodium chloride (NS) flush 5-40 mL  5-40 mL IntraVENous Q8H Zelalem Dobbins MD   10 mL at 06/17/20 0517    sodium chloride (NS) flush 5-40 mL  5-40 mL IntraVENous PRN Zelalem Dobbins MD        acetaminophen (TYLENOL) tablet 650 mg  650 mg Oral Q4H PRN Zelalem Dobbins MD   650 mg at 06/16/20 0119    HYDROcodone-acetaminophen (NORCO)  mg tablet 1 Tab  1 Tab Oral Q6H PRN Jennifer Tolentino MD   1 Tab at 20 2152    morphine injection 2 mg  2 mg IntraVENous Q4H PRN Jennifer Tolentino MD        naloxone [facility]) injection 0.4 mg  0.4 mg IntraVENous PRN Jennifer Tolentino MD        diphenhydrAMINE (BENADRYL) injection 12.5 mg  12.5 mg IntraVENous Q4H PRN Jennifer Tolentino MD        ondansetron [facility]) injection 4 mg  4 mg IntraVENous Q4H PRN Jennifer Tolentino MD        magnesium hydroxide (MILK OF MAGNESIA) 400 mg/5 mL oral suspension 30 mL  30 mL Oral DAILY PRN MD Debbie Riojas insulin lispro (HUMALOG) injection   SubCUTAneous AC&HS Jennifer Tolentino MD   Stopped at 06/15/20 0730    lisinopriL (PRINIVIL, ZESTRIL) tablet 20 mg  20 mg Oral DAILY Jennifer Tolentino MD   20 mg at 20 3786    labetaloL (NORMODYNE) tablet 100 mg  100 mg Oral Q12H Jennifer Tolentino MD   100 mg at 20 1457    cloNIDine HCL (CATAPRES) tablet 0.2 mg  0.2 mg Oral BID PRN Jennifer Tolentino MD   0.2 mg at 20 1221    hydrALAZINE (APRESOLINE) 20 mg/mL injection 10 mg  10 mg IntraVENous Q6H PRN Jennifer Tolentino MD        albuterol-ipratropium (DUO-NEB) 2.5 MG-0.5 MG/3 ML  3 mL Nebulization Q4H PRN Jennifer Tolentino MD           OBJECTIVE:  Patient Vitals for the past 8 hrs:   BP Temp Pulse Resp SpO2 Weight   20 0732 161/83 98.4 °F (36.9 °C) 79 20 94 %    20 0610      347 lb 12.8 oz (157.8 kg)   20 0503 147/86 97.8 °F (36.6 °C) 71 20 91 %      Temp (24hrs), Av.4 °F (36.9 °C), Min:97.8 °F (36.6 °C), Max:98.9 °F (37.2 °C)    701 - 1900  In: -   Out: 200 [Urine:200]    Physical Exam:  Constitutional: Well developed, obese male in no acute distress, sitting comfortably in the hospital bed. HEENT: Normocephalic and atraumatic. Oropharynx is clear, mucous membranes are moist. Extraocular muscles are intact. Sclerae anicteric. Neck supple without JVD. No thyromegaly present. Lymph node   Deferred   Skin Warm and dry.   No bruising and no rash noted. No erythema. No pallor. Respiratory Lungs are clear to auscultation bilaterally without wheezes, rales or rhonchi, normal air exchange without accessory muscle use. CVS Normal rate, regular rhythm and normal S1 and S2. No murmurs, gallops, or rubs. Abdomen Soft, nontender and nondistended, normoactive bowel sounds. No palpable mass. No hepatosplenomegaly. Neuro Grossly nonfocal with no obvious sensory or motor deficits. MSK Normal range of motion in general.  No edema and no tenderness. Psych Appropriate mood and affect.         Labs:    Recent Results (from the past 24 hour(s))   GLUCOSE, POC    Collection Time: 06/16/20 12:16 PM   Result Value Ref Range    Glucose (POC) 110 (H) 65 - 100 mg/dL   PTT    Collection Time: 06/16/20  1:25 PM   Result Value Ref Range    aPTT 104.8 (H) 24.3 - 35.4 SEC   GLUCOSE, POC    Collection Time: 06/16/20  3:35 PM   Result Value Ref Range    Glucose (POC) 200 (H) 65 - 100 mg/dL   GLUCOSE, POC    Collection Time: 06/16/20  9:08 PM   Result Value Ref Range    Glucose (POC) 107 (H) 65 - 100 mg/dL   PTT    Collection Time: 06/17/20  4:10 AM   Result Value Ref Range    aPTT 125.6 (H) 24.3 - 35.4 SEC   CBC W/O DIFF    Collection Time: 06/17/20  4:10 AM   Result Value Ref Range    WBC 9.0 4.3 - 11.1 K/uL    RBC 4.63 4.23 - 5.6 M/uL    HGB 12.6 (L) 13.6 - 17.2 g/dL    HCT 38.0 (L) 41.1 - 50.3 %    MCV 82.1 79.6 - 97.8 FL    MCH 27.2 26.1 - 32.9 PG    MCHC 33.2 31.4 - 35.0 g/dL    RDW 14.3 11.9 - 14.6 %    PLATELET 570 504 - 669 K/uL    MPV 10.1 9.4 - 12.3 FL    ABSOLUTE NRBC 0.00 0.0 - 0.2 K/uL   METABOLIC PANEL, COMPREHENSIVE    Collection Time: 06/17/20  4:10 AM   Result Value Ref Range    Sodium 140 136 - 145 mmol/L    Potassium 3.8 3.5 - 5.1 mmol/L    Chloride 107 98 - 107 mmol/L    CO2 19 (L) 21 - 32 mmol/L    Anion gap 14 7 - 16 mmol/L    Glucose 116 (H) 65 - 100 mg/dL    BUN 20 6 - 23 MG/DL    Creatinine 1.72 (H) 0.8 - 1.5 MG/DL    GFR est AA 53 (L) >60 ml/min/1.73m2    GFR est non-AA 44 (L) >60 ml/min/1.73m2    Calcium 8.8 8.3 - 10.4 MG/DL    Bilirubin, total 0.3 0.2 - 1.1 MG/DL    ALT (SGPT) 25 12 - 65 U/L    AST (SGOT) 17 15 - 37 U/L    Alk.  phosphatase 91 50 - 136 U/L    Protein, total 7.1 6.3 - 8.2 g/dL    Albumin 1.4 (L) 3.5 - 5.0 g/dL    Globulin 5.7 (H) 2.3 - 3.5 g/dL    A-G Ratio 0.2 (L) 1.2 - 3.5     MAGNESIUM    Collection Time: 06/17/20  4:10 AM   Result Value Ref Range    Magnesium 1.7 (L) 1.8 - 2.4 mg/dL   GLUCOSE, POC    Collection Time: 06/17/20  7:26 AM   Result Value Ref Range    Glucose (POC) 123 (H) 65 - 100 mg/dL       Imaging:  [unfilled]    ASSESSMENT:  Problem List  Date Reviewed: 3/4/2020          Codes Class Noted    Morbid obesity ([facility] ICD-10-CM: E66.01  ICD-9-CM: 278.01  6/14/2020        Shortness of breath ICD-10-CM: R06.02  ICD-9-CM: 786.05  6/14/2020        Atypical chest pain ICD-10-CM: R07.89  ICD-9-CM: 786.59  6/14/2020        * (Principal) Acute pulmonary embolism ([facility] ICD-10-CM: I26.99  ICD-9-CM: 415.19  6/14/2020        Hypertensive emergency ICD-10-CM: I16.1  ICD-9-CM: 401.9  6/14/2020        Type 2 diabetes mellitus with diabetic neuropathy, without long-term current use of insulin (HCC) ICD-10-CM: E11.40  ICD-9-CM: 250.60, 357.2  7/31/2019        Nuclear sclerotic cataract of both eyes ICD-10-CM: H25.13  ICD-9-CM: 366.16  6/27/2019        Tinea unguium ICD-10-CM: B35.1  ICD-9-CM: 110.1  2/5/2019    Overview Signed 2/5/2019 12:30 PM by CAT Soto     bilateral--all nails             Nonrheumatic aortic valve regurgitation ICD-10-CM: I35.1  ICD-9-CM: 424.1  12/6/2018        Chronic right shoulder pain ICD-10-CM: M25.511, G89.29  ICD-9-CM: 719.41, 338.29  11/9/2018        Chronic midline low back pain without sciatica ICD-10-CM: M54.5, G89.29  ICD-9-CM: 724.2, 338.29  11/9/2018        Arthritis, lumbar spine ICD-10-CM: M47.816  ICD-9-CM: 721.3  11/9/2018        Type 2 diabetes mellitus with stage 3 chronic kidney disease [facility]) ICD-10-CM: E11.22, N18.3  ICD-9-CM: 250.40, 585.3  1/24/2018        Primary osteoarthritis of both knees (L>R) ICD-10-CM: M17.0  ICD-9-CM: 715.16  4/13/2017        Bilateral chronic knee pain ICD-10-CM: M25.561, M25.562, G89.29  ICD-9-CM: 719.46, 338.29  7/62/6846        Diastolic CHF, chronic (HCC)--Dr. Thorne Guardian (cardiology) ICD-10-CM: I50.32  ICD-9-CM: 428.32, 428.0  6/1/2016    Overview Signed 6/1/2016  1:53 PM by Anna Mckeon     Remain on BID lasix, stable now. recent labs show Mg 1.6 and we will start Mag Oxide 400 BID.  K ok. Cr 1.4. Check labs again in 6 mos. BNP also is 19. Edema stable. Working out, [address], this is key for him. Check labs in 6 mos before follow up. Back off on lasix as he loses weight. Getting new PCP soon.     Noted 07/09/15             Dyslipidemia ICD-10-CM: E78.5  ICD-9-CM: 272.4  4/22/2016        Erectile dysfunction associated with type 2 diabetes mellitus ([facility] ICD-10-CM: E11.69, N52.1  ICD-9-CM: 250.80, 607.84  10/30/2015        Benign hypertension with CKD (chronic kidney disease) stage III ([facility] ICD-10-CM: I12.9, N18.3  ICD-9-CM: 403.10, 585.3  4/6/2015    Overview Signed 6/1/2016  1:49 PM by Anna Mckeon     Echo 11/2012: EF 60% with severe LVH  Renal US 2012: poor study               Morbid obesity with BMI of 45.0-49.9, adult [facility]) ICD-10-CM: E66.01, Z68.42  ICD-9-CM: 278.01, V85.42  4/6/2015        Allergic rhinitis due to animal (cat) (dog) hair and dander ICD-10-CM: J30.81  ICD-9-CM: 477.2  11/19/2014        Vitamin D deficiency ICD-10-CM: E55.9  ICD-9-CM: 268.9  Unknown        ORALIA on CPAP ICD-10-CM: G47.33, Z99.89  ICD-9-CM: 327.23, V46.8  Unknown    Overview Signed 6/1/2016  1:50 PM by Anna Mckeon     Compliant with CPAP               Mild intermittent asthma without complication NOV-76-LA: U59.46  ICD-9-CM: 493.90  11/3/2014        Gout, unspecified ICD-10-CM: M10.9  ICD-9-CM: 274.9  11/3/2014        Controlled type 2 diabetes mellitus with microalbuminuria, without long-term current use of insulin [facility]) ICD-10-CM: E11.29, R80.9  ICD-9-CM: 250.40, 791.0  11/3/2014            Mr. Nas Perez is a 63 y.o. male admitted on 6/14/2020. The primary encounter diagnosis was Multiple subsegmental pulmonary emboli without acute cor pulmonale ([address] Diagnoses of Accelerated hypertension, Cholecystitis, and Acute pulmonary embolism without acute cor pulmonale, unspecified pulmonary embolism type [facility]) were also pertinent to this visit. .      Mr Nas Perez has a PMH of diastolic CHF, ORALIA, HTN, DM. He presented to the ED 6/14 for epigastric/chest pain x24 hours and mild shortness of breath. CT CAP showed RLL PE and dilated gallbladder concerning for cholecystitis. He was started on a heparin gtt and is now on Rocephin for possible cholecystitis, surgical consult pending. Echo and BLE dopplers pending to complete PE work-up. He denied family history of blood clots as well as predisposing factors for development of PE. He denies sedentary lifestyle - cares for mother and works out at the gym 3x/week. No recent prolonged sitting/travel. Denies infectious symptoms. We have been consulted for hypercoaguable work-up given new history of PE without known risk factors. RECOMMENDATIONS:    Pulmonary embolus  - CT chest 6/14 shows RLL PE; LE doppler negative for DVT; echo pending  - On heparin gtt - eventually transition to oral anticoagulation when able, pending need for surgical/radiology intervention.  - Hype-coaguable work-up as outpatient. 6/17 Heparin gtt d/c'd. Started on Eliquis. Possible cholecystitis  - On rocephin  - Surgical consult completed - recommend US for further evaluation for possible cholecystitis and possible drain per IR if +cholecystitis given acute PE/current clinical condition. 6/17 US shows persistent mild gallbladder distention - no other evidence of acute cholecystitis.  No surgical intervention necessary per surgery and s/o. Lab studies and imaging studies were personally reviewed. Pertinent old records were reviewed. Thank you for allowing us to participate in the care of Mr. Thaddeus Ambriz. He will need to continue Eliquis and follow-up with our office as an outpatient in one month upon discharge for hyper-coaguable work-up. Our office will arrange this follow-up. We will now sign off. Please do not hesitate to call with questions or concerns.             Frances Dukes  Hematology & Oncology  [address] [place] [address]  Office : [phone]  Fax : [phone]

## 2020-06-17 NOTE — PROGRESS NOTES
HOSPITALIST  NAME:  Clifford Locke   Age:  63 y.o.  :   1965   MRN:   [id number]  PCP: CAT Rivera    subj:     63years old male with hx of morbid obesity, DM-2, HTN, dyslipidemia, ORALIA on CPAP, dCHFpEF, CKD-2/3 presented on 20  for 1 day hx of chest and abdominal pain. ER work up showed CT chest with evidence of acute right PE with dilated GB with some pericholecystic fluid with concerns of cholecystitis. Initial /131, WBC 11K, Cr 1.7. Also had an hypertensive urgency at admission requiring a cardene drip and admission to the ICU. He was started on a heparin drip and IV ceftriaxone. He was seen by surgery. Because he has PE and was on heparin, surgery recommended against cholecystectomy and if his gallbladder would not \"cool down\", IR guided cholecystostomy would be the next option. The patient came off the cardene drip and has been transferred to the medical floor. Improving. Pain is much better. Liver US today with no worsening condition. Advancing diet. REsuming BP meds. :  Pt reports feeling well, denies chest pain, SOB, cough, nausea/vomiting. No fever, chills. Discussed about duration of OAC.    ROS: 10 point ROS negative except what mentioned above. Objective:     Visit Vitals  /86 (BP 1 Location: Right arm, BP Patient Position: Head of bed elevated (Comment degrees))   Pulse 71   Temp 97.8 °F (36.6 °C)   Resp 20   Ht 6' 1\" (1.854 m)   Wt 157.8 kg (347 lb 12.8 oz)   SpO2 91%   BMI 45.89 kg/m²      Temp (24hrs), Av.4 °F (36.9 °C), Min:97.8 °F (36.6 °C), Max:98.9 °F (37.2 °C)    Oxygen Therapy  O2 Sat (%): 91 % (20 0503)  Pulse via Oximetry: 75 beats per minute (20 0120)  O2 Device: CPAP nasal (20 0120)  PEEP/CPAP (cm H2O): (auto set) (20 012)  O2 Flow Rate (L/min): 4 l/min (06/15/20 0306)  FIO2 (%): 21 % (20 0120)  Physical Exam:  General:    AAOx3, morbidly obese,  Lungs:   Clear to auscultation bilaterally.   No Wheezing or Rhonchi. No rales. Heart:   Regular rate and rhythm,  no murmur, rub or gallop. Abdomen:   Soft, obese, non tender, no guarding or rigidity. Bowel sounds normal.   Extremities: No cyanosis. No edema. No clubbing  Skin:     Texture, turgor normal. No rashes or lesions. Not Jaundiced    Data Review:   Recent Results (from the past 24 hour(s))   GLUCOSE, POC    Collection Time: 06/16/20 12:16 PM   Result Value Ref Range    Glucose (POC) 110 (H) 65 - 100 mg/dL   PTT    Collection Time: 06/16/20  1:25 PM   Result Value Ref Range    aPTT 104.8 (H) 24.3 - 35.4 SEC   GLUCOSE, POC    Collection Time: 06/16/20  3:35 PM   Result Value Ref Range    Glucose (POC) 200 (H) 65 - 100 mg/dL   GLUCOSE, POC    Collection Time: 06/16/20  9:08 PM   Result Value Ref Range    Glucose (POC) 107 (H) 65 - 100 mg/dL   PTT    Collection Time: 06/17/20  4:10 AM   Result Value Ref Range    aPTT 125.6 (H) 24.3 - 35.4 SEC   CBC W/O DIFF    Collection Time: 06/17/20  4:10 AM   Result Value Ref Range    WBC 9.0 4.3 - 11.1 K/uL    RBC 4.63 4.23 - 5.6 M/uL    HGB 12.6 (L) 13.6 - 17.2 g/dL    HCT 38.0 (L) 41.1 - 50.3 %    MCV 82.1 79.6 - 97.8 FL    MCH 27.2 26.1 - 32.9 PG    MCHC 33.2 31.4 - 35.0 g/dL    RDW 14.3 11.9 - 14.6 %    PLATELET 752 915 - 813 K/uL    MPV 10.1 9.4 - 12.3 FL    ABSOLUTE NRBC 0.00 0.0 - 0.2 K/uL   METABOLIC PANEL, COMPREHENSIVE    Collection Time: 06/17/20  4:10 AM   Result Value Ref Range    Sodium 140 136 - 145 mmol/L    Potassium 3.8 3.5 - 5.1 mmol/L    Chloride 107 98 - 107 mmol/L    CO2 19 (L) 21 - 32 mmol/L    Anion gap 14 7 - 16 mmol/L    Glucose 116 (H) 65 - 100 mg/dL    BUN 20 6 - 23 MG/DL    Creatinine 1.72 (H) 0.8 - 1.5 MG/DL    GFR est AA 53 (L) >60 ml/min/1.73m2    GFR est non-AA 44 (L) >60 ml/min/1.73m2    Calcium 8.8 8.3 - 10.4 MG/DL    Bilirubin, total 0.3 0.2 - 1.1 MG/DL    ALT (SGPT) 25 12 - 65 U/L    AST (SGOT) 17 15 - 37 U/L    Alk.  phosphatase 91 50 - 136 U/L    Protein, total 7.1 6.3 - 8.2 g/dL    Albumin 1.4 (L) 3.5 - 5.0 g/dL    Globulin 5.7 (H) 2.3 - 3.5 g/dL    A-G Ratio 0.2 (L) 1.2 - 3.5     MAGNESIUM    Collection Time: 06/17/20  4:10 AM   Result Value Ref Range    Magnesium 1.7 (L) 1.8 - 2.4 mg/dL     Imaging /Procedures /Studies   CT chest/abd/pelvis:  IMPRESSION:      Acute right lower lobe pulmonary emboli. No evidence of right ventricular  strain.     Findings worrisome for acute cholecystitis.     Bilateral simple renal cysts.     Avascular necrosis of both femoral heads with secondary degenerative changes.     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 Assessment and Plan:     Dx:  1- New right pulmonary embolism, no evidence of DVT, no previous hx of thromboembolism. Heparin gtt stopped today, 6/17  Started on eliquis    2- Hypertension, uncontrolled on arrival and chronically at times as well. He is on lisinopril, HCTZ, hydralazine, coreg and clonidine at home. Here he has been placed on lisinopril and labetalol. Added nifedipine    3- low suspicion of acute cholecystitis. Pt has been evaluated by Surgery, recommends conservative treatment. Pt is on oral diet now.       4- NIDDM, controlled    5- Chronic diastolic dysfunction and ORALIA- stable    6- DM Type II: humalog SS     dvt prophylaxis with eliquis  Dispo: likely discharge to home tomorrow      Signed By: Charisse Womack MD     June 17, 2020

## 2020-06-17 NOTE — PROGRESS NOTES
HOSPITALIST  NAME:  Ruchi Caldera   Age:  63 y.o.  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 ER work up showed CT chest with evidence of acute right PE with dilated GB with some pericholecystic fluid with concerns of cholecystitis. Initial /131, WBC 11K, Cr 1.7. Also had an hypertensive urgency at admission requiring a cardene drip and admission to the ICU. He was started on a heparin drip and IV ceftriaxone. He was seen by surgery. Because he has PE and was on heparin, surgery recommended against cholecystectomy and if his gallbladder would not \"cool down\", IR guided cholecystostomy would be the next option. The patient came off the cardene drip and has been transferred to the medical floor. Improving. Pain is much better. Liver US today with no worsening condition. Advancing diet. REsuming BP meds. Added IV flagyl today. Objective:     Visit Vitals  /89 (BP 1 Location: Right arm, BP Patient Position: At rest)   Pulse 82   Temp 98.9 °F (37.2 °C)   Resp 20   Ht 6' 1\" (1.854 m)   Wt 157.3 kg (346 lb 12.8 oz)   SpO2 95%   BMI 45.75 kg/m²      Temp (24hrs), Av.4 °F (36.9 °C), Min:98.2 °F (36.8 °C), Max:98.9 °F (37.2 °C)    Oxygen Therapy  O2 Sat (%): 95 % (20)  Pulse via Oximetry: 78 beats per minute (20)  O2 Device: CPAP nasal (20)  O2 Flow Rate (L/min): 4 l/min (06/15/20 030)  FIO2 (%): 21 % (20)  Physical Exam:  General:    AAOx3, morbidly obese,  Lungs:   Clear to auscultation bilaterally. No Wheezing or Rhonchi. No rales. Heart:   Regular rate and rhythm,  no murmur, rub or gallop. Abdomen:   Soft, obese, mildly tender in RUQ, no guarding or rigidity. Bowel sounds normal.   Extremities: No cyanosis. No edema.  No clubbing  Skin: Texture, turgor normal. No rashes or lesions. Not Jaundiced    Data Review:   Recent Results (from the past 24 hour(s))   PTT    Collection Time: 06/16/20  4:19 AM   Result Value Ref Range    aPTT 102.7 (H) 24.3 - 35.4 SEC   CBC W/O DIFF    Collection Time: 06/16/20  4:19 AM   Result Value Ref Range    WBC 9.4 4.3 - 11.1 K/uL    RBC 4.86 4.23 - 5.6 M/uL    HGB 13.1 (L) 13.6 - 17.2 g/dL    HCT 39.8 (L) 41.1 - 50.3 %    MCV 81.9 79.6 - 97.8 FL    MCH 27.0 26.1 - 32.9 PG    MCHC 32.9 31.4 - 35.0 g/dL    RDW 14.3 11.9 - 14.6 %    PLATELET 998 242 - 991 K/uL    MPV 11.0 9.4 - 12.3 FL    ABSOLUTE NRBC 0.00 0.0 - 0.2 K/uL   METABOLIC PANEL, COMPREHENSIVE    Collection Time: 06/16/20  4:19 AM   Result Value Ref Range    Sodium 135 (L) 136 - 145 mmol/L    Potassium 5.4 (H) 3.5 - 5.1 mmol/L    Chloride 103 98 - 107 mmol/L    CO2 21 21 - 32 mmol/L    Anion gap 11 7 - 16 mmol/L    Glucose 87 65 - 100 mg/dL    BUN 20 6 - 23 MG/DL    Creatinine 1.86 (H) 0.8 - 1.5 MG/DL    GFR est AA 49 (L) >60 ml/min/1.73m2    GFR est non-AA 40 (L) >60 ml/min/1.73m2    Calcium 8.8 8.3 - 10.4 MG/DL    Bilirubin, total 0.4 0.2 - 1.1 MG/DL    ALT (SGPT) 24 12 - 65 U/L    AST (SGOT) 27 15 - 37 U/L    Alk.  phosphatase 105 50 - 136 U/L    Protein, total 7.1 6.3 - 8.2 g/dL    Albumin 3.0 (L) 3.5 - 5.0 g/dL    Globulin 4.1 (H) 2.3 - 3.5 g/dL    A-G Ratio 0.7 (L) 1.2 - 3.5     MAGNESIUM    Collection Time: 06/16/20  4:19 AM   Result Value Ref Range    Magnesium 1.9 1.8 - 2.4 mg/dL   GLUCOSE, POC    Collection Time: 06/16/20  6:56 AM   Result Value Ref Range    Glucose (POC) 110 (H) 65 - 100 mg/dL   GLUCOSE, POC    Collection Time: 06/16/20 12:16 PM   Result Value Ref Range    Glucose (POC) 110 (H) 65 - 100 mg/dL   PTT    Collection Time: 06/16/20  1:25 PM   Result Value Ref Range    aPTT 104.8 (H) 24.3 - 35.4 SEC   GLUCOSE, POC    Collection Time: 06/16/20  3:35 PM   Result Value Ref Range    Glucose (POC) 200 (H) 65 - 100 mg/dL   GLUCOSE, POC Collection Time: 06/16/20  9:08 PM   Result Value Ref Range    Glucose (POC) 107 (H) 65 - 100 mg/dL     Imaging /Procedures /Studies   CT chest/abd/pelvis:  IMPRESSION:      Acute right lower lobe pulmonary emboli. No evidence of right ventricular  strain.     Findings worrisome for acute cholecystitis.     Bilateral simple renal cysts.     Avascular necrosis of both femoral heads with secondary degenerative changes.     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 Assessment and Plan:     Dx:  1- New right pulmonary embolism, no evidence of DVT, no previous hx of thromboembolism,continue heparin for now. TOlerating a full liquid diet. If no surgical or IR intervention planned, consider switching him to oral anticoagulation tomorrow     2- Hypertension, uncontrolled on arrival and chronically at times as well. He is on lisinopril, HCTZ, hydralazine, coreg and clonidine at home. Here he has been placed on lisinopril and labetalol. REsumed hydralazine today. Resume home meds progressively. COnsider putting him back on his home coreg and dc labetalol. 3- Likely acute cholecystitis per CT, seen by surgery. Medical management recommended for now. IV ceftriaxone. Added IV flagyl for better coverage of anaerobs.      4- NIDDM, controlled    5- Chronic diastolic dysfunction and ORALIA- stable    6- DM Type II: humalog SS       Signed By: Sherrie Sommers MD     June 16, 2020

## 2020-06-17 NOTE — PROGRESS NOTES
Chart screened by  for discharge planning. No needs identified at this time. Please consult or notify  if any new issues arise. CM continues to monitor.

## 2020-06-18 VITALS
SYSTOLIC BLOOD PRESSURE: 160 MMHG | HEIGHT: 73 IN | BODY MASS INDEX: 41.75 KG/M2 | DIASTOLIC BLOOD PRESSURE: 108 MMHG | TEMPERATURE: 98.3 F | HEART RATE: 74 BPM | OXYGEN SATURATION: 95 % | WEIGHT: 315 LBS | RESPIRATION RATE: 16 BRPM

## 2020-06-18 LAB — GLUCOSE BLD STRIP.AUTO-MCNC: 129 MG/DL (ref 65–100)

## 2020-06-18 PROCEDURE — 82962 GLUCOSE BLOOD TEST: CPT

## 2020-06-18 PROCEDURE — 74011250637 HC RX REV CODE- 250/637: Performed by: HOSPITALIST

## 2020-06-18 PROCEDURE — 74011250637 HC RX REV CODE- 250/637: Performed by: INTERNAL MEDICINE

## 2020-06-18 RX ORDER — POLYETHYLENE GLYCOL 3350 17 G/17G
17 POWDER, FOR SOLUTION ORAL DAILY
Qty: 7 PACKET | Refills: 0 | Status: SHIPPED | OUTPATIENT
Start: 2020-06-18 | End: 2020-06-25

## 2020-06-18 RX ORDER — NIFEDIPINE 60 MG/1
60 TABLET, EXTENDED RELEASE ORAL DAILY
Qty: 30 TAB | Refills: 2 | Status: SHIPPED | OUTPATIENT
Start: 2020-06-18 | End: 2020-07-18

## 2020-06-18 RX ORDER — LABETALOL 100 MG/1
100 TABLET, FILM COATED ORAL EVERY 12 HOURS
Qty: 60 TAB | Refills: 2 | Status: SHIPPED | OUTPATIENT
Start: 2020-06-18 | End: 2020-07-18

## 2020-06-18 RX ORDER — FACIAL-BODY WIPES
10 EACH TOPICAL ONCE
Status: DISCONTINUED | OUTPATIENT
Start: 2020-06-18 | End: 2020-06-18 | Stop reason: HOSPADM

## 2020-06-18 RX ADMIN — HYDRALAZINE HYDROCHLORIDE 50 MG: 50 TABLET, FILM COATED ORAL at 09:26

## 2020-06-18 RX ADMIN — APIXABAN 10 MG: 5 TABLET, FILM COATED ORAL at 09:24

## 2020-06-18 RX ADMIN — NIFEDIPINE 60 MG: 30 TABLET, FILM COATED, EXTENDED RELEASE ORAL at 09:24

## 2020-06-18 RX ADMIN — LISINOPRIL 20 MG: 20 TABLET ORAL at 09:25

## 2020-06-18 RX ADMIN — LABETALOL HYDROCHLORIDE 100 MG: 100 TABLET, FILM COATED ORAL at 09:23

## 2020-06-18 NOTE — DISCHARGE SUMMARY
Hospitalist Discharge Summary     Patient ID:  Anne Sims  [id number]  4 y.o.  1965  Admit date: 6/14/2020  8:29 PM  Discharge date and time: 6/18/2020  Attending: Zuri Frances MD  PCP:  Kendrick Wylie  Treatment Team: Attending Provider: Zuri Frances MD; Utilization Review: Will Yung; Care Manager: Mariajose Lindsay    Principal Diagnosis Acute pulmonary embolism [facility])   Principal Problem:    Acute pulmonary embolism ([facility] (6/14/2020)    Active Problems:    Mild intermittent asthma without complication (98/4/8957)      Gout, unspecified (11/3/2014)      ORALIA on CPAP ()      Overview: Compliant with CPAP            Controlled type 2 diabetes mellitus with microalbuminuria, without long-term current use of insulin ([facility] (11/3/2014)      Benign hypertension with CKD (chronic kidney disease) stage III ([facility] (4/6/2015)      Overview: Echo 11/2012: EF 60% with severe LVH      Renal US 2012: poor study            Morbid obesity with BMI of 45.0-49.9, adult ([facility] (4/6/2015)      Dyslipidemia (8/65/4280)      Diastolic CHF, chronic (HCC)--Dr. Prudencio Antunez (cardiology) (6/1/2016)      Overview: Remain on BID lasix, stable now. recent labs show Mg 1.6 and we will       start Mag Oxide 400 BID.  K ok. Cr 1.4. Check labs again in 6 mos. BNP       also is 19. Edema stable. Working out, [address], this is       key for him. Check labs in 6 mos before follow up. Back off on lasix as       he loses weight. Getting new PCP soon. Noted 07/09/15      Morbid obesity ([facility] (6/14/2020)      Shortness of breath (6/14/2020)      Atypical chest pain (6/14/2020)      Hypertensive emergency (6/14/2020)             Hospital Course:  Please refer to the admission H&P for details of presentation.  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   ER work up showed CT chest with evidence of acute right PE with dilated GB with some pericholecystic fluid with concerns of cholecystitis. Initial /131, WBC 11K, Cr 1.7. Also had an hypertensive urgency at admission requiring a cardene drip and admission to the ICU. He was started on a heparin drip and IV ceftriaxone. He was seen by surgery. Because he has PE and was on heparin, surgery recommended against cholecystectomy and if his gallbladder would not \"cool down\", IR guided cholecystostomy would be the next option. The patient came off the cardene drip and was transferred to the medical floor. Pt continued to improve clinically overall, abdominal pain subsided, liver US showed no acute signs of cholecystitis. Pt was transitioned to eliquis on 6/17. Hematology has evaluated the patient, recommended hypercoagulable work up as outpatient. Pt is medically cleared and hemodynamically stable. He will be discharged today to home. Rest of the hospital course was uneventful. Significant Diagnostic Studies:   Echo:  SUMMARY:     -  Left ventricle: Systolic function was normal. Ejection fraction was  estimated to be greater than 55 %. There were no regional wall motion  abnormalities. There was mild concentric hypertrophy, 17 mm septal diameter.     -  Right ventricle: The size was normal. Systolic function was normal.     -  Aortic valve: There was mild to moderate regurgitation.     -  Aorta, systemic arteries: There was dilatation of the ascending aorta, 42   mm  in diameter.     -  Pericardium: There was trace pericardial effusion. Features were not  consistent with tamponade physiology. Right upper quadrant abdominal ultrasound     History: poss cholecystitis noted on CT; pt tender in RUQ. recent Dx of PE, 63  years Male epigastric and right upper quadrant moderate pain     Comparison: CT torso June 14, 2020.     Findings:  The liver is normal in echotexture and there is no dilatation of the  intrahepatic ducts or evidence of focal mass. The liver measures 20 cm in  midclavicular length. Normal hepatopedal flow within the main portal vein. The  gallbladder appears mildly distended, similar to recent CT. No evidence of  cholelithiasis, gallbladder wall thickening, or pericholecystic fluid. Negative  sonographic Miller sign. The proximal common duct is not dilated and measures 3  mm. The right kidney is normal appearing and measures 10.7 cm in length. Partially visualized head and body of the pancreas appear unremarkable. The  visualized portions of the IVC are unremarkable. Abdominal aorta not visualized  due to overlying bowel gas. No ascites or right pleural effusion. Technically  difficult exam due to patient body habitus.     IMPRESSION  Impression: Persistent mild gallbladder distention without other sonographic  signs of acute cholecystitis. BILATERAL LOWER EXTREMITY VENOUS DUPLEX ULTRASOUND.     HISTORY:  Pain.     TECHNIQUE: Direct skin-contact high resolution grayscale images, color-flow  Doppler and duplex waveform analysis.     FINDINGS: There is compressibility, color-flow assignment and augmentation of  the venous waveform with calf compression in the common femoral, superficial  femoral and popliteal veins. Upper calf veins are unremarkable     IMPRESSION  IMPRESSION: Negative for sonographic evidence of deep venous thrombosis  bilateral lower extremity. Labs: Results:       Chemistry Recent Labs     06/17/20  0410 06/16/20 0419   * 87    135*   K 3.8 5.4*    103   CO2 19* 21   BUN 20 20   CREA 1.72* 1.86*   CA 8.8 8.8   AGAP 14 11   AP 91 105   TP 7.1 7.1   ALB 1.4* 3.0*   GLOB 5.7* 4.1*   AGRAT 0.2* 0.7*      CBC w/Diff Recent Labs     06/17/20  0410 06/16/20 0419   WBC 9.0 9.4   RBC 4.63 4.86   HGB 12.6* 13.1*   HCT 38.0* 39.8*    264      Cardiac Enzymes No results for input(s): CPK, CKND1, ELROY in the last 72 hours.     No lab exists for component: CKRMB, TROIP   Coagulation Recent Labs     06/17/20  1108 06/17/20  0410   APTT 35.3 125.6*       Lipid Panel Lab Results   Component Value Date/Time    Cholesterol, total 106 01/29/2019 10:02 AM    HDL Cholesterol 35 (L) 01/29/2019 10:02 AM    LDL, calculated 54 01/29/2019 10:02 AM    VLDL, calculated 17 01/29/2019 10:02 AM    Triglyceride 86 01/29/2019 10:02 AM    CHOL/HDL Ratio 3.0 07/06/2015 10:15 AM      BNP No results for input(s): BNPP in the last 72 hours. Liver Enzymes Recent Labs     06/17/20  0410   TP 7.1   ALB 1.4*   AP 91      Thyroid Studies Lab Results   Component Value Date/Time    TSH 2.170 05/21/2018 02:59 PM            Discharge Exam:  Visit Vitals  /86 (BP 1 Location: Left arm, BP Patient Position: Lying right side)   Pulse 83   Temp 97.9 °F (36.6 °C)   Resp 16   Ht 6' 1\" (1.854 m)   Wt 157.8 kg (347 lb 12.8 oz)   SpO2 92%   BMI 45.89 kg/m²     General appearance: alert, cooperative, no distress, appears stated age  Lungs: clear to auscultation bilaterally  Heart: regular rate and rhythm, S1, S2 normal, no murmur, click, rub or gallop  Abdomen: soft, non-tender. Bowel sounds normal. No masses,  no organomegaly  Extremities: no cyanosis or edema  Neurologic: Grossly normal    Disposition: home  Discharge Condition: stable  Patient Instructions:   Current Discharge Medication List      START taking these medications    Details   !! apixaban (ELIQUIS) 5 mg tablet Take 2 Tabs by mouth two (2) times a day for 5 days. Qty: 20 Tab, Refills: 0      labetaloL (NORMODYNE) 100 mg tablet Take 1 Tab by mouth every twelve (12) hours for 30 days. Qty: 60 Tab, Refills: 2      NIFEdipine ER (PROCARDIA XL) 60 mg ER tablet Take 1 Tab by mouth daily for 30 days. Qty: 30 Tab, Refills: 2      !! apixaban (ELIQUIS) 5 mg tablet Take 1 Tab by mouth two (2) times a day for 30 days. Qty: 60 Tab, Refills: 3       !! - Potential duplicate medications found. Please discuss with provider.       CONTINUE these medications which have NOT CHANGED    Details   lisinopril-hydroCHLOROthiazide (PRINZIDE, ZESTORETIC) 20-12.5 mg per tablet Take 2 tablets by mouth once daily  Qty: 60 Tab, Refills: 0      metFORMIN (GLUCOPHAGE) 500 mg tablet Take 1 Tab by mouth two (2) times daily (with meals). Qty: 60 Tab, Refills: 5    Comments: Please d/c any rx's on file for Metformin 1000mg BID      hydrALAZINE (APRESOLINE) 50 mg tablet Take 1 Tab by mouth two (2) times a day. Qty: 60 Tab, Refills: 5      allopurinoL (ZYLOPRIM) 100 mg tablet Take 2 Tabs by mouth daily. Indications: for gout attack prevention  Qty: 30 Tab, Refills: 0    Comments: Please place rx on file until pt. Requests refill. cloNIDine HCL (CATAPRES) 0.2 mg tablet Take 2 Tabs by mouth two (2) times a day. Qty: 120 Tab, Refills: 5    Associated Diagnoses: Hypertension, renal disease, stage 1-4 or unspecified chronic kidney disease      rosuvastatin (CRESTOR) 10 mg tablet Take 1 Tab by mouth nightly. Qty: 30 Tab, Refills: 5    Comments: Cancel rx's on file for Atorvastatin      magnesium oxide (MAG-OX) 400 mg tablet Take 1 Tab by mouth two (2) times a day. Qty: 60 Tab, Refills: 11      sildenafil citrate (VIAGRA) 100 mg tablet Take 1 Tab by mouth as needed for Other (as needed for erectile dysfunction). Qty: 30 Tab, Refills: 1      cpap machine kit by Does Not Apply route.          STOP taking these medications       carvediloL (COREG) 25 mg tablet Comments:   Reason for Stopping:               Activity: Activity as tolerated  Diet: Cardiac Diet and Diabetic Diet  Wound Care: None needed    Follow-up  · Follow up with PCP and Hematology in 2-3 weeks    Time spent to discharge patient 35 minutes  Signed:  Damien Mclean MD  6/18/2020  7:11 AM

## 2020-06-18 NOTE — PROGRESS NOTES
Sleep Disorder Breathing Screen:     Patient reports symptoms of:   · Snoring   · Excessive daytime sleepiness  · Observed apnea  · Waking from sleep gasping, or feelings of choking. · STOP-BAN  · [place] Score: 16   · Height: 6'1\"  · Weight: 347 lbs  · BMI: 45.9     Patient is currently compliant with his machine, however he is not receiving supplies and is in need of an updated machine. His current DME is [employer]. Patient was receptive and agreed to undergo polysomnography. Refer patient for sleep study based on above assessment.

## 2020-06-18 NOTE — DISCHARGE INSTRUCTIONS
Patient Education        Learning About Diabetes Food Guidelines  Your Care Instructions     Meal planning is important to manage diabetes. It helps keep your blood sugar at a target level (which you set with your doctor). You don't have to eat special foods. You can eat what your family eats, including sweets once in a while. But you do have to pay attention to how often you eat and how much you eat of certain foods. You may want to work with a dietitian or a certified diabetes educator (CDE) to help you plan meals and snacks. A dietitian or CDE can also help you lose weight if that is one of your goals. What should you know about eating carbs? Managing the amount of carbohydrate (carbs) you eat is an important part of healthy meals when you have diabetes. Carbohydrate is found in many foods. · Learn which foods have carbs. And learn the amounts of carbs in different foods. ? Bread, cereal, pasta, and rice have about 15 grams of carbs in a serving. A serving is 1 slice of bread (1 ounce), ½ cup of cooked cereal, or 1/3 cup of cooked pasta or rice. ? Fruits have 15 grams of carbs in a serving. A serving is 1 small fresh fruit, such as an apple or orange; ½ of a banana; ½ cup of cooked or canned fruit; ½ cup of fruit juice; 1 cup of melon or raspberries; or 2 tablespoons of dried fruit. ? Milk and no-sugar-added yogurt have 15 grams of carbs in a serving. A serving is 1 cup of milk or 2/3 cup of no-sugar-added yogurt. ? Starchy vegetables have 15 grams of carbs in a serving. A serving is ½ cup of mashed potatoes or sweet potato; 1 cup winter squash; ½ of a small baked potato; ½ cup of cooked beans; or ½ cup cooked corn or green peas. · Learn how much carbs to eat each day and at each meal. A dietitian or CDE can teach you how to keep track of the amount of carbs you eat. This is called carbohydrate counting. · If you are not sure how to count carbohydrate grams, use the Plate Method to plan meals.  It is a good, quick way to make sure that you have a balanced meal. It also helps you spread carbs throughout the day. ? Divide your plate by types of foods. Put non-starchy vegetables on half the plate, meat or other protein food on one-quarter of the plate, and a grain or starchy vegetable in the final quarter of the plate. To this you can add a small piece of fruit and 1 cup of milk or yogurt, depending on how many carbs you are supposed to eat at a meal.  · Try to eat about the same amount of carbs at each meal. Do not \"save up\" your daily allowance of carbs to eat at one meal.  · Proteins have very little or no carbs per serving. Examples of proteins are beef, chicken, turkey, fish, eggs, tofu, cheese, cottage cheese, and peanut butter. A serving size of meat is 3 ounces, which is about the size of a deck of cards. Examples of meat substitute serving sizes (equal to 1 ounce of meat) are 1/4 cup of cottage cheese, 1 egg, 1 tablespoon of peanut butter, and ½ cup of tofu. How can you eat out and still eat healthy? · Learn to estimate the serving sizes of foods that have carbohydrate. If you measure food at home, it will be easier to estimate the amount in a serving of restaurant food. · If the meal you order has too much carbohydrate (such as potatoes, corn, or baked beans), ask to have a low-carbohydrate food instead. Ask for a salad or green vegetables. · If you use insulin, check your blood sugar before and after eating out to help you plan how much to eat in the future. · If you eat more carbohydrate at a meal than you had planned, take a walk or do other exercise. This will help lower your blood sugar. What else should you know? · Limit saturated fat, such as the fat from meat and dairy products. This is a healthy choice because people who have diabetes are at higher risk of heart disease. So choose lean cuts of meat and nonfat or low-fat dairy products.  Use olive or canola oil instead of butter or shortening when cooking. · Don't skip meals. Your blood sugar may drop too low if you skip meals and take insulin or certain medicines for diabetes. · Check with your doctor before you drink alcohol. Alcohol can cause your blood sugar to drop too low. Alcohol can also cause a bad reaction if you take certain diabetes medicines. Follow-up care is a key part of your treatment and safety. Be sure to make and go to all appointments, and call your doctor if you are having problems. It's also a good idea to know your test results and keep a list of the medicines you take. Where can you learn more? Go to http://sofia-dwayne.info/  Enter I147 in the search box to learn more about \"Learning About Diabetes Food Guidelines. \"  Current as of: December 20, 2019               Content Version: 12.5  © 7993-4776 Healthwise, Incorporated. Care instructions adapted under license by [employer] (which disclaims liability or warranty for this information). If you have questions about a medical condition or this instruction, always ask your healthcare professional. [address] any warranty or liability for your use of this information. Patient Education        Learning About Meal Planning for Diabetes  Why plan your meals? Meal planning can be a key part of managing diabetes. Planning meals and snacks with the right balance of carbohydrate, protein, and fat can help you keep your blood sugar at the target level you set with your doctor. You don't have to eat special foods. You can eat what your family eats, including sweets once in a while. But you do have to pay attention to how often you eat and how much you eat of certain foods. You may want to work with a dietitian or a certified diabetes educator. He or she can give you tips and meal ideas and can answer your questions about meal planning.  This health professional can also help you reach a healthy weight if that is one of your goals. What plan is right for you? Your dietitian or diabetes educator may suggest that you start with the plate format or carbohydrate counting. The plate format  The plate format is a simple way to help you manage how you eat. You plan meals by learning how much space each food should take on a plate. Using the plate format helps you spread carbohydrate throughout the day. It can make it easier to keep your blood sugar level within your target range. It also helps you see if you're eating healthy portion sizes. To use the plate format, you put non-starchy vegetables on half your plate. Add meat or meat substitutes on one-quarter of the plate. Put a grain or starchy vegetable (such as brown rice or a potato) on the final quarter of the plate. You can add a small piece of fruit and some low-fat or fat-free milk or yogurt, depending on your carbohydrate goal for each meal.  Here are some tips for using the plate format:  · Make sure that you are not using an oversized plate. A 9-inch plate is best. Many restaurants use larger plates. · Get used to using the plate format at home. Then you can use it when you eat out. · Write down your questions about using the plate format. Talk to your doctor, a dietitian, or a diabetes educator about your concerns. Carbohydrate counting  With carbohydrate counting, you plan meals based on the amount of carbohydrate in each food. Carbohydrate raises blood sugar higher and more quickly than any other nutrient. It is found in desserts, breads and cereals, and fruit. It's also found in starchy vegetables such as potatoes and corn, grains such as rice and pasta, and milk and yogurt. Spreading carbohydrate throughout the day helps keep your blood sugar levels within your target range. Your daily amount depends on several things, including your weight, how active you are, which diabetes medicines you take, and what your goals are for your blood sugar levels.  A registered dietitian or diabetes educator can help you plan how much carbohydrate to include in each meal and snack. A guideline for your daily amount of carbohydrate is:  · 45 to 60 grams at each meal. That's about the same as 3 to 4 carbohydrate servings. · 15 to 20 grams at each snack. That's about the same as 1 carbohydrate serving. The Nutrition Facts label on packaged foods tells you how much carbohydrate is in a serving of the food. First, look at the serving size on the food label. Is that the amount you eat in a serving? All of the nutrition information on a food label is based on that serving size. So if you eat more or less than that, you'll need to adjust the other numbers. Total carbohydrate is the next thing you need to look for on the label. If you count carbohydrate servings, one serving of carbohydrate is 15 grams. For foods that don't come with labels, such as fresh fruits and vegetables, you'll need a guide that lists carbohydrate in these foods. Ask your doctor, dietitian, or diabetes educator about books or other nutrition guides you can use. If you take insulin, you need to know how many grams of carbohydrate are in a meal. This lets you know how much rapid-acting insulin to take before you eat. If you use an insulin pump, you get a constant rate of insulin during the day. So the pump must be programmed at meals to give you extra insulin to cover the rise in blood sugar after meals. When you know how much carbohydrate you will eat, you can take the right amount of insulin. Or, if you always use the same amount of insulin, you need to make sure that you eat the same amount of carbohydrate at meals. If you need more help to understand carbohydrate counting and food labels, ask your doctor, dietitian, or diabetes educator. How do you get started with meal planning? Here are some tips to get started:  · Plan your meals a week at a time. Don't forget to include snacks too.   · Use cookbooks or online recipes to plan several main meals. Plan some quick meals for busy nights. You also can double some recipes that freeze well. Then you can save half for other busy nights when you don't have time to cook. · Make sure you have the ingredients you need for your recipes. If you're running low on basic items, put these items on your shopping list too. · List foods that you use to make breakfasts, lunches, and snacks. List plenty of fruits and vegetables. · Post this list on the refrigerator. Add to it as you think of more things you need. · Take the list to the store to do your weekly shopping. Follow-up care is a key part of your treatment and safety. Be sure to make and go to all appointments, and call your doctor if you are having problems. It's also a good idea to know your test results and keep a list of the medicines you take. Where can you learn more? Go to http://sofiaFirst To Filedwayne.info/  Enter M747 in the search box to learn more about \"Learning About Meal Planning for Diabetes. \"  Current as of: December 20, 2019               Content Version: 12.5  © 2278-8628 [employer].de. Care instructions adapted under license by [employer] (which disclaims liability or warranty for this information). If you have questions about a medical condition or this instruction, always ask your healthcare professional. [address] any warranty or liability for your use of this information. Patient Education        DASH Diet: Care Instructions  Your Care Instructions     The DASH diet is an eating plan that can help lower your blood pressure. DASH stands for Dietary Approaches to Stop Hypertension. Hypertension is high blood pressure. The DASH diet focuses on eating foods that are high in calcium, potassium, and magnesium. These nutrients can lower blood pressure.  The foods that are highest in these nutrients are fruits, vegetables, low-fat dairy products, nuts, seeds, and legumes. But taking calcium, potassium, and magnesium supplements instead of eating foods that are high in those nutrients does not have the same effect. The DASH diet also includes whole grains, fish, and poultry. The DASH diet is one of several lifestyle changes your doctor may recommend to lower your high blood pressure. Your doctor may also want you to decrease the amount of sodium in your diet. Lowering sodium while following the DASH diet can lower blood pressure even further than just the DASH diet alone. Follow-up care is a key part of your treatment and safety. Be sure to make and go to all appointments, and call your doctor if you are having problems. It's also a good idea to know your test results and keep a list of the medicines you take. How can you care for yourself at home? Following the DASH diet  · Eat 4 to 5 servings of fruit each day. A serving is 1 medium-sized piece of fruit, ½ cup chopped or canned fruit, 1/4 cup dried fruit, or 4 ounces (½ cup) of fruit juice. Choose fruit more often than fruit juice. · Eat 4 to 5 servings of vegetables each day. A serving is 1 cup of lettuce or raw leafy vegetables, ½ cup of chopped or cooked vegetables, or 4 ounces (½ cup) of vegetable juice. Choose vegetables more often than vegetable juice. · Get 2 to 3 servings of low-fat and fat-free dairy each day. A serving is 8 ounces of milk, 1 cup of yogurt, or 1 ½ ounces of cheese. · Eat 6 to 8 servings of grains each day. A serving is 1 slice of bread, 1 ounce of dry cereal, or ½ cup of cooked rice, pasta, or cooked cereal. Try to choose whole-grain products as much as possible. · Limit lean meat, poultry, and fish to 2 servings each day. A serving is 3 ounces, about the size of a deck of cards. · Eat 4 to 5 servings of nuts, seeds, and legumes (cooked dried beans, lentils, and split peas) each week.  A serving is 1/3 cup of nuts, 2 tablespoons of seeds, or ½ cup of cooked beans or peas.  · Limit fats and oils to 2 to 3 servings each day. A serving is 1 teaspoon of vegetable oil or 2 tablespoons of salad dressing. · Limit sweets and added sugars to 5 servings or less a week. A serving is 1 tablespoon jelly or jam, ½ cup sorbet, or 1 cup of lemonade. · Eat less than 2,300 milligrams (mg) of sodium a day. If you limit your sodium to 1,500 mg a day, you can lower your blood pressure even more. Tips for success  · Start small. Do not try to make dramatic changes to your diet all at once. You might feel that you are missing out on your favorite foods and then be more likely to not follow the plan. Make small changes, and stick with them. Once those changes become habit, add a few more changes. · Try some of the following:  ? Make it a goal to eat a fruit or vegetable at every meal and at snacks. This will make it easy to get the recommended amount of fruits and vegetables each day. ? Try yogurt topped with fruit and nuts for a snack or healthy dessert. ? Add lettuce, tomato, cucumber, and onion to sandwiches. ? Combine a ready-made pizza crust with low-fat mozzarella cheese and lots of vegetable toppings. Try using tomatoes, squash, spinach, broccoli, carrots, cauliflower, and onions. ? Have a variety of cut-up vegetables with a low-fat dip as an appetizer instead of chips and dip. ? Sprinkle sunflower seeds or chopped almonds over salads. Or try adding chopped walnuts or almonds to cooked vegetables. ? Try some vegetarian meals using beans and peas. Add garbanzo or kidney beans to salads. Make burritos and tacos with mashed greene beans or black beans. Where can you learn more? Go to http://sofia-dwayne.info/  Enter H967 in the search box to learn more about \"DASH Diet: Care Instructions. \"  Current as of: December 16, 2019               Content Version: 12.5  © 8625-9734 Healthwise, Incorporated.    Care instructions adapted under license by [employer] (which disclaims liability or warranty for this information). If you have questions about a medical condition or this instruction, always ask your healthcare professional. [address] any warranty or liability for your use of this information.

## 2020-06-18 NOTE — PROGRESS NOTES
Discharge instructions provided to Pt. Questions answered. Meds reviewed and education material reviewed and  provided for diet, eliquis. PIV x 2 removed and wrapped in coban. Pt refused dulcolax supp per orders. \"Will take care of it at home\" per Pt and  primary RN. Discharge signature obtained and placed on Pt chart.

## 2020-06-18 NOTE — PROGRESS NOTES
CM met with patient to discuss discharge needs. Patient voiced no needs at this time. Patient to discharge home this day. Please consult or notify CM if any needs shall arise. CM remains available. Care Management Interventions  PCP Verified by CM: Yes(Magnus Estrella PA)  Mode of Transport at Discharge: Other (see comment)(family)  Transition of Care Consult (CM Consult): Discharge Planning  Discharge Durable Medical Equipment: No(Patient has a Cpap machine)  Physical Therapy Consult: Yes  Occupational Therapy Consult: Yes  Speech Therapy Consult: No  Current Support Network: Relative's Home(Patient states he lives with his sister Peg Apa))  Confirm Follow Up Transport: Family  The Plan for Transition of Care is Related to the Following Treatment Goals : Patient to obtain care to become medically stable and to return home with a safe transition. The Patient and/or Patient Representative was Provided with a Choice of Provider and Agrees with the Discharge Plan?: No  Name of the Patient Representative Who was Provided with a Choice of Provider and Agrees with the Discharge Plan: Patient.    Freedom of Choice List was Provided with Basic Dialogue that Supports the Patient's Individualized Plan of Care/Goals, Treatment Preferences and Shares the Quality Data Associated with the Providers?: No  [place] Resource Information Provided?: No  Discharge Location  Discharge Placement: Home

## 2020-06-19 ENCOUNTER — PATIENT OUTREACH (OUTPATIENT)
Dept: CASE MANAGEMENT | Age: 55
End: 2020-06-19

## 2020-06-20 LAB
BACTERIA SPEC CULT: NORMAL
BACTERIA SPEC CULT: NORMAL
SERVICE CMNT-IMP: NORMAL
SERVICE CMNT-IMP: NORMAL

## 2020-06-20 NOTE — PROGRESS NOTES
Patient contacted regarding recent discharge and COVID-19 risk. Discussed COVID-19 related testing which was not done at this time. Test results were not done. Patient informed of results, if available? N/A    Care Transition Nurse/ Ambulatory Care Manager contacted the patient by telephone to perform post discharge assessment. Verified name and  with patient as identifiers. Patient has following risk factors of: chronic kidney disease. CTN/ACM reviewed discharge instructions, medical action plan and red flags related to discharge diagnosis. Reviewed and educated them on any new and changed medications related to discharge diagnosis. Advised obtaining a 90-day supply of all daily and as-needed medications. Education provided regarding infection prevention, and signs and symptoms of COVID-19 and when to seek medical attention with patient who verbalized understanding. Discussed exposure protocols and quarantine from [address] you at higher risk for severe illness  and given an opportunity for questions and concerns. The patient agrees to contact the COVID-19 hotline [phone] or PCP office for questions related to their healthcare. CTN/ACM provided contact information for future reference. From CDC: Are you at higher risk for severe illness?  Wash your hands often.  Avoid close contact (6 feet, which is about two arm lengths) with people who are sick.  Put distance between yourself and other people if COVID-19 is spreading in your community.  Clean and disinfect frequently touched surfaces.  Avoid all cruise travel and non-essential air travel.  Call your healthcare professional if you have concerns about COVID-19 and your underlying condition or if you are sick.     For more information on steps you can take to protect yourself, see CDC's How to Protect Yourself      Patient/family/caregiver given information for Fred Abbott and agrees to enroll no  Patient's preferred e-mail:  N/A  Patient's preferred phone number: N/A  Based on Loop alert triggers, patient will be contacted by nurse care manager for worsening symptoms: not enrolled    Plan for follow-up call in 7-14 days based on severity of symptoms and risk factors.

## 2020-07-09 ENCOUNTER — PATIENT OUTREACH (OUTPATIENT)
Dept: CASE MANAGEMENT | Age: 55
End: 2020-07-09

## 2020-07-10 NOTE — PROGRESS NOTES
Left voice message with contact information requesting a call back. Plan: call back if no return call. This note will not be viewable in [address]

## 2020-07-11 ENCOUNTER — PATIENT OUTREACH (OUTPATIENT)
Dept: CASE MANAGEMENT | Age: 55
End: 2020-07-11

## 2020-07-11 NOTE — PROGRESS NOTES
Unable to reach patient for final MAYRA call. MAYRA episode to be closed. No further outreach. This note will not be viewable in [address]

## 2020-07-13 ENCOUNTER — HOSPITAL ENCOUNTER (OUTPATIENT)
Dept: SLEEP MEDICINE | Age: 55
Discharge: HOME | End: 2020-07-13
Payer: COMMERCIAL

## 2020-07-13 PROCEDURE — 95811 POLYSOM 6/>YRS CPAP 4/> PARM: CPT

## 2020-07-15 PROBLEM — M87.052 AVASCULAR NECROSIS OF BONES OF BOTH HIPS (HCC): Status: ACTIVE | Noted: 2020-07-15

## 2020-07-15 PROBLEM — Z86.711 HISTORY OF PULMONARY EMBOLISM: Status: ACTIVE | Noted: 2020-07-15

## 2020-07-15 PROBLEM — M87.051 AVASCULAR NECROSIS OF BONES OF BOTH HIPS (HCC): Status: ACTIVE | Noted: 2020-07-15

## 2020-07-28 ENCOUNTER — HOSPITAL ENCOUNTER (OUTPATIENT)
Dept: LAB | Age: 55
Discharge: HOME | End: 2020-07-28
Payer: COMMERCIAL

## 2020-07-28 DIAGNOSIS — I26.99 ACUTE PULMONARY EMBOLISM WITHOUT ACUTE COR PULMONALE, UNSPECIFIED PULMONARY EMBOLISM TYPE (HCC): ICD-10-CM

## 2020-07-28 PROCEDURE — 36415 COLL VENOUS BLD VENIPUNCTURE: CPT

## 2020-08-04 LAB
Lab: NORMAL
REFERENCE LAB,REFLB: NORMAL
TEST DESCRIPTION:,ATST: NORMAL

## 2020-10-02 PROBLEM — I26.99 ACUTE PULMONARY EMBOLISM (HCC): Status: RESOLVED | Noted: 2020-06-14 | Resolved: 2020-10-02

## 2020-10-12 ENCOUNTER — HOSPITAL ENCOUNTER (OUTPATIENT)
Dept: ULTRASOUND IMAGING | Age: 55
Discharge: HOME | End: 2020-10-12
Attending: INTERNAL MEDICINE
Payer: COMMERCIAL

## 2020-10-12 DIAGNOSIS — K81.9 CHOLECYSTITIS: ICD-10-CM

## 2020-10-12 PROCEDURE — 76705 ECHO EXAM OF ABDOMEN: CPT

## 2021-08-04 ENCOUNTER — APPOINTMENT (OUTPATIENT)
Dept: GENERAL RADIOLOGY | Age: 56
End: 2021-08-04
Attending: EMERGENCY MEDICINE
Payer: COMMERCIAL

## 2021-08-04 ENCOUNTER — HOSPITAL ENCOUNTER (EMERGENCY)
Age: 56
Discharge: HOME | End: 2021-08-04
Attending: EMERGENCY MEDICINE
Payer: COMMERCIAL

## 2021-08-04 VITALS
WEIGHT: 315 LBS | TEMPERATURE: 98 F | SYSTOLIC BLOOD PRESSURE: 164 MMHG | OXYGEN SATURATION: 98 % | RESPIRATION RATE: 16 BRPM | DIASTOLIC BLOOD PRESSURE: 107 MMHG | HEIGHT: 73 IN | BODY MASS INDEX: 41.75 KG/M2 | HEART RATE: 70 BPM

## 2021-08-04 DIAGNOSIS — S39.012A LUMBAR STRAIN, INITIAL ENCOUNTER: ICD-10-CM
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
## 2021-08-04 PROCEDURE — 99283 EMERGENCY DEPT VISIT LOW MDM: CPT

## 2021-08-04 PROCEDURE — 72100 X-RAY EXAM L-S SPINE 2/3 VWS: CPT

## 2021-08-04 PROCEDURE — 74011250637 HC RX REV CODE- 250/637: Performed by: EMERGENCY MEDICINE

## 2021-08-04 RX ORDER — HYDROCODONE BITARTRATE AND ACETAMINOPHEN 5; 325 MG/1; MG/1
1 TABLET ORAL
Qty: 15 TABLET | Refills: 0 | Status: SHIPPED | OUTPATIENT
Start: 2021-08-04 | End: 2021-08-07

## 2021-08-04 RX ORDER — HYDROCODONE BITARTRATE AND ACETAMINOPHEN 7.5; 325 MG/1; MG/1
1 TABLET ORAL
Status: COMPLETED | OUTPATIENT
Start: 2021-08-04 | End: 2021-08-04

## 2021-08-04 RX ORDER — METHOCARBAMOL 750 MG/1
750 TABLET, FILM COATED ORAL
Qty: 20 TABLET | Refills: 0 | Status: SHIPPED | OUTPATIENT
Start: 2021-08-04 | End: 2021-10-13 | Stop reason: SDUPTHER

## 2021-08-04 RX ADMIN — HYDROCODONE BITARTRATE AND ACETAMINOPHEN 1 TABLET: 7.5; 325 TABLET ORAL at 22:00
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
## 2021-08-05 NOTE — ED NOTES
I have reviewed discharge instructions with the patient. The patient verbalized understanding. Patient left ED via Discharge Method: ambulatory to Home with spouse. Opportunity for questions and clarification provided. Patient given 2 scripts. To continue your aftercare when you leave the hospital, you may receive an automated call from our care team to check in on how you are doing. This is a free service and part of our promise to provide the best care and service to meet your aftercare needs.  If you have questions, or wish to unsubscribe from this service please call [phone]. Thank you for Choosing our [facility] Emergency Department.

## 2022-01-20 ENCOUNTER — ANESTHESIA EVENT (OUTPATIENT)
Dept: ENDOSCOPY | Age: 57
End: 2022-01-20
Payer: COMMERCIAL

## 2022-01-20 RX ORDER — SODIUM CHLORIDE, SODIUM LACTATE, POTASSIUM CHLORIDE, CALCIUM CHLORIDE 600; 310; 30; 20 MG/100ML; MG/100ML; MG/100ML; MG/100ML
100 INJECTION, SOLUTION INTRAVENOUS CONTINUOUS
Status: CANCELLED | OUTPATIENT
Start: 2022-01-20

## 2022-01-20 RX ORDER — SODIUM CHLORIDE 0.9 % (FLUSH) 0.9 %
5-40 SYRINGE (ML) INJECTION AS NEEDED
Status: CANCELLED | OUTPATIENT
Start: 2022-01-20

## 2022-01-20 RX ORDER — SODIUM CHLORIDE 0.9 % (FLUSH) 0.9 %
5-40 SYRINGE (ML) INJECTION EVERY 8 HOURS
Status: CANCELLED | OUTPATIENT
Start: 2022-01-20

## 2022-01-20 NOTE — PROGRESS NOTES
Pt confirmed arrival time of 1115 for 1245 procedure. Pt confirmed medications he is suppose to hold for procedure (reviewed pre-assessment note).

## 2022-01-21 ENCOUNTER — ANESTHESIA (OUTPATIENT)
Dept: ENDOSCOPY | Age: 57
End: 2022-01-21
Payer: COMMERCIAL

## 2022-01-21 ENCOUNTER — HOSPITAL ENCOUNTER (OUTPATIENT)
Age: 57
Setting detail: OUTPATIENT SURGERY
Discharge: HOME | End: 2022-01-21
Attending: SURGERY | Admitting: SURGERY
Payer: COMMERCIAL

## 2022-01-21 VITALS
RESPIRATION RATE: 20 BRPM | DIASTOLIC BLOOD PRESSURE: 86 MMHG | HEART RATE: 65 BPM | OXYGEN SATURATION: 98 % | TEMPERATURE: 98.6 F | SYSTOLIC BLOOD PRESSURE: 143 MMHG

## 2022-01-21 DIAGNOSIS — Z12.11 COLON CANCER SCREENING: ICD-10-CM

## 2022-01-21 LAB
GLUCOSE BLD STRIP.AUTO-MCNC: 108 MG/DL (ref 65–100)
SERVICE CMNT-IMP: ABNORMAL

## 2022-01-21 PROCEDURE — 82962 GLUCOSE BLOOD TEST: CPT

## 2022-01-21 PROCEDURE — 74011000250 HC RX REV CODE- 250: Performed by: NURSE ANESTHETIST, CERTIFIED REGISTERED

## 2022-01-21 PROCEDURE — 76060000031 HC ANESTHESIA FIRST 0.5 HR: Performed by: SURGERY

## 2022-01-21 PROCEDURE — 76040000025: Performed by: SURGERY

## 2022-01-21 PROCEDURE — 74011250636 HC RX REV CODE- 250/636: Performed by: NURSE ANESTHETIST, CERTIFIED REGISTERED

## 2022-01-21 PROCEDURE — 2709999900 HC NON-CHARGEABLE SUPPLY: Performed by: SURGERY

## 2022-01-21 PROCEDURE — 45378 DIAGNOSTIC COLONOSCOPY: CPT | Performed by: SURGERY

## 2022-01-21 RX ORDER — GLYCOPYRROLATE 0.2 MG/ML
INJECTION INTRAMUSCULAR; INTRAVENOUS AS NEEDED
Status: DISCONTINUED | OUTPATIENT
Start: 2022-01-21 | End: 2022-01-21 | Stop reason: HOSPADM

## 2022-01-21 RX ORDER — SODIUM CHLORIDE, SODIUM LACTATE, POTASSIUM CHLORIDE, CALCIUM CHLORIDE 600; 310; 30; 20 MG/100ML; MG/100ML; MG/100ML; MG/100ML
INJECTION, SOLUTION INTRAVENOUS
Status: DISCONTINUED | OUTPATIENT
Start: 2022-01-21 | End: 2022-01-21 | Stop reason: HOSPADM

## 2022-01-21 RX ORDER — PROPOFOL 10 MG/ML
INJECTION, EMULSION INTRAVENOUS
Status: DISCONTINUED | OUTPATIENT
Start: 2022-01-21 | End: 2022-01-21 | Stop reason: HOSPADM

## 2022-01-21 RX ORDER — LIDOCAINE HYDROCHLORIDE 20 MG/ML
INJECTION, SOLUTION EPIDURAL; INFILTRATION; INTRACAUDAL; PERINEURAL AS NEEDED
Status: DISCONTINUED | OUTPATIENT
Start: 2022-01-21 | End: 2022-01-21 | Stop reason: HOSPADM

## 2022-01-21 RX ORDER — PROPOFOL 10 MG/ML
INJECTION, EMULSION INTRAVENOUS AS NEEDED
Status: DISCONTINUED | OUTPATIENT
Start: 2022-01-21 | End: 2022-01-21 | Stop reason: HOSPADM

## 2022-01-21 RX ADMIN — PROPOFOL 300 MCG/KG/MIN: 10 INJECTION, EMULSION INTRAVENOUS at 12:15

## 2022-01-21 RX ADMIN — SODIUM CHLORIDE, SODIUM LACTATE, POTASSIUM CHLORIDE, AND CALCIUM CHLORIDE: 600; 310; 30; 20 INJECTION, SOLUTION INTRAVENOUS at 11:59

## 2022-01-21 RX ADMIN — GLYCOPYRROLATE 0.1 MG: 0.2 INJECTION INTRAMUSCULAR; INTRAVENOUS at 12:16

## 2022-01-21 RX ADMIN — PROPOFOL 70 MG: 10 INJECTION, EMULSION INTRAVENOUS at 12:15

## 2022-01-21 RX ADMIN — LIDOCAINE HYDROCHLORIDE 100 MG: 20 INJECTION, SOLUTION EPIDURAL; INFILTRATION; INTRACAUDAL; PERINEURAL at 12:15

## 2022-01-21 NOTE — PROCEDURES
Procedure in Detail:  Informed consent was obtained for the procedure. The patient was placed in the left lateral decubitus position and sedation was induced by anesthesia. The scope was inserted into the rectum and advanced under direct vision to the cecum, which was identified by the appendiceal orifice. The quality of the colonic preparation was excellent. A careful inspection was made as the colonoscope was withdrawn, including a retroflexed view of the rectum; findings and interventions are described below. Appropriate photodocumentation was obtained. Findings:   ANUS: Anal exam reveals no masses or hemorrhoids, sphincter tone is normal.   RECTUM: Rectal exam reveals no masses or hemorrhoids. SIGMOID COLON: The mucosa is normal with good vascular pattern and without ulcers, diverticula, and polyps. DESCENDING COLON: The mucosa is normal with good vascular pattern and without ulcers, diverticula, and polyps. SPLENIC FLEXURE: The splenic flexure is normal.   TRANSVERSE COLON: The mucosa is normal with good vascular pattern and without ulcers, diverticula, and polyps. HEPATIC FLEXURE: The hepatic flexure is normal.   ASCENDING COLON: The mucosa is normal with good vascular pattern and without ulcers, diverticula, and polyps. CECUM: The appendiceal orifice appears normal. The ileocecal valve appears normal.   TERMINAL ILEUM: The terminal ileum was not entered. Specimens: No specimens were collected. Complications: None; patient tolerated the procedure well. \    EBL - none    Recommendations:   - For colon cancer screening in this average-risk patient, colonoscopy may be repeated in 10 years.      Signed By: Raúl Gomes MD                        January 21, 2022

## 2022-01-21 NOTE — ROUTINE PROCESS
Pt discharged home with friend, friend driving, VSS, instructions reviewed with Pt and friend , understanding verbalized. All belongings sent home with Pt. Pt transported out via wheelchair by [employer].

## 2022-01-21 NOTE — PROGRESS NOTES
Spiritual Care visit. Initial Visit, Pre Surgery Consult. Visit and prayer before patient goes to surgery.     Visit by Ryan Mcnair M.Ed., Th.B. ,Staff

## 2022-01-21 NOTE — DISCHARGE INSTRUCTIONS
Mary Guzman M.D.  [phone]    Instructions following colonoscopy:    ACTIVITY:   Resume usual, basic activities around the house today.  You may be light-headed or sleepy from anesthesia, so be careful going up and down stairs.  Avoid driving, operating machinery, or signing documents for 24 hours. DIET:   No restriction. Please note, some people may have nausea or cramps after this procedure which can result in an upset stomach after eating.  Many people have loose stools or diarrhea immediately after colonoscopy. It is also not uncommon to not have a bowel movement for 2-3 days.  No alcohol for the rest of the day. PAIN:   Some cramping or gas pain is normal after colonoscopy. However, if you experience worsening pain over the course of the day, or pain with associated fever please call the office immediately      [address] IF:   You have a temperature higher than 101.5° Fahrenheit for more than 6 hours.  You have severe nausea or vomiting not relieved by medication; or diarrhea. For colon cancer screening in this average-risk patient, colonoscopy may be repeated in 10 years. Otherwise, continue home medications as previously prescribed.

## 2022-01-21 NOTE — ANESTHESIA PREPROCEDURE EVALUATION
Anesthetic History   No history of anesthetic complications            Review of Systems / Medical History  Patient summary reviewed, nursing notes reviewed and pertinent labs reviewed    Pulmonary        Sleep apnea: CPAP           Neuro/Psych   Within defined limits           Cardiovascular    Hypertension: well controlled              Exercise tolerance: >4 METS  Comments: TTE 2020: lvef 55%, no rwma, mild AI   GI/Hepatic/Renal         Renal disease (Cr 1.3): CRI       Endo/Other    Diabetes: well controlled, type 2    Morbid obesity and arthritis     Other Findings              Physical Exam    Airway  Mallampati: II  TM Distance: > 6 cm  Neck ROM: normal range of motion   Mouth opening: Normal     Cardiovascular    Rhythm: regular  Rate: normal         Dental  No notable dental hx       Pulmonary  Breath sounds clear to auscultation               Abdominal         Other Findings            Anesthetic Plan    ASA: 3  Anesthesia type: total IV anesthesia          Induction: Intravenous  Anesthetic plan and risks discussed with: Patient

## 2022-01-21 NOTE — H&P
History and Physical      Patient: Ysabel Brayan    Physician: Raúl Gomes MD    Referring Physician: CAT Magallon    Chief Complaint: For colonoscopy    History of Present Illness: Pt presents for colonoscopy. Polyp on exam 2016. History:  Past Medical History:   Diagnosis Date    Allergic rhinitis due to animal (cat) (dog) hair and dander 11/19/14    Arrhythmia     Nonrheumatic aortic valve regurgitation, followed by Dr. Nay Seth Arthritis     back    Asthma     Chronic kidney disease     Stage III     Diabetes ([address]     type II, does not check BS at home. oral medication.  Diastolic CHF, chronic ([address] 6/1/2016    Dyslipidemia     Gout     Hypertension     managed with medication    Morbid obesity ([facility] [address]     ORALIA on CPAP     Study done 4/17/2012; AHI 86.7, RDI 86.7 (same)    Osteoarthritis of knees, bilateral     Thromboembolus ([facility] [address] 06/2020    hospitalized with PE on heparin gtt and DC'd on  eliquis. Completed 6 month course of Eliquis and then it was DC'd    Tinea pedis     Vitamin D deficiency      Past Surgical History:   Procedure Laterality Date    COLONOSCOPY N/A 8/26/2016    COLONOSCOPY / BMI 47 performed by Ra[facility]l [facility] at [facility] ENDOSCOPY    HX AMPUTATION Right     Right index finger happened at work    HX COLONOSCOPY  08/2016    HX HEENT      ear surgery during childhood    HX PREMALIG/BENIGN SKIN LESION EXCISION Right     Behind right ear    HX TONSILLECTOMY      SD COLSC FLX W/REMOVAL LESION BY HOT BX FORCEPS  8/26/2016           Social History     Socioeconomic History    Marital status: SINGLE   Tobacco Use    Smoking status: Never Smoker    Smokeless tobacco: Never Used   Vaping Use    Vaping Use: Never used   Substance and Sexual Activity    Alcohol use:  Yes     Alcohol/week: 13.0 standard drinks     Types: 10 Cans of beer, 3 Shots of liquor per week    Drug use: No    Sexual activity: Yes     Partners: Female      Family History Problem Relation Age of Onset    Cancer Sister     Diabetes Mother     Hypertension Mother     No Known Problems Father     No Known Problems Brother     No Known Problems Sister     Diabetes Other     Other Other         Renal Failure     Hypertension Other     Colon Cancer Neg Hx        Medications:   Prior to Admission medications    Medication Sig Start Date End Date Taking? Authorizing Provider   cloNIDine HCL (CATAPRES) 0.2 mg tablet Take 1 Tablet by mouth two (2) times a day. 10/13/21  Yes Alyssa Estrella PA   magnesium oxide (MAG-OX) 400 mg tablet Take 1 tablet by mouth twice daily 9/13/21  Yes Alc CrispDO   allopurinoL (ZYLOPRIM) 100 mg tablet Take 2 Tablets by mouth daily. Indications: for gout attack prevention 7/15/21  Yes Alyssa Estrella PA   lisinopril-hydroCHLOROthiazide (PRINZIDE, ZESTORETIC) 20-12.5 mg per tablet Take 2 tablets by mouth once daily 7/15/21  Yes Alyssa Estrella PA   metFORMIN (GLUCOPHAGE) 500 mg tablet TAKE ONE TABLET BY MOUTH TWICE A DAY WITH MEAL(S) 7/15/21  Yes Alyssa Estrella PA   rosuvastatin (CRESTOR) 10 mg tablet Take 1 Tablet by mouth nightly. 7/15/21  Yes Alyssa Estrella PA   NIFEdipine ER (ADALAT CC) 60 mg ER tablet Take 1 Tablet by mouth daily. 11/18/21   Alyssa Estrella PA   DISABLED PLACARD (DISABLED PLACARD) DMV Use for parking in disabled parking spot 10/13/21   Alyssa Estrella, [id number]   diclofenac (VOLTAREN) 1 % gel Apply 4 g to affected area four (4) times daily. 10/13/21   Alyssa Estrella PA   ergocalciferol (ERGOCALCIFEROL) 1,250 mcg (50,000 unit) capsule Take 1 Capsule by mouth every seven (7) days. 7/15/21   Alyssa Estrella PA   labetaloL (NORMODYNE) 100 mg tablet Take 1 Tablet by mouth two (2) times a day.  7/15/21   Alyssa Estrella PA   Leg Brace (Knee Brace Large-XLarge) misc Wear on Right Knee Daily 8/20/20   Alyssa Estrella PA   cpap machine kit by Does Not Apply route. Provider, Historical       Allergies: No Known Allergies    Physical Exam:     Vital Signs: There were no vitals taken for this visit. .    General: no distress      Heart: regular   Lungs: unlabored   Abdominal: soft   Neurological: Grossly normal        Findings/Diagnosis: Screening for colorectal cancer, h/o polyp    Plan of Care/Planned Procedure: Colonoscopy, possible polypectomy. Pt/designee has reviewed the colonoscopy information sheet. Any questions have been discussed. They agree to proceed.       Signed:  Shelley Tucker MD   1/21/2022

## 2022-01-25 NOTE — ANESTHESIA POSTPROCEDURE EVALUATION
Procedure(s):  COLONOSCOPY/ BMI 44.     total IV anesthesia    Anesthesia Post Evaluation      Multimodal analgesia: multimodal analgesia used between 6 hours prior to anesthesia start to PACU discharge  Patient location during evaluation: bedside  Patient participation: complete - patient participated  Level of consciousness: awake and alert  Pain management: adequate  Airway patency: patent  Anesthetic complications: no  Cardiovascular status: acceptable  Respiratory status: acceptable  Hydration status: acceptable  Post anesthesia nausea and vomiting:  controlled  Final Post Anesthesia Temperature Assessment:  Normothermia (36.0-37.5 degrees C)      INITIAL Post-op Vital signs:   Vitals Value Taken Time   /84 01/24/22 1404   Temp 36.6 °C (97.9 °F) 01/24/22 1404   Pulse 89 01/24/22 1404   Resp 18 01/24/22 1404   SpO2 94 % 01/24/22 1404

## 2022-02-13 ENCOUNTER — APPOINTMENT (OUTPATIENT)
Dept: GENERAL RADIOLOGY | Age: 57
DRG: 134 | End: 2022-02-13
Payer: COMMERCIAL

## 2022-02-13 ENCOUNTER — APPOINTMENT (OUTPATIENT)
Dept: CT IMAGING | Age: 57
DRG: 134 | End: 2022-02-13
Payer: COMMERCIAL

## 2022-02-13 ENCOUNTER — APPOINTMENT (OUTPATIENT)
Dept: NON INVASIVE DIAGNOSTICS | Age: 57
DRG: 134 | End: 2022-02-13
Attending: INTERNAL MEDICINE
Payer: COMMERCIAL

## 2022-02-13 ENCOUNTER — HOSPITAL ENCOUNTER (INPATIENT)
Age: 57
LOS: 2 days | Discharge: HOME | DRG: 134 | End: 2022-02-15
Attending: EMERGENCY MEDICINE | Admitting: INTERNAL MEDICINE
Payer: COMMERCIAL

## 2022-02-13 DIAGNOSIS — R07.9 CHEST PAIN, UNSPECIFIED TYPE: Primary | ICD-10-CM

## 2022-02-13 DIAGNOSIS — I26.99 ACUTE PULMONARY EMBOLISM WITHOUT ACUTE COR PULMONALE, UNSPECIFIED PULMONARY EMBOLISM TYPE (HCC): ICD-10-CM

## 2022-02-13 LAB
ALBUMIN SERPL-MCNC: 3.6 G/DL (ref 3.5–5)
ALBUMIN/GLOB SERPL: 0.8 {RATIO} (ref 1.2–3.5)
ALP SERPL-CCNC: 111 U/L (ref 50–136)
ALT SERPL-CCNC: 24 U/L (ref 12–65)
ANION GAP SERPL CALC-SCNC: 1 MMOL/L (ref 7–16)
APTT PPP: 123.1 SEC (ref 24.1–35.1)
AST SERPL-CCNC: 20 U/L (ref 15–37)
ATRIAL RATE: 96 BPM
BASOPHILS # BLD: 0 K/UL (ref 0–0.2)
BASOPHILS # BLD: 0 K/UL (ref 0–0.2)
BASOPHILS NFR BLD: 0 % (ref 0–2)
BASOPHILS NFR BLD: 0 % (ref 0–2)
BILIRUB SERPL-MCNC: 0.3 MG/DL (ref 0.2–1.1)
BNP SERPL-MCNC: 110 PG/ML (ref 5–125)
BUN SERPL-MCNC: 28 MG/DL (ref 6–23)
CALCIUM SERPL-MCNC: 9.9 MG/DL (ref 8.3–10.4)
CALCULATED P AXIS, ECG09: 68 DEGREES
CALCULATED R AXIS, ECG10: -8 DEGREES
CALCULATED T AXIS, ECG11: 71 DEGREES
CHLORIDE SERPL-SCNC: 104 MMOL/L (ref 98–107)
CO2 SERPL-SCNC: 31 MMOL/L (ref 21–32)
COVID-19 RAPID TEST, COVR: NOT DETECTED
CREAT SERPL-MCNC: 1.7 MG/DL (ref 0.8–1.5)
D DIMER PPP FEU-MCNC: 2.46 UG/ML(FEU)
DIAGNOSIS, 93000: NORMAL
DIFFERENTIAL METHOD BLD: ABNORMAL
DIFFERENTIAL METHOD BLD: ABNORMAL
ECHO AO ASC DIAM: 4.1 CM
ECHO AO ASCENDING AORTA INDEX: 1.55 CM/M2
ECHO AO ROOT DIAM: 3.3 CM
ECHO AO ROOT INDEX: 1.25 CM/M2
ECHO AV AREA PEAK VELOCITY: 2.8 CM2
ECHO AV AREA VTI: 2.7 CM2
ECHO AV AREA/BSA PEAK VELOCITY: 1.1 CM2/M2
ECHO AV AREA/BSA VTI: 1 CM2/M2
ECHO AV MEAN GRADIENT: 4 MMHG
ECHO AV MEAN VELOCITY: 1 M/S
ECHO AV PEAK GRADIENT: 8 MMHG
ECHO AV PEAK VELOCITY: 1.4 M/S
ECHO AV VELOCITY RATIO: 0.79
ECHO AV VTI: 28.6 CM
ECHO LA AREA 2C: 23.6 CM2
ECHO LA AREA 4C: 22.2 CM2
ECHO LA DIAMETER INDEX: 1.33 CM/M2
ECHO LA DIAMETER: 3.5 CM
ECHO LA MAJOR AXIS: 6.5 CM
ECHO LA MINOR AXIS: 6.4 CM
ECHO LA TO AORTIC ROOT RATIO: 1.06
ECHO LA VOL 2C: 69 ML (ref 18–58)
ECHO LA VOL 4C: 63 ML (ref 18–58)
ECHO LA VOL BP: 66 ML (ref 18–58)
ECHO LA VOL/BSA BIPLANE: 25 ML/M2 (ref 16–34)
ECHO LA VOLUME INDEX A2C: 26 ML/M2 (ref 16–34)
ECHO LA VOLUME INDEX A4C: 24 ML/M2 (ref 16–34)
ECHO LV E' LATERAL VELOCITY: 11 CM/S
ECHO LV E' SEPTAL VELOCITY: 7 CM/S
ECHO LV EDV A2C: 145 ML
ECHO LV EDV A4C: 151 ML
ECHO LV EDV BP: 150 ML (ref 67–155)
ECHO LV EDV INDEX A4C: 57 ML/M2
ECHO LV EDV INDEX BP: 57 ML/M2
ECHO LV EDV NDEX A2C: 55 ML/M2
ECHO LV EJECTION FRACTION A2C: 56 %
ECHO LV EJECTION FRACTION A4C: 55 %
ECHO LV EJECTION FRACTION BIPLANE: 55 % (ref 55–100)
ECHO LV ESV A2C: 65 ML
ECHO LV ESV A4C: 68 ML
ECHO LV ESV INDEX A2C: 25 ML/M2
ECHO LV ESV INDEX A4C: 26 ML/M2
ECHO LV FRACTIONAL SHORTENING: 27 % (ref 28–44)
ECHO LV INTERNAL DIMENSION DIASTOLE INDEX: 1.7 CM/M2
ECHO LV INTERNAL DIMENSION DIASTOLIC: 4.5 CM (ref 4.2–5.9)
ECHO LV INTERNAL DIMENSION SYSTOLIC INDEX: 1.25 CM/M2
ECHO LV INTERNAL DIMENSION SYSTOLIC: 3.3 CM
ECHO LV IVSD: 1.3 CM (ref 0.6–1)
ECHO LV MASS 2D: 261.9 G (ref 88–224)
ECHO LV MASS INDEX 2D: 99.2 G/M2 (ref 49–115)
ECHO LV POSTERIOR WALL DIASTOLIC: 1.6 CM (ref 0.6–1)
ECHO LV RELATIVE WALL THICKNESS RATIO: 0.71
ECHO LVOT AREA: 3.8 CM2
ECHO LVOT AV VTI INDEX: 0.71
ECHO LVOT DIAM: 2.2 CM
ECHO LVOT MEAN GRADIENT: 2 MMHG
ECHO LVOT PEAK GRADIENT: 4 MMHG
ECHO LVOT PEAK VELOCITY: 1.1 M/S
ECHO LVOT STROKE VOLUME INDEX: 29.4 ML/M2
ECHO LVOT SV: 77.5 ML
ECHO LVOT VTI: 20.4 CM
ECHO MV A VELOCITY: 0.74 M/S
ECHO MV E DECELERATION TIME (DT): 222 MS
ECHO MV E VELOCITY: 0.94 M/S
ECHO MV E/A RATIO: 1.27
ECHO MV E/E' LATERAL: 8.55
ECHO MV E/E' RATIO (AVERAGED): 10.99
ECHO MV E/E' SEPTAL: 13.43
ECHO RV INTERNAL DIMENSION: 3.8 CM
ECHO RV TAPSE: 2.4 CM (ref 1.5–2)
EOSINOPHIL # BLD: 0.6 K/UL (ref 0–0.8)
EOSINOPHIL # BLD: 0.8 K/UL (ref 0–0.8)
EOSINOPHIL NFR BLD: 4 % (ref 0.5–7.8)
EOSINOPHIL NFR BLD: 6 % (ref 0.5–7.8)
ERYTHROCYTE [DISTWIDTH] IN BLOOD BY AUTOMATED COUNT: 14.2 % (ref 11.9–14.6)
ERYTHROCYTE [DISTWIDTH] IN BLOOD BY AUTOMATED COUNT: 14.4 % (ref 11.9–14.6)
EST. AVERAGE GLUCOSE BLD GHB EST-MCNC: 157 MG/DL
GLOBULIN SER CALC-MCNC: 4.6 G/DL (ref 2.3–3.5)
GLUCOSE BLD STRIP.AUTO-MCNC: 105 MG/DL (ref 65–100)
GLUCOSE BLD STRIP.AUTO-MCNC: 150 MG/DL (ref 65–100)
GLUCOSE SERPL-MCNC: 129 MG/DL (ref 65–100)
HBA1C MFR BLD: 7.1 % (ref 4.2–6.3)
HCT VFR BLD AUTO: 40.4 % (ref 41.1–50.3)
HCT VFR BLD AUTO: 41.4 % (ref 41.1–50.3)
HGB BLD-MCNC: 12.5 G/DL (ref 13.6–17.2)
HGB BLD-MCNC: 13.2 G/DL (ref 13.6–17.2)
IMM GRANULOCYTES # BLD AUTO: 0 K/UL (ref 0–0.5)
IMM GRANULOCYTES # BLD AUTO: 0.1 K/UL (ref 0–0.5)
IMM GRANULOCYTES NFR BLD AUTO: 0 % (ref 0–5)
IMM GRANULOCYTES NFR BLD AUTO: 0 % (ref 0–5)
LYMPHOCYTES # BLD: 3.2 K/UL (ref 0.5–4.6)
LYMPHOCYTES # BLD: 3.3 K/UL (ref 0.5–4.6)
LYMPHOCYTES NFR BLD: 23 % (ref 13–44)
LYMPHOCYTES NFR BLD: 27 % (ref 13–44)
MCH RBC QN AUTO: 26.2 PG (ref 26.1–32.9)
MCH RBC QN AUTO: 26.5 PG (ref 26.1–32.9)
MCHC RBC AUTO-ENTMCNC: 30.9 G/DL (ref 31.4–35)
MCHC RBC AUTO-ENTMCNC: 31.9 G/DL (ref 31.4–35)
MCV RBC AUTO: 83 FL (ref 79.6–97.8)
MCV RBC AUTO: 84.5 FL (ref 79.6–97.8)
MONOCYTES # BLD: 0.9 K/UL (ref 0.1–1.3)
MONOCYTES # BLD: 1 K/UL (ref 0.1–1.3)
MONOCYTES NFR BLD: 7 % (ref 4–12)
MONOCYTES NFR BLD: 8 % (ref 4–12)
NEUTS SEG # BLD: 7.1 K/UL (ref 1.7–8.2)
NEUTS SEG # BLD: 9.1 K/UL (ref 1.7–8.2)
NEUTS SEG NFR BLD: 58 % (ref 43–78)
NEUTS SEG NFR BLD: 66 % (ref 43–78)
NRBC # BLD: 0 K/UL (ref 0–0.2)
NRBC # BLD: 0 K/UL (ref 0–0.2)
P-R INTERVAL, ECG05: 188 MS
PLATELET # BLD AUTO: 270 K/UL (ref 150–450)
PLATELET # BLD AUTO: 280 K/UL (ref 150–450)
PMV BLD AUTO: 10 FL (ref 9.4–12.3)
PMV BLD AUTO: 9.8 FL (ref 9.4–12.3)
POTASSIUM SERPL-SCNC: 4 MMOL/L (ref 3.5–5.1)
PROT SERPL-MCNC: 8.2 G/DL (ref 6.3–8.2)
Q-T INTERVAL, ECG07: 372 MS
QRS DURATION, ECG06: 102 MS
QTC CALCULATION (BEZET), ECG08: 469 MS
RBC # BLD AUTO: 4.78 M/UL (ref 4.23–5.6)
RBC # BLD AUTO: 4.99 M/UL (ref 4.23–5.6)
SERVICE CMNT-IMP: ABNORMAL
SERVICE CMNT-IMP: ABNORMAL
SODIUM SERPL-SCNC: 136 MMOL/L (ref 138–145)
SOURCE, COVRS: NORMAL
TROPONIN-HIGH SENSITIVITY: 11.3 PG/ML (ref 0–14)
TROPONIN-HIGH SENSITIVITY: 14.6 PG/ML (ref 0–14)
UFH PPP CHRO-ACNC: 0.64 IU/ML (ref 0.3–0.7)
UFH PPP CHRO-ACNC: 0.75 IU/ML (ref 0.3–0.7)
VENTRICULAR RATE, ECG03: 96 BPM
WBC # BLD AUTO: 12.2 K/UL (ref 4.3–11.1)
WBC # BLD AUTO: 13.9 K/UL (ref 4.3–11.1)

## 2022-02-13 PROCEDURE — 99284 EMERGENCY DEPT VISIT MOD MDM: CPT

## 2022-02-13 PROCEDURE — 74011000250 HC RX REV CODE- 250: Performed by: INTERNAL MEDICINE

## 2022-02-13 PROCEDURE — 85520 HEPARIN ASSAY: CPT

## 2022-02-13 PROCEDURE — 83036 HEMOGLOBIN GLYCOSYLATED A1C: CPT

## 2022-02-13 PROCEDURE — 74011000250 HC RX REV CODE- 250: Performed by: EMERGENCY MEDICINE

## 2022-02-13 PROCEDURE — 87635 SARS-COV-2 COVID-19 AMP PRB: CPT

## 2022-02-13 PROCEDURE — 85025 COMPLETE CBC W/AUTO DIFF WBC: CPT

## 2022-02-13 PROCEDURE — 74011250636 HC RX REV CODE- 250/636: Performed by: INTERNAL MEDICINE

## 2022-02-13 PROCEDURE — 74011250636 HC RX REV CODE- 250/636

## 2022-02-13 PROCEDURE — 84484 ASSAY OF TROPONIN QUANT: CPT

## 2022-02-13 PROCEDURE — 74011250637 HC RX REV CODE- 250/637: Performed by: INTERNAL MEDICINE

## 2022-02-13 PROCEDURE — 71260 CT THORAX DX C+: CPT

## 2022-02-13 PROCEDURE — 5A09457 ASSISTANCE WITH RESPIRATORY VENTILATION, 24-96 CONSECUTIVE HOURS, CONTINUOUS POSITIVE AIRWAY PRESSURE: ICD-10-PCS | Performed by: INTERNAL MEDICINE

## 2022-02-13 PROCEDURE — 85379 FIBRIN DEGRADATION QUANT: CPT

## 2022-02-13 PROCEDURE — 2709999900 HC NON-CHARGEABLE SUPPLY

## 2022-02-13 PROCEDURE — 74011000636 HC RX REV CODE- 636

## 2022-02-13 PROCEDURE — 94762 N-INVAS EAR/PLS OXIMTRY CONT: CPT

## 2022-02-13 PROCEDURE — 82962 GLUCOSE BLOOD TEST: CPT

## 2022-02-13 PROCEDURE — 65270000029 HC RM PRIVATE

## 2022-02-13 PROCEDURE — 93306 TTE W/DOPPLER COMPLETE: CPT

## 2022-02-13 PROCEDURE — 94660 CPAP INITIATION&MGMT: CPT

## 2022-02-13 PROCEDURE — 83880 ASSAY OF NATRIURETIC PEPTIDE: CPT

## 2022-02-13 PROCEDURE — 83735 ASSAY OF MAGNESIUM: CPT

## 2022-02-13 PROCEDURE — 96375 TX/PRO/DX INJ NEW DRUG ADDON: CPT

## 2022-02-13 PROCEDURE — 71045 X-RAY EXAM CHEST 1 VIEW: CPT

## 2022-02-13 PROCEDURE — 74011000258 HC RX REV CODE- 258

## 2022-02-13 PROCEDURE — 80053 COMPREHEN METABOLIC PANEL: CPT

## 2022-02-13 PROCEDURE — 93005 ELECTROCARDIOGRAM TRACING: CPT

## 2022-02-13 PROCEDURE — 93005 ELECTROCARDIOGRAM TRACING: CPT | Performed by: EMERGENCY MEDICINE

## 2022-02-13 PROCEDURE — 96376 TX/PRO/DX INJ SAME DRUG ADON: CPT

## 2022-02-13 PROCEDURE — 96374 THER/PROPH/DIAG INJ IV PUSH: CPT

## 2022-02-13 PROCEDURE — 85730 THROMBOPLASTIN TIME PARTIAL: CPT

## 2022-02-13 PROCEDURE — 36415 COLL VENOUS BLD VENIPUNCTURE: CPT

## 2022-02-13 RX ORDER — POLYETHYLENE GLYCOL 3350 17 G/17G
17 POWDER, FOR SOLUTION ORAL DAILY PRN
Status: DISCONTINUED | OUTPATIENT
Start: 2022-02-13 | End: 2022-02-15 | Stop reason: HOSPADM

## 2022-02-13 RX ORDER — HEPARIN SODIUM 5000 [USP'U]/100ML
18-36 INJECTION, SOLUTION INTRAVENOUS
Status: DISCONTINUED | OUTPATIENT
Start: 2022-02-13 | End: 2022-02-13

## 2022-02-13 RX ORDER — HYDROCHLOROTHIAZIDE 25 MG/1
25 TABLET ORAL DAILY
Status: DISCONTINUED | OUTPATIENT
Start: 2022-02-14 | End: 2022-02-15 | Stop reason: HOSPADM

## 2022-02-13 RX ORDER — SODIUM CHLORIDE 0.9 % (FLUSH) 0.9 %
10 SYRINGE (ML) INJECTION
Status: COMPLETED | OUTPATIENT
Start: 2022-02-13 | End: 2022-02-13

## 2022-02-13 RX ORDER — LABETALOL 100 MG/1
100 TABLET, FILM COATED ORAL 2 TIMES DAILY
Status: DISCONTINUED | OUTPATIENT
Start: 2022-02-13 | End: 2022-02-15 | Stop reason: HOSPADM

## 2022-02-13 RX ORDER — INSULIN LISPRO 100 [IU]/ML
INJECTION, SOLUTION INTRAVENOUS; SUBCUTANEOUS
Status: DISCONTINUED | OUTPATIENT
Start: 2022-02-13 | End: 2022-02-15 | Stop reason: HOSPADM

## 2022-02-13 RX ORDER — LISINOPRIL AND HYDROCHLOROTHIAZIDE 12.5; 2 MG/1; MG/1
2 TABLET ORAL DAILY
Status: DISCONTINUED | OUTPATIENT
Start: 2022-02-13 | End: 2022-02-13

## 2022-02-13 RX ORDER — DEXTROSE MONOHYDRATE 100 MG/ML
125-250 INJECTION, SOLUTION INTRAVENOUS AS NEEDED
Status: DISCONTINUED | OUTPATIENT
Start: 2022-02-13 | End: 2022-02-15 | Stop reason: HOSPADM

## 2022-02-13 RX ORDER — ROSUVASTATIN CALCIUM 10 MG/1
10 TABLET, COATED ORAL
Status: DISCONTINUED | OUTPATIENT
Start: 2022-02-13 | End: 2022-02-15 | Stop reason: HOSPADM

## 2022-02-13 RX ORDER — SODIUM CHLORIDE 0.9 % (FLUSH) 0.9 %
5-40 SYRINGE (ML) INJECTION AS NEEDED
Status: DISCONTINUED | OUTPATIENT
Start: 2022-02-13 | End: 2022-02-15 | Stop reason: HOSPADM

## 2022-02-13 RX ORDER — LANOLIN ALCOHOL/MO/W.PET/CERES
400 CREAM (GRAM) TOPICAL 2 TIMES DAILY
Status: DISCONTINUED | OUTPATIENT
Start: 2022-02-13 | End: 2022-02-15 | Stop reason: HOSPADM

## 2022-02-13 RX ORDER — HEPARIN SODIUM 5000 [USP'U]/100ML
18-36 INJECTION, SOLUTION INTRAVENOUS
Status: DISCONTINUED | OUTPATIENT
Start: 2022-02-13 | End: 2022-02-14

## 2022-02-13 RX ORDER — SODIUM CHLORIDE 0.9 % (FLUSH) 0.9 %
5-40 SYRINGE (ML) INJECTION EVERY 8 HOURS
Status: DISCONTINUED | OUTPATIENT
Start: 2022-02-13 | End: 2022-02-15 | Stop reason: HOSPADM

## 2022-02-13 RX ORDER — ONDANSETRON 8 MG/1
4 TABLET, ORALLY DISINTEGRATING ORAL
Status: DISCONTINUED | OUTPATIENT
Start: 2022-02-13 | End: 2022-02-15 | Stop reason: HOSPADM

## 2022-02-13 RX ORDER — MAG HYDROX/ALUMINUM HYD/SIMETH 200-200-20
15 SUSPENSION, ORAL (FINAL DOSE FORM) ORAL
Status: DISCONTINUED | OUTPATIENT
Start: 2022-02-13 | End: 2022-02-15 | Stop reason: HOSPADM

## 2022-02-13 RX ORDER — FAMOTIDINE 20 MG/1
20 TABLET, FILM COATED ORAL
Status: DISCONTINUED | OUTPATIENT
Start: 2022-02-13 | End: 2022-02-15 | Stop reason: HOSPADM

## 2022-02-13 RX ORDER — ALLOPURINOL 100 MG/1
200 TABLET ORAL DAILY
Status: DISCONTINUED | OUTPATIENT
Start: 2022-02-14 | End: 2022-02-15 | Stop reason: HOSPADM

## 2022-02-13 RX ORDER — NIFEDIPINE 30 MG/1
60 TABLET, EXTENDED RELEASE ORAL DAILY
Status: DISCONTINUED | OUTPATIENT
Start: 2022-02-13 | End: 2022-02-15 | Stop reason: HOSPADM

## 2022-02-13 RX ORDER — SODIUM CHLORIDE 0.9 % (FLUSH) 0.9 %
5-10 SYRINGE (ML) INJECTION EVERY 8 HOURS
Status: DISCONTINUED | OUTPATIENT
Start: 2022-02-13 | End: 2022-02-15 | Stop reason: HOSPADM

## 2022-02-13 RX ORDER — TRAMADOL HYDROCHLORIDE 50 MG/1
50 TABLET ORAL
Status: DISCONTINUED | OUTPATIENT
Start: 2022-02-13 | End: 2022-02-15 | Stop reason: HOSPADM

## 2022-02-13 RX ORDER — ONDANSETRON 2 MG/ML
4 INJECTION INTRAMUSCULAR; INTRAVENOUS
Status: COMPLETED | OUTPATIENT
Start: 2022-02-13 | End: 2022-02-13

## 2022-02-13 RX ORDER — DICLOFENAC SODIUM 10 MG/G
4 GEL TOPICAL 4 TIMES DAILY
Status: DISCONTINUED | OUTPATIENT
Start: 2022-02-13 | End: 2022-02-15 | Stop reason: HOSPADM

## 2022-02-13 RX ORDER — DEXTROSE 40 %
15 GEL (GRAM) ORAL AS NEEDED
Status: DISCONTINUED | OUTPATIENT
Start: 2022-02-13 | End: 2022-02-15 | Stop reason: HOSPADM

## 2022-02-13 RX ORDER — ACETAMINOPHEN 325 MG/1
650 TABLET ORAL
Status: DISCONTINUED | OUTPATIENT
Start: 2022-02-13 | End: 2022-02-15 | Stop reason: HOSPADM

## 2022-02-13 RX ORDER — MORPHINE SULFATE 4 MG/ML
4 INJECTION INTRAVENOUS
Status: COMPLETED | OUTPATIENT
Start: 2022-02-13 | End: 2022-02-13

## 2022-02-13 RX ORDER — ACETAMINOPHEN 650 MG/1
650 SUPPOSITORY RECTAL
Status: DISCONTINUED | OUTPATIENT
Start: 2022-02-13 | End: 2022-02-15 | Stop reason: HOSPADM

## 2022-02-13 RX ORDER — LISINOPRIL 20 MG/1
40 TABLET ORAL DAILY
Status: DISCONTINUED | OUTPATIENT
Start: 2022-02-14 | End: 2022-02-15 | Stop reason: HOSPADM

## 2022-02-13 RX ORDER — SODIUM CHLORIDE 0.9 % (FLUSH) 0.9 %
5-10 SYRINGE (ML) INJECTION AS NEEDED
Status: DISCONTINUED | OUTPATIENT
Start: 2022-02-13 | End: 2022-02-15 | Stop reason: HOSPADM

## 2022-02-13 RX ORDER — ONDANSETRON 2 MG/ML
4 INJECTION INTRAMUSCULAR; INTRAVENOUS
Status: DISCONTINUED | OUTPATIENT
Start: 2022-02-13 | End: 2022-02-15 | Stop reason: HOSPADM

## 2022-02-13 RX ORDER — FACIAL-BODY WIPES
10 EACH TOPICAL DAILY PRN
Status: DISCONTINUED | OUTPATIENT
Start: 2022-02-13 | End: 2022-02-15 | Stop reason: HOSPADM

## 2022-02-13 RX ORDER — CLONIDINE HYDROCHLORIDE 0.2 MG/1
0.2 TABLET ORAL 2 TIMES DAILY
Status: DISCONTINUED | OUTPATIENT
Start: 2022-02-13 | End: 2022-02-15 | Stop reason: HOSPADM

## 2022-02-13 RX ORDER — HEPARIN SODIUM 1000 [USP'U]/ML
80 INJECTION, SOLUTION INTRAVENOUS; SUBCUTANEOUS ONCE
Status: COMPLETED | OUTPATIENT
Start: 2022-02-13 | End: 2022-02-13

## 2022-02-13 RX ADMIN — ONDANSETRON 4 MG: 2 INJECTION INTRAMUSCULAR; INTRAVENOUS at 06:17

## 2022-02-13 RX ADMIN — DICLOFENAC SODIUM 4 G: 10 GEL TOPICAL at 17:11

## 2022-02-13 RX ADMIN — LABETALOL HYDROCHLORIDE 100 MG: 100 TABLET, FILM COATED ORAL at 17:11

## 2022-02-13 RX ADMIN — ROSUVASTATIN CALCIUM 10 MG: 10 TABLET, FILM COATED ORAL at 21:49

## 2022-02-13 RX ADMIN — SODIUM CHLORIDE 100 ML: 900 INJECTION, SOLUTION INTRAVENOUS at 04:08

## 2022-02-13 RX ADMIN — NIFEDIPINE 60 MG: 30 TABLET, FILM COATED, EXTENDED RELEASE ORAL at 14:53

## 2022-02-13 RX ADMIN — Medication 400 MG: at 17:10

## 2022-02-13 RX ADMIN — DICLOFENAC SODIUM 4 G: 10 GEL TOPICAL at 14:53

## 2022-02-13 RX ADMIN — Medication 10 ML: at 04:08

## 2022-02-13 RX ADMIN — MORPHINE SULFATE 4 MG: 4 INJECTION INTRAVENOUS at 03:32

## 2022-02-13 RX ADMIN — LABETALOL HYDROCHLORIDE 100 MG: 100 TABLET, FILM COATED ORAL at 14:53

## 2022-02-13 RX ADMIN — MORPHINE SULFATE 4 MG: 4 INJECTION INTRAVENOUS at 06:18

## 2022-02-13 RX ADMIN — SODIUM CHLORIDE, PRESERVATIVE FREE 10 ML: 5 INJECTION INTRAVENOUS at 21:49

## 2022-02-13 RX ADMIN — HEPARIN SODIUM 18 UNITS/KG/HR: 5000 INJECTION, SOLUTION INTRAVENOUS at 06:18

## 2022-02-13 RX ADMIN — CLONIDINE HYDROCHLORIDE 0.2 MG: 0.2 TABLET ORAL at 17:10

## 2022-02-13 RX ADMIN — TRAMADOL HYDROCHLORIDE 50 MG: 50 TABLET, COATED ORAL at 14:52

## 2022-02-13 RX ADMIN — IOPAMIDOL 100 ML: 755 INJECTION, SOLUTION INTRAVENOUS at 04:02

## 2022-02-13 RX ADMIN — HEPARIN SODIUM 17 UNITS/KG/HR: 5000 INJECTION, SOLUTION INTRAVENOUS at 14:40

## 2022-02-13 RX ADMIN — HEPARIN SODIUM 8510 UNITS: 1000 INJECTION INTRAVENOUS; SUBCUTANEOUS at 06:17

## 2022-02-13 RX ADMIN — SODIUM CHLORIDE, PRESERVATIVE FREE 10 ML: 5 INJECTION INTRAVENOUS at 16:17

## 2022-02-13 RX ADMIN — DICLOFENAC SODIUM 4 G: 10 GEL TOPICAL at 21:50

## 2022-02-13 NOTE — PROGRESS NOTES
Reviewed notes for new spiritual concerns      Per notes:        PREFERRED NAME -  SERGIO    Muslim    SISTER -  [phone] [place]    NO ACP

## 2022-02-13 NOTE — ED TRIAGE NOTES
Pt has had SOB for \"a few days\" with coughing that produces some clear sputum. Afebrile. Hx of asthma, does not use a rescue inhaler. Last year had a PE for which he was admitted for a week and then took blood thinners for about 3 months- but is not currently taking at this time. He uses CPAP at night but not home daily O2. Able to speak in short 3-4 word sentences.   Some tightness in L chest.
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
## 2022-02-13 NOTE — PROGRESS NOTES
TRANSFER - IN REPORT:    Verbal report received from Tree(name) on Miranda Rahman  being received from ED(unit) for routine progression of care      Report consisted of patients Situation, Background, Assessment and   Recommendations(SBAR). Information from the following report(s) SBAR was reviewed with the receiving nurse. Opportunity for questions and clarification was provided. Assessment completed upon patients arrival to unit and care assumed.

## 2022-02-13 NOTE — PROGRESS NOTES
Spoke with Nehemias Barton in lab about staff coming to draw Hep Xa due at 1215. She stated ticket has been claimed, and someone will be up to draw lab as soon as possible.

## 2022-02-13 NOTE — ED PROVIDER NOTES
Patient had a pulmonary embolism in July 2020. There was no predisposing risk and he was started on blood thinner. Followed up with hematology and did well and eventually discontinue blood thinner. He has had his Covid vaccination. For the past couple days has had increased shortness of breath cough and congestion. There is mild left chest tightness. No nausea numbness or diaphoresis. Arrived to triage hypoxic 85%. Improved with oxygen. The history is provided by the patient. No  was used. Shortness of Breath  This is a new problem. The problem occurs continuously. The current episode started 2 days ago. The problem has been gradually worsening. Associated symptoms include cough, sputum production, chest pain and leg swelling. Pertinent negatives include no fever, no headaches, no rhinorrhea, no sore throat, no neck pain, no wheezing, no orthopnea, no vomiting, no abdominal pain, no rash and no leg pain. He has tried nothing for the symptoms. He has had prior hospitalizations. Associated medical issues include heart failure. Associated medical issues do not include COPD or CAD. Past Medical History:   Diagnosis Date    Allergic rhinitis due to animal (cat) (dog) hair and dander 90/90/31    Diastolic CHF, chronic ([facility] [address] 6/1/2016    Dyslipidemia     ORALIA on CPAP     Study done 4/17/2012;  AHI 86.7, RDI 86.7 (same)    Vitamin D deficiency        Past Surgical History:   Procedure Laterality Date    COLONOSCOPY N/A 8/26/2016    COLONOSCOPY / BMI 47 performed by Donna Guerrier MD at [facility] ENDOSCOPY    COLONOSCOPY N/A 1/21/2022    COLONOSCOPY/ BMI 44 performed by Stephy Zamora MD at [facility] ENDOSCOPY    HX AMPUTATION Right     Right index finger happened at work    HX COLONOSCOPY  08/2016    HX HEENT      ear surgery during childhood    HX PREMALIG/BENIGN SKIN LESION EXCISION Right     Behind right ear    HX TONSILLECTOMY      CO COLONOSCOPY FLX DX W/COLLJ SPEC WHEN PFRMD 1/21/2022         NV COLSC FLX W/REMOVAL LESION BY HOT BX FORCEPS  8/26/2016              Family History:   Problem Relation Age of Onset    Cancer Sister     Diabetes Mother     Hypertension Mother     No Known Problems Father     No Known Problems Brother     No Known Problems Sister     Diabetes Other     Other Other         Renal Failure     Hypertension Other     Colon Cancer Neg Hx        Social History     Socioeconomic History    Marital status: SINGLE     Spouse name: Not on file    Number of children: Not on file    Years of education: Not on file    Highest education level: Not on file   Occupational History    Not on file   Tobacco Use    Smoking status: Never Smoker    Smokeless tobacco: Never Used   Vaping Use    Vaping Use: Never used   Substance and Sexual Activity    Alcohol use: Yes     Alcohol/week: 13.0 standard drinks     Types: 10 Cans of beer, 3 Shots of liquor per week    Drug use: No    Sexual activity: Yes     Partners: Female   Other Topics Concern    Not on file   Social History Narrative    Not on file     Social Determinants of Health     Financial Resource Strain:     Difficulty of Paying Living Expenses: Not on file   Food Insecurity:     Worried About Running Out of Food in the Last Year: Not on file    [place] of Food in the Last Year: Not on file   Transportation Needs:     Lack of Transportation (Medical): Not on file    Lack of Transportation (Non-Medical):  Not on file   Physical Activity:     Days of Exercise per Week: Not on file    Minutes of Exercise per Session: Not on file   Stress:     Feeling of Stress : Not on file   Social Connections:     Frequency of Communication with Friends and Family: Not on file    Frequency of Social Gatherings with Friends and Family: Not on file    Attends [employer] Services: Not on file    Active Member of Clubs or Organizations: Not on file    Attends Club or Organization Meetings: Not on file   Kathrine Hahn Marital Status: Not on file   Intimate Partner Violence:     Fear of Current or Ex-Partner: Not on file    Emotionally Abused: Not on file    Physically Abused: Not on file    Sexually Abused: Not on file   Housing Stability:     Unable to Pay for Housing in the Last Year: Not on file    Number of [place] in the Last Year: Not on file    Unstable Housing in the Last Year: Not on file         ALLERGIES: Patient has no known allergies. Review of Systems   Constitutional: Negative for chills and fever. HENT: Negative for rhinorrhea and sore throat. Eyes: Negative for pain and redness. Respiratory: Positive for cough, sputum production, chest tightness and shortness of breath. Negative for wheezing. Cardiovascular: Positive for chest pain and leg swelling. Negative for orthopnea. Gastrointestinal: Negative for abdominal pain, diarrhea, nausea and vomiting. Genitourinary: Negative for dysuria and hematuria. Musculoskeletal: Negative for back pain, gait problem, neck pain and neck stiffness. Skin: Negative for color change and rash. Neurological: Negative for weakness, numbness and headaches. Vitals:    02/13/22 0032 02/13/22 0040   BP: (!) 168/106    Pulse: 100    Resp: 28    Temp: 97.8 °F (36.6 °C)    SpO2: (!) 85% 93%   Weight: 149.7 kg (330 lb)    Height: 6' (1.829 m)             Physical Exam  Constitutional:       Appearance: He is well-developed. HENT:      Head: Normocephalic and atraumatic. Cardiovascular:      Rate and Rhythm: Normal rate and regular rhythm. Pulmonary:      Effort: Respiratory distress present. Breath sounds: Normal breath sounds. No wheezing. Abdominal:      General: Bowel sounds are normal.      Palpations: Abdomen is soft. Tenderness: There is no abdominal tenderness. Musculoskeletal:         General: Normal range of motion. Cervical back: Normal range of motion and neck supple. Right lower leg: Edema present.       Left lower leg: Edema present. Skin:     General: Skin is warm and dry. Neurological:      Mental Status: He is alert and oriented to person, place, and time. MDM  Number of Diagnoses or Management Options  Diagnosis management comments: Pt with blood work and imagine pending. Will have oncoming doc follow up. Amount and/or Complexity of Data Reviewed  Clinical lab tests: ordered and reviewed  Tests in the radiology section of CPT®: ordered and reviewed  Tests in the medicine section of CPT®: ordered and reviewed    Patient Progress  Patient progress: stable         Procedures        EKG: normal sinus rhythm, nonspecific ST and T waves changes. Rate 96.

## 2022-02-13 NOTE — PROGRESS NOTES
02/13/22 1100   Dual Skin Pressure Injury Assessment   Dual Skin Pressure Injury Assessment WDL   Second Care Provider (Based on [address] [place] [address]) Shamir Locke, DORYS   Pt has no noted skin breakdown on sacrum/heels. Skin is intact.

## 2022-02-14 LAB
ANION GAP SERPL CALC-SCNC: 2 MMOL/L (ref 7–16)
BASOPHILS # BLD: 0 K/UL (ref 0–0.2)
BASOPHILS NFR BLD: 0 % (ref 0–2)
BUN SERPL-MCNC: 24 MG/DL (ref 6–23)
CALCIUM SERPL-MCNC: 9.5 MG/DL (ref 8.3–10.4)
CHLORIDE SERPL-SCNC: 104 MMOL/L (ref 98–107)
CO2 SERPL-SCNC: 32 MMOL/L (ref 21–32)
CREAT SERPL-MCNC: 1.8 MG/DL (ref 0.8–1.5)
DIFFERENTIAL METHOD BLD: ABNORMAL
EOSINOPHIL # BLD: 0.7 K/UL (ref 0–0.8)
EOSINOPHIL NFR BLD: 7 % (ref 0.5–7.8)
ERYTHROCYTE [DISTWIDTH] IN BLOOD BY AUTOMATED COUNT: 14.3 % (ref 11.9–14.6)
GLUCOSE BLD STRIP.AUTO-MCNC: 118 MG/DL (ref 65–100)
GLUCOSE BLD STRIP.AUTO-MCNC: 152 MG/DL (ref 65–100)
GLUCOSE BLD STRIP.AUTO-MCNC: 185 MG/DL (ref 65–100)
GLUCOSE SERPL-MCNC: 120 MG/DL (ref 65–100)
HCT VFR BLD AUTO: 38.9 % (ref 41.1–50.3)
HGB BLD-MCNC: 12 G/DL (ref 13.6–17.2)
IMM GRANULOCYTES # BLD AUTO: 0 K/UL (ref 0–0.5)
IMM GRANULOCYTES NFR BLD AUTO: 0 % (ref 0–5)
LYMPHOCYTES # BLD: 3.1 K/UL (ref 0.5–4.6)
LYMPHOCYTES NFR BLD: 29 % (ref 13–44)
MCH RBC QN AUTO: 26.3 PG (ref 26.1–32.9)
MCHC RBC AUTO-ENTMCNC: 30.8 G/DL (ref 31.4–35)
MCV RBC AUTO: 85.3 FL (ref 79.6–97.8)
MONOCYTES # BLD: 1 K/UL (ref 0.1–1.3)
MONOCYTES NFR BLD: 9 % (ref 4–12)
NEUTS SEG # BLD: 5.6 K/UL (ref 1.7–8.2)
NEUTS SEG NFR BLD: 54 % (ref 43–78)
NRBC # BLD: 0 K/UL (ref 0–0.2)
PLATELET # BLD AUTO: 252 K/UL (ref 150–450)
PMV BLD AUTO: 9.8 FL (ref 9.4–12.3)
POTASSIUM SERPL-SCNC: 4.1 MMOL/L (ref 3.5–5.1)
RBC # BLD AUTO: 4.56 M/UL (ref 4.23–5.6)
SERVICE CMNT-IMP: ABNORMAL
SODIUM SERPL-SCNC: 138 MMOL/L (ref 138–145)
UFH PPP CHRO-ACNC: 0.64 IU/ML (ref 0.3–0.7)
UFH PPP CHRO-ACNC: 0.79 IU/ML (ref 0.3–0.7)
WBC # BLD AUTO: 10.4 K/UL (ref 4.3–11.1)

## 2022-02-14 PROCEDURE — 85520 HEPARIN ASSAY: CPT

## 2022-02-14 PROCEDURE — 74011000250 HC RX REV CODE- 250: Performed by: EMERGENCY MEDICINE

## 2022-02-14 PROCEDURE — 36415 COLL VENOUS BLD VENIPUNCTURE: CPT

## 2022-02-14 PROCEDURE — 74011250637 HC RX REV CODE- 250/637: Performed by: INTERNAL MEDICINE

## 2022-02-14 PROCEDURE — 82962 GLUCOSE BLOOD TEST: CPT

## 2022-02-14 PROCEDURE — 80048 BASIC METABOLIC PNL TOTAL CA: CPT

## 2022-02-14 PROCEDURE — 74011250636 HC RX REV CODE- 250/636: Performed by: INTERNAL MEDICINE

## 2022-02-14 PROCEDURE — 85025 COMPLETE CBC W/AUTO DIFF WBC: CPT

## 2022-02-14 PROCEDURE — 65270000029 HC RM PRIVATE

## 2022-02-14 PROCEDURE — 74011000250 HC RX REV CODE- 250: Performed by: INTERNAL MEDICINE

## 2022-02-14 RX ADMIN — CLONIDINE HYDROCHLORIDE 0.2 MG: 0.2 TABLET ORAL at 17:21

## 2022-02-14 RX ADMIN — LABETALOL HYDROCHLORIDE 100 MG: 100 TABLET, FILM COATED ORAL at 08:30

## 2022-02-14 RX ADMIN — DICLOFENAC SODIUM 4 G: 10 GEL TOPICAL at 17:21

## 2022-02-14 RX ADMIN — APIXABAN 10 MG: 5 TABLET, FILM COATED ORAL at 17:21

## 2022-02-14 RX ADMIN — SODIUM CHLORIDE, PRESERVATIVE FREE 10 ML: 5 INJECTION INTRAVENOUS at 05:02

## 2022-02-14 RX ADMIN — DICLOFENAC SODIUM 4 G: 10 GEL TOPICAL at 08:31

## 2022-02-14 RX ADMIN — CLONIDINE HYDROCHLORIDE 0.2 MG: 0.2 TABLET ORAL at 08:30

## 2022-02-14 RX ADMIN — DICLOFENAC SODIUM 4 G: 10 GEL TOPICAL at 13:00

## 2022-02-14 RX ADMIN — NIFEDIPINE 60 MG: 30 TABLET, FILM COATED, EXTENDED RELEASE ORAL at 08:30

## 2022-02-14 RX ADMIN — LISINOPRIL 40 MG: 20 TABLET ORAL at 08:30

## 2022-02-14 RX ADMIN — SODIUM CHLORIDE, PRESERVATIVE FREE 10 ML: 5 INJECTION INTRAVENOUS at 22:05

## 2022-02-14 RX ADMIN — APIXABAN 10 MG: 5 TABLET, FILM COATED ORAL at 08:30

## 2022-02-14 RX ADMIN — Medication 400 MG: at 08:31

## 2022-02-14 RX ADMIN — SODIUM CHLORIDE, PRESERVATIVE FREE 10 ML: 5 INJECTION INTRAVENOUS at 15:59

## 2022-02-14 RX ADMIN — HYDROCHLOROTHIAZIDE 25 MG: 25 TABLET ORAL at 08:30

## 2022-02-14 RX ADMIN — HEPARIN SODIUM 16 UNITS/KG/HR: 5000 INJECTION, SOLUTION INTRAVENOUS at 05:00

## 2022-02-14 RX ADMIN — ALLOPURINOL 200 MG: 100 TABLET ORAL at 08:30

## 2022-02-14 RX ADMIN — Medication 400 MG: at 17:21

## 2022-02-14 RX ADMIN — SODIUM CHLORIDE, PRESERVATIVE FREE 10 ML: 5 INJECTION INTRAVENOUS at 22:04

## 2022-02-14 RX ADMIN — DICLOFENAC SODIUM 4 G: 10 GEL TOPICAL at 22:00

## 2022-02-14 RX ADMIN — LABETALOL HYDROCHLORIDE 100 MG: 100 TABLET, FILM COATED ORAL at 17:20

## 2022-02-14 RX ADMIN — ROSUVASTATIN CALCIUM 10 MG: 10 TABLET, FILM COATED ORAL at 22:00

## 2022-02-14 NOTE — PROGRESS NOTES
Met with patient in room - ppe + social distance maintained. Demographics confirmed - patient stated he lives alone and is independent. He drives himself to appointments and has no reported problems obtaining medications. No report of DME - no report of HH. Patient plans to go home @ d/c and reported no concerns at this time. CM will continue to follow.      Care Management Interventions  Mode of Transport at Discharge: Self  Transition of Care Consult (CM Consult): Discharge Planning  Support Systems: Other Family Member(s)  Confirm Follow Up Transport: Family  The Patient and/or Patient Representative was Provided with a Choice of Provider and Agrees with the Discharge Plan?: Yes  Freedom of Choice List was Provided with Basic Dialogue that Supports the Patient's Individualized Plan of Care/Goals, Treatment Preferences and Shares the Quality Data Associated with the Providers?: Yes  Discharge Location  Patient Expects to be Discharged to[de-identified] Unable to determine at this time

## 2022-02-14 NOTE — PROGRESS NOTES
Hospitalist Progress Note   Admit Date:  2022 12:49 AM   Name:  Yuli Berg   Age:  61 y.o. Sex:  male  :  1965   MRN:  [id number]   Room:  604/01    Presenting Complaint: Shortness of Breath    Reason(s) for Admission: PE (pulmonary thromboembolism) [facility]) [I26.99]     Hospital Course & Interval History:   Patient is a 61 y.o. male with medical h/o pulmonary embolism, hypertension, diastolic heart failure, CKD, hypertension, obesity who presents to the emergency department with increasing cough and shortness of breath over the course of 2 days. Patient is noted to previously had a pulmonary embolism and was followed by hematology on an outpatient basis who felt that the patient was okay to discontinue anticoagulation. Patient denies any sick persons of contact in his household. Patient denies fever chills chest pain lower extremity pain. Review of systems is otherwise unremarkable    Subjective/24hr Events (22): Patient seen and examined. Patient denies fever chills nausea vomiting. Patient complains of shortness of breath. Abnormal radiographic findings discussed with patient in great detail. ROS:  10 systems reviewed and negative except as noted above. Assessment & Plan:     Pulmonary embolism-CTA noted to areas of concern for pulmonary embolism in 1 of these is noted to be in a similar location is prior pulmonary embolism. Nonetheless patient does have 2 areas of concern and I have fully anticoagulated the patient on heparin drip will obtain echocardiogram to evaluate for left heart strain. If echo is within normal limits will transition patient to Eliquis. It is noted that this is the second clotting event for this patient and likely will need lifelong anticoagulation.   Patient denies lower extremity pain or swelling and as such we will not get bilateral ultrasounds of lower extremities as this would not change clinical treatment plan as patient does need anticoagulated. 2/14-echo reviewed no evidence of left heart strain. Will discontinue heparin drip and transition patient to Eliquis. Hypoxemia-  2/14-likely secondary to underlying pulmonary embolism and continues to require 3 L of oxygen. Continue supportive care and treatment of underlying etiology. Ascending aortic aneurysmal dilatation 4 cm-  2/14-not a surgical candidate at this time due to being 4 cm. Patient would benefit from outpatient referral to cardiothoracic surgery for continued monitoring and likely will need surgical intervention if patient's aneurysm should worsen to 5.5 cm      Diastolic heart failure, euvolemic-we will continue patient's home regimen and monitor closely. Echo pending.        Obstructive sleep apnea-CPAP at at bedtime. Patient may use his own machine        Hypertension-we will resume patient's antihypertensive regiment and clinically follow. 2/14-continue patient's home regimen and monitor blood pressure closely      Chronic kidney disease-patient's renal function appears to be at baseline and will monitor closely. 2/14-closely monitor as patient's creatinine now 1.8 patient noted to have a baseline creatinine of 1.6-1.75. No additional nephrotoxic agents in place at this time. Should patient have any further decline in his creatinine consider holding patient's ACE inhibitor.     +1 pitting edema-this is likely multifactorial with use of calcium channel blocker and underlying CKD. Patient is asymptomatic at this time and will monitor.        Obesity-noted BMI is 55.75 and certainly lends complication to care        Hypoxemia-oxygen supplementation. Likely secondary to underlying pulmonary embolism.        Diabetes type 2-patient noted to be on Metformin as an outpatient basis will initiate sliding scale and check hemoglobin A1c  2/14-patient's hemoglobin A1c noted to be 7.1. Blood sugars currently to goal on sliding scale.   Patient may benefit from long-acting insulin at discharge. Disposition: Home when clinically stable  Diet: Diabetic  VTE ppx: Eliquis  GI ppx: Famotidine  CODE STATUS: Full code        Hospital Problems as of 2/14/2022 Date Reviewed: 1/24/2022          Codes Class Noted - Resolved POA    * (Principal) PE (pulmonary thromboembolism) ([facility] [address] ICD-10-CM: I26.99  ICD-9-CM: 415.19  2/13/2022 - Present Unknown        Diastolic CHF, chronic (HCC)--Dr. Song Krishnan (cardiology) ICD-10-CM: I50.32  ICD-9-CM: 428.32, 428.0  6/1/2016 - Present Yes    Overview Signed 6/1/2016  1:53 PM by Garrett Calvo on BID lasix, stable now. recent labs show Mg 1.6 and we will start Mag Oxide 400 BID.  K ok. Cr 1.4. Check labs again in 6 mos. BNP also is 19. Edema stable. Working out, [address], this is key for him. Check labs in 6 mos before follow up. Back off on lasix as he loses weight. Getting new PCP soon.     Noted 07/09/15             Essential hypertension ICD-10-CM: I10  ICD-9-CM: 401.9  4/6/2015 - Present Yes    Overview Signed 6/1/2016  1:49 PM by Mel Gilford     Echo 11/2012: EF 60% with severe LVH  Renal US 2012: poor study               Morbid obesity with BMI of 45.0-49.9, adult [facility]) ICD-10-CM: E66.01, Z68.42  ICD-9-CM: 278.01, V85.42  4/6/2015 - Present Yes        ORALIA on CPAP ICD-10-CM: G47.33, Z99.89  ICD-9-CM: 327.23, V46.8  Unknown - Present Yes    Overview Signed 6/1/2016  1:50 PM by Mel Gilford     Compliant with CPAP                     Objective:     Patient Vitals for the past 24 hrs:   Temp Pulse Resp BP SpO2   02/14/22 1052 98.1 °F (36.7 °C) 86 20 108/81 95 %   02/14/22 0709 98 °F (36.7 °C) 79 20 (!) 141/93 92 %   02/14/22 0500  72 18 (!) 134/97 96 %   02/13/22 2327     99 %   02/13/22 2303 97.4 °F (36.3 °C) 66 18 (!) 135/90 97 %   02/13/22 2003     92 %   02/13/22 1937 98.1 °F (36.7 °C) 84  (!) 125/90 97 %   02/13/22 1659    (!) 148/105    02/13/22 1524 98.1 °F (36.7 °C) 77 16 (!) 148/105 99 %   02/13/22 1146 98 °F (36.7 °C) 79 14 (!) 146/104 99 %     Oxygen Therapy  O2 Sat (%): 95 % (02/14/22 1052)  Pulse via Oximetry: 82 beats per minute (02/13/22 2327)  O2 Device: CPAP mask (02/13/22 2327)  O2 Flow Rate (L/min): 4 l/min (bleed in) (02/13/22 2327)    Estimated body mass index is 44.76 kg/m² as calculated from the following:    Height as of this encounter: 6' (1.829 m). Weight as of this encounter: 149.7 kg (330 lb). No intake or output data in the 24 hours ending 02/14/22 1056      Physical Exam:     Blood pressure 108/81, pulse 86, temperature 98.1 °F (36.7 °C), resp. rate 20, height 6' (1.829 m), weight 149.7 kg (330 lb), SpO2 95 %. General:    Well nourished. No overt distress  Head:  Normocephalic, atraumatic  Eyes:  Sclerae appear normal.  Pupils equally round. ENT:  Nares appear normal, no drainage. Moist oral mucosa  Neck:  No restricted ROM. Trachea midline   CV:   RRR. No m/r/g. No jugular venous distension. Lungs:   CTAB. No wheezing, rhonchi, or rales. Respirations even, unlabored  Abdomen: Bowel sounds present. Soft, nontender, nondistended. Extremities: No cyanosis or clubbing.  +1 bilateral edema  Skin:     No rashes and normal coloration. Warm and dry. Neuro:  CN II-XII grossly intact. Sensation intact. A&Ox3  Psych:  Normal mood and affect.       I have reviewed ordered lab tests and independently visualized imaging below:    Recent Labs:  Recent Results (from the past 48 hour(s))   EKG, 12 LEAD, INITIAL    Collection Time: 02/13/22 12:38 AM   Result Value Ref Range    Ventricular Rate 96 BPM    Atrial Rate 96 BPM    P-R Interval 188 ms    QRS Duration 102 ms    Q-T Interval 372 ms    QTC Calculation (Bezet) 469 ms    Calculated P Axis 68 degrees    Calculated R Axis -8 degrees    Calculated T Axis 71 degrees    Diagnosis       Normal sinus rhythm  Normal ECG  When compared with ECG of 14-JUN-2020 20:01,  No significant change was found  Confirmed by [facility]  MD (), Geena Hicks ([id number]) on 2/13/2022 7:42:23 AM     COVID-19 RAPID TEST    Collection Time: 02/13/22 12:48 AM   Result Value Ref Range    Specimen source Nasopharyngeal      COVID-19 rapid test Not detected NOTD     METABOLIC PANEL, COMPREHENSIVE    Collection Time: 02/13/22  1:00 AM   Result Value Ref Range    Sodium 136 (L) 138 - 145 mmol/L    Potassium 4.0 3.5 - 5.1 mmol/L    Chloride 104 98 - 107 mmol/L    CO2 31 21 - 32 mmol/L    Anion gap 1 (L) 7 - 16 mmol/L    Glucose 129 (H) 65 - 100 mg/dL    BUN 28 (H) 6 - 23 MG/DL    Creatinine 1.70 (H) 0.8 - 1.5 MG/DL    GFR est AA 54 (L) >60 ml/min/1.73m2    GFR est non-AA 44 (L) >60 ml/min/1.73m2    Calcium 9.9 8.3 - 10.4 MG/DL    Bilirubin, total 0.3 0.2 - 1.1 MG/DL    ALT (SGPT) 24 12 - 65 U/L    AST (SGOT) 20 15 - 37 U/L    Alk. phosphatase 111 50 - 136 U/L    Protein, total 8.2 6.3 - 8.2 g/dL    Albumin 3.6 3.5 - 5.0 g/dL    Globulin 4.6 (H) 2.3 - 3.5 g/dL    A-G Ratio 0.8 (L) 1.2 - 3.5     TROPONIN-HIGH SENSITIVITY    Collection Time: 02/13/22  1:00 AM   Result Value Ref Range    Troponin-High Sensitivity 14.6 (H) 0 - 14 pg/mL   D DIMER    Collection Time: 02/13/22  1:00 AM   Result Value Ref Range    D DIMER 2.46 (H) <0.56 ug/ml(FEU)   NT-PRO BNP    Collection Time: 02/13/22  1:00 AM   Result Value Ref Range    NT pro- 5 - 125 PG/ML   CBC WITH AUTOMATED DIFF    Collection Time: 02/13/22  1:00 AM   Result Value Ref Range    WBC 13.9 (H) 4.3 - 11.1 K/uL    RBC 4.99 4.23 - 5.6 M/uL    HGB 13.2 (L) 13.6 - 17.2 g/dL    HCT 41.4 41.1 - 50.3 %    MCV 83.0 79.6 - 97.8 FL    MCH 26.5 26.1 - 32.9 PG    MCHC 31.9 31.4 - 35.0 g/dL    RDW 14.2 11.9 - 14.6 %    PLATELET 880 797 - 090 K/uL    MPV 10.0 9.4 - 12.3 FL    ABSOLUTE NRBC 0.00 0.0 - 0.2 K/uL    DF AUTOMATED      NEUTROPHILS 66 43 - 78 %    LYMPHOCYTES 23 13 - 44 %    MONOCYTES 7 4.0 - 12.0 %    EOSINOPHILS 4 0.5 - 7.8 %    BASOPHILS 0 0.0 - 2.0 %    IMMATURE GRANULOCYTES 0 0.0 - 5.0 %    ABS. NEUTROPHILS 9.1 (H) 1.7 - 8.2 K/UL    ABS. LYMPHOCYTES 3.2 0.5 - 4.6 K/UL    ABS. MONOCYTES 0.9 0.1 - 1.3 K/UL    ABS. EOSINOPHILS 0.6 0.0 - 0.8 K/UL    ABS. BASOPHILS 0.0 0.0 - 0.2 K/UL    ABS. IMM. GRANS. 0.1 0.0 - 0.5 K/UL   TROPONIN-HIGH SENSITIVITY    Collection Time: 02/13/22  3:31 AM   Result Value Ref Range    Troponin-High Sensitivity 11.3 0 - 14 pg/mL   HEPARIN XA UFH    Collection Time: 02/13/22 12:23 PM   Result Value Ref Range    Heparin Xa UFH 0.75 (H) 0.3 - 0.7 IU/mL   PTT    Collection Time: 02/13/22 12:23 PM   Result Value Ref Range    aPTT 123.1 (H) 24.1 - 35.1 SEC   CBC WITH AUTOMATED DIFF    Collection Time: 02/13/22  1:36 PM   Result Value Ref Range    WBC 12.2 (H) 4.3 - 11.1 K/uL    RBC 4.78 4.23 - 5.6 M/uL    HGB 12.5 (L) 13.6 - 17.2 g/dL    HCT 40.4 (L) 41.1 - 50.3 %    MCV 84.5 79.6 - 97.8 FL    MCH 26.2 26.1 - 32.9 PG    MCHC 30.9 (L) 31.4 - 35.0 g/dL    RDW 14.4 11.9 - 14.6 %    PLATELET 966 805 - 141 K/uL    MPV 9.8 9.4 - 12.3 FL    ABSOLUTE NRBC 0.00 0.0 - 0.2 K/uL    DF AUTOMATED      NEUTROPHILS 58 43 - 78 %    LYMPHOCYTES 27 13 - 44 %    MONOCYTES 8 4.0 - 12.0 %    EOSINOPHILS 6 0.5 - 7.8 %    BASOPHILS 0 0.0 - 2.0 %    IMMATURE GRANULOCYTES 0 0.0 - 5.0 %    ABS. NEUTROPHILS 7.1 1.7 - 8.2 K/UL    ABS. LYMPHOCYTES 3.3 0.5 - 4.6 K/UL    ABS. MONOCYTES 1.0 0.1 - 1.3 K/UL    ABS. EOSINOPHILS 0.8 0.0 - 0.8 K/UL    ABS. BASOPHILS 0.0 0.0 - 0.2 K/UL    ABS. IMM.  GRANS. 0.0 0.0 - 0.5 K/UL   HEMOGLOBIN A1C WITH EAG    Collection Time: 02/13/22  1:36 PM   Result Value Ref Range    Hemoglobin A1c 7.1 (H) 4.20 - 6.30 %    Est. average glucose 157 mg/dL   GLUCOSE, POC    Collection Time: 02/13/22  3:55 PM   Result Value Ref Range    Glucose (POC) 105 (H) 65 - 100 mg/dL    Performed by WallacePreeyaPCT    ECHO ADULT COMPLETE    Collection Time: 02/13/22  4:45 PM   Result Value Ref Range    LV EDV A2C 145 mL    LV EDV A4C 151 mL    LV EDV  67 - 155 mL    LV ESV A2C 65 mL    LV ESV A4C 68 mL IVSd 1.3 (A) 0.6 - 1.0 cm    LVIDd 4.5 4.2 - 5.9 cm    LVIDs 3.3 cm    LVOT Diameter 2.2 cm    LVOT Mean Gradient 2 mmHg    LVOT VTI 20.4 cm    LVOT Peak Velocity 1.1 m/s    LVOT Peak Gradient 4 mmHg    LVPWd 1.6 (A) 0.6 - 1.0 cm    LV E' Lateral Velocity 11 cm/s    LV E' Septal Velocity 7 cm/s    LV Ejection Fraction A2C 56 %    LV Ejection Fraction A4C 55 %    EF BP 55 55 - 100 %    LVOT Area 3.8 cm2    LVOT SV 77.5 ml    LA Minor Axis 6.4 cm    LA Major Axis 6.5 cm    LA Area 2C 23.6 cm2    LA Area 4C 22.2 cm2    LA Volume BP 66 (A) 18 - 58 mL    LA Diameter 3.5 cm    AV Mean Velocity 1.0 m/s    AV Mean Gradient 4 mmHg    AV VTI 28.6 cm    AV Peak Velocity 1.4 m/s    AV Peak Gradient 8 mmHg    AV Area by VTI 2.7 cm2    AV Area by Peak Velocity 2.8 cm2    Aortic Root 3.3 cm    Ascending Aorta 4.1 cm    MV E Wave Deceleration Time 222.0 ms    MV A Velocity 0.74 m/s    MV E Velocity 0.94 m/s    RVIDd 3.8 cm    TAPSE 2.4 (A) 1.5 - 2.0 cm    Fractional Shortening 2D 27 28 - 44 %    LV EDV Index BP 57 mL/m2    LV ESV Index A4C 26 mL/m2    LV EDV Index A4C 57 mL/m2    LV ESV Index A2C 25 mL/m2    LV EDV Index A2C 55 mL/m2    LVIDd Index 1.70 cm/m2    LVIDs Index 1.25 cm/m2    LV RWT Ratio 0.71     LV Mass 2D 261.9 (A) 88 - 224 g    LV Mass 2D Index 99.2 49 - 115 g/m2    MV E/A 1.27     E/E' Ratio (Averaged) 10.99     E/E' Lateral 8.55     E/E' Septal 13.43     LA Volume Index BP 25 16 - 34 ml/m2    LVOT Stroke Volume Index 29.4 mL/m2    LA Size Index 1.33 cm/m2    LA/AO Root Ratio 1.06     Ao Root Index 1.25 cm/m2    Ascending Aorta Index 1.55 cm/m2    AV Velocity Ratio 0.79     LVOT:AV VTI Index 0.71     MIRIAN/BSA VTI 1.0 cm2/m2    MIRIAN/BSA Peak Velocity 1.1 cm2/m2    LA Volume 2C 69 (A) 18 - 58 mL    LA Volume Index 2C 26 16 - 34 mL/m2    LA Volume 4C 63 (A) 18 - 58 mL    LA Volume Index 4C 24 16 - 34 mL/m2   HEPARIN XA UFH    Collection Time: 02/13/22  7:29 PM   Result Value Ref Range    Heparin Xa UFH 0.64 0.3 - 0.7 IU/mL   GLUCOSE, POC    Collection Time: 02/13/22  8:37 PM   Result Value Ref Range    Glucose (POC) 150 (H) 65 - 100 mg/dL    Performed by [facility]    METABOLIC PANEL, BASIC    Collection Time: 02/14/22  1:04 AM   Result Value Ref Range    Sodium 138 138 - 145 mmol/L    Potassium 4.1 3.5 - 5.1 mmol/L    Chloride 104 98 - 107 mmol/L    CO2 32 21 - 32 mmol/L    Anion gap 2 (L) 7 - 16 mmol/L    Glucose 120 (H) 65 - 100 mg/dL    BUN 24 (H) 6 - 23 MG/DL    Creatinine 1.80 (H) 0.8 - 1.5 MG/DL    GFR est AA 50 (L) >60 ml/min/1.73m2    GFR est non-AA 42 (L) >60 ml/min/1.73m2    Calcium 9.5 8.3 - 10.4 MG/DL   CBC WITH AUTOMATED DIFF    Collection Time: 02/14/22  1:04 AM   Result Value Ref Range    WBC 10.4 4.3 - 11.1 K/uL    RBC 4.56 4.23 - 5.6 M/uL    HGB 12.0 (L) 13.6 - 17.2 g/dL    HCT 38.9 (L) 41.1 - 50.3 %    MCV 85.3 79.6 - 97.8 FL    MCH 26.3 26.1 - 32.9 PG    MCHC 30.8 (L) 31.4 - 35.0 g/dL    RDW 14.3 11.9 - 14.6 %    PLATELET 024 004 - 154 K/uL    MPV 9.8 9.4 - 12.3 FL    ABSOLUTE NRBC 0.00 0.0 - 0.2 K/uL    DF AUTOMATED      NEUTROPHILS 54 43 - 78 %    LYMPHOCYTES 29 13 - 44 %    MONOCYTES 9 4.0 - 12.0 %    EOSINOPHILS 7 0.5 - 7.8 %    BASOPHILS 0 0.0 - 2.0 %    IMMATURE GRANULOCYTES 0 0.0 - 5.0 %    ABS. NEUTROPHILS 5.6 1.7 - 8.2 K/UL    ABS. LYMPHOCYTES 3.1 0.5 - 4.6 K/UL    ABS. MONOCYTES 1.0 0.1 - 1.3 K/UL    ABS. EOSINOPHILS 0.7 0.0 - 0.8 K/UL    ABS. BASOPHILS 0.0 0.0 - 0.2 K/UL    ABS. IMM.  GRANS. 0.0 0.0 - 0.5 K/UL   HEPARIN XA UFH    Collection Time: 02/14/22  1:04 AM   Result Value Ref Range    Heparin Xa UFH 0.79 (H) 0.3 - 0.7 IU/mL   HEPARIN XA UFH    Collection Time: 02/14/22  6:54 AM   Result Value Ref Range    Heparin Xa UFH 0.64 0.3 - 0.7 IU/mL   GLUCOSE, POC    Collection Time: 02/14/22  7:06 AM   Result Value Ref Range    Glucose (POC) 152 (H) 65 - 100 mg/dL    Performed by Loly        All Micro Results     Procedure Component Value Units Date/Time    COVID-19 RAPID TEST [[id number]] Collected: 02/13/22 0048    Order Status: Completed Specimen: Nasopharyngeal Updated: 02/13/22 0134     Specimen source Nasopharyngeal        COVID-19 rapid test Not detected        Comment:      The specimen is NEGATIVE for SARS-CoV-2, the novel coronavirus associated with COVID-19. A negative result does not rule out COVID-19. This test has been authorized by the FDA under an Emergency Use Authorization (EUA) for use by authorized laboratories. Fact sheet for Healthcare Providers: [employer]are[employer]nz  Fact sheet for Patients: TradingView[employer]nz       Methodology: Isothermal Nucleic Acid Amplification               Other Studies:  ECHO ADULT COMPLETE    Result Date: 2/13/2022    Left Ventricle: Left ventricle size is normal. Increased wall thickness. Findings consistent with mild concentric hypertrophy. Normal wall motion. Normal left ventricular systolic function. EF by 2D Simpsons Biplane is 55%. Normal diastolic function.   Right Ventricle: Right ventricle size is normal. Normal systolic function.   Aortic Valve: Mildly thickened cusps. Mild transvalvular regurgitation. No stenosis.   Mitral Valve: Mild transvalvular regurgitation.   Aorta: Normal sized sinus of Valsalva. Dilated ascending aorta (41 mm).        Current Meds:  Current Facility-Administered Medications   Medication Dose Route Frequency    apixaban (ELIQUIS) tablet 10 mg  10 mg Oral BID    Followed by   Glades Lele ON 2/21/2022] apixaban (ELIQUIS) tablet 5 mg  5 mg Oral BID    sodium chloride (NS) flush 5-10 mL  5-10 mL IntraVENous Q8H    sodium chloride (NS) flush 5-10 mL  5-10 mL IntraVENous PRN    sodium chloride (NS) flush 5-40 mL  5-40 mL IntraVENous Q8H    sodium chloride (NS) flush 5-40 mL  5-40 mL IntraVENous PRN    acetaminophen (TYLENOL) tablet 650 mg  650 mg Oral Q6H PRN    Or    acetaminophen (TYLENOL) suppository 650 mg  650 mg Rectal Q6H PRN    polyethylene glycol (MIRALAX) packet 17 g  17 g Oral DAILY PRN    bisacodyL (DULCOLAX) suppository 10 mg  10 mg Rectal DAILY PRN    ondansetron (ZOFRAN ODT) tablet 4 mg  4 mg Oral Q8H PRN    Or    ondansetron (ZOFRAN) injection 4 mg  4 mg IntraVENous Q6H PRN    famotidine (PEPCID) tablet 20 mg  20 mg Oral BID PRN    alum-mag hydroxide-simeth (MYLANTA) oral suspension 15 mL  15 mL Oral Q6H PRN    allopurinoL (ZYLOPRIM) tablet 200 mg  200 mg Oral DAILY    cloNIDine HCL (CATAPRES) tablet 0.2 mg  0.2 mg Oral BID    diclofenac (VOLTAREN) 1 % topical gel 4 g  4 g Topical QID    labetaloL (NORMODYNE) tablet 100 mg  100 mg Oral BID    magnesium oxide (MAG-OX) tablet 400 mg  400 mg Oral BID    NIFEdipine ER (PROCARDIA XL) tablet 60 mg  60 mg Oral DAILY    rosuvastatin (CRESTOR) tablet 10 mg  10 mg Oral QHS    traMADoL (ULTRAM) tablet 50 mg  50 mg Oral Q6H PRN    lisinopriL (PRINIVIL, ZESTRIL) tablet 40 mg  40 mg Oral DAILY    hydroCHLOROthiazide (HYDRODIURIL) tablet 25 mg  25 mg Oral DAILY    insulin lispro (HUMALOG) injection   SubCUTAneous AC&HS    dextrose 40% (GLUTOSE) oral gel 1 Tube  15 g Oral PRN    glucagon (GLUCAGEN) injection 1 mg  1 mg IntraMUSCular PRN    dextrose 10% infusion 125-250 mL  125-250 mL IntraVENous PRN       Signed:  Kim Hodgkins, DO    Part of this note may have been written by using a voice dictation software. The note has been proof read but may still contain some grammatical/other typographical errors.

## 2022-02-14 NOTE — PROGRESS NOTES
Problem: Falls - Risk of  Goal: *Absence of Falls  Description: Document [place] Wiseman Fall Risk and appropriate interventions in the flowsheet. Outcome: Progressing Towards Goal  Note: Fall Risk Interventions:            Medication Interventions: Patient to call before getting OOB,Teach patient to arise slowly                   Problem: Patient Education: Go to Patient Education Activity  Goal: Patient/Family Education  Outcome: Progressing Towards Goal     Problem: Diabetes Self-Management  Goal: *Disease process and treatment process  Description: Define diabetes and identify own type of diabetes; list 3 options for treating diabetes. Outcome: Progressing Towards Goal  Goal: *Incorporating nutritional management into lifestyle  Description: Describe effect of type, amount and timing of food on blood glucose; list 3 methods for planning meals. Outcome: Progressing Towards Goal  Goal: *Incorporating physical activity into lifestyle  Description: State effect of exercise on blood glucose levels. Outcome: Progressing Towards Goal  Goal: *Developing strategies to promote health/change behavior  Description: Define the ABC's of diabetes; identify appropriate screenings, schedule and personal plan for screenings. Outcome: Progressing Towards Goal  Goal: *Using medications safely  Description: State effect of diabetes medications on diabetes; name diabetes medication taking, action and side effects. Outcome: Progressing Towards Goal  Goal: *Monitoring blood glucose, interpreting and using results  Description: Identify recommended blood glucose targets  and personal targets. Outcome: Progressing Towards Goal  Goal: *Prevention, detection, treatment of acute complications  Description: List symptoms of hyper- and hypoglycemia; describe how to treat low blood sugar and actions for lowering  high blood glucose level.   Outcome: Progressing Towards Goal  Goal: *Prevention, detection and treatment of chronic complications  Description: Define the natural course of diabetes and describe the relationship of blood glucose levels to long term complications of diabetes.   Outcome: Progressing Towards Goal  Goal: *Developing strategies to address psychosocial issues  Description: Describe feelings about living with diabetes; identify support needed and support network  Outcome: Progressing Towards Goal  Goal: *Insulin pump training  Outcome: Progressing Towards Goal  Goal: *Sick day guidelines  Outcome: Progressing Towards Goal  Goal: *Patient Specific Goal (EDIT GOAL, INSERT TEXT)  Outcome: Progressing Towards Goal

## 2022-02-14 NOTE — PROGRESS NOTES
Pt placed on CPAP - pt is in no distress at this time      02/13/22 2327   Oxygen Therapy   O2 Sat (%) 99 %   Pulse via Oximetry 82 beats per minute   O2 Device CPAP mask   O2 Flow Rate (L/min) 4 l/min  (bleed in)   Respiratory   Respiratory (WDL) X   Respiratory Pattern Regular   Chest/Tracheal Assessment Chest expansion, symmetrical   Breath Sounds Bilateral Diminished   CPAP/BIPAP   CPAP/BIPAP Start/Stop On   Device Mode CPAP, auto-titrating   $$ CPAP Daily Yes   Mask Type and Size Full face; Medium   Skin Condition intact   EPAP (cm H2O)   (5-20 cm H2O - auto titrating )   Total RR (Spontaneous) 18 breaths per minute   Leak (Estimated)   (mask fits well)   Pt's Home Machine No   Biomedical Check Performed Yes   Settings Verified Yes   Alarm Settings   Apnea 20   Pulmonary Toilet   Pulmonary Toilet Cough and deep breath;H. O.B elevated

## 2022-02-14 NOTE — PROGRESS NOTES
No events overnight. Pt currently resting quietly in bed with CPAP in place. Heparin infusing. Next Xa to be drawn at 0700.

## 2022-02-15 VITALS
WEIGHT: 315 LBS | DIASTOLIC BLOOD PRESSURE: 89 MMHG | HEIGHT: 72 IN | RESPIRATION RATE: 20 BRPM | HEART RATE: 68 BPM | BODY MASS INDEX: 42.66 KG/M2 | TEMPERATURE: 98.6 F | SYSTOLIC BLOOD PRESSURE: 126 MMHG | OXYGEN SATURATION: 93 %

## 2022-02-15 LAB
ANION GAP SERPL CALC-SCNC: 1 MMOL/L (ref 7–16)
BASOPHILS # BLD: 0 K/UL (ref 0–0.2)
BASOPHILS NFR BLD: 0 % (ref 0–2)
BUN SERPL-MCNC: 28 MG/DL (ref 6–23)
CALCIUM SERPL-MCNC: 9.6 MG/DL (ref 8.3–10.4)
CHLORIDE SERPL-SCNC: 107 MMOL/L (ref 98–107)
CO2 SERPL-SCNC: 31 MMOL/L (ref 21–32)
CREAT SERPL-MCNC: 1.7 MG/DL (ref 0.8–1.5)
DIFFERENTIAL METHOD BLD: ABNORMAL
EOSINOPHIL # BLD: 0.6 K/UL (ref 0–0.8)
EOSINOPHIL NFR BLD: 7 % (ref 0.5–7.8)
ERYTHROCYTE [DISTWIDTH] IN BLOOD BY AUTOMATED COUNT: 14.4 % (ref 11.9–14.6)
GLUCOSE BLD STRIP.AUTO-MCNC: 128 MG/DL (ref 65–100)
GLUCOSE SERPL-MCNC: 129 MG/DL (ref 65–100)
HCT VFR BLD AUTO: 37.1 % (ref 41.1–50.3)
HGB BLD-MCNC: 11.6 G/DL (ref 13.6–17.2)
IMM GRANULOCYTES # BLD AUTO: 0 K/UL (ref 0–0.5)
IMM GRANULOCYTES NFR BLD AUTO: 0 % (ref 0–5)
LYMPHOCYTES # BLD: 2.2 K/UL (ref 0.5–4.6)
LYMPHOCYTES NFR BLD: 27 % (ref 13–44)
MAGNESIUM SERPL-MCNC: 1.7 MG/DL (ref 1.6–2.3)
MCH RBC QN AUTO: 26.5 PG (ref 26.1–32.9)
MCHC RBC AUTO-ENTMCNC: 31.3 G/DL (ref 31.4–35)
MCV RBC AUTO: 84.7 FL (ref 79.6–97.8)
MONOCYTES # BLD: 0.7 K/UL (ref 0.1–1.3)
MONOCYTES NFR BLD: 9 % (ref 4–12)
NEUTS SEG # BLD: 4.6 K/UL (ref 1.7–8.2)
NEUTS SEG NFR BLD: 57 % (ref 43–78)
NRBC # BLD: 0 K/UL (ref 0–0.2)
PLATELET # BLD AUTO: 240 K/UL (ref 150–450)
PMV BLD AUTO: 10 FL (ref 9.4–12.3)
POTASSIUM SERPL-SCNC: 4.4 MMOL/L (ref 3.5–5.1)
RBC # BLD AUTO: 4.38 M/UL (ref 4.23–5.6)
SERVICE CMNT-IMP: ABNORMAL
SODIUM SERPL-SCNC: 139 MMOL/L (ref 138–145)
WBC # BLD AUTO: 8.1 K/UL (ref 4.3–11.1)

## 2022-02-15 PROCEDURE — 74011000250 HC RX REV CODE- 250: Performed by: EMERGENCY MEDICINE

## 2022-02-15 PROCEDURE — 85025 COMPLETE CBC W/AUTO DIFF WBC: CPT

## 2022-02-15 PROCEDURE — 74011000250 HC RX REV CODE- 250: Performed by: INTERNAL MEDICINE

## 2022-02-15 PROCEDURE — 82962 GLUCOSE BLOOD TEST: CPT

## 2022-02-15 PROCEDURE — 94660 CPAP INITIATION&MGMT: CPT

## 2022-02-15 PROCEDURE — 80048 BASIC METABOLIC PNL TOTAL CA: CPT

## 2022-02-15 PROCEDURE — 94761 N-INVAS EAR/PLS OXIMETRY MLT: CPT

## 2022-02-15 PROCEDURE — 36415 COLL VENOUS BLD VENIPUNCTURE: CPT

## 2022-02-15 PROCEDURE — 74011250637 HC RX REV CODE- 250/637: Performed by: INTERNAL MEDICINE

## 2022-02-15 RX ADMIN — CLONIDINE HYDROCHLORIDE 0.2 MG: 0.2 TABLET ORAL at 09:05

## 2022-02-15 RX ADMIN — LABETALOL HYDROCHLORIDE 100 MG: 100 TABLET, FILM COATED ORAL at 09:05

## 2022-02-15 RX ADMIN — SODIUM CHLORIDE, PRESERVATIVE FREE 10 ML: 5 INJECTION INTRAVENOUS at 05:47

## 2022-02-15 RX ADMIN — LISINOPRIL 40 MG: 20 TABLET ORAL at 09:05

## 2022-02-15 RX ADMIN — ALLOPURINOL 200 MG: 100 TABLET ORAL at 09:06

## 2022-02-15 RX ADMIN — HYDROCHLOROTHIAZIDE 25 MG: 25 TABLET ORAL at 09:05

## 2022-02-15 RX ADMIN — APIXABAN 10 MG: 5 TABLET, FILM COATED ORAL at 09:04

## 2022-02-15 RX ADMIN — Medication 400 MG: at 09:06

## 2022-02-15 RX ADMIN — NIFEDIPINE 60 MG: 30 TABLET, FILM COATED, EXTENDED RELEASE ORAL at 09:04

## 2022-02-15 RX ADMIN — SODIUM CHLORIDE, PRESERVATIVE FREE 10 ML: 5 INJECTION INTRAVENOUS at 05:46

## 2022-02-15 NOTE — PROGRESS NOTES
Problem: Falls - Risk of  Goal: *Absence of Falls  Description: Document [place] Pb Fall Risk and appropriate interventions in the flowsheet.   Outcome: Resolved/Met     Problem: Patient Education: Go to Patient Education Activity  Goal: Patient/Family Education  Outcome: Resolved/Met     Problem: Patient Education: Go to Patient Education Activity  Goal: Patient/Family Education  Outcome: Resolved/Met

## 2022-02-15 NOTE — DISCHARGE SUMMARY
Hospitalist Discharge Summary     Patient ID:  Sanju Douglas  [id number]  64 y.o.  1965  Admit date: 2/13/2022 12:49 AM  Discharge date and time: 2/15/2022  Attending: No att. providers found  PCP:  Greenfield Boys, PA  Treatment Team: Utilization Review: Monica Coello; Tech: Jarrod Larry RN    Principal Diagnosis PE (pulmonary thromboembolism) ([facility]   Principal Problem:    PE (pulmonary thromboembolism) ([facility] (2/13/2022)    Active Problems:    ORALIA on CPAP ()      Overview: Compliant with CPAP            Essential hypertension (4/6/2015)      Overview: Echo 11/2012: EF 60% with severe LVH      Renal US 2012: poor study            Morbid obesity with BMI of 45.0-49.9, adult ([facility] (1/0/5549)      Diastolic CHF, chronic (HCC)--Dr. Les Hammer (cardiology) (6/1/2016)      Overview: Remain on BID lasix, stable now. recent labs show Mg 1.6 and we will       start Mag Oxide 400 BID.  K ok. Cr 1.4. Check labs again in 6 mos. BNP       also is 19. Edema stable. Working out, [address], this is       key for him. Check labs in 6 mos before follow up. Back off on lasix as       he loses weight. Getting new PCP soon. Noted 07/09/15       Patient is a 61 y. o. male with medical h/o pulmonary embolism, hypertension, diastolic heart failure, CKD, hypertension, obesity who presents to the emergency department with increasing cough and shortness of breath over the course of 2 days.  Patient is noted to previously had a pulmonary embolism and was followed by hematology on an outpatient basis who felt that the patient was okay to discontinue anticoagulation.  Patient denies any sick persons of contact in his household. Jenna Sack denies fever chills chest pain lower extremity pain.  Review of systems is otherwise unremarkable    Interval History (2/15): patient examined at bedside. No acute overnight events. Patient feels better overall. Denies shortness of breath. Denies chest pain.  Denies fevers/chills. Has not noticed any black/red stools or any signs of bleeding. Hospital Course:  Please refer to the admission H&P for details of presentation. In summary, the patient is admitted for recurrent pulmonary embolism. He was initially started on heparin gtt. TTE negative for RH strain. He was transitioned to Eliquis. Oxygen qualifier completed and patient does not qualify for supplemental oxygen. He has tolerated anticoagulation without any overt signs of bleeding, he was counseled to be cognizant for signs of bleeding and to avoid NSAIDs and other medications that might increase the risk of bleeding. He is currently hemodynamically stable for hospital discharge. Details of hospitalization have been discussed with patient at bedside who understands and agrees with plan of care and discharge home. Return precautions provided. All questions answered. Significant Diagnostic Studies:       Labs: Results:       Chemistry Recent Labs     02/15/22  0550 02/14/22  0104 02/13/22  0100   * 120* 129*    138 136*   K 4.4 4.1 4.0    104 104   CO2 31 32 31   BUN 28* 24* 28*   CREA 1.70* 1.80* 1.70*   CA 9.6 9.5 9.9   AGAP 1* 2* 1*   AP  --   --  111   TP  --   --  8.2   ALB  --   --  3.6   GLOB  --   --  4.6*   AGRAT  --   --  0.8*      CBC w/Diff Recent Labs     02/15/22  0550 02/14/22  0104 02/13/22  1336   WBC 8.1 10.4 12.2*   RBC 4.38 4.56 4.78   HGB 11.6* 12.0* 12.5*   HCT 37.1* 38.9* 40.4*    252 270   GRANS 57 54 58   LYMPH 27 29 27   EOS 7 7 6      Cardiac Enzymes No results for input(s): CPK, CKND1, ELROY in the last 72 hours.     No lab exists for component: CKRMB, TROIP   Coagulation Recent Labs     02/13/22  1223   APTT 123.1*       Lipid Panel Lab Results   Component Value Date/Time    Cholesterol, total 97 (L) 10/06/2021 11:49 AM    HDL Cholesterol 37 (L) 10/06/2021 11:49 AM    LDL, calculated 38 10/06/2021 11:49 AM    LDL, calculated 32 07/15/2020 01:04 PM    VLDL, calculated 22 10/06/2021 11:49 AM    VLDL, calculated 34 07/15/2020 01:04 PM    Triglyceride 120 10/06/2021 11:49 AM    CHOL/HDL Ratio 3.0 07/06/2015 10:15 AM      BNP No results for input(s): BNPP in the last 72 hours. Liver Enzymes Recent Labs     02/13/22  0100   TP 8.2   ALB 3.6         Thyroid Studies Lab Results   Component Value Date/Time    TSH 2.170 05/21/2018 02:59 PM            Discharge Exam:  Visit Vitals  /89   Pulse 68   Temp 98.6 °F (37 °C)   Resp 20   Ht 6' (1.829 m)   Wt 157.4 kg (347 lb)   SpO2 93%   BMI 47.06 kg/m²     General appearance: alert, cooperative, no distress, appears stated age  Lungs: clear to auscultation bilaterally. No wheezing. Nonlabored. Heart: regular rate and rhythm, S1, S2 normal  Abdomen: soft, non-tender. Bowel sounds normal. No masses,  no organomegaly  Extremities: no cyanosis or edema  Neurologic: Grossly normal    Disposition: home  Discharge Condition: stable  Patient Instructions:   Discharge Medication List as of 2/15/2022 10:13 AM      CONTINUE these medications which have CHANGED    Details   !! apixaban (ELIQUIS) 5 mg tablet Start only after 10 mg tablets are finished., Print, Disp-60 Tablet, R-0      !! apixaban (ELIQUIS) 5 mg tablet Take 2 Tablets by mouth two (2) times a day for 12 doses. , Print, Disp-24 Tablet, R-0       !! - Potential duplicate medications found. Please discuss with provider. CONTINUE these medications which have NOT CHANGED    Details   Leg Brace (Knee Brace Large-XLarge) misc Wear on Right Knee Daily, Print, Disp-1 Each, R-0      traMADoL (ULTRAM) 50 mg tablet TAKE ONE TABLET BY MOUTH EVERY 6 HOURS, Historical Med      methocarbamoL (ROBAXIN) 750 mg tablet Historical Med      NIFEdipine ER (ADALAT CC) 60 mg ER tablet Take 1 Tablet by mouth daily. , Normal, Disp-90 Tablet, R-3      cloNIDine HCL (CATAPRES) 0.2 mg tablet Take 1 Tablet by mouth two (2) times a day., Normal, Disp-60 Tablet, R-11Please d/c any orders on file w/ instructions to take 0.1mg BID. DISABLED PLACARD (DISABLED PLACARD) DMV Use for parking in disabled parking spot, Print, Disp-1 Each, R-0      diclofenac (VOLTAREN) 1 % gel Apply 4 g to affected area four (4) times daily. , Normal, Disp-300 g, R-5      magnesium oxide (MAG-OX) 400 mg tablet Take 1 tablet by mouth twice daily, Normal, Disp-60 Tablet, R-11      allopurinoL (ZYLOPRIM) 100 mg tablet Take 2 Tablets by mouth daily. Indications: for gout attack prevention, Normal, Disp-60 Tablet, R-11      ergocalciferol (ERGOCALCIFEROL) 1,250 mcg (50,000 unit) capsule Take 1 Capsule by mouth every seven (7) days. , Normal, Disp-4 Capsule, R-11      labetaloL (NORMODYNE) 100 mg tablet Take 1 Tablet by mouth two (2) times a day., Normal, Disp-60 Tablet, R-11      lisinopril-hydroCHLOROthiazide (PRINZIDE, ZESTORETIC) 20-12.5 mg per tablet Take 2 tablets by mouth once daily, Normal, Disp-60 Tablet, R-11      metFORMIN (GLUCOPHAGE) 500 mg tablet TAKE ONE TABLET BY MOUTH TWICE A DAY WITH MEAL(S), Normal, Disp-60 Tablet, R-11      rosuvastatin (CRESTOR) 10 mg tablet Take 1 Tablet by mouth nightly., Normal, Disp-30 Tablet, R-11Cancel rx's on file for Atorvastatin      cpap machine kit by Does Not Apply route., Historical Med             Activity: Activity as tolerated  Diet: Resume previous diet  Wound Care: None needed    Follow-up  ·   With PCP within 1 week. Time spent to discharge patient 35 minutes  Signed:   Taylor Yoo DO  2/15/2022  2:54 PM

## 2022-02-15 NOTE — PROGRESS NOTES
Pt's D/C instructions completed. Verbalized understanding of all instructions including diet, activity, s/sx to alert MD, and f/u appointment.

## 2022-02-15 NOTE — PROGRESS NOTES
Problem: Diabetes Self-Management  Goal: *Disease process and treatment process  Description: Define diabetes and identify own type of diabetes; list 3 options for treating diabetes. 2/15/2022 1011 by Betty Crockett RN  Outcome: Resolved/Met  2/15/2022 1010 by Betty Crockett RN  Outcome: Resolved/Met  Goal: *Incorporating nutritional management into lifestyle  Description: Describe effect of type, amount and timing of food on blood glucose; list 3 methods for planning meals. 2/15/2022 1011 by Betty Crockett RN  Outcome: Resolved/Met  2/15/2022 1010 by Betty Crockett RN  Outcome: Resolved/Met  Goal: *Incorporating physical activity into lifestyle  Description: State effect of exercise on blood glucose levels. 2/15/2022 1011 by Betty Crockett RN  Outcome: Resolved/Met  2/15/2022 1010 by Betty Crockett RN  Outcome: Resolved/Met  Goal: *Developing strategies to promote health/change behavior  Description: Define the ABC's of diabetes; identify appropriate screenings, schedule and personal plan for screenings. 2/15/2022 1011 by Betty Crockett RN  Outcome: Resolved/Met  2/15/2022 1010 by Betty Crockett RN  Outcome: Resolved/Met  Goal: *Using medications safely  Description: State effect of diabetes medications on diabetes; name diabetes medication taking, action and side effects. 2/15/2022 1011 by Betty Crockett RN  Outcome: Resolved/Met  2/15/2022 1010 by Betty Crockett RN  Outcome: Resolved/Met  Goal: *Monitoring blood glucose, interpreting and using results  Description: Identify recommended blood glucose targets  and personal targets.   2/15/2022 1011 by Betty Crockett RN  Outcome: Resolved/Met  2/15/2022 1010 by Betty Crockett RN  Outcome: Resolved/Met  Goal: *Prevention, detection, treatment of acute complications  Description: List symptoms of hyper- and hypoglycemia; describe how to treat low blood sugar and actions for lowering  high blood glucose level.  2/15/2022 1011 by Chung Cortés RN  Outcome: Resolved/Met  2/15/2022 1010 by Chung Cortés RN  Outcome: Resolved/Met  Goal: *Prevention, detection and treatment of chronic complications  Description: Define the natural course of diabetes and describe the relationship of blood glucose levels to long term complications of diabetes.   2/15/2022 1011 by Chung Cortés RN  Outcome: Resolved/Met  2/15/2022 1010 by Chung Cortés RN  Outcome: Resolved/Met  Goal: *Developing strategies to address psychosocial issues  Description: Describe feelings about living with diabetes; identify support needed and support network  2/15/2022 1011 by Chung Cortés RN  Outcome: Resolved/Met  2/15/2022 1010 by Chung Cortés RN  Outcome: Resolved/Met  Goal: *Insulin pump training  2/15/2022 1011 by Chung Cortés RN  Outcome: Resolved/Met  2/15/2022 1010 by Chung Cortés RN  Outcome: Resolved/Met  Goal: *Sick day guidelines  2/15/2022 1011 by Chung Cortés RN  Outcome: Resolved/Met  2/15/2022 1010 by Chung Cortés RN  Outcome: Resolved/Met  Goal: *Patient Specific Goal (EDIT GOAL, INSERT TEXT)  2/15/2022 1011 by Chung Cortés RN  Outcome: Resolved/Met  2/15/2022 1010 by Chung Cortés RN  Outcome: Resolved/Met

## 2022-02-15 NOTE — PROGRESS NOTES
Problem: Falls - Risk of  Goal: *Absence of Falls  Description: Document Cherylle Cockayne Fall Risk and appropriate interventions in the flowsheet. Outcome: Progressing Towards Goal  Note: Fall Risk Interventions:            Medication Interventions: Assess postural VS orthostatic hypotension,Evaluate medications/consider consulting pharmacy,Patient to call before getting OOB,Teach patient to arise slowly                   Problem: Patient Education: Go to Patient Education Activity  Goal: Patient/Family Education  Outcome: Progressing Towards Goal     Problem: Diabetes Self-Management  Goal: *Disease process and treatment process  Description: Define diabetes and identify own type of diabetes; list 3 options for treating diabetes. Outcome: Progressing Towards Goal  Goal: *Incorporating nutritional management into lifestyle  Description: Describe effect of type, amount and timing of food on blood glucose; list 3 methods for planning meals. Outcome: Progressing Towards Goal  Goal: *Incorporating physical activity into lifestyle  Description: State effect of exercise on blood glucose levels. Outcome: Progressing Towards Goal  Goal: *Developing strategies to promote health/change behavior  Description: Define the ABC's of diabetes; identify appropriate screenings, schedule and personal plan for screenings. Outcome: Progressing Towards Goal  Goal: *Using medications safely  Description: State effect of diabetes medications on diabetes; name diabetes medication taking, action and side effects. Outcome: Progressing Towards Goal  Goal: *Monitoring blood glucose, interpreting and using results  Description: Identify recommended blood glucose targets  and personal targets. Outcome: Progressing Towards Goal  Goal: *Prevention, detection, treatment of acute complications  Description: List symptoms of hyper- and hypoglycemia; describe how to treat low blood sugar and actions for lowering  high blood glucose level.   Outcome: Progressing Towards Goal  Goal: *Prevention, detection and treatment of chronic complications  Description: Define the natural course of diabetes and describe the relationship of blood glucose levels to long term complications of diabetes.   Outcome: Progressing Towards Goal  Goal: *Developing strategies to address psychosocial issues  Description: Describe feelings about living with diabetes; identify support needed and support network  Outcome: Progressing Towards Goal  Goal: *Insulin pump training  Outcome: Progressing Towards Goal  Goal: *Sick day guidelines  Outcome: Progressing Towards Goal  Goal: *Patient Specific Goal (EDIT GOAL, INSERT TEXT)  Outcome: Progressing Towards Goal     Problem: Patient Education: Go to Patient Education Activity  Goal: Patient/Family Education  Outcome: Progressing Towards Goal

## 2022-02-15 NOTE — PROGRESS NOTES
No events overnight. Pt currently resting quietly in bed with CPAP in place. Bed lowest locked position. Call bell within reach.  Will continue to monitor and report to day shift RN

## 2022-02-15 NOTE — PROGRESS NOTES
Oxygen Qualifier       Room air: SpO2 with O2 and liter flow   Resting SpO2  94  %    Ambulating SpO2  92%        Completed by:    David Blanco, RT

## 2022-02-15 NOTE — PROGRESS NOTES
Patient will be d/c home today. Patient has no needs identified at being d/c home today. Family will transport home. Patient has met all treatment goals / milestones. CM will continue to monitor and remain available for any needs that may occur. Care Management Interventions  PCP Verified by CM:  Yes  Mode of Transport at Discharge: Self  Transition of Care Consult (CM Consult): Discharge Planning  Support Systems: Other Family Member(s)  Confirm Follow Up Transport: Family  The Patient and/or Patient Representative was Provided with a Choice of Provider and Agrees with the Discharge Plan?: Yes  Freedom of Choice List was Provided with Basic Dialogue that Supports the Patient's Individualized Plan of Care/Goals, Treatment Preferences and Shares the Quality Data Associated with the Providers?: Yes  Discharge Location  Patient Expects to be Discharged to[de-identified] Home

## 2022-02-23 PROBLEM — B35.1 TINEA UNGUIUM: Status: RESOLVED | Noted: 2019-02-05 | Resolved: 2022-02-23

## 2022-02-23 PROBLEM — I71.40 ABDOMINAL AORTIC ANEURYSM (AAA) 3.0 CM TO 5.5 CM IN DIAMETER IN MALE: Status: ACTIVE | Noted: 2022-02-13

## 2022-03-18 PROBLEM — Z86.711 HISTORY OF PULMONARY EMBOLISM: Status: ACTIVE | Noted: 2020-07-15

## 2022-03-18 PROBLEM — M17.0 PRIMARY OSTEOARTHRITIS OF BOTH KNEES: Status: ACTIVE | Noted: 2017-04-13

## 2022-03-18 PROBLEM — R07.89 ATYPICAL CHEST PAIN: Status: ACTIVE | Noted: 2020-06-14

## 2022-03-18 PROBLEM — M47.816 ARTHRITIS, LUMBAR SPINE: Status: ACTIVE | Noted: 2018-11-09

## 2022-03-18 PROBLEM — E11.22 TYPE 2 DIABETES MELLITUS WITH STAGE 3 CHRONIC KIDNEY DISEASE (HCC): Status: ACTIVE | Noted: 2018-01-24

## 2022-03-18 PROBLEM — H25.13 NUCLEAR SCLEROTIC CATARACT OF BOTH EYES: Status: ACTIVE | Noted: 2019-06-27

## 2022-03-18 PROBLEM — N18.30 TYPE 2 DIABETES MELLITUS WITH STAGE 3 CHRONIC KIDNEY DISEASE (HCC): Status: ACTIVE | Noted: 2018-01-24

## 2022-03-19 PROBLEM — E11.40 TYPE 2 DIABETES MELLITUS WITH DIABETIC NEUROPATHY, WITHOUT LONG-TERM CURRENT USE OF INSULIN (HCC): Status: ACTIVE | Noted: 2019-07-31

## 2022-03-19 PROBLEM — G89.29 CHRONIC MIDLINE LOW BACK PAIN WITHOUT SCIATICA: Status: ACTIVE | Noted: 2018-11-09

## 2022-03-19 PROBLEM — I35.1 NONRHEUMATIC AORTIC VALVE REGURGITATION: Status: ACTIVE | Noted: 2018-12-06

## 2022-03-19 PROBLEM — I26.99 PE (PULMONARY THROMBOEMBOLISM) (HCC): Status: ACTIVE | Noted: 2022-02-13

## 2022-03-19 PROBLEM — M54.50 CHRONIC MIDLINE LOW BACK PAIN WITHOUT SCIATICA: Status: ACTIVE | Noted: 2018-11-09

## 2022-03-19 PROBLEM — I71.40 ABDOMINAL AORTIC ANEURYSM (AAA) 3.0 CM TO 5.5 CM IN DIAMETER IN MALE (HCC): Status: ACTIVE | Noted: 2022-02-13

## 2022-03-20 PROBLEM — G89.29 CHRONIC RIGHT SHOULDER PAIN: Status: ACTIVE | Noted: 2018-11-09

## 2022-03-20 PROBLEM — M87.052 AVASCULAR NECROSIS OF BONES OF BOTH HIPS (HCC): Status: ACTIVE | Noted: 2020-07-15

## 2022-03-20 PROBLEM — M25.561 BILATERAL CHRONIC KNEE PAIN: Status: ACTIVE | Noted: 2017-04-13

## 2022-03-20 PROBLEM — M25.511 CHRONIC RIGHT SHOULDER PAIN: Status: ACTIVE | Noted: 2018-11-09

## 2022-03-20 PROBLEM — M87.051 AVASCULAR NECROSIS OF BONES OF BOTH HIPS (HCC): Status: ACTIVE | Noted: 2020-07-15

## 2022-03-20 PROBLEM — M25.562 BILATERAL CHRONIC KNEE PAIN: Status: ACTIVE | Noted: 2017-04-13

## 2022-03-20 PROBLEM — G89.29 BILATERAL CHRONIC KNEE PAIN: Status: ACTIVE | Noted: 2017-04-13

## 2022-03-24 ENCOUNTER — HOSPITAL ENCOUNTER (OUTPATIENT)
Dept: CT IMAGING | Age: 57
Discharge: HOME | End: 2022-03-24
Attending: PHYSICIAN ASSISTANT
Payer: COMMERCIAL

## 2022-03-24 DIAGNOSIS — I71.20 THORACIC AORTIC ANEURYSM WITHOUT RUPTURE: ICD-10-CM

## 2022-03-24 LAB — CREAT BLD-MCNC: 1.4 MG/DL (ref 0.8–1.5)

## 2022-03-24 PROCEDURE — 74011000636 HC RX REV CODE- 636: Performed by: PHYSICIAN ASSISTANT

## 2022-03-24 PROCEDURE — 71275 CT ANGIOGRAPHY CHEST: CPT

## 2022-03-24 PROCEDURE — 74011000258 HC RX REV CODE- 258: Performed by: PHYSICIAN ASSISTANT

## 2022-03-24 PROCEDURE — 82565 ASSAY OF CREATININE: CPT

## 2022-03-24 RX ORDER — SODIUM CHLORIDE 0.9 % (FLUSH) 0.9 %
10 SYRINGE (ML) INJECTION
Status: COMPLETED | OUTPATIENT
Start: 2022-03-24 | End: 2022-03-24

## 2022-03-24 RX ADMIN — Medication 10 ML: at 10:26

## 2022-03-24 RX ADMIN — SODIUM CHLORIDE 100 ML: 9 INJECTION, SOLUTION INTRAVENOUS at 10:26

## 2022-03-24 RX ADMIN — IOPAMIDOL 80 ML: 755 INJECTION, SOLUTION INTRAVENOUS at 10:26

## 2022-04-28 ENCOUNTER — HOSPITAL ENCOUNTER (OUTPATIENT)
Dept: LAB | Age: 57
Discharge: HOME | End: 2022-04-28
Payer: COMMERCIAL

## 2022-04-28 DIAGNOSIS — I26.99 PE (PULMONARY THROMBOEMBOLISM) (HCC): ICD-10-CM

## 2022-04-28 PROCEDURE — 36415 COLL VENOUS BLD VENIPUNCTURE: CPT

## 2022-04-28 PROCEDURE — 85306 CLOT INHIBIT PROT S FREE: CPT

## 2022-04-30 LAB — PROT S ACT/NOR PPP: 60 % (ref 63–140)

## 2022-05-06 ENCOUNTER — HOSPITAL ENCOUNTER (OUTPATIENT)
Dept: CT IMAGING | Age: 57
Discharge: HOME | End: 2022-05-06
Attending: INTERNAL MEDICINE
Payer: COMMERCIAL

## 2022-05-06 DIAGNOSIS — I26.99 PE (PULMONARY THROMBOEMBOLISM) (HCC): ICD-10-CM

## 2022-05-06 LAB — CREAT BLD-MCNC: 1.74 MG/DL (ref 0.8–1.5)

## 2022-05-06 PROCEDURE — 74160 CT ABDOMEN W/CONTRAST: CPT

## 2022-05-06 PROCEDURE — 74011000258 HC RX REV CODE- 258: Performed by: INTERNAL MEDICINE

## 2022-05-06 PROCEDURE — 74011000636 HC RX REV CODE- 636: Performed by: INTERNAL MEDICINE

## 2022-05-06 PROCEDURE — 82565 ASSAY OF CREATININE: CPT

## 2022-05-06 RX ORDER — SODIUM CHLORIDE 0.9 % (FLUSH) 0.9 %
10 SYRINGE (ML) INJECTION
Status: COMPLETED | OUTPATIENT
Start: 2022-05-06 | End: 2022-05-06

## 2022-05-06 RX ADMIN — Medication 10 ML: at 10:19

## 2022-05-06 RX ADMIN — DIATRIZOATE MEGLUMINE AND DIATRIZOATE SODIUM 15 ML: 660; 100 LIQUID ORAL; RECTAL at 10:17

## 2022-05-06 RX ADMIN — SODIUM CHLORIDE 100 ML: 9 INJECTION, SOLUTION INTRAVENOUS at 10:18

## 2022-05-06 RX ADMIN — IOPAMIDOL 100 ML: 755 INJECTION, SOLUTION INTRAVENOUS at 10:18

## 2022-05-11 ENCOUNTER — HOSPITAL ENCOUNTER (OUTPATIENT)
Dept: ULTRASOUND IMAGING | Age: 57
Discharge: HOME | End: 2022-05-11
Attending: INTERNAL MEDICINE
Payer: COMMERCIAL

## 2022-05-11 DIAGNOSIS — I26.99 PE (PULMONARY THROMBOEMBOLISM) (HCC): ICD-10-CM

## 2022-05-11 PROCEDURE — 93970 EXTREMITY STUDY: CPT

## 2022-05-12 PROBLEM — D68.59 PROTEIN S DEFICIENCY (HCC): Status: ACTIVE | Noted: 2022-05-12

## 2022-06-01 NOTE — TELEPHONE ENCOUNTER
Patient called requesting rx for Patient called requesting refill Pennsaid gel. Send to [address] [place] [address]. States it works better than Voltaren Gel. States had this a long time ago.

## 2022-06-01 NOTE — TELEPHONE ENCOUNTER
Please let him know there is no pennsaid gel anymore. There is a pennsaid solution but not gel anymore.

## 2022-06-02 NOTE — TELEPHONE ENCOUNTER
At this point, he needs to be seen for a visit, he has an appointment upcoming in July, can discuss with jay.

## 2022-06-04 ENCOUNTER — HOSPITAL ENCOUNTER (EMERGENCY)
Age: 57
Discharge: HOME | End: 2022-06-05
Attending: EMERGENCY MEDICINE
Payer: COMMERCIAL

## 2022-06-04 ENCOUNTER — APPOINTMENT (OUTPATIENT)
Dept: GENERAL RADIOLOGY | Age: 57
End: 2022-06-04
Payer: COMMERCIAL

## 2022-06-04 DIAGNOSIS — J11.1 INFLUENZA WITH RESPIRATORY MANIFESTATION OTHER THAN PNEUMONIA: Primary | ICD-10-CM

## 2022-06-04 DIAGNOSIS — J45.909 UNCOMPLICATED ASTHMA, UNSPECIFIED ASTHMA SEVERITY, UNSPECIFIED WHETHER PERSISTENT: ICD-10-CM

## 2022-06-04 LAB
ALBUMIN SERPL-MCNC: 3.8 G/DL (ref 3.5–5)
ALBUMIN/GLOB SERPL: 0.8 {RATIO} (ref 1.2–3.5)
ALP SERPL-CCNC: 100 U/L (ref 50–136)
ALT SERPL-CCNC: 29 U/L (ref 12–65)
ANION GAP SERPL CALC-SCNC: 7 MMOL/L (ref 7–16)
ARTERIAL PATENCY WRIST A: POSITIVE
AST SERPL-CCNC: 22 U/L (ref 15–37)
BASE DEFICIT BLD-SCNC: 0.2 MMOL/L
BASOPHILS # BLD: 0 K/UL (ref 0–0.2)
BASOPHILS NFR BLD: 0 % (ref 0–2)
BDY SITE: ABNORMAL
BILIRUB SERPL-MCNC: 0.2 MG/DL (ref 0.2–1.1)
BUN SERPL-MCNC: 18 MG/DL (ref 6–23)
CALCIUM SERPL-MCNC: 9.5 MG/DL (ref 8.3–10.4)
CHLORIDE SERPL-SCNC: 103 MMOL/L (ref 98–107)
CO2 SERPL-SCNC: 27 MMOL/L (ref 21–32)
CREAT SERPL-MCNC: 1.6 MG/DL (ref 0.8–1.5)
DIFFERENTIAL METHOD BLD: ABNORMAL
EOSINOPHIL # BLD: 0.6 K/UL (ref 0–0.8)
EOSINOPHIL NFR BLD: 7 % (ref 0.5–7.8)
ERYTHROCYTE [DISTWIDTH] IN BLOOD BY AUTOMATED COUNT: 13.9 % (ref 11.9–14.6)
FLUAV AG NPH QL IA: POSITIVE
FLUBV AG NPH QL IA: NEGATIVE
GAS FLOW.O2 O2 DELIVERY SYS: ABNORMAL L/MIN
GLOBULIN SER CALC-MCNC: 4.5 G/DL (ref 2.3–3.5)
GLUCOSE SERPL-MCNC: 134 MG/DL (ref 65–100)
HCO3 BLD-SCNC: 23.2 MMOL/L (ref 22–26)
HCT VFR BLD AUTO: 40.2 % (ref 41.1–50.3)
HGB BLD-MCNC: 13 G/DL (ref 13.6–17.2)
IMM GRANULOCYTES # BLD AUTO: 0 K/UL (ref 0–0.5)
IMM GRANULOCYTES NFR BLD AUTO: 0 % (ref 0–5)
LACTATE SERPL-SCNC: 1 MMOL/L (ref 0.4–2)
LYMPHOCYTES # BLD: 0.9 K/UL (ref 0.5–4.6)
LYMPHOCYTES NFR BLD: 10 % (ref 13–44)
MCH RBC QN AUTO: 26.9 PG (ref 26.1–32.9)
MCHC RBC AUTO-ENTMCNC: 32.3 G/DL (ref 31.4–35)
MCV RBC AUTO: 83.1 FL (ref 79.6–97.8)
MONOCYTES # BLD: 1.1 K/UL (ref 0.1–1.3)
MONOCYTES NFR BLD: 12 % (ref 4–12)
NEUTS SEG # BLD: 6.6 K/UL (ref 1.7–8.2)
NEUTS SEG NFR BLD: 71 % (ref 43–78)
NRBC # BLD: 0 K/UL (ref 0–0.2)
NT PRO BNP: 291 PG/ML (ref 5–125)
PCO2 BLD: 33.7 MMHG (ref 35–45)
PH BLD: 7.45 [PH] (ref 7.35–7.45)
PLATELET # BLD AUTO: 238 K/UL (ref 150–450)
PMV BLD AUTO: 10.3 FL (ref 9.4–12.3)
PO2 BLD: 68 MMHG (ref 75–100)
POTASSIUM SERPL-SCNC: 3.8 MMOL/L (ref 3.5–5.1)
PROT SERPL-MCNC: 8.3 G/DL (ref 6.3–8.2)
RBC # BLD AUTO: 4.84 M/UL (ref 4.23–5.6)
RESPIRATORY RATE, POC: 22 (ref 5–40)
SAO2 % BLD: 94.2 % (ref 95–98)
SARS-COV-2 RDRP RESP QL NAA+PROBE: NOT DETECTED
SERVICE CMNT-IMP: ABNORMAL
SODIUM SERPL-SCNC: 137 MMOL/L (ref 138–145)
SOURCE: NORMAL
SPECIMEN SOURCE: ABNORMAL
SPECIMEN TYPE: ABNORMAL
TROPONIN I SERPL HS-MCNC: 14.9 PG/ML (ref 0–14)
TROPONIN I SERPL HS-MCNC: 15 PG/ML (ref 0–14)
WBC # BLD AUTO: 9.2 K/UL (ref 4.3–11.1)

## 2022-06-04 PROCEDURE — 80053 COMPREHEN METABOLIC PANEL: CPT

## 2022-06-04 PROCEDURE — 6360000002 HC RX W HCPCS: Performed by: EMERGENCY MEDICINE

## 2022-06-04 PROCEDURE — 6370000000 HC RX 637 (ALT 250 FOR IP): Performed by: EMERGENCY MEDICINE

## 2022-06-04 PROCEDURE — 93005 ELECTROCARDIOGRAM TRACING: CPT | Performed by: EMERGENCY MEDICINE

## 2022-06-04 PROCEDURE — 96368 THER/DIAG CONCURRENT INF: CPT

## 2022-06-04 PROCEDURE — 94664 DEMO&/EVAL PT USE INHALER: CPT

## 2022-06-04 PROCEDURE — 83605 ASSAY OF LACTIC ACID: CPT

## 2022-06-04 PROCEDURE — 84484 ASSAY OF TROPONIN QUANT: CPT

## 2022-06-04 PROCEDURE — 84146 ASSAY OF PROLACTIN: CPT

## 2022-06-04 PROCEDURE — 83880 ASSAY OF NATRIURETIC PEPTIDE: CPT

## 2022-06-04 PROCEDURE — 96365 THER/PROPH/DIAG IV INF INIT: CPT

## 2022-06-04 PROCEDURE — 96375 TX/PRO/DX INJ NEW DRUG ADDON: CPT

## 2022-06-04 PROCEDURE — 82803 BLOOD GASES ANY COMBINATION: CPT

## 2022-06-04 PROCEDURE — 71045 X-RAY EXAM CHEST 1 VIEW: CPT

## 2022-06-04 PROCEDURE — 87635 SARS-COV-2 COVID-19 AMP PRB: CPT

## 2022-06-04 PROCEDURE — 85025 COMPLETE CBC W/AUTO DIFF WBC: CPT

## 2022-06-04 PROCEDURE — 94640 AIRWAY INHALATION TREATMENT: CPT

## 2022-06-04 PROCEDURE — 2580000003 HC RX 258: Performed by: EMERGENCY MEDICINE

## 2022-06-04 PROCEDURE — 36600 WITHDRAWAL OF ARTERIAL BLOOD: CPT

## 2022-06-04 PROCEDURE — 87040 BLOOD CULTURE FOR BACTERIA: CPT

## 2022-06-04 PROCEDURE — 99285 EMERGENCY DEPT VISIT HI MDM: CPT

## 2022-06-04 PROCEDURE — 87804 INFLUENZA ASSAY W/OPTIC: CPT

## 2022-06-04 RX ORDER — ACETAMINOPHEN 500 MG
1000 TABLET ORAL
Status: COMPLETED | OUTPATIENT
Start: 2022-06-04 | End: 2022-06-04

## 2022-06-04 RX ORDER — SODIUM CHLORIDE 9 MG/ML
INJECTION, SOLUTION INTRAVENOUS PRN
Status: DISCONTINUED | OUTPATIENT
Start: 2022-06-04 | End: 2022-06-05 | Stop reason: HOSPADM

## 2022-06-04 RX ORDER — DEXAMETHASONE SODIUM PHOSPHATE 10 MG/ML
10 INJECTION INTRAMUSCULAR; INTRAVENOUS
Status: COMPLETED | OUTPATIENT
Start: 2022-06-04 | End: 2022-06-04

## 2022-06-04 RX ORDER — IPRATROPIUM BROMIDE AND ALBUTEROL SULFATE 2.5; .5 MG/3ML; MG/3ML
1 SOLUTION RESPIRATORY (INHALATION)
Status: COMPLETED | OUTPATIENT
Start: 2022-06-04 | End: 2022-06-04

## 2022-06-04 RX ORDER — SODIUM CHLORIDE 0.9 % (FLUSH) 0.9 %
5-40 SYRINGE (ML) INJECTION EVERY 12 HOURS SCHEDULED
Status: DISCONTINUED | OUTPATIENT
Start: 2022-06-04 | End: 2022-06-05 | Stop reason: HOSPADM

## 2022-06-04 RX ORDER — SODIUM CHLORIDE 0.9 % (FLUSH) 0.9 %
5-40 SYRINGE (ML) INJECTION PRN
Status: DISCONTINUED | OUTPATIENT
Start: 2022-06-04 | End: 2022-06-05 | Stop reason: HOSPADM

## 2022-06-04 RX ADMIN — CEFTRIAXONE 1000 MG: 1 INJECTION, POWDER, FOR SOLUTION INTRAMUSCULAR; INTRAVENOUS at 20:26

## 2022-06-04 RX ADMIN — AZITHROMYCIN DIHYDRATE 500 MG: 500 INJECTION, POWDER, LYOPHILIZED, FOR SOLUTION INTRAVENOUS at 20:45

## 2022-06-04 RX ADMIN — DEXAMETHASONE SODIUM PHOSPHATE 10 MG: 10 INJECTION INTRAMUSCULAR; INTRAVENOUS at 20:26

## 2022-06-04 RX ADMIN — ACETAMINOPHEN 1000 MG: 500 TABLET ORAL at 20:39

## 2022-06-04 RX ADMIN — IPRATROPIUM BROMIDE AND ALBUTEROL SULFATE 1 AMPULE: .5; 3 SOLUTION RESPIRATORY (INHALATION) at 20:04

## 2022-06-04 ASSESSMENT — PAIN SCALES - GENERAL
PAINLEVEL_OUTOF10: 8
PAINLEVEL_OUTOF10: 8
PAINLEVEL_OUTOF10: 7

## 2022-06-04 ASSESSMENT — ENCOUNTER SYMPTOMS
WHEEZING: 1
COUGH: 1

## 2022-06-04 ASSESSMENT — PAIN - FUNCTIONAL ASSESSMENT: PAIN_FUNCTIONAL_ASSESSMENT: 0-10

## 2022-06-04 ASSESSMENT — PAIN DESCRIPTION - LOCATION
LOCATION: HEAD
LOCATION: HEAD

## 2022-06-04 NOTE — ED PROVIDER NOTES
[facility] Emergency Department Provider Note                   PCP:                PATRICK Traore               Age: 61 y.o. Sex: male     No diagnosis found. DISPOSITION         New Prescriptions    No medications on file       Orders Placed This Encounter   Procedures    COVID-19, Rapid    Rapid influenza A/B antigens    Culture, Blood 1    Blood Culture 1    Blood Culture 2    XR CHEST PORTABLE    Lactate, Sepsis    Blood gas, arterial    Troponin    Prolactin    CBC with Auto Differential    CMP    Urinalysis    proBNP, N-TERMINAL    Initiate Oxygen Therapy Protocol    EKG 12 Lead        MDM  Number of Diagnoses or Management Options  Diagnosis management comments: Patient sounds better on exam.  Patient has resolution of his wheezing and satting 97% with deep inspirations. Patient's heart rate is 102. Patient wishes to be DC'd. We will give prescription for cough medicine, albuterol, AeroChamber, steroids, Tamiflu and have patient follow-up as needed. Amount and/or Complexity of Data Reviewed  Clinical lab tests: ordered and reviewed  Tests in the radiology section of CPT®: ordered and reviewed  Review and summarize past medical records: yes  Independent visualization of images, tracings, or specimens: yes    Risk of Complications, Morbidity, and/or Mortality  Presenting problems: high  Management options: high    Critical Care  Total time providing critical care: 30-74 minutes    Patient Progress  Patient progress: stable       Allyssa Win is a 61 y.o. male who presents to the Emergency Department with chief complaint of    Chief Complaint   Patient presents with    Generalized Body Aches      Patient is a 78-year-old male with history of chronic kidney disease, hypertension, diabetes, obesity, CHF who presents with cold cough and congestion for the last 2 days with muscle aches and body aches. Patient states he has had nausea as well as sweating.   Patient has been able to Gatherings with Friends and Family: Not on file    Attends Confucianism Services: Not on file    Active Member of Clubs or Organizations: Not on file    Attends Club or Organization Meetings: Not on file    Marital Status: Not on file        No Known Allergies     Vitals signs and nursing note reviewed. Patient Vitals for the past 4 hrs:   Temp Pulse Resp BP SpO2   06/04/22 1801 100.1 °F (37.8 °C) (!) 115 20 (!) 163/102 94 %          Physical Exam  Vitals and nursing note reviewed. Constitutional:       Appearance: Normal appearance. HENT:      Head: Normocephalic and atraumatic. Nose: Nose normal.      Mouth/Throat:      Mouth: Mucous membranes are dry. Eyes:      Extraocular Movements: Extraocular movements intact. Conjunctiva/sclera: Conjunctivae normal.      Pupils: Pupils are equal, round, and reactive to light. Cardiovascular:      Rate and Rhythm: Normal rate. Pulses: Normal pulses. Heart sounds: Normal heart sounds. Pulmonary:      Effort: Pulmonary effort is normal.      Breath sounds: Examination of the right-upper field reveals wheezing. Examination of the left-upper field reveals wheezing. Examination of the right-middle field reveals wheezing. Examination of the left-middle field reveals wheezing. Examination of the right-lower field reveals wheezing. Examination of the left-lower field reveals wheezing. Wheezing present. Abdominal:      General: Abdomen is flat. Musculoskeletal:         General: Normal range of motion. Cervical back: Normal range of motion and neck supple. Skin:     General: Skin is warm. Capillary Refill: Capillary refill takes less than 2 seconds. Neurological:      General: No focal deficit present. Mental Status: He is alert and oriented to person, place, and time. Psychiatric:         Mood and Affect: Mood normal.         Behavior: Behavior normal.         Thought Content:  Thought content normal.         Judgment: Judgment normal.          Procedures    Labs Reviewed   RAPID INFLUENZA A/B ANTIGENS - Abnormal; Notable for the following components:       Result Value    Influenza A Ag Positive (*)     All other components within normal limits   COVID-19, RAPID   CULTURE, BLOOD 1   CULTURE, BLOOD 1   CULTURE, BLOOD 1   LACTATE, SEPSIS   LACTATE, SEPSIS   BLOOD GAS, ARTERIAL   TROPONIN   PROLACTIN   CBC WITH AUTO DIFFERENTIAL   COMPREHENSIVE METABOLIC PANEL   URINALYSIS   PROBNP, N-TERMINAL        XR CHEST PORTABLE    (Results Pending)            Ella Coma Scale  Eye Opening: Spontaneous  Best Verbal Response: Oriented  Best Motor Response: Obeys commands  Parks Coma Scale Score: 15           EKG interpretation: Sinus tachycardia, rate of 117, no STEMI, normal intervals, nonspecific T wave abnormalities, inverted T wave V6, no ectopy          Voice dictation software was used during the making of this note. This software is not perfect and grammatical and other typographical errors may be present. This note has not been completely proofread for errors.       Kurt Paget, MD  06/05/22 12[address] Juan Diego Garcia MD  09/12/22 [id number]

## 2022-06-04 NOTE — ED TRIAGE NOTES
Patient ambulatory to triage with mask in place. Patient reports body aches, chills, nausea, and cough.

## 2022-06-05 VITALS
TEMPERATURE: 100.1 F | BODY MASS INDEX: 42.66 KG/M2 | HEIGHT: 72 IN | HEART RATE: 96 BPM | DIASTOLIC BLOOD PRESSURE: 105 MMHG | OXYGEN SATURATION: 94 % | SYSTOLIC BLOOD PRESSURE: 159 MMHG | RESPIRATION RATE: 18 BRPM | WEIGHT: 315 LBS

## 2022-06-05 LAB
EKG ATRIAL RATE: 118 BPM
EKG DIAGNOSIS: NORMAL
EKG P AXIS: 67 DEGREES
EKG P-R INTERVAL: 176 MS
EKG Q-T INTERVAL: 311 MS
EKG QRS DURATION: 96 MS
EKG QTC CALCULATION (BAZETT): 434 MS
EKG R AXIS: 48 DEGREES
EKG T AXIS: 57 DEGREES
EKG VENTRICULAR RATE: 117 BPM
PROLACTIN SERPL-MCNC: 10.1 NG/ML

## 2022-06-05 PROCEDURE — 6370000000 HC RX 637 (ALT 250 FOR IP): Performed by: EMERGENCY MEDICINE

## 2022-06-05 RX ORDER — BENZONATATE 200 MG/1
200 CAPSULE ORAL 3 TIMES DAILY PRN
Qty: 30 CAPSULE | Refills: 0 | Status: SHIPPED | OUTPATIENT
Start: 2022-06-05 | End: 2022-06-12

## 2022-06-05 RX ORDER — OSELTAMIVIR PHOSPHATE 75 MG/1
75 CAPSULE ORAL 2 TIMES DAILY
Qty: 20 CAPSULE | Refills: 0 | Status: SHIPPED | OUTPATIENT
Start: 2022-06-05 | End: 2022-06-15

## 2022-06-05 RX ORDER — PREDNISONE 50 MG/1
50 TABLET ORAL DAILY
Qty: 5 TABLET | Refills: 0 | Status: SHIPPED | OUTPATIENT
Start: 2022-06-05 | End: 2022-06-10

## 2022-06-05 RX ORDER — OSELTAMIVIR PHOSPHATE 75 MG/1
75 CAPSULE ORAL ONCE
Status: COMPLETED | OUTPATIENT
Start: 2022-06-05 | End: 2022-06-05

## 2022-06-05 RX ORDER — INHALER,ASSIST DEVICE,MED MASK
1 SPACER (EA) MISCELLANEOUS AS NEEDED
Qty: 1 EACH | Refills: 0 | Status: SHIPPED | OUTPATIENT
Start: 2022-06-05

## 2022-06-05 RX ORDER — ALBUTEROL SULFATE 90 UG/1
2 AEROSOL, METERED RESPIRATORY (INHALATION) 4 TIMES DAILY PRN
Qty: 54 G | Refills: 1 | Status: SHIPPED | OUTPATIENT
Start: 2022-06-05 | End: 2022-07-18 | Stop reason: ALTCHOICE

## 2022-06-05 RX ADMIN — OSELTAMIVIR PHOSPHATE 75 MG: 75 CAPSULE ORAL at 00:22

## 2022-06-05 NOTE — ED NOTES
I have reviewed discharge instructions with the patient. The patient verbalized understanding. Patient left ED via Discharge Method: wheelchair to Home with spouse. Opportunity for questions and clarification provided. Patient given 5 scripts. To continue your aftercare when you leave the hospital, you may receive an automated call from our care team to check in on how you are doing. This is a free service and part of our promise to provide the best care and service to meet your aftercare needs.  If you have questions, or wish to unsubscribe from this service please call [phone]. Thank you for Choosing our [facility] Emergency Department.           Alisha Tsai RN  06/05/22 [id number]

## 2022-06-06 ENCOUNTER — CARE COORDINATION (OUTPATIENT)
Dept: CARE COORDINATION | Facility: CLINIC | Age: 57
End: 2022-06-06

## 2022-06-06 NOTE — CARE COORDINATION
Date/Time:  6/6/2022 1:13 PM  Attempted to reach patient by telephone due to ED visit on 6/4/2022 for influenza with respiratory manifestation    SARS COV2 test results were NEGATIVE  Influenza A was POSITIVE  Call within 2 business days of discharge: Yes   Left HIPPA compliant message requesting a return call. Will attempt to reach patient again.

## 2022-06-07 ENCOUNTER — CARE COORDINATION (OUTPATIENT)
Dept: CARE COORDINATION | Facility: CLINIC | Age: 57
End: 2022-06-07

## 2022-06-07 NOTE — CARE COORDINATION
Date/Time:  6/7/2022 1:30 PM  Attempted to reach patient by telephone for   ED visit on 6/4/2022 for influenza with respiratory manifestation    SARS COV2 test results were NEGATIVE  Influenza A was POSITIVE  Call within 2 business days of discharge: Yes   Left HIPPA compliant message requesting a return call.     No further outreaches scheduled at this time due to UTR

## 2022-06-11 PROBLEM — N18.30 TYPE 2 DIABETES MELLITUS WITH STAGE 3 CHRONIC KIDNEY DISEASE, WITHOUT LONG-TERM CURRENT USE OF INSULIN (HCC): Status: ACTIVE | Noted: 2018-01-24

## 2022-06-11 PROBLEM — E11.22 TYPE 2 DIABETES MELLITUS WITH STAGE 3 CHRONIC KIDNEY DISEASE, WITHOUT LONG-TERM CURRENT USE OF INSULIN (HCC): Status: ACTIVE | Noted: 2018-01-24

## 2022-06-14 ENCOUNTER — OFFICE VISIT (OUTPATIENT)
Dept: CARDIOLOGY CLINIC | Age: 57
End: 2022-06-14
Payer: COMMERCIAL

## 2022-06-14 VITALS
HEART RATE: 68 BPM | BODY MASS INDEX: 42.66 KG/M2 | DIASTOLIC BLOOD PRESSURE: 80 MMHG | SYSTOLIC BLOOD PRESSURE: 120 MMHG | WEIGHT: 315 LBS | HEIGHT: 72 IN

## 2022-06-14 DIAGNOSIS — I71.40 ABDOMINAL AORTIC ANEURYSM (AAA) 3.0 CM TO 5.5 CM IN DIAMETER IN MALE: ICD-10-CM

## 2022-06-14 DIAGNOSIS — I35.1 NONRHEUMATIC AORTIC VALVE REGURGITATION: ICD-10-CM

## 2022-06-14 DIAGNOSIS — I10 ESSENTIAL HYPERTENSION: ICD-10-CM

## 2022-06-14 DIAGNOSIS — I50.32 DIASTOLIC CHF, CHRONIC (HCC): Primary | ICD-10-CM

## 2022-06-14 PROCEDURE — 99214 OFFICE O/P EST MOD 30 MIN: CPT | Performed by: INTERNAL MEDICINE

## 2022-06-14 NOTE — PROGRESS NOTES
[address], [address] [facility], [address] [place] [address]  PHONE: [phone]     22    NAME:  Debi Mendez  : 1965  MRN: [id number]       SUBJECTIVE:   Debi Mendez is a 61 y.o. male seen for a follow up visit regarding the following:     Chief Complaint   Patient presents with    Congestive Heart Failure     3 mo f/u        HPI: Here for eval of severe HTN and LVH. On CPAP, complaint now. Echo  and 2020: normal EF, mild to mod AI   2020 Admission for acute PE, HTN emergency. Getting over the flu now. Walking some now. No CP, pressure. ALEJANDRA stable. No palp, edema. Still on NOAC. Doing well on anticoagulation treatment as reviewed today, no bleeding issues or excessive bruising noted. Compliant with meds and CPAP. Patient denies recent history of orthopnea, PND, excessive dizziness and/or syncope. Declined weight loss surgery in the past.          Past Medical History, Past Surgical History, Family history, Social History, and Medications were all reviewed with the patient today and updated as necessary.      Current Outpatient Medications   Medication Sig Dispense Refill    albuterol sulfate HFA (VENTOLIN HFA) 108 (90 Base) MCG/ACT inhaler Inhale 2 puffs into the lungs 4 times daily as needed for Wheezing 54 g 1    Spacer/Aero-Holding Chambers (AEROCHAMBER PLUS MOLLY-VU W/MASK) MISC 1 Units by Does not apply route as needed (shortness of breath) 1 each 0    allopurinol (ZYLOPRIM) 100 MG tablet Take 200 mg by mouth daily      apixaban (ELIQUIS) 5 MG TABS tablet Take 5 mg by mouth 2 times daily      cloNIDine (CATAPRES) 0.2 MG tablet Take 0.2 mg by mouth 2 times daily      ergocalciferol (ERGOCALCIFEROL) 1.25 MG (50042 UT) capsule Take 50,000 Units by mouth every 7 days      labetalol (NORMODYNE) 100 MG tablet Take 100 mg by mouth 2 times daily      lisinopril-hydroCHLOROthiazide (PRINZIDE;ZESTORETIC) 20-12.5 MG per tablet Take 2 tablets by mouth once daily      magnesium oxide (MAG-OX) 400 MG tablet Take 1 tablet by mouth twice daily      metFORMIN (GLUCOPHAGE) 500 MG tablet TAKE ONE TABLET BY MOUTH TWICE A DAY WITH MEAL(S)      NIFEdipine (ADALAT CC) 60 MG extended release tablet Take 60 mg by mouth daily      rosuvastatin (CRESTOR) 10 MG tablet Take 10 mg by mouth      traMADol (ULTRAM) 50 MG tablet TAKE ONE TABLET BY MOUTH EVERY 6 HOURS      oseltamivir (TAMIFLU) 75 MG capsule Take 1 capsule by mouth 2 times daily for 10 days 20 capsule 0    diclofenac sodium (VOLTAREN) 1 % GEL Apply 4 g topically 4 times daily (Patient not taking: Reported on 6/14/2022)       No current facility-administered medications for this visit. No Known Allergies  Patient Active Problem List    Diagnosis Date Noted    Protein S deficiency ([facility] [address] 05/12/2022    ROXY on CPAP      Compliant with CPAP        Abdominal aortic aneurysm (AAA) 3.0 cm to 5.5 cm in diameter in male [facility]) 02/13/2022     Incidental finding during hospital stay in Feb 2022. 4 cm in size. Recommendation by hospital medicine was to have cardiothoracic surgery   follow him.  History of pulmonary embolism 07/15/2020    Avascular necrosis of bones of both hips ([address] 07/15/2020    Type 2 diabetes mellitus with diabetic neuropathy, without long-term current use of insulin ([address] 07/31/2019    Nuclear sclerotic cataract of both eyes 06/27/2019    Nonrheumatic aortic valve regurgitation 12/06/2018    Arthritis, lumbar spine 11/09/2018    Chronic midline low back pain without sciatica 11/09/2018    Chronic right shoulder pain 11/09/2018    Type 2 diabetes mellitus with stage 3 chronic kidney disease, without long-term current use of insulin ([address] 01/24/2018    Primary osteoarthritis of both knees 04/13/2017    Bilateral chronic knee pain 69/88/9287    Diastolic CHF, chronic ([address] 06/01/2016     Remain on BID lasix, stable now. recent labs show Mg 1.6 and we will   start Mag Oxide 400 BID. K ok.  Cr 1.4. Check labs again in 6 mos. BNP   also is 19. Edema stable. Working out, [address], this is   key for him. Check labs in 6 mos before follow up. Back off on lasix as   he loses weight. Getting new PCP soon.     Noted 07/09/15        Dyslipidemia 04/22/2016    Vitamin D deficiency     Erectile dysfunction associated with type 2 diabetes mellitus ([facility] [address] 10/30/2015    Morbid obesity with BMI of 45.0-49.9, adult ([facility] [address] 04/06/2015    Essential hypertension 04/06/2015     Echo 11/2012: EF 60% with severe LVH  Renal US 2012: poor study        Allergic rhinitis due to animal (cat) (dog) hair and dander 11/19/2014    Mild intermittent asthma without complication 11/35/8191    Uncontrolled type 2 diabetes mellitus with microalbuminuria, without long-term current use of insulin ([facility] [address] 11/03/2014    History of gout 11/03/2014    Senile incipient cataract 04/17/2014    Presbyopia 04/17/2014      Past Surgical History:   Procedure Laterality Date    AMPUTATION Right     Right index finger happened at work    COLONOSCOPY  08/2016    COLONOSCOPY N/A 1/21/2022    COLONOSCOPY/ BMI 44 performed by Tom Garcia MD at [place] [address] COLONOSCOPY N/A 8/26/2016    COLONOSCOPY / BMI 47 performed by Herlinda Steward MD at [facility] ENDOSCOPY    COLONOSCOPY FLX DX W/COLLJ SPEC WHEN PFRMD  1/21/2022         COLSC FLX W/REMOVAL LESION BY HOT BX FORCEPS  8/26/2016         HEENT      ear surgery during childhood    PRE-MALIGNANT / BENIGN SKIN LESION EXCISION Right     Behind right ear    TONSILLECTOMY       Family History   Problem Relation Age of Onset    Hypertension Other     Colon Cancer Neg Hx     Cancer Sister     Diabetes Mother     Hypertension Mother     No Known Problems Father     No Known Problems Brother     No Known Problems Sister     Diabetes Other     Other Other         Renal Failure      Social History     Tobacco Use    Smoking status: Never Smoker    Smokeless tobacco: Never Used   Substance Use Topics    Alcohol use: Yes     Alcohol/week: 13.0 standard drinks         ROS:    No obvious pertinent positives on review of systems except for what was outlined in the HPI today.       PHYSICAL EXAM:     /80   Pulse 68   Ht 6' (1.829 m)   Wt (!) 336 lb (152.4 kg)   BMI 45.57 kg/m²    General/Constitutional:   Alert and oriented x 3, no acute distress  HEENT:   normocephalic, atraumatic, moist mucous membranes  Neck:   No JVD or carotid bruits bilaterally  Cardiovascular:   regular rate and rhythm, no murmur/rub/gallop appreciated  Pulmonary:   clear to auscultation bilaterally, no respiratory distress  Abdomen:   soft, non-tender, non-distended  Ext:   No sig LE edema bilaterally  Skin:  warm and dry, no obvious rashes seen  Neuro:   no obvious sensory or motor deficits  Psychiatric:   normal mood and affect      Lab Results   Component Value Date     06/04/2022    K 3.8 06/04/2022     06/04/2022    CO2 27 06/04/2022    BUN 18 06/04/2022    CREATININE 1.60 06/04/2022    GLUCOSE 134 06/04/2022    CALCIUM 9.5 06/04/2022        Lab Results   Component Value Date    WBC 9.2 06/04/2022    HGB 13.0 (L) 06/04/2022    HCT 40.2 (L) 06/04/2022    MCV 83.1 06/04/2022     06/04/2022       No results found for: TSHFT4, TSH    Lab Results   Component Value Date    LABA1C 7.1 (H) 02/13/2022     Lab Results   Component Value Date     02/13/2022       Lab Results   Component Value Date    CHOL 97 (L) 10/06/2021    CHOL 94 (L) 01/06/2021    CHOL 104 07/15/2020     Lab Results   Component Value Date    TRIG 120 10/06/2021    TRIG 93 01/06/2021    TRIG 170 (H) 07/15/2020     Lab Results   Component Value Date    HDL 37 (L) 10/06/2021    HDL 36 (L) 01/06/2021    HDL 38 (L) 07/15/2020     Lab Results   Component Value Date    LDLCALC 38 10/06/2021    LDLCALC 40 01/06/2021    LDLCALC 32 07/15/2020     Lab Results   Component Value Date    LABVLDL 34 07/15/2020 VLDL 22 10/06/2021    VLDL 18 01/06/2021     No results found for: NOELELMER        I have Independently reviewed prior care notes, any ER records available, cardiac testing, labs and results with the patient and before seeing the patient today. Also independently reviewed outside records when available. ASSESSMENT:    Meridith Monday was seen today for congestive heart failure. Diagnoses and all orders for this visit:    Diastolic CHF, chronic (HCC)  -     Transthoracic echocardiogram (TTE) complete with contrast, bubble, strain, and 3D PRN; Future    Essential hypertension    Abdominal aortic aneurysm (AAA) 3.0 cm to 5.5 cm in diameter in male [facility])  -     Transthoracic echocardiogram (TTE) complete with contrast, bubble, strain, and 3D PRN; Future    Nonrheumatic aortic valve regurgitation          PLAN:    1. HTN:  Better now. Remain on BP meds, remain on CPAP. Needs ongoing better diet and exercising. Follow K and Cr. On diuretics. Instructed patient to follow low sodium diet. Monitor BP at home, will review at follow up. Aim for at least 30 minutes moderate physical activity at least 5 days a week. If needed, work on stress reduction and lifestyle modification. 2. ROXY:  Stay on CPAP. Stressed today. 3. DHF:  Echo ok, follow. Needs better diet, less salt intake. Weight loss key. Follow BP. Echo in 6 mos. 4. CKD: seeing renal as well. Renal has been adjusted BP meds. 5. AI: check echo. 6. PE:  back on [address] per heme, follow. Echo ok. I have reviewed their anticoagulation treatment, instructed patient to call with any bleeding issues such as melana or other. The patient will call with any issues related to their treatment. All questions were answered with the patient voicing understanding.         Patient has been instructed and agrees to call our office with any issues or other concerns related to their cardiac condition(s) and/or complaint(s). Return in about 6 months (around 12/14/2022).        MACI GALINDO DO  6/14/2022

## 2022-06-20 ENCOUNTER — OFFICE VISIT (OUTPATIENT)
Dept: FAMILY MEDICINE CLINIC | Facility: CLINIC | Age: 57
End: 2022-06-20
Payer: COMMERCIAL

## 2022-06-20 VITALS
RESPIRATION RATE: 18 BRPM | HEART RATE: 78 BPM | OXYGEN SATURATION: 97 % | WEIGHT: 315 LBS | TEMPERATURE: 98.9 F | BODY MASS INDEX: 42.66 KG/M2 | DIASTOLIC BLOOD PRESSURE: 78 MMHG | HEIGHT: 72 IN | SYSTOLIC BLOOD PRESSURE: 118 MMHG

## 2022-06-20 DIAGNOSIS — M17.0 PRIMARY OSTEOARTHRITIS OF BOTH KNEES: Primary | ICD-10-CM

## 2022-06-20 PROCEDURE — 99213 OFFICE O/P EST LOW 20 MIN: CPT | Performed by: NURSE PRACTITIONER

## 2022-06-20 RX ORDER — METHOCARBAMOL 750 MG/1
TABLET, FILM COATED ORAL
COMMUNITY
Start: 2022-06-06

## 2022-06-20 ASSESSMENT — ENCOUNTER SYMPTOMS
SINUS PAIN: 0
COUGH: 0
DIARRHEA: 0
BACK PAIN: 0
CONSTIPATION: 0
SHORTNESS OF BREATH: 0
NAUSEA: 0
ABDOMINAL PAIN: 0
SORE THROAT: 0
RHINORRHEA: 0
VOMITING: 0
EYE PAIN: 0

## 2022-06-20 NOTE — PROGRESS NOTES
Volodymyr Rodriguez (:  1965) is a 61 y.o. male,Established patient, here for evaluation of the following chief complaint(s):  Knee Pain (Bilateral knee pain ongoing for quite a while)         ASSESSMENT/PLAN:  1. Primary osteoarthritis of both knees  Assessment & Plan:   Uncontrolled, diclofenac gel for pain. Refer to ortho for management. Orders:  -     Lonnie Foley., MD, [address]  -     diclofenac sodium (VOLTAREN) 1 % GEL; Apply to affected joints twice daily, Disp-50 g, R-3, Normal      Return if symptoms worsen or fail to improve. Subjective   SUBJECTIVE/OBJECTIVE:  Presents with complaints of knee pain, requests arthritis cream. Reports chronic problem. Has not seen ortho in some time, pain is worsening. Is willing to consider TKA. Review of Systems   Constitutional: Negative for activity change, appetite change, fatigue and fever. HENT: Negative for congestion, ear pain, rhinorrhea, sinus pain and sore throat. Eyes: Negative for pain and visual disturbance. Respiratory: Negative for cough and shortness of breath. Cardiovascular: Negative for chest pain and palpitations. Gastrointestinal: Negative for abdominal pain, constipation, diarrhea, nausea and vomiting. Genitourinary: Negative for dysuria, frequency, hematuria and urgency. Musculoskeletal: Positive for arthralgias. Negative for back pain. Skin: Negative for rash. Neurological: Negative for dizziness and headaches. Objective   Physical Exam  Vitals reviewed. Constitutional:       Appearance: Normal appearance. HENT:      Head: Normocephalic and atraumatic. Eyes:      Extraocular Movements: Extraocular movements intact. Pupils: Pupils are equal, round, and reactive to light. Cardiovascular:      Rate and Rhythm: Normal rate and regular rhythm. Heart sounds: Normal heart sounds.    Pulmonary:      Effort: Pulmonary effort is normal.      Breath sounds: Normal breath sounds. Abdominal:      General: Abdomen is flat. Musculoskeletal:      Right knee: Crepitus present. No swelling, effusion or erythema. Left knee: Crepitus present. No swelling, effusion or erythema. Skin:     General: Skin is warm and dry. Neurological:      General: No focal deficit present. Mental Status: He is alert and oriented to person, place, and time. An electronic signature was used to authenticate this note.     --EUGENE Delacruz - CNP

## 2022-07-05 DIAGNOSIS — E11.22 TYPE 2 DIABETES MELLITUS WITH STAGE 3 CHRONIC KIDNEY DISEASE, WITHOUT LONG-TERM CURRENT USE OF INSULIN, UNSPECIFIED WHETHER STAGE 3A OR 3B CKD (HCC): ICD-10-CM

## 2022-07-05 DIAGNOSIS — E55.9 VITAMIN D DEFICIENCY: ICD-10-CM

## 2022-07-05 DIAGNOSIS — I10 ESSENTIAL HYPERTENSION: Primary | ICD-10-CM

## 2022-07-05 DIAGNOSIS — Z87.39 HISTORY OF GOUT: ICD-10-CM

## 2022-07-05 DIAGNOSIS — N18.30 TYPE 2 DIABETES MELLITUS WITH STAGE 3 CHRONIC KIDNEY DISEASE, WITHOUT LONG-TERM CURRENT USE OF INSULIN, UNSPECIFIED WHETHER STAGE 3A OR 3B CKD (HCC): ICD-10-CM

## 2022-07-05 DIAGNOSIS — E78.5 DYSLIPIDEMIA: ICD-10-CM

## 2022-07-11 ENCOUNTER — NURSE ONLY (OUTPATIENT)
Dept: FAMILY MEDICINE CLINIC | Facility: CLINIC | Age: 57
End: 2022-07-11

## 2022-07-11 DIAGNOSIS — E55.9 VITAMIN D DEFICIENCY: ICD-10-CM

## 2022-07-11 DIAGNOSIS — E78.5 DYSLIPIDEMIA: ICD-10-CM

## 2022-07-11 DIAGNOSIS — Z87.39 HISTORY OF GOUT: ICD-10-CM

## 2022-07-11 DIAGNOSIS — N18.30 TYPE 2 DIABETES MELLITUS WITH STAGE 3 CHRONIC KIDNEY DISEASE, WITHOUT LONG-TERM CURRENT USE OF INSULIN, UNSPECIFIED WHETHER STAGE 3A OR 3B CKD (HCC): ICD-10-CM

## 2022-07-11 DIAGNOSIS — I10 ESSENTIAL HYPERTENSION: ICD-10-CM

## 2022-07-11 DIAGNOSIS — E11.22 TYPE 2 DIABETES MELLITUS WITH STAGE 3 CHRONIC KIDNEY DISEASE, WITHOUT LONG-TERM CURRENT USE OF INSULIN, UNSPECIFIED WHETHER STAGE 3A OR 3B CKD (HCC): ICD-10-CM

## 2022-07-12 LAB
25(OH)D3 SERPL-MCNC: 83.8 NG/ML (ref 30–100)
ALBUMIN SERPL-MCNC: 3.8 G/DL (ref 3.5–5)
ALBUMIN/GLOB SERPL: 1.1 {RATIO} (ref 1.2–3.5)
ALP SERPL-CCNC: 87 U/L (ref 50–136)
ALT SERPL-CCNC: 27 U/L (ref 12–65)
ANION GAP SERPL CALC-SCNC: 9 MMOL/L (ref 7–16)
AST SERPL-CCNC: 16 U/L (ref 15–37)
BILIRUB SERPL-MCNC: 0.3 MG/DL (ref 0.2–1.1)
BUN SERPL-MCNC: 22 MG/DL (ref 6–23)
CALCIUM SERPL-MCNC: 9.8 MG/DL (ref 8.3–10.4)
CHLORIDE SERPL-SCNC: 105 MMOL/L (ref 98–107)
CHOLEST SERPL-MCNC: 101 MG/DL
CO2 SERPL-SCNC: 24 MMOL/L (ref 21–32)
CREAT SERPL-MCNC: 1.6 MG/DL (ref 0.8–1.5)
CREAT UR-MCNC: >400 MG/DL
EST. AVERAGE GLUCOSE BLD GHB EST-MCNC: 151 MG/DL
GLOBULIN SER CALC-MCNC: 3.6 G/DL (ref 2.3–3.5)
GLUCOSE SERPL-MCNC: 120 MG/DL (ref 65–100)
HBA1C MFR BLD: 6.9 % (ref 4.8–5.6)
HDLC SERPL-MCNC: 39 MG/DL (ref 40–60)
HDLC SERPL: 2.6 {RATIO}
LDLC SERPL CALC-MCNC: 36.8 MG/DL
MICROALBUMIN UR-MCNC: 4.68 MG/DL
MICROALBUMIN/CREAT UR-RTO: ABNORMAL MG/G
POTASSIUM SERPL-SCNC: 4 MMOL/L (ref 3.5–5.1)
PROT SERPL-MCNC: 7.4 G/DL (ref 6.3–8.2)
SODIUM SERPL-SCNC: 138 MMOL/L (ref 136–145)
TRIGL SERPL-MCNC: 126 MG/DL (ref 35–150)
URATE SERPL-MCNC: 5.3 MG/DL (ref 2.6–6)
VLDLC SERPL CALC-MCNC: 25.2 MG/DL (ref 6–23)

## 2022-07-13 RX ORDER — ALLOPURINOL 100 MG/1
TABLET ORAL
Qty: 60 TABLET | Refills: 0 | OUTPATIENT
Start: 2022-07-13

## 2022-07-13 RX ORDER — ERGOCALCIFEROL 1.25 MG/1
CAPSULE ORAL
Qty: 4 CAPSULE | Refills: 0 | OUTPATIENT
Start: 2022-07-13

## 2022-07-17 NOTE — PROGRESS NOTES
Tone Inman (: 1965) is a 61 y.o. male, an established patient, is here for evaluation of the following chief complaint(s):  Chief Complaint   Patient presents with    Hypertension    Diabetes    Results     Lab results           Followed by       ASSESSMENT/PLAN:  Ketan Schilling was seen today for hypertension, diabetes and results. Diagnoses and all orders for this visit:    Essential hypertension  -     labetalol (NORMODYNE) 100 MG tablet; Take 1 tablet by mouth in the morning and 1 tablet before bedtime.  -     Basic Metabolic Panel; Future    Type 2 diabetes mellitus with stage 3 chronic kidney disease, without long-term current use of insulin, unspecified whether stage 3a or 3b CKD ([facility] [address]  -     [facility]  -     Basic Metabolic Panel; Future  -     Hemoglobin A1C; Future    Type 2 diabetes mellitus with microalbuminuria, without long-term current use of insulin (HCC)  -     [facility]    Type 2 diabetes mellitus with diabetic neuropathy, without long-term current use of insulin ([facility])  -     [facility]    Dyslipidemia    History of gout  -     allopurinol (ZYLOPRIM) 100 MG tablet; Take 2 tablets by mouth in the morning. Vitamin D deficiency  -     ergocalciferol (ERGOCALCIFEROL) 1.25 MG (17561 UT) capsule; Take 1 capsule by mouth every 7 days    Primary osteoarthritis of both knees    Bilateral chronic knee pain    Musculoskeletal pain of left upper extremity  -     [facility], (NCS/N[facility] Conduction)    Paresthesia of left upper extremity  -     [facility], (NCS/Nerve Conduction)    Screening PSA (prostate specific antigen)  -     PSA Screening; Future    I reviewed recent lab results w/  Mr. Winn Oppenheim. Uric acid level is treated to goal.  Continue Allopurinol 100mg daily    Vitamin D level is treated to goal.  Continue supplementation w/ Vitamin D2 50,0000 iu weekly. Diabetes is well controlled.  Continue Metformin 500mg BID.    The ASCVD Risk score (Alisa Burch, et al., 2013) failed to calculate for the following reasons: The valid total cholesterol range is 130 to 320 mg/dL   Prescribed: crestor 10mg daily. He has CKD III which is stable/improving. Mr. Tona Ingram was informed of the risks of various medications that can be harmful in CKD including, but not limited to, Motrin, Ibuprofen, Advil, Naprosyn and Aleve. Tylenol is ok (but do not exceed a total of 3000 mg/day). Also encouraged to limit/avoid salt in diet, exercise, and keeping lipids under control. Advised to drink 60 oz of water each day. He is experiencing pain and numbness in his left arm off/on for the past 4-6 months. Symptoms extend down into his hand and fingers also. No injury. Will order a NCV/EMG on his LUE. He is overdue for a diabetic eye exam.  Referral placed to [address] [place] [address]. He declines ALL vaccinations. Follow Up    Return for 6 mos DM/HTN mgt w/ labs prior to visit. SUBJECTIVE/OBJECTIVE:    At his visit On 1/24/22, the following was discussed:     He is seeing Pain Mgt for his chronic knee pain at this time. He has OA in both knees (R>L) and has been wearing a knee brace on his right knee to help w/ stability. He has been previously told that he needs knee replacement, but is a poor surgical candidate and is needing to lose weight. States that the Tramadol QID did not help to treat his knee pain. At his visit On 6/20/22, the following was discussed (w/ JLauren Mcdonald): Followed by Dr. Danni Flores for CHF, AAA. Last seen on 6/14/22--plan to get an echo in 6 mos. CKD: seeing Dr. Keily Danielson as well. Renal has been adjusted BP meds. Rev'd note w/ Pulm on  5/12/22:   --He remains on Eliquis and likely will need to be lifelong. He has had 2 apparent clots in the context of what now seems to be a defined protein S deficiency.   I am a bit reluctant to take him off lifelong anticoagulation for fear that he would have to  get his third thrombosis. At today's visit:     Htn Mgt- Patient checks BP at home occasionally. Denies CP,  SOB,  HA and dizziness. Numbness in left hand and arm past 3 weeks     Diabetes Mgt- Patient does not check blood sugar at home    For the past 4-6 weeks, he's been experiencing pain and numbness in his left arm and hand and fingers. Notices when he is working and sometimes when he's driving. No problems when sleeping. Vitals:    07/18/22 1329   BP: (!) 130/8   Pulse: 87   Resp: 18   Temp: 98.8 °F (37.1 °C)   TempSrc: Oral   SpO2: 96%   Weight: (!) 333 lb 9.6 oz (151.3 kg)   Height: 6' (1.829 m)        Diabetic foot exam:   Left Foot:   Visual Exam: thickened, dystrophic toenails   Pulse DP: 2+ (normal)   Filament test: 6/6     Right Foot:   Visual Exam: thickened, dystrophic toenails   Pulse DP: 2+ (normal)   Filament test: 6/6     Orders Placed This Encounter    PSA Screening     Standing Status:   Future     Standing Expiration Date:   6/30/7115    Basic Metabolic Panel     Standing Status:   Future     Standing Expiration Date:   7/18/2023    Hemoglobin A1C     Standing Status:   Future     Standing Expiration Date:   7/18/2023    [facility] (NCS/Nerve Conduction)     Referral Priority:   Routine     Referral Type:   Eval and Treat     Referral Reason:   Specialty Services Required     Referred to Provider:   Soha Rodriguez MD     Requested Specialty:   Physical Medicine and Rehab     Number of Visits Requested:   1    [facility] - [address] [place] [address]     Referral Priority:   Routine     Referral Type:   Eval and Treat     Referral Reason:   Specialty Services Required     Referral Location:   [address] [place] [address]     Requested Specialty:   Ophthalmology     Number of Visits Requested:   1    allopurinol (ZYLOPRIM) 100 MG tablet     Sig: Take 2 tablets by mouth in the morning.      Dispense:  30 tablet     Refill:  11    ergocalciferol (ERGOCALCIFEROL) 1.25 MG (82029 UT) capsule     Sig: Take 1 capsule by mouth every 7 days     Dispense:  4 capsule     Refill:  11    labetalol (NORMODYNE) 100 MG tablet     Sig: Take 1 tablet by mouth in the morning and 1 tablet before bedtime. Dispense:  60 tablet     Refill:  5               An electronic signature was used to authenticate this note.   -- PATRICK Alex

## 2022-07-18 ENCOUNTER — OFFICE VISIT (OUTPATIENT)
Dept: FAMILY MEDICINE CLINIC | Facility: CLINIC | Age: 57
End: 2022-07-18
Payer: COMMERCIAL

## 2022-07-18 VITALS
HEART RATE: 87 BPM | RESPIRATION RATE: 18 BRPM | DIASTOLIC BLOOD PRESSURE: 8 MMHG | BODY MASS INDEX: 42.66 KG/M2 | HEIGHT: 72 IN | SYSTOLIC BLOOD PRESSURE: 130 MMHG | TEMPERATURE: 98.8 F | WEIGHT: 315 LBS | OXYGEN SATURATION: 96 %

## 2022-07-18 DIAGNOSIS — M79.602 MUSCULOSKELETAL PAIN OF LEFT UPPER EXTREMITY: ICD-10-CM

## 2022-07-18 DIAGNOSIS — M25.562 BILATERAL CHRONIC KNEE PAIN: ICD-10-CM

## 2022-07-18 DIAGNOSIS — E55.9 VITAMIN D DEFICIENCY: ICD-10-CM

## 2022-07-18 DIAGNOSIS — E11.22 TYPE 2 DIABETES MELLITUS WITH STAGE 3 CHRONIC KIDNEY DISEASE, WITHOUT LONG-TERM CURRENT USE OF INSULIN, UNSPECIFIED WHETHER STAGE 3A OR 3B CKD (HCC): ICD-10-CM

## 2022-07-18 DIAGNOSIS — E11.29 TYPE 2 DIABETES MELLITUS WITH MICROALBUMINURIA, WITHOUT LONG-TERM CURRENT USE OF INSULIN (HCC): ICD-10-CM

## 2022-07-18 DIAGNOSIS — R20.2 PARESTHESIA OF LEFT UPPER EXTREMITY: ICD-10-CM

## 2022-07-18 DIAGNOSIS — M17.0 PRIMARY OSTEOARTHRITIS OF BOTH KNEES: ICD-10-CM

## 2022-07-18 DIAGNOSIS — E11.40 TYPE 2 DIABETES MELLITUS WITH DIABETIC NEUROPATHY, WITHOUT LONG-TERM CURRENT USE OF INSULIN (HCC): ICD-10-CM

## 2022-07-18 DIAGNOSIS — N18.30 TYPE 2 DIABETES MELLITUS WITH STAGE 3 CHRONIC KIDNEY DISEASE, WITHOUT LONG-TERM CURRENT USE OF INSULIN, UNSPECIFIED WHETHER STAGE 3A OR 3B CKD (HCC): ICD-10-CM

## 2022-07-18 DIAGNOSIS — Z87.39 HISTORY OF GOUT: ICD-10-CM

## 2022-07-18 DIAGNOSIS — I10 ESSENTIAL HYPERTENSION: Primary | ICD-10-CM

## 2022-07-18 DIAGNOSIS — M25.561 BILATERAL CHRONIC KNEE PAIN: ICD-10-CM

## 2022-07-18 DIAGNOSIS — G89.29 BILATERAL CHRONIC KNEE PAIN: ICD-10-CM

## 2022-07-18 DIAGNOSIS — R80.9 TYPE 2 DIABETES MELLITUS WITH MICROALBUMINURIA, WITHOUT LONG-TERM CURRENT USE OF INSULIN (HCC): ICD-10-CM

## 2022-07-18 DIAGNOSIS — Z12.5 SCREENING PSA (PROSTATE SPECIFIC ANTIGEN): ICD-10-CM

## 2022-07-18 DIAGNOSIS — E78.5 DYSLIPIDEMIA: ICD-10-CM

## 2022-07-18 PROCEDURE — 3044F HG A1C LEVEL LT 7.0%: CPT | Performed by: PHYSICIAN ASSISTANT

## 2022-07-18 PROCEDURE — 99214 OFFICE O/P EST MOD 30 MIN: CPT | Performed by: PHYSICIAN ASSISTANT

## 2022-07-18 RX ORDER — ERGOCALCIFEROL (VITAMIN D2) 1250 MCG
50000 CAPSULE ORAL
Qty: 4 CAPSULE | Refills: 11 | Status: SHIPPED | OUTPATIENT
Start: 2022-07-18 | End: 2022-08-10

## 2022-07-18 RX ORDER — ALLOPURINOL 100 MG/1
200 TABLET ORAL DAILY
Qty: 30 TABLET | Refills: 11 | Status: SHIPPED | OUTPATIENT
Start: 2022-07-18

## 2022-07-18 RX ORDER — ERGOCALCIFEROL 1.25 MG/1
CAPSULE ORAL
Qty: 4 CAPSULE | Refills: 0 | OUTPATIENT
Start: 2022-07-18

## 2022-07-18 RX ORDER — LABETALOL 100 MG/1
100 TABLET, FILM COATED ORAL 2 TIMES DAILY
Qty: 60 TABLET | Refills: 5 | Status: SHIPPED | OUTPATIENT
Start: 2022-07-18

## 2022-07-18 RX ORDER — ALLOPURINOL 100 MG/1
TABLET ORAL
Qty: 60 TABLET | Refills: 0 | OUTPATIENT
Start: 2022-07-18

## 2022-07-18 ASSESSMENT — ANXIETY QUESTIONNAIRES
1. FEELING NERVOUS, ANXIOUS, OR ON EDGE: 0
4. TROUBLE RELAXING: 3
2. NOT BEING ABLE TO STOP OR CONTROL WORRYING: 3
3. WORRYING TOO MUCH ABOUT DIFFERENT THINGS: 3
6. BECOMING EASILY ANNOYED OR IRRITABLE: 1
7. FEELING AFRAID AS IF SOMETHING AWFUL MIGHT HAPPEN: 0
IF YOU CHECKED OFF ANY PROBLEMS ON THIS QUESTIONNAIRE, HOW DIFFICULT HAVE THESE PROBLEMS MADE IT FOR YOU TO DO YOUR WORK, TAKE CARE OF THINGS AT HOME, OR GET ALONG WITH OTHER PEOPLE: SOMEWHAT DIFFICULT
5. BEING SO RESTLESS THAT IT IS HARD TO SIT STILL: 0
GAD7 TOTAL SCORE: 10

## 2022-07-18 ASSESSMENT — PATIENT HEALTH QUESTIONNAIRE - PHQ9
1. LITTLE INTEREST OR PLEASURE IN DOING THINGS: 0
SUM OF ALL RESPONSES TO PHQ QUESTIONS 1-9: 0
SUM OF ALL RESPONSES TO PHQ9 QUESTIONS 1 & 2: 0
SUM OF ALL RESPONSES TO PHQ QUESTIONS 1-9: 0
2. FEELING DOWN, DEPRESSED OR HOPELESS: 0

## 2022-07-25 RX ORDER — LISINOPRIL AND HYDROCHLOROTHIAZIDE 20; 12.5 MG/1; MG/1
TABLET ORAL
Qty: 60 TABLET | Refills: 0 | Status: SHIPPED | OUTPATIENT
Start: 2022-07-25 | End: 2022-08-30

## 2022-08-08 DIAGNOSIS — Z87.39 HISTORY OF GOUT: ICD-10-CM

## 2022-08-09 ENCOUNTER — TELEPHONE (OUTPATIENT)
Dept: FAMILY MEDICINE CLINIC | Facility: CLINIC | Age: 57
End: 2022-08-09

## 2022-08-09 DIAGNOSIS — Z87.39 HISTORY OF GOUT: ICD-10-CM

## 2022-08-09 DIAGNOSIS — E78.5 DYSLIPIDEMIA: Primary | ICD-10-CM

## 2022-08-09 RX ORDER — ROSUVASTATIN CALCIUM 10 MG/1
10 TABLET, COATED ORAL DAILY
Qty: 90 TABLET | Refills: 3 | Status: SHIPPED | OUTPATIENT
Start: 2022-08-09

## 2022-08-09 RX ORDER — ROSUVASTATIN CALCIUM 10 MG/1
TABLET, COATED ORAL
Qty: 30 TABLET | Refills: 0 | OUTPATIENT
Start: 2022-08-09

## 2022-08-09 RX ORDER — ALLOPURINOL 100 MG/1
TABLET ORAL
Qty: 60 TABLET | Refills: 0 | OUTPATIENT
Start: 2022-08-09

## 2022-08-09 NOTE — TELEPHONE ENCOUNTER
Patient called needing a refill on rosuvastatin 10 mg to be sent to [place] [address].  Follow up in Jan 2023 last seen 7/1/2022

## 2022-08-10 DIAGNOSIS — E55.9 VITAMIN D DEFICIENCY: ICD-10-CM

## 2022-08-10 RX ORDER — ERGOCALCIFEROL 1.25 MG/1
CAPSULE ORAL
Qty: 12 CAPSULE | Refills: 3 | Status: SHIPPED | OUTPATIENT
Start: 2022-08-10

## 2022-08-15 ENCOUNTER — TELEPHONE (OUTPATIENT)
Dept: FAMILY MEDICINE CLINIC | Facility: CLINIC | Age: 57
End: 2022-08-15

## 2022-08-15 NOTE — TELEPHONE ENCOUNTER
Patient called needing a refill on Metformin 500 mg to be sent to [facility].

## 2022-08-19 NOTE — TELEPHONE ENCOUNTER
MEDICATION REFILL REQUEST      Name of Medication:  Magnesium Oxide  Dose:  400 mg  Frequency:  ?  Quantity:  ?  Days' supply:  ?       Pharmacy Name/Location:  pt did not leave pharmacy info   But did say he was out of meds

## 2022-08-22 RX ORDER — MAGNESIUM OXIDE 400 MG/1
400 TABLET ORAL 2 TIMES DAILY
Qty: 180 TABLET | Refills: 3 | Status: SHIPPED | OUTPATIENT
Start: 2022-08-22

## 2022-08-30 RX ORDER — LISINOPRIL AND HYDROCHLOROTHIAZIDE 20; 12.5 MG/1; MG/1
TABLET ORAL
Qty: 60 TABLET | Refills: 0 | Status: SHIPPED | OUTPATIENT
Start: 2022-08-30 | End: 2022-09-06

## 2022-09-06 RX ORDER — LISINOPRIL AND HYDROCHLOROTHIAZIDE 20; 12.5 MG/1; MG/1
TABLET ORAL
Qty: 60 TABLET | Refills: 0 | Status: SHIPPED | OUTPATIENT
Start: 2022-09-06 | End: 2022-10-26

## 2022-09-16 RX ORDER — LANOLIN ALCOHOL/MO/W.PET/CERES
CREAM (GRAM) TOPICAL
Qty: 180 TABLET | Refills: 3 | Status: SHIPPED | OUTPATIENT
Start: 2022-09-16

## 2022-09-19 RX ORDER — LANOLIN ALCOHOL/MO/W.PET/CERES
CREAM (GRAM) TOPICAL
Qty: 60 TABLET | Refills: 0 | OUTPATIENT
Start: 2022-09-19

## 2022-10-11 RX ORDER — APIXABAN 5 MG/1
TABLET, FILM COATED ORAL
Qty: 60 TABLET | Refills: 0 | OUTPATIENT
Start: 2022-10-11

## 2022-10-17 RX ORDER — APIXABAN 5 MG/1
TABLET, FILM COATED ORAL
Qty: 60 TABLET | Refills: 0 | Status: SHIPPED | OUTPATIENT
Start: 2022-10-17

## 2022-10-26 RX ORDER — LISINOPRIL AND HYDROCHLOROTHIAZIDE 20; 12.5 MG/1; MG/1
TABLET ORAL
Qty: 60 TABLET | Refills: 0 | Status: SHIPPED | OUTPATIENT
Start: 2022-10-26 | End: 2022-12-01

## 2022-11-15 ENCOUNTER — OFFICE VISIT (OUTPATIENT)
Dept: FAMILY MEDICINE CLINIC | Facility: CLINIC | Age: 57
End: 2022-11-15
Payer: MEDICAID

## 2022-11-15 VITALS
HEIGHT: 72 IN | RESPIRATION RATE: 17 BRPM | BODY MASS INDEX: 42.66 KG/M2 | WEIGHT: 315 LBS | OXYGEN SATURATION: 91 % | TEMPERATURE: 97.6 F | HEART RATE: 77 BPM

## 2022-11-15 DIAGNOSIS — M17.0 PRIMARY OSTEOARTHRITIS OF BOTH KNEES: Primary | ICD-10-CM

## 2022-11-15 PROBLEM — N18.31 CHRONIC KIDNEY DISEASE (CKD) STAGE G3A/A1, MODERATELY DECREASED GLOMERULAR FILTRATION RATE (GFR) BETWEEN 45-59 ML/MIN/1.73 SQUARE METER AND ALBUMINURIA CREATININE RATIO LESS THAN 30 MG/G (HCC): Status: ACTIVE | Noted: 2020-11-30

## 2022-11-15 PROCEDURE — 99212 OFFICE O/P EST SF 10 MIN: CPT | Performed by: NURSE PRACTITIONER

## 2022-11-15 RX ORDER — HYDROCODONE BITARTRATE AND ACETAMINOPHEN 5; 325 MG/1; MG/1
TABLET ORAL
COMMUNITY
Start: 2022-09-19

## 2022-11-15 ASSESSMENT — ENCOUNTER SYMPTOMS
BACK PAIN: 0
SHORTNESS OF BREATH: 0
COUGH: 0
DIARRHEA: 0
SORE THROAT: 0
RHINORRHEA: 0
EYE PAIN: 0
CONSTIPATION: 0
ABDOMINAL PAIN: 0
SINUS PAIN: 0
NAUSEA: 0
VOMITING: 0

## 2022-11-15 NOTE — PROGRESS NOTES
Tayo Gr (:  1965) is a 61 y.o. male,Established patient, here for evaluation of the following chief complaint(s):  Knee Pain (L KNEE FEELS LIKE IT WANTS TO GIVE OUT)         ASSESSMENT/PLAN:  1. Primary osteoarthritis of both knees  -     XR KNEE BILATERAL STANDARD; Future  -     [address]    Will get updated imaging and refer to orthopedics. He is on hydrocodone from pain management. Will call with xray results. No follow-ups on file. Subjective   SUBJECTIVE/OBJECTIVE:  Presents with increased knee pain. He reports that he's had problems with his knees his whole life. Thinks he may have had some gout last week. He went to a football game in [place] over the weekend and did a lot of walking which he thinks made his pain worse. Reports known arthritis and has previously been told it's \"bone on bone. \"    Review of Systems   Constitutional:  Negative for activity change, appetite change, fatigue and fever. HENT:  Negative for congestion, ear pain, rhinorrhea, sinus pain and sore throat. Eyes:  Negative for pain and visual disturbance. Respiratory:  Negative for cough and shortness of breath. Cardiovascular:  Negative for chest pain and palpitations. Gastrointestinal:  Negative for abdominal pain, constipation, diarrhea, nausea and vomiting. Genitourinary:  Negative for dysuria, frequency, hematuria and urgency. Musculoskeletal:  Positive for joint swelling. Negative for arthralgias and back pain. Skin:  Negative for rash. Neurological:  Negative for dizziness and headaches. Objective   Physical Exam  Vitals reviewed. Constitutional:       Appearance: Normal appearance. He is obese. HENT:      Head: Normocephalic and atraumatic. Eyes:      Extraocular Movements: Extraocular movements intact. Pupils: Pupils are equal, round, and reactive to light.    Cardiovascular:      Rate and Rhythm: Normal rate and regular rhythm. Heart sounds: Normal heart sounds. Pulmonary:      Effort: Pulmonary effort is normal.      Breath sounds: Normal breath sounds. Abdominal:      General: Abdomen is flat. Skin:     General: Skin is warm and dry. Neurological:      General: No focal deficit present. Mental Status: He is alert and oriented to person, place, and time. An electronic signature was used to authenticate this note.     --EUGENE Dill - CNP

## 2022-11-16 ENCOUNTER — TELEPHONE (OUTPATIENT)
Dept: FAMILY MEDICINE CLINIC | Facility: CLINIC | Age: 57
End: 2022-11-16

## 2022-11-16 ENCOUNTER — HOSPITAL ENCOUNTER (OUTPATIENT)
Dept: GENERAL RADIOLOGY | Age: 57
Discharge: HOME | End: 2022-11-19
Payer: MEDICAID

## 2022-11-16 DIAGNOSIS — M17.0 PRIMARY OSTEOARTHRITIS OF BOTH KNEES: ICD-10-CM

## 2022-11-16 PROCEDURE — 73562 X-RAY EXAM OF KNEE 3: CPT

## 2022-11-16 RX ORDER — APIXABAN 5 MG/1
TABLET, FILM COATED ORAL
Qty: 60 TABLET | Refills: 0 | Status: SHIPPED | OUTPATIENT
Start: 2022-11-16

## 2022-11-16 NOTE — TELEPHONE ENCOUNTER
Contacted patient. Advised of results and provider comments. Verbalized understanding. No questions.       TAMEKA GARCÍA

## 2022-11-22 RX ORDER — LISINOPRIL AND HYDROCHLOROTHIAZIDE 20; 12.5 MG/1; MG/1
TABLET ORAL
Qty: 60 TABLET | Refills: 0 | OUTPATIENT
Start: 2022-11-22

## 2022-12-01 ENCOUNTER — TELEPHONE (OUTPATIENT)
Dept: CARDIOLOGY CLINIC | Age: 57
End: 2022-12-01

## 2022-12-01 RX ORDER — LISINOPRIL AND HYDROCHLOROTHIAZIDE 20; 12.5 MG/1; MG/1
TABLET ORAL
Qty: 60 TABLET | Refills: 0 | Status: SHIPPED | OUTPATIENT
Start: 2022-12-01

## 2022-12-01 NOTE — TELEPHONE ENCOUNTER
----- Message from Giuliano Bailey DO sent at 12/1/2022  1:14 PM EST -----  Please call the patient. ECHO was ok, nothing major or concerning. If having more angina (worsening ALEJANDRA, CP, SOB), please let us know. Will review more at follow up appointment and get plan for the future.     Thanks,   Louann Sanchez

## 2022-12-12 ENCOUNTER — OFFICE VISIT (OUTPATIENT)
Dept: ORTHOPEDIC SURGERY | Age: 57
End: 2022-12-12
Payer: MEDICAID

## 2022-12-12 VITALS — BODY MASS INDEX: 42.66 KG/M2 | HEIGHT: 72 IN | WEIGHT: 315 LBS

## 2022-12-12 DIAGNOSIS — M25.561 PAIN IN BOTH KNEES, UNSPECIFIED CHRONICITY: Primary | ICD-10-CM

## 2022-12-12 DIAGNOSIS — M25.551 BILATERAL HIP PAIN: ICD-10-CM

## 2022-12-12 DIAGNOSIS — M17.11 OSTEOARTHRITIS OF RIGHT KNEE, UNSPECIFIED OSTEOARTHRITIS TYPE: ICD-10-CM

## 2022-12-12 DIAGNOSIS — M25.562 PAIN IN BOTH KNEES, UNSPECIFIED CHRONICITY: Primary | ICD-10-CM

## 2022-12-12 DIAGNOSIS — M25.552 BILATERAL HIP PAIN: ICD-10-CM

## 2022-12-12 DIAGNOSIS — M17.12 OSTEOARTHRITIS OF LEFT KNEE, UNSPECIFIED OSTEOARTHRITIS TYPE: ICD-10-CM

## 2022-12-12 PROCEDURE — 99204 OFFICE O/P NEW MOD 45 MIN: CPT | Performed by: ORTHOPAEDIC SURGERY

## 2022-12-12 NOTE — PROGRESS NOTES
Name: Tammy Cedeño  YOB: 1965  Gender: male  MRN: [id number]    CC:   Chief Complaint   Patient presents with    Knee Pain       HPI:   The pain has been present for years and is becoming worse. It hurts now with sleep at night. The pain is located over the knee and hip regions. It does hurt to walk and gets worse with increased distances. The pain does not radiate down the leg. Numbness and tingling are not noted. Treatment so far has been anti-inflammatory medications and topical pain creams with minimal relief. Reports getting IA hip injections in the distant past with some relief. Review of Systems  As per HPI. Pertinent positives and negatives are addressed with the patient, particularly those related to musculoskeletal concerns. Non-orthopaedic concerns were referred back to the primary care physician.       [address]:    Current Outpatient Medications:     lisinopril-hydroCHLOROthiazide (PRINZIDE;ZESTORETIC) 20-12.5 MG per tablet, Take 2 tablets by mouth once daily, Disp: 60 tablet, Rfl: 0    ELIQUIS 5 MG TABS tablet, Take 1 tablet by mouth twice daily, Disp: 60 tablet, Rfl: 0    HYDROcodone-acetaminophen (NORCO) 5-325 MG per tablet, TAKE ONE TABLET BY MOUTH EVERY 8 HOURS, Disp: , Rfl:     magnesium oxide (MAG-OX) 400 (240 Mg) MG tablet, Take 1 tablet by mouth twice daily, Disp: 180 tablet, Rfl: 3    magnesium oxide (MAG-OX) 400 MG tablet, Take 1 tablet by mouth 2 times daily, Disp: 180 tablet, Rfl: 3    metFORMIN (GLUCOPHAGE) 500 MG tablet, TAKE ONE TABLET BY MOUTH TWICE A DAY WITH MEAL(S), Disp: 180 tablet, Rfl: 1    vitamin D (ERGOCALCIFEROL) 1.25 MG (20720 UT) CAPS capsule, Take 1 capsule by mouth once a week, Disp: 12 capsule, Rfl: 3    rosuvastatin (CRESTOR) 10 MG tablet, Take 1 tablet by mouth in the morning., Disp: 90 tablet, Rfl: 3    allopurinol (ZYLOPRIM) 100 MG tablet, Take 2 tablets by mouth in the morning., Disp: 30 tablet, Rfl: 11    labetalol (NORMODYNE) 100 MG tablet, Take 1 tablet by mouth in the morning and 1 tablet before bedtime. , Disp: 60 tablet, Rfl: 5    methocarbamol (ROBAXIN) 750 MG tablet, , Disp: , Rfl:     diclofenac sodium (VOLTAREN) 1 % GEL, Apply to affected joints twice daily, Disp: 50 g, Rfl: 3    Spacer/Aero-Holding Chambers (AEROCHAMBER PLUS MOLLY-VU W/MASK) MISC, 1 Units by Does not apply route as needed (shortness of breath), Disp: 1 each, Rfl: 0    cloNIDine (CATAPRES) 0.2 MG tablet, Take 0.2 mg by mouth 2 times daily, Disp: , Rfl:     NIFEdipine (ADALAT CC) 60 MG extended release tablet, Take 60 mg by mouth daily, Disp: , Rfl:     traMADol (ULTRAM) 50 MG tablet, TAKE ONE TABLET BY MOUTH EVERY 6 HOURS (Patient not taking: Reported on 11/15/2022), Disp: , Rfl:   No Known Allergies  Past Medical History:   Diagnosis Date    Allergic rhinitis due to animal (cat) (dog) hair and dander 50/16/79    Diastolic CHF, chronic (HCC) 6/1/2016    Dyslipidemia     ROXY on CPAP     Study done 4/17/2012;  AHI 86.7, RDI 86.7 (same)    Tinea unguium 2/5/2019    bilateral--all nails    Vitamin D deficiency       Past Surgical History:   Procedure Laterality Date    AMPUTATION Right     Right index finger happened at work    COLONOSCOPY  08/2016    COLONOSCOPY N/A 1/21/2022    COLONOSCOPY/ BMI 44 performed by Joan Borrero MD at [facility] ENDOSCOPY    COLONOSCOPY N/A 8/26/2016    COLONOSCOPY / BMI 47 performed by Delfin Panda MD at [facility] ENDOSCOPY    COLONOSCOPY FLX DX W/COLLJ SPEC WHEN PFRMD  1/21/2022         COLSC FLX W/REMOVAL LESION BY HOT BX FORCEPS  8/26/2016         HEENT      ear surgery during childhood    PRE-MALIGNANT / BENIGN SKIN LESION EXCISION Right     Behind right ear    TONSILLECTOMY       Family History   Problem Relation Age of Onset    Hypertension Other     Colon Cancer Neg Hx     Cancer Sister     Diabetes Mother     Hypertension Mother     No Known Problems Father     No Known Problems Brother     No Known Problems Sister     Diabetes Other     Other Other Renal Failure      Social History     Socioeconomic History    Marital status: Single     Spouse name: Not on file    Number of children: Not on file    Years of education: Not on file    Highest education level: Not on file   Occupational History    Not on file   Tobacco Use    Smoking status: Never    Smokeless tobacco: Never   Vaping Use    Vaping Use: Never used   Substance and Sexual Activity    Alcohol use: Yes     Alcohol/week: 13.0 standard drinks     Types: 13 Standard drinks or equivalent per week     Comment: OCASSIONALLY    Drug use: No    Sexual activity: Not on file   Other Topics Concern    Not on file   Social History Narrative    Not on file     Social Determinants of Health     Financial Resource Strain: Not on file   Food Insecurity: Not on file   Transportation Needs: Not on file   Physical Activity: Not on file   Stress: Not on file   Social Connections: Not on file   Intimate Partner Violence: Not on file   Housing Stability: Not on file       PHYSICAL EXAMINATION:   The patient is alert and oriented, in no acute distress. The lower extremities are as described below. Circulation is normal with palpable pedal pulses bilaterally and no edema. There is no lymph adenopathy in the popliteal or malleolar region. Skin is without scarring about the knee bilaterally. There is no stasis disease distally bilaterally. Sensation is intact to light touch bilaterally. The gait is noted to be with a slight trendelenburg and antalgia. Range of motion of the hip is 0 to 90 degrees of flexion with 15 degrees internal and 25 degrees external rotation and some groin pain with this. Range of motion of the knee is 0-120 degrees on the right and 0-120 degrees on the left. There is 2mm. of anterior/posterior translation and 2mm of medial/lateral instability bilaterally. There is 5 degrees of varus alignment on the right and 5 degrees of varus alignment on the left.   Limb lengths are equal.  Straight leg test is negative. Quadriceps strength is good. Their judgment and insight are normal.  They are oriented to time, place and person. Their memory is good and the mood and affect appropriate. XRAYS:   AP pelvis and lateral view of bilateral hip are reviewed. There is AVN of the hip. X-ray impression: AVN of the bilateral  hip         Views of bilateral knee are reviewed. 4 views standing reveal joint space loss, eburnated bone, and osteophyte formation present. X-ray impression:  Advanced degenerative joint disease of bilateral  knee      IMPRESSION:     Diagnosis Orders   1. Pain in both knees, unspecified chronicity        2. Osteoarthritis of right knee, unspecified osteoarthritis type        3. Osteoarthritis of left knee, unspecified osteoarthritis type        4. Bilateral hip pain  XR HIP 3-4 VW W PELVIS BILATERAL          RECOMMENDATION:           Reviewed x-ray findings with the patient. Quite often hip pain can be referred to the knee. He has substantial disease in his knee and his hip. He has avascular necrosis in his hips. We discussed the fact that a lot of times hip pain can be referred to the knee. He has had injection therapies in his hip before. He thinks his hip just occasionally goes out. He Shurtz her right knee that is his problem. He does not want to entertain selective injections today to differentiate. The concerns with functional limitations are increased and no longer responding to conservative measures. Due to deterioration in their quality of life the decision has been made to perform total hip and knee replaced replacement. We discussed specific risks associated with knee replacement. This includes a risk of infection, dislocation, or general medical risks of surgery such as stroke, heart attack, and blood clot. RENAY - Today we discussed bilateral  hip replacement surgery. They are aware of the 1% risk of dislocation and 1% risk of infection.   They were also informed of the possibility of stroke, heart attack and blood clot-any of these which could result in further hospitalization or death. and TKA - Today we also discussed bilateral knee replacement surgery. They are aware of the 1% risk of infection. They were also informed of the possibility of stroke, heart attack and blood clot; any of which could result in further hospitalization or death. Approximately 45 minutes was spent reviewing the medical record, imaging, performing history and physical examination, discussing the diagnosis and treatment plan with the patient, and completing documentation for this visit. He is aware we may fix his right knee and then he may eventually after experience right hip arthroplasty. He is aware that all 4 major joints are at risk. He has lost a lot of weight though still quite big. He does have not diabetes. He is aware his risk of infection and blood clot still would be greater with the size. Additional weight loss was encouraged over the next few months.   To be done in the latter April

## 2022-12-13 RX ORDER — APIXABAN 5 MG/1
TABLET, FILM COATED ORAL
Qty: 60 TABLET | Refills: 0 | Status: SHIPPED | OUTPATIENT
Start: 2022-12-13

## 2022-12-14 ENCOUNTER — OFFICE VISIT (OUTPATIENT)
Dept: CARDIOLOGY CLINIC | Age: 57
End: 2022-12-14
Payer: MEDICAID

## 2022-12-14 VITALS
DIASTOLIC BLOOD PRESSURE: 86 MMHG | WEIGHT: 315 LBS | HEART RATE: 72 BPM | BODY MASS INDEX: 42.66 KG/M2 | SYSTOLIC BLOOD PRESSURE: 132 MMHG | HEIGHT: 72 IN

## 2022-12-14 DIAGNOSIS — I10 ESSENTIAL HYPERTENSION: ICD-10-CM

## 2022-12-14 DIAGNOSIS — I35.1 NONRHEUMATIC AORTIC VALVE REGURGITATION: ICD-10-CM

## 2022-12-14 DIAGNOSIS — I71.40 ABDOMINAL AORTIC ANEURYSM (AAA) 3.0 CM TO 5.5 CM IN DIAMETER IN MALE: ICD-10-CM

## 2022-12-14 DIAGNOSIS — I50.32 DIASTOLIC CHF, CHRONIC (HCC): Primary | ICD-10-CM

## 2022-12-14 PROCEDURE — 3078F DIAST BP <80 MM HG: CPT | Performed by: INTERNAL MEDICINE

## 2022-12-14 PROCEDURE — 3074F SYST BP LT 130 MM HG: CPT | Performed by: INTERNAL MEDICINE

## 2022-12-14 PROCEDURE — 99214 OFFICE O/P EST MOD 30 MIN: CPT | Performed by: INTERNAL MEDICINE

## 2022-12-14 NOTE — PROGRESS NOTES
[address], [address]  PHONE: [phone]     22    NAME:  Ishmael Osuna  : 1965  MRN: [id number]       SUBJECTIVE:   Ishmael Osuna is a 61 y.o. male seen for a follow up visit regarding the following:     Chief Complaint   Patient presents with    Congestive Heart Failure       HPI:   Here for eval of severe HTN and LVH. On CPAP, complaint now. Echo  and 2020: normal EF, mild to mod AI   2020 Admission for acute PE, HTN emergency. Echo 2022: normal EF, mild AI, AO 4.2cm     Needs knee replacement in spring. NO CP, no new ALEJANDRA, SOB. Still on NOAC. Doing well on anticoagulation treatment as reviewed today, no bleeding issues or excessive bruising noted. Compliant with meds and CPAP. Patient denies recent history of orthopnea, PND, excessive dizziness and/or syncope. Declined weight loss surgery in the past.             Past Medical History, Past Surgical History, Family history, Social History, and Medications were all reviewed with the patient today and updated as necessary. Current Outpatient Medications   Medication Sig Dispense Refill    ELIQUIS 5 MG TABS tablet Take 1 tablet by mouth twice daily 60 tablet 0    lisinopril-hydroCHLOROthiazide (PRINZIDE;ZESTORETIC) 20-12.5 MG per tablet Take 2 tablets by mouth once daily 60 tablet 0    HYDROcodone-acetaminophen (NORCO) 5-325 MG per tablet TAKE ONE TABLET BY MOUTH EVERY 8 HOURS      magnesium oxide (MAG-OX) 400 (240 Mg) MG tablet Take 1 tablet by mouth twice daily 180 tablet 3    metFORMIN (GLUCOPHAGE) 500 MG tablet TAKE ONE TABLET BY MOUTH TWICE A DAY WITH MEAL(S) 180 tablet 1    vitamin D (ERGOCALCIFEROL) 1.25 MG (86728 UT) CAPS capsule Take 1 capsule by mouth once a week 12 capsule 3    rosuvastatin (CRESTOR) 10 MG tablet Take 1 tablet by mouth in the morning. 90 tablet 3    allopurinol (ZYLOPRIM) 100 MG tablet Take 2 tablets by mouth in the morning.  30 tablet 11 labetalol (NORMODYNE) 100 MG tablet Take 1 tablet by mouth in the morning and 1 tablet before bedtime. 60 tablet 5    methocarbamol (ROBAXIN) 750 MG tablet       diclofenac sodium (VOLTAREN) 1 % GEL Apply to affected joints twice daily 50 g 3    Spacer/Aero-Holding Chambers (AEROCHAMBER PLUS MOLLY-VU W/MASK) MISC 1 Units by Does not apply route as needed (shortness of breath) 1 each 0    cloNIDine (CATAPRES) 0.2 MG tablet Take 0.2 mg by mouth 2 times daily      traMADol (ULTRAM) 50 MG tablet TAKE ONE TABLET BY MOUTH EVERY 6 HOURS       No current facility-administered medications for this visit. No Known Allergies  Patient Active Problem List    Diagnosis Date Noted    Chronic kidney disease (CKD) stage G3a/A1, moderately decreased glomerular filtration rate (GFR) between 45-59 mL/min/1.73 square meter and albuminuria creatinine ratio less than 30 mg/g ([facility] 11/30/2020     Priority: Medium     Last Assessment & Plan:   Formatting of this note is different from the original.  Renal function stable with creatinine 1.7. Baseline creatinine 1.5-1.8. And EGFR 48  Euvolemic  Mild metabolic acidosis seen-he claims that he on and off has diarrhea. UA negative, U ACR 23  Hemoglobin A1c 6.9  Blood pressure well controlled on current medication  PE on Eliquis    All measures to prevent CKD explained . Advised to avoid NSAIDS. Take only Tylenol PRN Advised to reduce Red meat like beef and pork, instead substitute with chicken , eggs , turkey and fish . Can take handsize red meat in a week Patient was informed of the risks of various  medications that can be harmful in CKD including, but not limited to, Motrin, Ibuprofen, Advil, and Aleve. Patient was also made aware of the potential risks of IV dye contrast agents used in various radiological and cardiac procedures.       Osteoarthritis of right knee 12/12/2022     Added automatically from request for surgery [id number]      Protein S deficiency ([facility] 05/12/2022    ROXY on CPAP      Compliant with CPAP        Abdominal aortic aneurysm (AAA) 3.0 cm to 5.5 cm in diameter in male 02/13/2022     Incidental finding during hospital stay in Feb 2022. 4 cm in size. Recommendation by hospital medicine was to have cardiothoracic surgery   follow him. History of pulmonary embolism 07/15/2020    Avascular necrosis of bones of both hips ([address] 07/15/2020    Type 2 diabetes mellitus with diabetic neuropathy, without long-term current use of insulin ([address] 07/31/2019    Nuclear sclerotic cataract of both eyes 06/27/2019    Nonrheumatic aortic valve regurgitation 12/06/2018    Arthritis, lumbar spine 11/09/2018    Chronic midline low back pain without sciatica 11/09/2018    Chronic right shoulder pain 11/09/2018    Type 2 diabetes mellitus with stage 3 chronic kidney disease, without long-term current use of insulin ([address] 01/24/2018    Primary osteoarthritis of both knees 04/13/2017    Bilateral chronic knee pain 57/23/1040    Diastolic CHF, chronic ([address] 06/01/2016     Remain on BID lasix, stable now. recent labs show Mg 1.6 and we will   start Mag Oxide 400 BID.  K ok. Cr 1.4. Check labs again in 6 mos. BNP   also is 19. Edema stable. Working out, [address], this is   key for him. Check labs in 6 mos before follow up. Back off on lasix as   he loses weight. Getting new PCP soon.     Noted 07/09/15        Dyslipidemia 04/22/2016    Vitamin D deficiency     Erectile dysfunction associated with type 2 diabetes mellitus ([address] 10/30/2015    Morbid obesity with BMI of 45.0-49.9, adult ([address] 04/06/2015    Essential hypertension 04/06/2015     Echo 11/2012: EF 60% with severe LVH  Renal US 2012: poor study        Allergic rhinitis due to animal (cat) (dog) hair and dander 11/19/2014    Mild intermittent asthma without complication 07/44/5710    Type 2 diabetes mellitus with microalbuminuria, without long-term current use of insulin ([address] 11/03/2014    History of gout 11/03/2014 Senile incipient cataract 04/17/2014    Presbyopia 04/17/2014      Past Surgical History:   Procedure Laterality Date    AMPUTATION Right     Right index finger happened at work    COLONOSCOPY  08/2016    COLONOSCOPY N/A 1/21/2022    COLONOSCOPY/ BMI 44 performed by Cristel Virk MD at [facility] ENDOSCOPY    COLONOSCOPY N/A 8/26/2016    COLONOSCOPY / BMI 47 performed by Ramírez Alvarado MD at [facility] ENDOSCOPY    COLONOSCOPY FLX DX W/COLLJ SPEC WHEN PFRMD  1/21/2022         COLSC FLX W/REMOVAL LESION BY HOT BX FORCEPS  8/26/2016         HEENT      ear surgery during childhood    PRE-MALIGNANT / BENIGN SKIN LESION EXCISION Right     Behind right ear    TONSILLECTOMY       Family History   Problem Relation Age of Onset    Hypertension Other     Colon Cancer Neg Hx     Cancer Sister     Diabetes Mother     Hypertension Mother     No Known Problems Father     No Known Problems Brother     No Known Problems Sister     Diabetes Other     Other Other         Renal Failure      Social History     Tobacco Use    Smoking status: Never    Smokeless tobacco: Never   Substance Use Topics    Alcohol use: Yes     Alcohol/week: 13.0 standard drinks     Types: 13 Standard drinks or equivalent per week     Comment: OCASSIONALLY         ROS:    No obvious pertinent positives on review of systems except for what was outlined in the HPI today.       PHYSICAL EXAM:     /86   Pulse 72   Ht 6' (1.829 m)   Wt (!) 324 lb (147 kg)   BMI 43.94 kg/m²    General/Constitutional:   Alert and oriented x 3, no acute distress  HEENT:   normocephalic, atraumatic, moist mucous membranes  Neck:   No JVD or carotid bruits bilaterally  Cardiovascular:   regular rate and rhythm, no murmur/rub/gallop appreciated  Pulmonary:   clear to auscultation bilaterally, no respiratory distress  Abdomen:   soft, non-tender, non-distended  Ext:   No sig LE edema bilaterally  Skin:  warm and dry, no obvious rashes seen  Neuro:   no obvious sensory or motor deficits  Psychiatric:   normal mood and affect      Lab Results   Component Value Date/Time     07/11/2022 01:34 PM    K 4.0 07/11/2022 01:34 PM     07/11/2022 01:34 PM    CO2 24 07/11/2022 01:34 PM    BUN 22 07/11/2022 01:34 PM    CREATININE 1.60 07/11/2022 01:34 PM    GLUCOSE 120 07/11/2022 01:34 PM    CALCIUM 9.8 07/11/2022 01:34 PM        Lab Results   Component Value Date    WBC 9.2 06/04/2022    HGB 13.0 (L) 06/04/2022    HCT 40.2 (L) 06/04/2022    MCV 83.1 06/04/2022     06/04/2022       No results found for: TSHFT4, TSH    Lab Results   Component Value Date    LABA1C 6.9 (H) 07/11/2022     Lab Results   Component Value Date     07/11/2022       Lab Results   Component Value Date    CHOL 101 07/11/2022    CHOL 97 (L) 10/06/2021    CHOL 94 (L) 01/06/2021     Lab Results   Component Value Date    TRIG 126 07/11/2022    TRIG 120 10/06/2021    TRIG 93 01/06/2021     Lab Results   Component Value Date    HDL 39 (L) 07/11/2022    HDL 37 (L) 10/06/2021    HDL 36 (L) 01/06/2021     Lab Results   Component Value Date    LDLCALC 36.8 07/11/2022    LDLCALC 38 10/06/2021    LDLCALC 40 01/06/2021     Lab Results   Component Value Date    LABVLDL 25.2 (H) 07/11/2022    LABVLDL 34 07/15/2020    VLDL 22 10/06/2021    VLDL 18 01/06/2021     Lab Results   Component Value Date    CHOLHDLRATIO 2.6 07/11/2022           I have Independently reviewed prior care notes, any ER records available, cardiac testing, labs and results with the patient and before seeing the patient today. Also independently reviewed outside records when available. ASSESSMENT:    Kely Alvarez was seen today for congestive heart failure. Diagnoses and all orders for this visit:    Diastolic CHF, chronic (HCC)    Nonrheumatic aortic valve regurgitation    Essential hypertension    Abdominal aortic aneurysm (AAA) 3.0 cm to 5.5 cm in diameter in male        PLAN:    1. HTN:  Needs ongoing better diet and exercising.      Follow K and Cr. On diuretics. Instructed patient to follow low sodium diet. Monitor BP at home, will review at follow up. Aim for at least 30 minutes moderate physical activity at least 5 days a week. If needed, work on stress reduction and lifestyle modification. 2. ROXY:  Stay on CPAP. Stressed today. 3. DHF:  EF normal.  Follow AO in the future. On BB now. 4. CKD: seeing renal as well. Renal has been adjusted BP meds. 5. AI: echo ok, check in 2-3 yr.       6. PE:  back on AC per heme, follow. Echo ok. I have reviewed their anticoagulation treatment, instructed patient to call with any bleeding issues such as melana or other. The patient will call with any issues related to their treatment. All questions were answered with the patient voicing understanding. Patient has been instructed and agrees to call our office with any issues or other concerns related to their cardiac condition(s) and/or complaint(s). Return in about 6 months (around 6/14/2023).        MACI GALINDO,   12/14/2022

## 2023-01-03 RX ORDER — LISINOPRIL AND HYDROCHLOROTHIAZIDE 20; 12.5 MG/1; MG/1
TABLET ORAL
Qty: 60 TABLET | Refills: 0 | Status: SHIPPED | OUTPATIENT
Start: 2023-01-03

## 2023-01-12 ENCOUNTER — NURSE ONLY (OUTPATIENT)
Dept: FAMILY MEDICINE CLINIC | Facility: CLINIC | Age: 58
End: 2023-01-12

## 2023-01-12 DIAGNOSIS — E11.22 TYPE 2 DIABETES MELLITUS WITH STAGE 3 CHRONIC KIDNEY DISEASE, WITHOUT LONG-TERM CURRENT USE OF INSULIN, UNSPECIFIED WHETHER STAGE 3A OR 3B CKD (HCC): ICD-10-CM

## 2023-01-12 DIAGNOSIS — I10 ESSENTIAL HYPERTENSION: ICD-10-CM

## 2023-01-12 DIAGNOSIS — N18.30 TYPE 2 DIABETES MELLITUS WITH STAGE 3 CHRONIC KIDNEY DISEASE, WITHOUT LONG-TERM CURRENT USE OF INSULIN, UNSPECIFIED WHETHER STAGE 3A OR 3B CKD (HCC): ICD-10-CM

## 2023-01-12 DIAGNOSIS — Z87.39 HISTORY OF GOUT: ICD-10-CM

## 2023-01-12 DIAGNOSIS — Z12.5 SCREENING PSA (PROSTATE SPECIFIC ANTIGEN): ICD-10-CM

## 2023-01-12 LAB
ANION GAP SERPL CALC-SCNC: 3 MMOL/L (ref 2–11)
BUN SERPL-MCNC: 25 MG/DL (ref 6–23)
CALCIUM SERPL-MCNC: 10.1 MG/DL (ref 8.3–10.4)
CHLORIDE SERPL-SCNC: 105 MMOL/L (ref 101–110)
CO2 SERPL-SCNC: 29 MMOL/L (ref 21–32)
CREAT SERPL-MCNC: 1.7 MG/DL (ref 0.8–1.5)
GLUCOSE SERPL-MCNC: 123 MG/DL (ref 65–100)
POTASSIUM SERPL-SCNC: 4.1 MMOL/L (ref 3.5–5.1)
PSA SERPL-MCNC: 0.8 NG/ML
SODIUM SERPL-SCNC: 137 MMOL/L (ref 133–143)

## 2023-01-12 RX ORDER — ALLOPURINOL 100 MG/1
200 TABLET ORAL DAILY
Qty: 30 TABLET | Refills: 0 | OUTPATIENT
Start: 2023-01-12

## 2023-01-12 RX ORDER — APIXABAN 5 MG/1
TABLET, FILM COATED ORAL
Qty: 60 TABLET | Refills: 0 | Status: SHIPPED | OUTPATIENT
Start: 2023-01-12

## 2023-01-13 LAB
EST. AVERAGE GLUCOSE BLD GHB EST-MCNC: 148 MG/DL
HBA1C MFR BLD: 6.8 % (ref 4.8–5.6)

## 2023-01-17 PROBLEM — E11.40 TYPE 2 DIABETES MELLITUS WITH DIABETIC NEUROPATHY, WITHOUT LONG-TERM CURRENT USE OF INSULIN (HCC): Status: RESOLVED | Noted: 2019-07-31 | Resolved: 2023-01-17

## 2023-01-17 PROBLEM — N18.30 TYPE 2 DIABETES MELLITUS WITH STAGE 3 CHRONIC KIDNEY DISEASE, WITHOUT LONG-TERM CURRENT USE OF INSULIN (HCC): Status: RESOLVED | Noted: 2018-01-24 | Resolved: 2023-01-17

## 2023-01-17 PROBLEM — D68.59 PROTEIN S DEFICIENCY (HCC): Status: RESOLVED | Noted: 2022-05-12 | Resolved: 2023-01-17

## 2023-01-17 PROBLEM — E11.22 TYPE 2 DIABETES MELLITUS WITH STAGE 3 CHRONIC KIDNEY DISEASE, WITHOUT LONG-TERM CURRENT USE OF INSULIN (HCC): Status: RESOLVED | Noted: 2018-01-24 | Resolved: 2023-01-17

## 2023-01-17 NOTE — PROGRESS NOTES
Guille Ernandez (: 1965) is a 61 y.o. male, an established patient, is here for evaluation of the following chief complaint(s):  Chief Complaint   Patient presents with    Diabetes     No new problems    Hypertension    Results    Hyperlipidemia            ASSESSMENT/PLAN:  Gretel Johsnton was seen today for diabetes, hypertension, results and hyperlipidemia. Diagnoses and all orders for this visit:    Benign hypertension with CKD (chronic kidney disease) stage III (HCC)  -     labetalol (NORMODYNE) 100 MG tablet; Take 1 tablet by mouth 2 times daily  -     lisinopril-hydroCHLOROthiazide (PRINZIDE;ZESTORETIC) 20-12.5 MG per tablet; Take 2 tablets by mouth once daily  -     cloNIDine (CATAPRES) 0.2 MG tablet; Take 1 tablet by mouth 2 times daily  -     NIFEdipine (ADALAT CC) 60 MG extended release tablet; Take 1 tablet by mouth daily  -     [facility]  -     Basic Metabolic Panel; Future    Type 2 diabetes mellitus with stage 3 chronic kidney disease, without long-term current use of insulin, unspecified whether stage 3a or 3b CKD (HCC)  -     metFORMIN (GLUCOPHAGE) 500 MG tablet; TAKE ONE TABLET BY MOUTH TWICE A DAY WITH MEAL(S)  -     [facility]    Dyslipidemia  -     rosuvastatin (CRESTOR) 10 MG tablet; Take 1 tablet by mouth daily    Vitamin D deficiency    History of gout  -     allopurinol (ZYLOPRIM) 100 MG tablet; Take 2 tablets by mouth daily    Diastolic CHF, chronic (HCC)    ROXY on CPAP    History of pulmonary embolism  -     apixaban (ELIQUIS) 5 MG TABS tablet; Take 1 tablet by mouth twice daily    Primary osteoarthritis of both knees    Bilateral chronic knee pain    Abdominal aortic aneurysm (AAA) without rupture, unspecified part    Chronic midline low back pain without sciatica  -     methocarbamol (ROBAXIN) 750 MG tablet; Take 1 tablet by mouth daily as needed (muscle spasm)    Body mass index (BMI) 45.0-49.9, adult ([address]    I reviewed recent lab results w/  Mr. Yumiko Jensen.       PSA is WNL. He denies any problems w/ urinary slowing, stopping/starting urination or nocturia. Diabetes is well controlled. Currently prescribed:   Key Antihyperglycemic Medications            metFORMIN (GLUCOPHAGE) 500 MG tablet    Sig: TAKE ONE TABLET BY MOUTH TWICE A DAY WITH MEAL(S)     Referral to [address] [place] [address] Drive placed. Needs diab. Eye exam    His renal function is declining since last July. Mr. Antonina Soria was informed of the risks of various medications that can be harmful in CKD including, but not limited to, Motrin, Ibuprofen, Advil, Naprosyn and Aleve. Tylenol is ok (but do not exceed a total of 3000 mg/day). reduce Red meat like beef and pork, instead substitute with chicken , eggs , turkey and fish . Can take handsize red meat in a week Also encouraged to limit/avoid salt in diet, exercise, and keeping lipids under control. Advised to drink 60 oz of water each day. BP today is: a little elevated. Currently prescribed:   Key Anti-Hypertensive Meds            lisinopril-hydroCHLOROthiazide (PRINZIDE;ZESTORETIC) 20-12.5 MG per tablet    Sig: Take 2 tablets by mouth once daily    labetalol (NORMODYNE) 100 MG tablet    Sig - Route: Take 1 tablet by mouth in the morning and 1 tablet before bedtime. - Oral    cloNIDine (CATAPRES) 0.2 MG tablet    Class: Historical Med   He is also taking Nifedipine CR 60 daily. Advised to reduce salt in diet and will re-eval in 3 mos. Discussed that HA's he's been experiencing could be related to increased BP. Continue Crestor 10mg daily for mgt of HLD. Taking Allopurinol 100mg daily for gout prevention. Vitamin D Deficiency treated w/ Vit D2 50,000 iu once weekly. He is scheduled for knee surgery in April w/ Dr. Calzada August    Rev'd visit note dated 12/14/22 w/ Cardiology:  followed for CHF, Nonrheumatic aortic valve regurgitation, HTN and AAA 3-5.5 cm in diam.  He has a h/o PE and is taking eliquis.       Rev'd visit note w/ Nephrology dated 11/2/22:  followed for Stage G3a/A1 CKD.      Using CPAP regularly for tx of ROXY.     He declines ALL vaccinations.        Follow Up    Return for 3 mos HTN mgt w/ labs prior to visit.     SUBJECTIVE/OBJECTIVE:  At his visit On 7/18/22, the following was discussed:     He is experiencing pain and numbness in his left arm off/on for the past 4-6 months.  Symptoms extend down into his hand and fingers also.  No injury.  Will order a NCV/EMG on his LUE.       He is overdue for a diabetic eye exam.  Referral placed to [facility].     At today's visit:     Has been having headaches for the past week or two.        Vitals:    01/18/23 1402   BP: (!) 141/93   Pulse: 88   Resp: 18   Temp: 98 °F (36.7 °C)   TempSrc: Oral   SpO2: 97%   Weight: (!) 337 lb (152.9 kg)   Height: 6' (1.829 m)                    Orders Placed This Encounter    Basic Metabolic Panel     Standing Status:   Future     Standing Expiration Date:   7/18/2023    [facility]     Referral Priority:   Routine     Referral Type:   Eval and Treat     Referral Reason:   Specialty Services Required     [facility]     Requested Specialty:   Ophthalmology     Number of Visits Requested:   1    DISCONTD: NIFEdipine (ADALAT CC) 60 MG extended release tablet     Sig: Take 60 mg by mouth    DISCONTD: magnesium oxide (MAG-OX) 400 MG tablet     Sig: TAKE 1 TABLET BY MOUTH TWICE DAILY    allopurinol (ZYLOPRIM) 100 MG tablet     Sig: Take 2 tablets by mouth daily     Dispense:  30 tablet     Refill:  11    apixaban (ELIQUIS) 5 MG TABS tablet     Sig: Take 1 tablet by mouth twice daily     Dispense:  60 tablet     Refill:  11    labetalol (NORMODYNE) 100 MG tablet     Sig: Take 1 tablet by mouth 2 times daily     Dispense:  60 tablet     Refill:  11    lisinopril-hydroCHLOROthiazide (PRINZIDE;ZESTORETIC) 20-12.5 MG per tablet     Sig: Take 2 tablets by mouth once daily     Dispense:  60 tablet     Refill:  11    cloNIDine  (CATAPRES) 0.2 MG tablet     Sig: Take 1 tablet by mouth 2 times daily     Dispense:  60 tablet     Refill:  11    NIFEdipine (ADALAT CC) 60 MG extended release tablet     Sig: Take 1 tablet by mouth daily     Dispense:  30 tablet     Refill:  11    metFORMIN (GLUCOPHAGE) 500 MG tablet     Sig: TAKE ONE TABLET BY MOUTH TWICE A DAY WITH MEAL(S)     Dispense:  60 tablet     Refill:  11    methocarbamol (ROBAXIN) 750 MG tablet     Sig: Take 1 tablet by mouth daily as needed (muscle spasm)     Dispense:  30 tablet     Refill:  5    rosuvastatin (CRESTOR) 10 MG tablet     Sig: Take 1 tablet by mouth daily     Dispense:  30 tablet     Refill:  11               An electronic signature was used to authenticate this note.   -- PATRICK Rice

## 2023-01-18 ENCOUNTER — OFFICE VISIT (OUTPATIENT)
Dept: FAMILY MEDICINE CLINIC | Facility: CLINIC | Age: 58
End: 2023-01-18
Payer: MEDICAID

## 2023-01-18 VITALS
OXYGEN SATURATION: 97 % | RESPIRATION RATE: 18 BRPM | HEIGHT: 72 IN | TEMPERATURE: 98 F | SYSTOLIC BLOOD PRESSURE: 141 MMHG | DIASTOLIC BLOOD PRESSURE: 93 MMHG | WEIGHT: 315 LBS | HEART RATE: 88 BPM | BODY MASS INDEX: 42.66 KG/M2

## 2023-01-18 DIAGNOSIS — Z86.711 HISTORY OF PULMONARY EMBOLISM: ICD-10-CM

## 2023-01-18 DIAGNOSIS — E11.22 TYPE 2 DIABETES MELLITUS WITH STAGE 3 CHRONIC KIDNEY DISEASE, WITHOUT LONG-TERM CURRENT USE OF INSULIN, UNSPECIFIED WHETHER STAGE 3A OR 3B CKD (HCC): ICD-10-CM

## 2023-01-18 DIAGNOSIS — E55.9 VITAMIN D DEFICIENCY: ICD-10-CM

## 2023-01-18 DIAGNOSIS — I12.9 BENIGN HYPERTENSION WITH CKD (CHRONIC KIDNEY DISEASE) STAGE III (HCC): Primary | ICD-10-CM

## 2023-01-18 DIAGNOSIS — I50.32 DIASTOLIC CHF, CHRONIC (HCC): ICD-10-CM

## 2023-01-18 DIAGNOSIS — N18.30 TYPE 2 DIABETES MELLITUS WITH STAGE 3 CHRONIC KIDNEY DISEASE, WITHOUT LONG-TERM CURRENT USE OF INSULIN, UNSPECIFIED WHETHER STAGE 3A OR 3B CKD (HCC): ICD-10-CM

## 2023-01-18 DIAGNOSIS — M25.561 BILATERAL CHRONIC KNEE PAIN: ICD-10-CM

## 2023-01-18 DIAGNOSIS — M25.562 BILATERAL CHRONIC KNEE PAIN: ICD-10-CM

## 2023-01-18 DIAGNOSIS — M54.50 CHRONIC MIDLINE LOW BACK PAIN WITHOUT SCIATICA: ICD-10-CM

## 2023-01-18 DIAGNOSIS — I71.40 ABDOMINAL AORTIC ANEURYSM (AAA) WITHOUT RUPTURE, UNSPECIFIED PART: ICD-10-CM

## 2023-01-18 DIAGNOSIS — G89.29 BILATERAL CHRONIC KNEE PAIN: ICD-10-CM

## 2023-01-18 DIAGNOSIS — M17.0 PRIMARY OSTEOARTHRITIS OF BOTH KNEES: ICD-10-CM

## 2023-01-18 DIAGNOSIS — Z87.39 HISTORY OF GOUT: ICD-10-CM

## 2023-01-18 DIAGNOSIS — Z99.89 OSA ON CPAP: ICD-10-CM

## 2023-01-18 DIAGNOSIS — E78.5 DYSLIPIDEMIA: ICD-10-CM

## 2023-01-18 DIAGNOSIS — G89.29 CHRONIC MIDLINE LOW BACK PAIN WITHOUT SCIATICA: ICD-10-CM

## 2023-01-18 DIAGNOSIS — N18.30 BENIGN HYPERTENSION WITH CKD (CHRONIC KIDNEY DISEASE) STAGE III (HCC): Primary | ICD-10-CM

## 2023-01-18 DIAGNOSIS — G47.33 OSA ON CPAP: ICD-10-CM

## 2023-01-18 PROCEDURE — 3044F HG A1C LEVEL LT 7.0%: CPT | Performed by: PHYSICIAN ASSISTANT

## 2023-01-18 PROCEDURE — 99214 OFFICE O/P EST MOD 30 MIN: CPT | Performed by: PHYSICIAN ASSISTANT

## 2023-01-18 RX ORDER — NIFEDIPINE 60 MG/1
60 TABLET, FILM COATED, EXTENDED RELEASE ORAL DAILY
Qty: 30 TABLET | Refills: 11 | Status: SHIPPED | OUTPATIENT
Start: 2023-01-18

## 2023-01-18 RX ORDER — NIFEDIPINE 60 MG/1
60 TABLET, FILM COATED, EXTENDED RELEASE ORAL
COMMUNITY
Start: 2022-12-22 | End: 2023-01-18 | Stop reason: SDUPTHER

## 2023-01-18 RX ORDER — MAGNESIUM OXIDE 400 MG/1
TABLET ORAL
COMMUNITY
Start: 2022-12-22 | End: 2023-01-18 | Stop reason: SDUPTHER

## 2023-01-18 RX ORDER — NIFEDIPINE 60 MG/1
TABLET, FILM COATED, EXTENDED RELEASE ORAL
Qty: 90 TABLET | Refills: 0 | OUTPATIENT
Start: 2023-01-18

## 2023-01-18 RX ORDER — LABETALOL 100 MG/1
100 TABLET, FILM COATED ORAL 2 TIMES DAILY
Qty: 60 TABLET | Refills: 11 | Status: SHIPPED | OUTPATIENT
Start: 2023-01-18

## 2023-01-18 RX ORDER — CLONIDINE HYDROCHLORIDE 0.2 MG/1
0.2 TABLET ORAL 2 TIMES DAILY
Qty: 60 TABLET | Refills: 11 | Status: SHIPPED | OUTPATIENT
Start: 2023-01-18

## 2023-01-18 RX ORDER — TRAMADOL HYDROCHLORIDE 50 MG/1
TABLET ORAL
Status: CANCELLED | OUTPATIENT
Start: 2023-01-18

## 2023-01-18 RX ORDER — ALLOPURINOL 100 MG/1
200 TABLET ORAL DAILY
Qty: 30 TABLET | Refills: 11 | Status: SHIPPED | OUTPATIENT
Start: 2023-01-18

## 2023-01-18 RX ORDER — METHOCARBAMOL 750 MG/1
750 TABLET, FILM COATED ORAL DAILY PRN
Qty: 30 TABLET | Refills: 5 | Status: SHIPPED | OUTPATIENT
Start: 2023-01-18

## 2023-01-18 RX ORDER — ROSUVASTATIN CALCIUM 10 MG/1
10 TABLET, COATED ORAL DAILY
Qty: 30 TABLET | Refills: 11 | Status: SHIPPED | OUTPATIENT
Start: 2023-01-18

## 2023-01-18 RX ORDER — LISINOPRIL AND HYDROCHLOROTHIAZIDE 20; 12.5 MG/1; MG/1
TABLET ORAL
Qty: 60 TABLET | Refills: 11 | Status: SHIPPED | OUTPATIENT
Start: 2023-01-18

## 2023-01-29 DIAGNOSIS — I12.9 BENIGN HYPERTENSION WITH CKD (CHRONIC KIDNEY DISEASE) STAGE III (HCC): ICD-10-CM

## 2023-01-29 DIAGNOSIS — N18.30 BENIGN HYPERTENSION WITH CKD (CHRONIC KIDNEY DISEASE) STAGE III (HCC): ICD-10-CM

## 2023-01-30 RX ORDER — CLONIDINE HYDROCHLORIDE 0.2 MG/1
TABLET ORAL
Qty: 60 TABLET | Refills: 0 | OUTPATIENT
Start: 2023-01-30

## 2023-02-24 RX ORDER — LANOLIN ALCOHOL/MO/W.PET/CERES
CREAM (GRAM) TOPICAL
Qty: 180 TABLET | Refills: 3 | Status: SHIPPED | OUTPATIENT
Start: 2023-02-24

## 2023-02-24 NOTE — TELEPHONE ENCOUNTER
MEDICATION REFILL REQUEST      Name of Medication:  magnesium oxide  Dose:  400 mg (240mg)  Frequency:  take 1 tablet by mouth twice daily  Quantity:  180  Days' supply:  90      Pharmacy Name/Location:  [address] [place] [address]

## 2023-03-08 ENCOUNTER — TELEPHONE (OUTPATIENT)
Dept: FAMILY MEDICINE CLINIC | Facility: CLINIC | Age: 58
End: 2023-03-08

## 2023-03-08 DIAGNOSIS — E78.5 DYSLIPIDEMIA: ICD-10-CM

## 2023-03-08 DIAGNOSIS — M17.11 LOCALIZED OSTEOARTHRITIS OF RIGHT KNEE: Primary | ICD-10-CM

## 2023-03-08 RX ORDER — ROSUVASTATIN CALCIUM 10 MG/1
10 TABLET, COATED ORAL DAILY
Qty: 30 TABLET | Refills: 11 | Status: SHIPPED | OUTPATIENT
Start: 2023-03-08 | End: 2023-03-08 | Stop reason: CLARIF

## 2023-03-08 RX ORDER — ROSUVASTATIN CALCIUM 10 MG/1
10 TABLET, COATED ORAL DAILY
Qty: 30 TABLET | Refills: 11 | Status: SHIPPED | OUTPATIENT
Start: 2023-03-08

## 2023-03-08 NOTE — TELEPHONE ENCOUNTER
Pt called for a refill of rosuvastatin.  His normal pharmacy is put so he needs it sent to [facility] [address]

## 2023-03-23 ENCOUNTER — PREP FOR PROCEDURE (OUTPATIENT)
Dept: ORTHOPEDIC SURGERY | Age: 58
End: 2023-03-23

## 2023-03-23 DIAGNOSIS — M17.11 OSTEOARTHRITIS OF RIGHT KNEE, UNSPECIFIED OSTEOARTHRITIS TYPE: Primary | ICD-10-CM

## 2023-03-23 RX ORDER — SODIUM CHLORIDE 9 MG/ML
INJECTION, SOLUTION INTRAVENOUS PRN
Status: CANCELLED | OUTPATIENT
Start: 2023-03-23

## 2023-03-23 RX ORDER — ACETAMINOPHEN 500 MG
1000 TABLET ORAL ONCE
Status: CANCELLED | OUTPATIENT
Start: 2023-03-23 | End: 2023-03-23

## 2023-03-23 RX ORDER — SODIUM CHLORIDE 0.9 % (FLUSH) 0.9 %
5-40 SYRINGE (ML) INJECTION PRN
Status: CANCELLED | OUTPATIENT
Start: 2023-03-23

## 2023-03-23 RX ORDER — SODIUM CHLORIDE 0.9 % (FLUSH) 0.9 %
5-40 SYRINGE (ML) INJECTION EVERY 12 HOURS SCHEDULED
Status: CANCELLED | OUTPATIENT
Start: 2023-03-23

## 2023-03-28 ENCOUNTER — HOSPITAL ENCOUNTER (OUTPATIENT)
Dept: REHABILITATION | Age: 58
Discharge: HOME | End: 2023-03-31
Payer: MEDICAID

## 2023-03-28 ENCOUNTER — HOSPITAL ENCOUNTER (OUTPATIENT)
Dept: SURGERY | Age: 58
Discharge: HOME | End: 2023-03-31
Payer: MEDICAID

## 2023-03-28 VITALS
DIASTOLIC BLOOD PRESSURE: 82 MMHG | WEIGHT: 315 LBS | HEIGHT: 73 IN | SYSTOLIC BLOOD PRESSURE: 140 MMHG | OXYGEN SATURATION: 96 % | HEART RATE: 68 BPM | TEMPERATURE: 99.7 F | BODY MASS INDEX: 41.75 KG/M2

## 2023-03-28 DIAGNOSIS — M17.11 OSTEOARTHRITIS OF RIGHT KNEE, UNSPECIFIED OSTEOARTHRITIS TYPE: ICD-10-CM

## 2023-03-28 DIAGNOSIS — Z96.651 STATUS POST RIGHT KNEE REPLACEMENT: Primary | ICD-10-CM

## 2023-03-28 LAB
ANION GAP SERPL CALC-SCNC: 3 MMOL/L (ref 2–11)
APTT PPP: 31.4 SEC (ref 24.5–34.2)
BACTERIA SPEC CULT: NORMAL
BASOPHILS # BLD: 0 K/UL (ref 0–0.2)
BASOPHILS NFR BLD: 0 % (ref 0–2)
BUN SERPL-MCNC: 21 MG/DL (ref 6–23)
CALCIUM SERPL-MCNC: 9.6 MG/DL (ref 8.3–10.4)
CHLORIDE SERPL-SCNC: 105 MMOL/L (ref 101–110)
CO2 SERPL-SCNC: 29 MMOL/L (ref 21–32)
CREAT SERPL-MCNC: 1.4 MG/DL (ref 0.8–1.5)
DIFFERENTIAL METHOD BLD: ABNORMAL
EOSINOPHIL # BLD: 0.7 K/UL (ref 0–0.8)
EOSINOPHIL NFR BLD: 11 % (ref 0.5–7.8)
ERYTHROCYTE [DISTWIDTH] IN BLOOD BY AUTOMATED COUNT: 14.1 % (ref 11.9–14.6)
EST. AVERAGE GLUCOSE BLD GHB EST-MCNC: 143 MG/DL
GLUCOSE SERPL-MCNC: 138 MG/DL (ref 65–100)
HBA1C MFR BLD: 6.6 % (ref 4.8–5.6)
HCT VFR BLD AUTO: 41.1 % (ref 41.1–50.3)
HGB BLD-MCNC: 12.9 G/DL (ref 13.6–17.2)
IMM GRANULOCYTES # BLD AUTO: 0 K/UL (ref 0–0.5)
IMM GRANULOCYTES NFR BLD AUTO: 0 % (ref 0–5)
INR PPP: 1.3
LYMPHOCYTES # BLD: 2.5 K/UL (ref 0.5–4.6)
LYMPHOCYTES NFR BLD: 37 % (ref 13–44)
MCH RBC QN AUTO: 26.7 PG (ref 26.1–32.9)
MCHC RBC AUTO-ENTMCNC: 31.4 G/DL (ref 31.4–35)
MCV RBC AUTO: 84.9 FL (ref 82–102)
MONOCYTES # BLD: 0.5 K/UL (ref 0.1–1.3)
MONOCYTES NFR BLD: 7 % (ref 4–12)
NEUTS SEG # BLD: 3 K/UL (ref 1.7–8.2)
NEUTS SEG NFR BLD: 45 % (ref 43–78)
NRBC # BLD: 0 K/UL (ref 0–0.2)
PLATELET # BLD AUTO: 259 K/UL (ref 150–450)
PMV BLD AUTO: 10.7 FL (ref 9.4–12.3)
POTASSIUM SERPL-SCNC: 4.1 MMOL/L (ref 3.5–5.1)
PROTHROMBIN TIME: 15.7 SEC (ref 12.6–14.3)
RBC # BLD AUTO: 4.84 M/UL (ref 4.23–5.6)
SERVICE CMNT-IMP: NORMAL
SODIUM SERPL-SCNC: 137 MMOL/L (ref 133–143)
WBC # BLD AUTO: 6.7 K/UL (ref 4.3–11.1)

## 2023-03-28 PROCEDURE — 97161 PT EVAL LOW COMPLEX 20 MIN: CPT

## 2023-03-28 PROCEDURE — 87641 MR-STAPH DNA AMP PROBE: CPT

## 2023-03-28 PROCEDURE — 94760 N-INVAS EAR/PLS OXIMETRY 1: CPT

## 2023-03-28 PROCEDURE — 85610 PROTHROMBIN TIME: CPT

## 2023-03-28 PROCEDURE — 85730 THROMBOPLASTIN TIME PARTIAL: CPT

## 2023-03-28 PROCEDURE — 80048 BASIC METABOLIC PNL TOTAL CA: CPT

## 2023-03-28 PROCEDURE — 83036 HEMOGLOBIN GLYCOSYLATED A1C: CPT

## 2023-03-28 PROCEDURE — 85025 COMPLETE CBC W/AUTO DIFF WBC: CPT

## 2023-03-28 ASSESSMENT — KOOS JR
GOING UP OR DOWN STAIRS: 4
HOW SEVERE IS YOUR KNEE STIFFNESS AFTER FIRST WAKING IN MORNING: 3
KOOS JR TOTAL INTERVAL SCORE: 31.307
TWISING OR PIVOTING ON KNEE: 4
BENDING TO THE FLOOR TO PICK UP OBJECT: 3
STRAIGHTENING KNEE FULLY: 2
RISING FROM SITTING: 3
STANDING UPRIGHT: 3

## 2023-03-28 ASSESSMENT — PAIN DESCRIPTION - DESCRIPTORS: DESCRIPTORS: ACHING;BURNING;THROBBING

## 2023-03-28 ASSESSMENT — PAIN DESCRIPTION - LOCATION: LOCATION: KNEE

## 2023-03-28 ASSESSMENT — PAIN DESCRIPTION - ORIENTATION: ORIENTATION: RIGHT;ANTERIOR;INNER;OUTER

## 2023-03-28 ASSESSMENT — PULMONARY FUNCTION TESTS
FEV1 (LITERS): 1.56
FEV1 (%PREDICTED): 49

## 2023-03-28 ASSESSMENT — PAIN SCALES - GENERAL: PAINLEVEL_OUTOF10: 10

## 2023-03-28 NOTE — PROGRESS NOTES
Decreased step clearance, Decreased step length, Decreased stance, and Wide base of support    DME None     Stairs      Ramp     I=Independent, Mod I=Modified Independent, S=Supervision, SBA=Standby Assistance, CGA=Contact Guard Assistance,   Min=Minimal Assistance, Mod=Moderate Assistance, Max=Maximal Assistance, Total=Total Assistance, NT=Not Tested    TREATMENT:   EVALUATION: LOW COMPLEXITY: (Untimed Charge)    TREATMENT PLAN:   Effective Dates: 3/28/2023 TO 3/28/2023. Treatment/Session Assessment:  Patient was instructed in PT- HEP to increase strength and ROM in LEs. Answered all questions. Frequency/Duration: Patient to continue to perform home exercise program at least twice per day up until his surgery. EDUCATION: Education Given To: Patient, Family  Education Provided: Role of Therapy, Plan of Care, Home Exercise Program, Precautions  Education Method: Demonstration, Verbal, Teach Back, Printed Information/Hand-outs  Education Outcome: Verbalized understanding, Demonstrated understanding    MEDICAL NECESSITY: Mr. Matty Acosta is expected to optimize hislower extremity strength and ROM in preparation for joint replacement surgery. REASON FOR CONTINUED SERVICES: Achieve baseline assesment of musculoskeletal system, functional mobility and home environment. , educate in PT HEP in preparation for surgery, educate in hospital plan of care. COMPLIANCE WITH PROGRAM/EXERCISE: compliant most of the time. TOTAL TREATMENT DURATION:  Time In: 1000  Time Out: 1015  Minutes: 15    Regarding Gerardo Mancini's therapy, I certify that the treatment plan above will be carried out by a therapist or under their direction.   Thank you for this referral,  Eboni Lewis, PT

## 2023-03-28 NOTE — PERIOP NOTE
CARDIAC CLEARANCE    Surgical, Procedural, or Medication Clearance    Physician or Practice Requesting Clearance: [facility]  : Garrett Higgins RN  Contact Phone Number: [phone]  Contact Fax Number: [phone]  Date of Surgery/Procedure: 4/20/23  Type of Surgery or Procedure: Right knee replacement  Type of Anesthesia: spinal  Medication to hold: Eliquis  Requested # of days to hold: 72 hours (3 days)      Please do not respond via e-mail to this message.

## 2023-03-28 NOTE — PERIOP NOTE
PLEASE CONTINUE TAKING ALL PRESCRIPTION MEDICATIONS UP TO THE DAY OF SURGERY UNLESS OTHERWISE DIRECTED BELOW. DISCONTINUE all vitamins and supplements 21 days prior to surgery. DISCONTINUE Non-Steriodal Anti-Inflammatory (NSAIDS) such as Advil and Aleve 5 days prior to surgery. Home Medications to take  the day of surgery    Allopurinol, Clonidine, Hydrocodone if needed, Methocarbamol if needed,    Labetolol, Nifedipine, Rosuvastatin        Home Medications   to Hold   Hold Eliquis for 72 hours before surgery (last dose 4/16/23 pm). Hold voltaren gel for 5 days before surgery. Comments   Bring to hospital: incentive spirometer, maeve hex soap, cpap machine             Please do not bring home medications with you on the day of surgery unless otherwise directed by your nurse. If you are instructed to bring home medications, please give them to your nurse as they will be administered by the nursing staff. If you have any questions, please call Jojo Wright [phone]. A copy of this note was provided to the patient for reference.

## 2023-03-28 NOTE — PERIOP NOTE
PT=15.7, anesthesia to review. All other lab results within anesthesia guidelines, no follow-up required. MRSA swab still processing.        Latest Reference Range & Units 3/28/23 10:00   Sodium 133 - 143 mmol/L 137   Potassium 3.5 - 5.1 mmol/L 4.1   Chloride 101 - 110 mmol/L 105   CO2 21 - 32 mmol/L 29   BUN,BUNPL 6 - 23 MG/DL 21   Creatinine 0.8 - 1.5 MG/DL 1.40   Anion Gap 2 - 11 mmol/L 3   Est, Glom Filt Rate >60 ml/min/1.73m2 58 (L)   Glucose, Random 65 - 100 mg/dL 138 (H)   CALCIUM, SERUM, 467853 8.3 - 10.4 MG/DL 9.6   Hemoglobin A1C 4.8 - 5.6 % 6.6 (H)   eAG (mg/dL) mg/dL 143   WBC 4.3 - 11.1 K/uL 6.7   RBC 4.23 - 5.6 M/uL 4.84   Hemoglobin Quant 13.6 - 17.2 g/dL 12.9 (L)   Hematocrit 41.1 - 50.3 % 41.1   MCV 82.0 - 102.0 FL 84.9   MCH 26.1 - 32.9 PG 26.7   MCHC 31.4 - 35.0 g/dL 31.4   MPV 9.4 - 12.3 FL 10.7   RDW 11.9 - 14.6 % 14.1   Platelet Count 619 - 450 K/uL 259   Absolute Mono # 0.1 - 1.3 K/UL 0.5   Eosinophils % 0.5 - 7.8 % 11 (H)   Basophils Absolute 0.0 - 0.2 K/UL 0.0   Differential Type -   AUTOMATED   Seg Neutrophils 43 - 78 % 45   Segs Absolute 1.7 - 8.2 K/UL 3.0   Lymphocytes 13 - 44 % 37   Absolute Lymph # 0.5 - 4.6 K/UL 2.5   Monocytes 4.0 - 12.0 % 7   Absolute Eos # 0.0 - 0.8 K/UL 0.7   Basophils 0.0 - 2.0 % 0   Immature Granulocytes 0.0 - 5.0 % 0   Nucleated Red Blood Cells 0.0 - 0.2 K/uL 0.00   Absolute Immature Granulocyte 0.0 - 0.5 K/UL 0.0   Prothrombin Time 12.6 - 14.3 sec 15.7 (H)   INR -   1.3   PTT 24.5 - 34.2 SEC 31.4   MSSA/MRSA SCREEN BY PCR  Rpt   (L): Data is abnormally low  (H): Data is abnormally high  Rpt: View report in Results Review for more information

## 2023-03-28 NOTE — CARE COORDINATION
Joint Camp Case Management note:  Patient screened in Prehab for discharge planning needs. Patient scheduled for a future total joint replacement. We discussed Home Health and equipment needed after surgery. List of Home Health providers offered. Patient w/o preference towards provider. We will arrange [facility] on the day of surgery. Pt nervous about surgery-asking if he can stay 3days in hosp. Pt assured hospital stay will be based on his progress and we anticipate he will do well.  He will need an HD walker and HD BSC (his [employer] ins will cover)

## 2023-03-28 NOTE — PERIOP NOTE
Patient verified name and . Order for consent IS found in EHR and matches case posting; patient verified. Type 3 surgery, joint camp assessment complete. Labs per surgeon: CBC,BMP, PT/PTT, Hgb A1c, MRSA swab ; results processing  Labs per anesthesia protocol: no additional  EKG: dated 22 in EHR, anesthesia to review. Clearance to hold Eliquis prior to surgery sent to [place]. Charge RN to follow up. MRSA/MSSA swab collected; pharmacy to review and dose antibiotic as appropriate. Hospital approved surgical skin cleanser and instructions to return bottle on DOS given per hospital policy. Patient provided with handouts including Guide to Surgery, Pain Management, Hand Hygiene, Blood Transfusion Education, and [facility] Brochure. Patient answered medical/surgical history questions at their best of ability. All prior to admission medications documented in [facility]. Original medication prescription bottles were visualized during patient appointment. Patient instructed to hold all vitamins 3 weeks prior to surgery and NSAIDS 5 days prior to surgery. Patient teach back successful and patient demonstrates knowledge of instruction.

## 2023-03-31 NOTE — PROGRESS NOTES
----- Message -----   From: PATRICK Rubio   Sent: 3/29/2023  12:16 PM EDT   To: Arnel Ordaz RN   Subject: RE: Inpatient Notes                               Pt has permission to hold Eliquis 2-3 days prior to upcoming surgery. Please instruct to restart following sugery.      PATRICK Rubio-C   ----- Message -----   From: Arnel Ordaz RN   Sent: 3/28/2023   1:27 PM EDT   To: PATRICK Rubio   Subject: Inpatient Notes
03/28/23 0900   Treatment   Treatment Type Bedside spirometry   Oxygen Therapy/Pulse Ox   O2 Therapy Room air   Heart Rate 82   SpO2 97 %   Pulse Oximeter Device Mode Intermittent   $Pulse Oximeter $Spot check (single)   Bedside Spirometry   FEV-1/Actual (Liters) 1.56 Liters   FEV-1/Predicted (Liters) 49 Liters   Initial respiratory Assessment completed with pt. Pt was interviewed and evaluated in Joint camp prior to surgery. Patient ID:  Tony Warner  [id number]  83 y.o.  1965  Surgeon: Dr. Kane Saucedo  Date of Surgery: [email or web address]  Procedure: Total Right Knee Arthroplasty  Primary Care Physician: Gabriela Dallas, [address] [place] [address]  Specialists: [facility]    BS:diminished      Pt taught proper COUGH technique  IS REVIEWED WITH PT AS WELL AS BENEFITS OF USING IS IN SEDENTARY PTS. DIAPHRAGMATIC BREATHING EXERCISE INSTRUCTIONS GIVEN    History of smoking:   DENIES                 Quit date:         Secondhand smoke:DENIES    Past procedures with Oxygen desaturation or delayed awakening:DENIES    Past Medical History:   Diagnosis Date    AAA (abdominal aortic aneurysm)     Allergic rhinitis due to animal (cat) (dog) hair and dander 11/19/14    Asthma     as a child    Diastolic CHF, chronic ([address] 6/1/2016    DVT (deep venous thrombosis) ([address] 2021    right leg- currently on Eliquis    Dyslipidemia     ROXY on CPAP     Study done 4/17/2012;  AHI 86.7, RDI 86.7 (same)    Pulmonary embolism [facility] 02/2022    Tinea unguium 2/5/2019    bilateral--all nails    Vitamin D deficiency     HX OF PE  Asthma- no inhalers  Respiratory history:                                 SOB  ON EXERTION                                    Respiratory meds:  DENIES    FAMILY PRESENT:             NO     PAST SLEEP STUDY:        YES                    HX OF ROXY:                        YES                   SEVERE ROXY  ROXY assessment:     DANGERS OF UNTREATED ROXY EXPLAINED TO PT.
Renal Failure       Social History     Tobacco Use    Smoking status: Never    Smokeless tobacco: Never   Substance Use Topics    Alcohol use: Yes     Alcohol/week: 13.0 standard drinks     Types: 13 Standard drinks or equivalent per week     Comment: OCASSIONALLY       Prior to Admission medications    Medication Sig Start Date End Date Taking?  Authorizing Provider   rosuvastatin (CRESTOR) 10 MG tablet Take 1 tablet by mouth daily 3/8/23   Jacinto Henning PA   magnesium oxide (MAG-OX) 400 (240 Mg) MG tablet Take 1 tablet by mouth twice daily 2/24/23   Ck Archibald DO   allopurinol (ZYLOPRIM) 100 MG tablet Take 2 tablets by mouth daily 1/18/23   Jacinto Henning PA   apixaban (ELIQUIS) 5 MG TABS tablet Take 1 tablet by mouth twice daily 1/18/23   Jacinto Henning PA   labetalol (NORMODYNE) 100 MG tablet Take 1 tablet by mouth 2 times daily 1/18/23   Jacinto Henning PA   lisinopril-hydroCHLOROthiazide (PRINZIDE;ZESTORETIC) 20-12.5 MG per tablet Take 2 tablets by mouth once daily 1/18/23   Jacinto Henning PA   cloNIDine (CATAPRES) 0.2 MG tablet Take 1 tablet by mouth 2 times daily 1/18/23   PATRICK Bautista   NIFEdipine (ADALAT CC) 60 MG extended release tablet Take 1 tablet by mouth daily 1/18/23   Jacinto Henning PA   metFORMIN (GLUCOPHAGE) 500 MG tablet TAKE ONE TABLET BY MOUTH TWICE A DAY WITH MEAL(S) 1/18/23   Jacinto Henning PA   methocarbamol (ROBAXIN) 750 MG tablet Take 1 tablet by mouth daily as needed (muscle spasm) 1/18/23   Jacinto Henning PA   HYDROcodone-acetaminophen (NORCO) 5-325 MG per tablet TAKE ONE TABLET BY MOUTH EVERY 8 HOURS 9/19/22   Historical Provider, MD   vitamin D (ERGOCALCIFEROL) 1.25 MG (16289 UT) CAPS capsule Take 1 capsule by mouth once a week 8/10/22   Jacinto Henning PA   diclofenac sodium (VOLTAREN) 1 % GEL Apply to affected joints twice daily 6/20/22   Blondell Foot, APRN - CNP

## 2023-04-07 DIAGNOSIS — M17.11 LOCALIZED OSTEOARTHRITIS OF RIGHT KNEE: ICD-10-CM

## 2023-04-11 ENCOUNTER — NURSE ONLY (OUTPATIENT)
Dept: FAMILY MEDICINE CLINIC | Facility: CLINIC | Age: 58
End: 2023-04-11

## 2023-04-11 DIAGNOSIS — I12.9 BENIGN HYPERTENSION WITH CKD (CHRONIC KIDNEY DISEASE) STAGE III (HCC): ICD-10-CM

## 2023-04-11 DIAGNOSIS — N18.30 BENIGN HYPERTENSION WITH CKD (CHRONIC KIDNEY DISEASE) STAGE III (HCC): ICD-10-CM

## 2023-04-12 LAB
ANION GAP SERPL CALC-SCNC: 4 MMOL/L (ref 2–11)
BUN SERPL-MCNC: 22 MG/DL (ref 6–23)
CALCIUM SERPL-MCNC: 9.6 MG/DL (ref 8.3–10.4)
CHLORIDE SERPL-SCNC: 107 MMOL/L (ref 101–110)
CO2 SERPL-SCNC: 28 MMOL/L (ref 21–32)
CREAT SERPL-MCNC: 1.6 MG/DL (ref 0.8–1.5)
GLUCOSE SERPL-MCNC: 107 MG/DL (ref 65–100)
POTASSIUM SERPL-SCNC: 4 MMOL/L (ref 3.5–5.1)
SODIUM SERPL-SCNC: 139 MMOL/L (ref 133–143)

## 2023-04-14 PROBLEM — N18.31 TYPE 2 DIABETES MELLITUS WITH STAGE 3A CHRONIC KIDNEY DISEASE, WITHOUT LONG-TERM CURRENT USE OF INSULIN (HCC): Status: ACTIVE | Noted: 2018-01-24

## 2023-04-14 PROBLEM — M51.36 DEGENERATION OF LUMBAR INTERVERTEBRAL DISC: Status: ACTIVE | Noted: 2023-04-14

## 2023-04-14 PROBLEM — M51.369 DEGENERATION OF LUMBAR INTERVERTEBRAL DISC: Status: ACTIVE | Noted: 2023-04-14

## 2023-04-14 PROBLEM — M47.817 LUMBOSACRAL SPONDYLOSIS WITHOUT MYELOPATHY: Status: ACTIVE | Noted: 2023-04-14

## 2023-04-17 ENCOUNTER — OFFICE VISIT (OUTPATIENT)
Dept: FAMILY MEDICINE CLINIC | Facility: CLINIC | Age: 58
End: 2023-04-17
Payer: MEDICAID

## 2023-04-17 VITALS
TEMPERATURE: 97.6 F | SYSTOLIC BLOOD PRESSURE: 120 MMHG | DIASTOLIC BLOOD PRESSURE: 84 MMHG | BODY MASS INDEX: 41.75 KG/M2 | HEART RATE: 69 BPM | RESPIRATION RATE: 16 BRPM | WEIGHT: 315 LBS | HEIGHT: 73 IN | OXYGEN SATURATION: 97 %

## 2023-04-17 DIAGNOSIS — M25.562 BILATERAL CHRONIC KNEE PAIN: ICD-10-CM

## 2023-04-17 DIAGNOSIS — R53.83 FATIGUE, UNSPECIFIED TYPE: ICD-10-CM

## 2023-04-17 DIAGNOSIS — N18.30 BENIGN HYPERTENSION WITH CKD (CHRONIC KIDNEY DISEASE) STAGE III (HCC): Primary | ICD-10-CM

## 2023-04-17 DIAGNOSIS — E78.5 DYSLIPIDEMIA: ICD-10-CM

## 2023-04-17 DIAGNOSIS — Z87.39 HISTORY OF GOUT: ICD-10-CM

## 2023-04-17 DIAGNOSIS — E55.9 VITAMIN D DEFICIENCY: ICD-10-CM

## 2023-04-17 DIAGNOSIS — Z99.89 OSA ON CPAP: ICD-10-CM

## 2023-04-17 DIAGNOSIS — G89.29 BILATERAL CHRONIC KNEE PAIN: ICD-10-CM

## 2023-04-17 DIAGNOSIS — E11.22 TYPE 2 DIABETES MELLITUS WITH STAGE 3A CHRONIC KIDNEY DISEASE, WITHOUT LONG-TERM CURRENT USE OF INSULIN (HCC): ICD-10-CM

## 2023-04-17 DIAGNOSIS — I12.9 BENIGN HYPERTENSION WITH CKD (CHRONIC KIDNEY DISEASE) STAGE III (HCC): Primary | ICD-10-CM

## 2023-04-17 DIAGNOSIS — R05.1 ACUTE COUGH: ICD-10-CM

## 2023-04-17 DIAGNOSIS — G47.33 OSA ON CPAP: ICD-10-CM

## 2023-04-17 DIAGNOSIS — M25.561 BILATERAL CHRONIC KNEE PAIN: ICD-10-CM

## 2023-04-17 DIAGNOSIS — N18.31 TYPE 2 DIABETES MELLITUS WITH STAGE 3A CHRONIC KIDNEY DISEASE, WITHOUT LONG-TERM CURRENT USE OF INSULIN (HCC): ICD-10-CM

## 2023-04-17 DIAGNOSIS — M17.0 PRIMARY OSTEOARTHRITIS OF BOTH KNEES: ICD-10-CM

## 2023-04-17 LAB
EXP DATE SOLUTION: NORMAL
EXP DATE SWAB: NORMAL
EXPIRATION DATE: NORMAL
LOT NUMBER POC: NORMAL
LOT NUMBER SOLUTION: NORMAL
LOT NUMBER SWAB: NORMAL
SARS-COV-2 RNA, POC: NEGATIVE

## 2023-04-17 PROCEDURE — 99214 OFFICE O/P EST MOD 30 MIN: CPT | Performed by: PHYSICIAN ASSISTANT

## 2023-04-17 PROCEDURE — 87635 SARS-COV-2 COVID-19 AMP PRB: CPT | Performed by: PHYSICIAN ASSISTANT

## 2023-04-17 PROCEDURE — 3044F HG A1C LEVEL LT 7.0%: CPT | Performed by: PHYSICIAN ASSISTANT

## 2023-04-17 RX ORDER — ERGOCALCIFEROL 1.25 MG/1
50000 CAPSULE ORAL WEEKLY
Qty: 12 CAPSULE | Refills: 3 | Status: SHIPPED | OUTPATIENT
Start: 2023-04-17

## 2023-04-17 RX ORDER — ALLOPURINOL 100 MG/1
200 TABLET ORAL DAILY
Qty: 30 TABLET | Refills: 11 | Status: SHIPPED | OUTPATIENT
Start: 2023-04-17

## 2023-04-17 ASSESSMENT — PATIENT HEALTH QUESTIONNAIRE - PHQ9
1. LITTLE INTEREST OR PLEASURE IN DOING THINGS: 0
SUM OF ALL RESPONSES TO PHQ QUESTIONS 1-9: 0
SUM OF ALL RESPONSES TO PHQ QUESTIONS 1-9: 0
2. FEELING DOWN, DEPRESSED OR HOPELESS: 0
SUM OF ALL RESPONSES TO PHQ QUESTIONS 1-9: 0
SUM OF ALL RESPONSES TO PHQ9 QUESTIONS 1 & 2: 0
SUM OF ALL RESPONSES TO PHQ QUESTIONS 1-9: 0

## 2023-04-18 ENCOUNTER — TELEPHONE (OUTPATIENT)
Dept: ORTHOPEDIC SURGERY | Age: 58
End: 2023-04-18

## 2023-04-19 ENCOUNTER — OFFICE VISIT (OUTPATIENT)
Dept: ORTHOPEDIC SURGERY | Age: 58
End: 2023-04-19

## 2023-04-19 ENCOUNTER — ANESTHESIA EVENT (OUTPATIENT)
Dept: SURGERY | Age: 58
End: 2023-04-19
Payer: MEDICAID

## 2023-04-19 DIAGNOSIS — M17.11 ARTHRITIS OF RIGHT KNEE: Primary | ICD-10-CM

## 2023-04-20 ENCOUNTER — ANESTHESIA (OUTPATIENT)
Dept: SURGERY | Age: 58
End: 2023-04-20
Payer: MEDICAID

## 2023-04-20 ENCOUNTER — HOME HEALTH ADMISSION (OUTPATIENT)
Dept: HOME HEALTH SERVICES | Facility: HOME HEALTH | Age: 58
End: 2023-04-20
Payer: MEDICAID

## 2023-04-20 ENCOUNTER — HOSPITAL ENCOUNTER (OUTPATIENT)
Age: 58
Discharge: HOME HEALTH | End: 2023-04-21
Attending: ORTHOPAEDIC SURGERY | Admitting: ORTHOPAEDIC SURGERY
Payer: MEDICAID

## 2023-04-20 DIAGNOSIS — Z96.651 STATUS POST RIGHT KNEE REPLACEMENT: Primary | ICD-10-CM

## 2023-04-20 DIAGNOSIS — Z86.711 HISTORY OF PULMONARY EMBOLISM: ICD-10-CM

## 2023-04-20 PROBLEM — M17.11 PRIMARY OSTEOARTHRITIS OF RIGHT KNEE: Status: ACTIVE | Noted: 2023-04-20

## 2023-04-20 LAB
GLUCOSE BLD STRIP.AUTO-MCNC: 126 MG/DL (ref 65–100)
HCT VFR BLD AUTO: 41.2 % (ref 41.1–50.3)
HGB BLD-MCNC: 13.4 G/DL (ref 13.6–17.2)
SERVICE CMNT-IMP: ABNORMAL

## 2023-04-20 PROCEDURE — 6370000000 HC RX 637 (ALT 250 FOR IP): Performed by: ANESTHESIOLOGY

## 2023-04-20 PROCEDURE — 82962 GLUCOSE BLOOD TEST: CPT

## 2023-04-20 PROCEDURE — 85014 HEMATOCRIT: CPT

## 2023-04-20 PROCEDURE — 2580000003 HC RX 258: Performed by: ANESTHESIOLOGY

## 2023-04-20 PROCEDURE — 2709999900 HC NON-CHARGEABLE SUPPLY: Performed by: ORTHOPAEDIC SURGERY

## 2023-04-20 PROCEDURE — 97535 SELF CARE MNGMENT TRAINING: CPT

## 2023-04-20 PROCEDURE — 7100000001 HC PACU RECOVERY - ADDTL 15 MIN: Performed by: ORTHOPAEDIC SURGERY

## 2023-04-20 PROCEDURE — 2700000000 HC OXYGEN THERAPY PER DAY

## 2023-04-20 PROCEDURE — 94760 N-INVAS EAR/PLS OXIMETRY 1: CPT

## 2023-04-20 PROCEDURE — 2500000003 HC RX 250 WO HCPCS: Performed by: ORTHOPAEDIC SURGERY

## 2023-04-20 PROCEDURE — 2580000003 HC RX 258: Performed by: ORTHOPAEDIC SURGERY

## 2023-04-20 PROCEDURE — 7100000000 HC PACU RECOVERY - FIRST 15 MIN: Performed by: ORTHOPAEDIC SURGERY

## 2023-04-20 PROCEDURE — 6360000002 HC RX W HCPCS: Performed by: ANESTHESIOLOGY

## 2023-04-20 PROCEDURE — 2500000003 HC RX 250 WO HCPCS: Performed by: NURSE ANESTHETIST, CERTIFIED REGISTERED

## 2023-04-20 PROCEDURE — 6360000002 HC RX W HCPCS: Performed by: PHYSICIAN ASSISTANT

## 2023-04-20 PROCEDURE — 3700000001 HC ADD 15 MINUTES (ANESTHESIA): Performed by: ORTHOPAEDIC SURGERY

## 2023-04-20 PROCEDURE — 2580000003 HC RX 258: Performed by: PHYSICIAN ASSISTANT

## 2023-04-20 PROCEDURE — 97162 PT EVAL MOD COMPLEX 30 MIN: CPT

## 2023-04-20 PROCEDURE — 2580000003 HC RX 258: Performed by: NURSE ANESTHETIST, CERTIFIED REGISTERED

## 2023-04-20 PROCEDURE — C1713 ANCHOR/SCREW BN/BN,TIS/BN: HCPCS | Performed by: ORTHOPAEDIC SURGERY

## 2023-04-20 PROCEDURE — 3600000005 HC SURGERY LEVEL 5 BASE: Performed by: ORTHOPAEDIC SURGERY

## 2023-04-20 PROCEDURE — 6360000002 HC RX W HCPCS: Performed by: NURSE ANESTHETIST, CERTIFIED REGISTERED

## 2023-04-20 PROCEDURE — 27447 TOTAL KNEE ARTHROPLASTY: CPT | Performed by: ORTHOPAEDIC SURGERY

## 2023-04-20 PROCEDURE — 97110 THERAPEUTIC EXERCISES: CPT

## 2023-04-20 PROCEDURE — 2720000010 HC SURG SUPPLY STERILE: Performed by: ORTHOPAEDIC SURGERY

## 2023-04-20 PROCEDURE — 3600000015 HC SURGERY LEVEL 5 ADDTL 15MIN: Performed by: ORTHOPAEDIC SURGERY

## 2023-04-20 PROCEDURE — 20985 CPTR-ASST DIR MS PX: CPT | Performed by: ORTHOPAEDIC SURGERY

## 2023-04-20 PROCEDURE — 94761 N-INVAS EAR/PLS OXIMETRY MLT: CPT

## 2023-04-20 PROCEDURE — 6360000002 HC RX W HCPCS: Performed by: ORTHOPAEDIC SURGERY

## 2023-04-20 PROCEDURE — 36415 COLL VENOUS BLD VENIPUNCTURE: CPT

## 2023-04-20 PROCEDURE — 2500000003 HC RX 250 WO HCPCS: Performed by: ANESTHESIOLOGY

## 2023-04-20 PROCEDURE — 97530 THERAPEUTIC ACTIVITIES: CPT

## 2023-04-20 PROCEDURE — 85018 HEMOGLOBIN: CPT

## 2023-04-20 PROCEDURE — 64447 NJX AA&/STRD FEMORAL NRV IMG: CPT | Performed by: ANESTHESIOLOGY

## 2023-04-20 PROCEDURE — C1776 JOINT DEVICE (IMPLANTABLE): HCPCS | Performed by: ORTHOPAEDIC SURGERY

## 2023-04-20 PROCEDURE — 6370000000 HC RX 637 (ALT 250 FOR IP): Performed by: PHYSICIAN ASSISTANT

## 2023-04-20 PROCEDURE — 3700000000 HC ANESTHESIA ATTENDED CARE: Performed by: ORTHOPAEDIC SURGERY

## 2023-04-20 DEVICE — COMPONENT TOT KNEE CAPPED PRIMARY K2STRYKER] STRYKER CORP]: Type: IMPLANTABLE DEVICE | Status: FUNCTIONAL

## 2023-04-20 DEVICE — TIBIAL COMPONENT
Type: IMPLANTABLE DEVICE | Site: KNEE | Status: FUNCTIONAL
Brand: TRIATHLON

## 2023-04-20 DEVICE — CRUCIATE RETAINING FEMORAL
Type: IMPLANTABLE DEVICE | Site: KNEE | Status: FUNCTIONAL
Brand: TRIATHLON

## 2023-04-20 DEVICE — INSERT TIB CRUC STBL 7 12 MM POLYETH TRIATHLON X3: Type: IMPLANTABLE DEVICE | Site: KNEE | Status: FUNCTIONAL

## 2023-04-20 RX ORDER — ROCURONIUM BROMIDE 10 MG/ML
INJECTION, SOLUTION INTRAVENOUS PRN
Status: DISCONTINUED | OUTPATIENT
Start: 2023-04-20 | End: 2023-04-20 | Stop reason: SDUPTHER

## 2023-04-20 RX ORDER — SODIUM CHLORIDE, SODIUM LACTATE, POTASSIUM CHLORIDE, CALCIUM CHLORIDE 600; 310; 30; 20 MG/100ML; MG/100ML; MG/100ML; MG/100ML
INJECTION, SOLUTION INTRAVENOUS CONTINUOUS
Status: DISCONTINUED | OUTPATIENT
Start: 2023-04-20 | End: 2023-04-21 | Stop reason: HOSPADM

## 2023-04-20 RX ORDER — ROPIVACAINE HYDROCHLORIDE 2 MG/ML
INJECTION, SOLUTION EPIDURAL; INFILTRATION; PERINEURAL PRN
Status: DISCONTINUED | OUTPATIENT
Start: 2023-04-20 | End: 2023-04-20 | Stop reason: HOSPADM

## 2023-04-20 RX ORDER — NEOSTIGMINE METHYLSULFATE 1 MG/ML
INJECTION, SOLUTION INTRAVENOUS PRN
Status: DISCONTINUED | OUTPATIENT
Start: 2023-04-20 | End: 2023-04-20 | Stop reason: SDUPTHER

## 2023-04-20 RX ORDER — SODIUM CHLORIDE 9 MG/ML
INJECTION, SOLUTION INTRAVENOUS PRN
Status: DISCONTINUED | OUTPATIENT
Start: 2023-04-20 | End: 2023-04-20 | Stop reason: HOSPADM

## 2023-04-20 RX ORDER — DIPHENHYDRAMINE HCL 25 MG
25 CAPSULE ORAL EVERY 6 HOURS PRN
Status: DISCONTINUED | OUTPATIENT
Start: 2023-04-20 | End: 2023-04-21 | Stop reason: HOSPADM

## 2023-04-20 RX ORDER — HYDROMORPHONE HYDROCHLORIDE 1 MG/ML
INJECTION, SOLUTION INTRAMUSCULAR; INTRAVENOUS; SUBCUTANEOUS PRN
Status: DISCONTINUED | OUTPATIENT
Start: 2023-04-20 | End: 2023-04-20 | Stop reason: SDUPTHER

## 2023-04-20 RX ORDER — ALBUTEROL SULFATE 2.5 MG/3ML
2.5 SOLUTION RESPIRATORY (INHALATION) EVERY 6 HOURS PRN
Status: DISCONTINUED | OUTPATIENT
Start: 2023-04-20 | End: 2023-04-21 | Stop reason: HOSPADM

## 2023-04-20 RX ORDER — SODIUM CHLORIDE 0.9 % (FLUSH) 0.9 %
5-40 SYRINGE (ML) INJECTION PRN
Status: DISCONTINUED | OUTPATIENT
Start: 2023-04-20 | End: 2023-04-20 | Stop reason: HOSPADM

## 2023-04-20 RX ORDER — METHOCARBAMOL 750 MG/1
750 TABLET, FILM COATED ORAL DAILY PRN
Status: DISCONTINUED | OUTPATIENT
Start: 2023-04-20 | End: 2023-04-21 | Stop reason: HOSPADM

## 2023-04-20 RX ORDER — OXYCODONE HYDROCHLORIDE 5 MG/1
5-10 TABLET ORAL EVERY 4 HOURS PRN
Qty: 60 TABLET | Refills: 0 | Status: SHIPPED | OUTPATIENT
Start: 2023-04-20 | End: 2023-04-25

## 2023-04-20 RX ORDER — LISINOPRIL AND HYDROCHLOROTHIAZIDE 20; 12.5 MG/1; MG/1
2 TABLET ORAL DAILY
Status: DISCONTINUED | OUTPATIENT
Start: 2023-04-20 | End: 2023-04-20 | Stop reason: SDUPTHER

## 2023-04-20 RX ORDER — SODIUM CHLORIDE 9 MG/ML
INJECTION, SOLUTION INTRAVENOUS PRN
Status: DISCONTINUED | OUTPATIENT
Start: 2023-04-20 | End: 2023-04-21 | Stop reason: HOSPADM

## 2023-04-20 RX ORDER — SODIUM CHLORIDE 0.9 % (FLUSH) 0.9 %
5-40 SYRINGE (ML) INJECTION EVERY 12 HOURS SCHEDULED
Status: DISCONTINUED | OUTPATIENT
Start: 2023-04-20 | End: 2023-04-20 | Stop reason: HOSPADM

## 2023-04-20 RX ORDER — ALLOPURINOL 100 MG/1
200 TABLET ORAL DAILY
Status: DISCONTINUED | OUTPATIENT
Start: 2023-04-21 | End: 2023-04-21 | Stop reason: HOSPADM

## 2023-04-20 RX ORDER — HYDROMORPHONE HCL 110MG/55ML
0.5 PATIENT CONTROLLED ANALGESIA SYRINGE INTRAVENOUS EVERY 5 MIN PRN
Status: COMPLETED | OUTPATIENT
Start: 2023-04-20 | End: 2023-04-20

## 2023-04-20 RX ORDER — SODIUM CHLORIDE 9 MG/ML
INJECTION, SOLUTION INTRAVENOUS CONTINUOUS
Status: DISCONTINUED | OUTPATIENT
Start: 2023-04-20 | End: 2023-04-20 | Stop reason: HOSPADM

## 2023-04-20 RX ORDER — SENNA AND DOCUSATE SODIUM 50; 8.6 MG/1; MG/1
1 TABLET, FILM COATED ORAL 2 TIMES DAILY
Status: DISCONTINUED | OUTPATIENT
Start: 2023-04-20 | End: 2023-04-21 | Stop reason: HOSPADM

## 2023-04-20 RX ORDER — LIDOCAINE HYDROCHLORIDE 20 MG/ML
INJECTION, SOLUTION EPIDURAL; INFILTRATION; INTRACAUDAL; PERINEURAL PRN
Status: DISCONTINUED | OUTPATIENT
Start: 2023-04-20 | End: 2023-04-20 | Stop reason: SDUPTHER

## 2023-04-20 RX ORDER — LABETALOL 200 MG/1
100 TABLET, FILM COATED ORAL 2 TIMES DAILY
Status: DISCONTINUED | OUTPATIENT
Start: 2023-04-20 | End: 2023-04-21 | Stop reason: HOSPADM

## 2023-04-20 RX ORDER — ACETAMINOPHEN 500 MG
1000 TABLET ORAL ONCE
Status: DISCONTINUED | OUTPATIENT
Start: 2023-04-20 | End: 2023-04-20

## 2023-04-20 RX ORDER — OXYCODONE HYDROCHLORIDE 5 MG/1
10 TABLET ORAL PRN
Status: COMPLETED | OUTPATIENT
Start: 2023-04-20 | End: 2023-04-20

## 2023-04-20 RX ORDER — FENTANYL CITRATE 50 UG/ML
100 INJECTION, SOLUTION INTRAMUSCULAR; INTRAVENOUS
Status: COMPLETED | OUTPATIENT
Start: 2023-04-20 | End: 2023-04-20

## 2023-04-20 RX ORDER — MIDAZOLAM HYDROCHLORIDE 2 MG/2ML
2 INJECTION, SOLUTION INTRAMUSCULAR; INTRAVENOUS
Status: COMPLETED | OUTPATIENT
Start: 2023-04-20 | End: 2023-04-20

## 2023-04-20 RX ORDER — SODIUM CHLORIDE, SODIUM LACTATE, POTASSIUM CHLORIDE, CALCIUM CHLORIDE 600; 310; 30; 20 MG/100ML; MG/100ML; MG/100ML; MG/100ML
INJECTION, SOLUTION INTRAVENOUS CONTINUOUS PRN
Status: DISCONTINUED | OUTPATIENT
Start: 2023-04-20 | End: 2023-04-20 | Stop reason: SDUPTHER

## 2023-04-20 RX ORDER — TRANEXAMIC ACID 100 MG/ML
INJECTION, SOLUTION INTRAVENOUS PRN
Status: DISCONTINUED | OUTPATIENT
Start: 2023-04-20 | End: 2023-04-20 | Stop reason: SDUPTHER

## 2023-04-20 RX ORDER — VANCOMYCIN HYDROCHLORIDE 1 G/20ML
INJECTION, POWDER, LYOPHILIZED, FOR SOLUTION INTRAVENOUS PRN
Status: DISCONTINUED | OUTPATIENT
Start: 2023-04-20 | End: 2023-04-20 | Stop reason: HOSPADM

## 2023-04-20 RX ORDER — SODIUM CHLORIDE 0.9 % (FLUSH) 0.9 %
5-40 SYRINGE (ML) INJECTION PRN
Status: DISCONTINUED | OUTPATIENT
Start: 2023-04-20 | End: 2023-04-21 | Stop reason: HOSPADM

## 2023-04-20 RX ORDER — ONDANSETRON 4 MG/1
4 TABLET, ORALLY DISINTEGRATING ORAL EVERY 8 HOURS PRN
Status: DISCONTINUED | OUTPATIENT
Start: 2023-04-20 | End: 2023-04-21 | Stop reason: HOSPADM

## 2023-04-20 RX ORDER — LISINOPRIL 20 MG/1
40 TABLET ORAL DAILY
Status: DISCONTINUED | OUTPATIENT
Start: 2023-04-21 | End: 2023-04-21 | Stop reason: HOSPADM

## 2023-04-20 RX ORDER — SODIUM CHLORIDE 0.9 % (FLUSH) 0.9 %
5-40 SYRINGE (ML) INJECTION EVERY 12 HOURS SCHEDULED
Status: DISCONTINUED | OUTPATIENT
Start: 2023-04-20 | End: 2023-04-21 | Stop reason: HOSPADM

## 2023-04-20 RX ORDER — ONDANSETRON 2 MG/ML
INJECTION INTRAMUSCULAR; INTRAVENOUS PRN
Status: DISCONTINUED | OUTPATIENT
Start: 2023-04-20 | End: 2023-04-20 | Stop reason: SDUPTHER

## 2023-04-20 RX ORDER — SODIUM CHLORIDE, SODIUM LACTATE, POTASSIUM CHLORIDE, CALCIUM CHLORIDE 600; 310; 30; 20 MG/100ML; MG/100ML; MG/100ML; MG/100ML
INJECTION, SOLUTION INTRAVENOUS CONTINUOUS
Status: DISCONTINUED | OUTPATIENT
Start: 2023-04-20 | End: 2023-04-20 | Stop reason: HOSPADM

## 2023-04-20 RX ORDER — FENTANYL CITRATE 50 UG/ML
INJECTION, SOLUTION INTRAMUSCULAR; INTRAVENOUS PRN
Status: DISCONTINUED | OUTPATIENT
Start: 2023-04-20 | End: 2023-04-20 | Stop reason: SDUPTHER

## 2023-04-20 RX ORDER — DIPHENHYDRAMINE HYDROCHLORIDE 50 MG/ML
12.5 INJECTION INTRAMUSCULAR; INTRAVENOUS
Status: DISCONTINUED | OUTPATIENT
Start: 2023-04-20 | End: 2023-04-20 | Stop reason: HOSPADM

## 2023-04-20 RX ORDER — ROSUVASTATIN CALCIUM 5 MG/1
10 TABLET, COATED ORAL DAILY
Status: DISCONTINUED | OUTPATIENT
Start: 2023-04-21 | End: 2023-04-21 | Stop reason: HOSPADM

## 2023-04-20 RX ORDER — HYDROCHLOROTHIAZIDE 25 MG/1
25 TABLET ORAL DAILY
Status: DISCONTINUED | OUTPATIENT
Start: 2023-04-21 | End: 2023-04-21 | Stop reason: HOSPADM

## 2023-04-20 RX ORDER — ACETAMINOPHEN 500 MG
1000 TABLET ORAL ONCE
Status: DISCONTINUED | OUTPATIENT
Start: 2023-04-20 | End: 2023-04-20 | Stop reason: HOSPADM

## 2023-04-20 RX ORDER — SODIUM CHLORIDE 9 MG/ML
INJECTION, SOLUTION INTRAVENOUS PRN
Status: DISCONTINUED | OUTPATIENT
Start: 2023-04-20 | End: 2023-04-20

## 2023-04-20 RX ORDER — PROPOFOL 10 MG/ML
INJECTION, EMULSION INTRAVENOUS PRN
Status: DISCONTINUED | OUTPATIENT
Start: 2023-04-20 | End: 2023-04-20 | Stop reason: SDUPTHER

## 2023-04-20 RX ORDER — DIPHENHYDRAMINE HYDROCHLORIDE 50 MG/ML
25 INJECTION INTRAMUSCULAR; INTRAVENOUS EVERY 6 HOURS PRN
Status: DISCONTINUED | OUTPATIENT
Start: 2023-04-20 | End: 2023-04-21 | Stop reason: HOSPADM

## 2023-04-20 RX ORDER — LANOLIN ALCOHOL/MO/W.PET/CERES
400 CREAM (GRAM) TOPICAL 2 TIMES DAILY
Status: DISCONTINUED | OUTPATIENT
Start: 2023-04-20 | End: 2023-04-21 | Stop reason: HOSPADM

## 2023-04-20 RX ORDER — ASPIRIN 81 MG/1
81 TABLET ORAL 2 TIMES DAILY
Status: DISCONTINUED | OUTPATIENT
Start: 2023-04-20 | End: 2023-04-21 | Stop reason: HOSPADM

## 2023-04-20 RX ORDER — GLYCOPYRROLATE 0.2 MG/ML
INJECTION INTRAMUSCULAR; INTRAVENOUS PRN
Status: DISCONTINUED | OUTPATIENT
Start: 2023-04-20 | End: 2023-04-20 | Stop reason: SDUPTHER

## 2023-04-20 RX ORDER — ONDANSETRON 2 MG/ML
4 INJECTION INTRAMUSCULAR; INTRAVENOUS EVERY 6 HOURS PRN
Status: DISCONTINUED | OUTPATIENT
Start: 2023-04-20 | End: 2023-04-21 | Stop reason: HOSPADM

## 2023-04-20 RX ORDER — ROPIVACAINE HYDROCHLORIDE 2 MG/ML
INJECTION, SOLUTION EPIDURAL; INFILTRATION; PERINEURAL
Status: COMPLETED | OUTPATIENT
Start: 2023-04-20 | End: 2023-04-20

## 2023-04-20 RX ORDER — LIDOCAINE HYDROCHLORIDE 10 MG/ML
1 INJECTION, SOLUTION INFILTRATION; PERINEURAL
Status: DISCONTINUED | OUTPATIENT
Start: 2023-04-20 | End: 2023-04-20 | Stop reason: HOSPADM

## 2023-04-20 RX ORDER — ONDANSETRON 2 MG/ML
4 INJECTION INTRAMUSCULAR; INTRAVENOUS
Status: DISCONTINUED | OUTPATIENT
Start: 2023-04-20 | End: 2023-04-20 | Stop reason: HOSPADM

## 2023-04-20 RX ORDER — NIFEDIPINE 30 MG/1
60 TABLET, EXTENDED RELEASE ORAL DAILY
Status: DISCONTINUED | OUTPATIENT
Start: 2023-04-21 | End: 2023-04-21 | Stop reason: HOSPADM

## 2023-04-20 RX ORDER — OXYCODONE HYDROCHLORIDE 5 MG/1
5 TABLET ORAL PRN
Status: COMPLETED | OUTPATIENT
Start: 2023-04-20 | End: 2023-04-20

## 2023-04-20 RX ORDER — FAMOTIDINE 20 MG/1
20 TABLET, FILM COATED ORAL ONCE
Status: COMPLETED | OUTPATIENT
Start: 2023-04-20 | End: 2023-04-20

## 2023-04-20 RX ORDER — ACETAMINOPHEN 500 MG
1000 TABLET ORAL EVERY 6 HOURS
Status: DISCONTINUED | OUTPATIENT
Start: 2023-04-20 | End: 2023-04-21 | Stop reason: HOSPADM

## 2023-04-20 RX ORDER — CLONIDINE HYDROCHLORIDE 0.1 MG/1
0.2 TABLET ORAL 2 TIMES DAILY
Status: DISCONTINUED | OUTPATIENT
Start: 2023-04-20 | End: 2023-04-21 | Stop reason: HOSPADM

## 2023-04-20 RX ORDER — OXYCODONE HYDROCHLORIDE 5 MG/1
10 TABLET ORAL EVERY 4 HOURS PRN
Status: DISCONTINUED | OUTPATIENT
Start: 2023-04-20 | End: 2023-04-21 | Stop reason: HOSPADM

## 2023-04-20 RX ADMIN — ONDANSETRON 8 MG: 2 INJECTION INTRAMUSCULAR; INTRAVENOUS at 12:13

## 2023-04-20 RX ADMIN — ASPIRIN 81 MG: 81 TABLET, COATED ORAL at 20:20

## 2023-04-20 RX ADMIN — SODIUM CHLORIDE, SODIUM LACTATE, POTASSIUM CHLORIDE, AND CALCIUM CHLORIDE: 600; 310; 30; 20 INJECTION, SOLUTION INTRAVENOUS at 11:21

## 2023-04-20 RX ADMIN — LIDOCAINE HYDROCHLORIDE 100 MG: 20 INJECTION, SOLUTION EPIDURAL; INFILTRATION; INTRACAUDAL; PERINEURAL at 10:48

## 2023-04-20 RX ADMIN — DEXAMETHASONE SODIUM PHOSPHATE 5 MG: 4 INJECTION, SOLUTION INTRAMUSCULAR; INTRAVENOUS at 09:40

## 2023-04-20 RX ADMIN — OXYCODONE 10 MG: 5 TABLET ORAL at 17:35

## 2023-04-20 RX ADMIN — ACETAMINOPHEN 1000 MG: 500 TABLET, FILM COATED ORAL at 23:36

## 2023-04-20 RX ADMIN — Medication 400 MG: at 20:20

## 2023-04-20 RX ADMIN — SODIUM CHLORIDE, SODIUM LACTATE, POTASSIUM CHLORIDE, AND CALCIUM CHLORIDE: 600; 310; 30; 20 INJECTION, SOLUTION INTRAVENOUS at 10:43

## 2023-04-20 RX ADMIN — Medication 3 MG: at 12:22

## 2023-04-20 RX ADMIN — SODIUM CHLORIDE, POTASSIUM CHLORIDE, SODIUM LACTATE AND CALCIUM CHLORIDE: 600; 310; 30; 20 INJECTION, SOLUTION INTRAVENOUS at 09:36

## 2023-04-20 RX ADMIN — FAMOTIDINE 20 MG: 20 TABLET, FILM COATED ORAL at 09:12

## 2023-04-20 RX ADMIN — HYDROMORPHONE HYDROCHLORIDE 1 MG: 1 INJECTION, SOLUTION INTRAMUSCULAR; INTRAVENOUS; SUBCUTANEOUS at 23:46

## 2023-04-20 RX ADMIN — HYDROMORPHONE HYDROCHLORIDE 0.5 MG: 2 INJECTION, SOLUTION INTRAMUSCULAR; INTRAVENOUS; SUBCUTANEOUS at 14:15

## 2023-04-20 RX ADMIN — TRANEXAMIC ACID 1000 MG: 100 INJECTION, SOLUTION INTRAVENOUS at 11:00

## 2023-04-20 RX ADMIN — OXYCODONE 10 MG: 5 TABLET ORAL at 13:41

## 2023-04-20 RX ADMIN — OXYCODONE 10 MG: 5 TABLET ORAL at 21:04

## 2023-04-20 RX ADMIN — FENTANYL CITRATE 25 MCG: 50 INJECTION INTRAMUSCULAR; INTRAVENOUS at 09:41

## 2023-04-20 RX ADMIN — FENTANYL CITRATE 50 MCG: 50 INJECTION, SOLUTION INTRAMUSCULAR; INTRAVENOUS at 11:14

## 2023-04-20 RX ADMIN — ACETAMINOPHEN 1000 MG: 500 TABLET, FILM COATED ORAL at 17:35

## 2023-04-20 RX ADMIN — ROCURONIUM BROMIDE 50 MG: 50 INJECTION, SOLUTION INTRAVENOUS at 11:09

## 2023-04-20 RX ADMIN — CLONIDINE HYDROCHLORIDE 0.2 MG: 0.1 TABLET ORAL at 20:20

## 2023-04-20 RX ADMIN — Medication 3000 MG: at 11:00

## 2023-04-20 RX ADMIN — FENTANYL CITRATE 50 MCG: 50 INJECTION, SOLUTION INTRAMUSCULAR; INTRAVENOUS at 10:49

## 2023-04-20 RX ADMIN — HYDROMORPHONE HYDROCHLORIDE 0.5 MG: 1 INJECTION, SOLUTION INTRAMUSCULAR; INTRAVENOUS; SUBCUTANEOUS at 13:20

## 2023-04-20 RX ADMIN — HYDROMORPHONE HYDROCHLORIDE 0.5 MG: 1 INJECTION, SOLUTION INTRAMUSCULAR; INTRAVENOUS; SUBCUTANEOUS at 13:40

## 2023-04-20 RX ADMIN — HYDROMORPHONE HYDROCHLORIDE 0.5 MG: 1 INJECTION, SOLUTION INTRAMUSCULAR; INTRAVENOUS; SUBCUTANEOUS at 13:30

## 2023-04-20 RX ADMIN — PROPOFOL 200 MG: 10 INJECTION, EMULSION INTRAVENOUS at 10:48

## 2023-04-20 RX ADMIN — SODIUM CHLORIDE, PRESERVATIVE FREE 10 ML: 5 INJECTION INTRAVENOUS at 20:20

## 2023-04-20 RX ADMIN — DEXAMETHASONE SODIUM PHOSPHATE 5 MG: 4 INJECTION, SOLUTION INTRAMUSCULAR; INTRAVENOUS at 12:13

## 2023-04-20 RX ADMIN — HYDROMORPHONE HYDROCHLORIDE 0.5 MG: 2 INJECTION, SOLUTION INTRAMUSCULAR; INTRAVENOUS; SUBCUTANEOUS at 14:10

## 2023-04-20 RX ADMIN — MIDAZOLAM HYDROCHLORIDE 2 MG: 1 INJECTION, SOLUTION INTRAMUSCULAR; INTRAVENOUS at 09:40

## 2023-04-20 RX ADMIN — Medication 3000 MG: at 17:35

## 2023-04-20 RX ADMIN — HYDROMORPHONE HYDROCHLORIDE 0.5 MG: 1 INJECTION, SOLUTION INTRAMUSCULAR; INTRAVENOUS; SUBCUTANEOUS at 13:10

## 2023-04-20 RX ADMIN — HYDROMORPHONE HYDROCHLORIDE 2 MG: 1 INJECTION, SOLUTION INTRAMUSCULAR; INTRAVENOUS; SUBCUTANEOUS at 11:18

## 2023-04-20 RX ADMIN — GLYCOPYRROLATE 0.6 MG: 0.2 INJECTION INTRAMUSCULAR; INTRAVENOUS at 12:22

## 2023-04-20 RX ADMIN — SENNOSIDES AND DOCUSATE SODIUM 1 TABLET: 50; 8.6 TABLET ORAL at 20:20

## 2023-04-20 RX ADMIN — ROPIVACAINE HYDROCHLORIDE 20 ML: 2 INJECTION, SOLUTION EPIDURAL; INFILTRATION at 09:40

## 2023-04-20 RX ADMIN — LABETALOL HYDROCHLORIDE 100 MG: 200 TABLET, FILM COATED ORAL at 20:20

## 2023-04-20 ASSESSMENT — PAIN SCALES - GENERAL
PAINLEVEL_OUTOF10: 9
PAINLEVEL_OUTOF10: 10
PAINLEVEL_OUTOF10: 5
PAINLEVEL_OUTOF10: 10
PAINLEVEL_OUTOF10: 8
PAINLEVEL_OUTOF10: 10
PAINLEVEL_OUTOF10: 10
PAINLEVEL_OUTOF10: 6
PAINLEVEL_OUTOF10: 8
PAINLEVEL_OUTOF10: 7
PAINLEVEL_OUTOF10: 7

## 2023-04-20 ASSESSMENT — PAIN - FUNCTIONAL ASSESSMENT: PAIN_FUNCTIONAL_ASSESSMENT: NONE - DENIES PAIN

## 2023-04-20 ASSESSMENT — PAIN DESCRIPTION - PAIN TYPE
TYPE: SURGICAL PAIN
TYPE: SURGICAL PAIN

## 2023-04-20 ASSESSMENT — PAIN DESCRIPTION - ORIENTATION
ORIENTATION: RIGHT

## 2023-04-20 ASSESSMENT — PAIN DESCRIPTION - LOCATION
LOCATION: KNEE

## 2023-04-20 ASSESSMENT — PAIN DESCRIPTION - DESCRIPTORS
DESCRIPTORS: BURNING
DESCRIPTORS: BURNING

## 2023-04-20 NOTE — H&P
The patient has end stage arthritis of the right knee. The patient was see and examined and there are no changes to the patient's orthopedic condition. They have tried conservative treatment for this condition; including antiinflammatories and lifestyle modifications and have failed. The necessity for the joint replacement is still present, and the H&P from the office is still current. The patient is admitted today forProcedure(s) (LRB):  KNEE TOTAL ARTHROPLASTY ROBOTIC Right (Right) .
right ear    TONSILLECTOMY          Allergies: No Known Allergies     Physical Exam:   General: NAD, Alert, Oriented, Appears their stated age     [de-identified]: NC/AT, PERRL    Skin: No rashes, lesions or wounds seen      Psych: normal affect      Heart: Regular Rate, Rhythm     Lungs: unlabored respirations, normal breath sounds     Abdomen: Soft and non-distended     Ortho: Pain with limited ROM of the right knee    Neuro: no focal defects, sensation is equal bilaterally     Lymph: no lymphadenopathy     Meds:   No current facility-administered medications for this encounter.      Current Outpatient Medications   Medication Sig    allopurinol (ZYLOPRIM) 100 MG tablet Take 2 tablets by mouth daily    vitamin D (ERGOCALCIFEROL) 1.25 MG (05855 UT) CAPS capsule Take 1 capsule by mouth once a week    rosuvastatin (CRESTOR) 10 MG tablet Take 1 tablet by mouth daily    magnesium oxide (MAG-OX) 400 (240 Mg) MG tablet Take 1 tablet by mouth twice daily    apixaban (ELIQUIS) 5 MG TABS tablet Take 1 tablet by mouth twice daily    labetalol (NORMODYNE) 100 MG tablet Take 1 tablet by mouth 2 times daily    lisinopril-hydroCHLOROthiazide (PRINZIDE;ZESTORETIC) 20-12.5 MG per tablet Take 2 tablets by mouth once daily    cloNIDine (CATAPRES) 0.2 MG tablet Take 1 tablet by mouth 2 times daily    NIFEdipine (ADALAT CC) 60 MG extended release tablet Take 1 tablet by mouth daily    metFORMIN (GLUCOPHAGE) 500 MG tablet TAKE ONE TABLET BY MOUTH TWICE A DAY WITH MEAL(S)    methocarbamol (ROBAXIN) 750 MG tablet Take 1 tablet by mouth daily as needed (muscle spasm) (Patient not taking: Reported on 4/17/2023)    HYDROcodone-acetaminophen (NORCO) 5-325 MG per tablet TAKE ONE TABLET BY MOUTH EVERY 8 HOURS    diclofenac sodium (VOLTAREN) 1 % GEL Apply to affected joints twice daily    Spacer/Aero-Holding Chambers (AEROCHAMBER PLUS MOLLY-VU W/MASK) MISC 1 Units by Does not apply route as needed (shortness of breath)         Labs:  Office Visit on

## 2023-04-20 NOTE — ANESTHESIA PROCEDURE NOTES
Peripheral Block    Patient location during procedure: pre-op  Reason for block: post-op pain management and at surgeon's request  Start time: 4/20/2023 9:40 AM  End time: 4/20/2023 9:42 AM  Staffing  Performed: anesthesiologist   Anesthesiologist: Dann Escalante MD  Preanesthetic Checklist  Completed: patient identified, IV checked, site marked, risks and benefits discussed, surgical/procedural consents, equipment checked, pre-op evaluation, timeout performed, anesthesia consent given, oxygen available and monitors applied/VS acknowledged  Peripheral Block   Patient position: supine  Prep: ChloraPrep  Provider prep: mask  Patient monitoring: cardiac monitor, continuous pulse ox, frequent blood pressure checks, IV access, oxygen and responsive to questions  Block type: Femoral  Adductor canal  Laterality: right  Injection technique: single-shot  Guidance: ultrasound guided  Local infiltration: decadron  Infiltration strength: 1 %  Local infiltration: decadron  Dose: 0.5 mL    Needle   Needle type: insulated echogenic nerve stimulator needle   Needle gauge: 20 G  Needle localization: ultrasound guidance  Assessment   Injection assessment: negative aspiration for heme, no paresthesia on injection, local visualized surrounding nerve on ultrasound and no intravascular symptoms  Slow fractionated injection: yes  Hemodynamics: stable  Real-time US image taken/store: yes  Outcomes: uncomplicated    Medications Administered  ropivacaine (NAROPIN) injection 0.2% - Perineural   20 mL - 4/20/2023 9:40:00 AM  dexamethasone (DECADRON) injection 4 mg/mL - Perineural   5 mg - 4/20/2023 9:40:00 AM

## 2023-04-20 NOTE — OP NOTE
irrigated. The capsular layer was closed using a #1 PDS suture and a #1 Vicryl. Then, 1 gram (100 mg/ml) of Transexamic Acid and 1 gram of Vancomycin was injected into the joint space. The subcutaneous layers were closed using 2-0 vicryl. The skin was closed using staples which were applied in occlusive fashion and sterile bandage applied. An Iceman cryo pad was applied on the operative leg. Sponge count and needle counts were correct. Blanco Sherman left the operating room     Implants:   Implant Name Type Inv.  Item Serial No.  Lot No. LRB No. Used Action   COMPONENT FEM SZ 6 R KNEE CRUCE RET CEMENTLESS BEAD W/ CORBY - [id number]  COMPONENT FEM SZ 6 R KNEE CRUCE RET CEMENTLESS BEAD W/ CORBY  [employer] [id number] Right 1 Implanted   BASEPLATE TIB SZ 7 [id number] ML80MM KNEE TRITANIUM 4 CRUCFRM - [id number]  BASEPLATE TIB SZ 7 [id number] ML80MM KNEE TRITANIUM 4 CRUCFRM  [employer] [id number] Right 1 Implanted   INSERT TIB CRUC STBL 7 12 MM POLYETH TRIATHLON X3 - [id number]  INSERT TIB CRUC STBL 7 12 MM POLYETH TRIATHLON X3  [employer] [id number] Right 1 Implanted         Signed By: Deidra Wei MD   4/20/2023,  12:03 PM

## 2023-04-20 NOTE — ANESTHESIA PRE PROCEDURE
Department of Anesthesiology  Preprocedure Note       Name:  Sonia Bucio   Age:  61 y.o.  :  1965                                          MRN:  [id number]         Date:  2023      Surgeon: Casey Jim):  Jae Cadet MD    Procedure: Procedure(s):  KNEE TOTAL ARTHROPLASTY ROBOTIC Right    Medications prior to admission:   Prior to Admission medications    Medication Sig Start Date End Date Taking?  Authorizing Provider   allopurinol (ZYLOPRIM) 100 MG tablet Take 2 tablets by mouth daily 23   PATRICK Edge Sa   vitamin D (ERGOCALCIFEROL) 1.25 MG (99961 UT) CAPS capsule Take 1 capsule by mouth once a week 23   PATRICK Edge Sa   rosuvastatin (CRESTOR) 10 MG tablet Take 1 tablet by mouth daily 3/8/23   PATRICK Edge Sa   magnesium oxide (MAG-OX) 400 (240 Mg) MG tablet Take 1 tablet by mouth twice daily 23   Katie Poon DO   apixaban (ELIQUIS) 5 MG TABS tablet Take 1 tablet by mouth twice daily 23   PATRICK Edge Sa   labetalol (NORMODYNE) 100 MG tablet Take 1 tablet by mouth 2 times daily 23   PATRICK Edge Sa   lisinopril-hydroCHLOROthiazide (PRINZIDE;ZESTORETIC) 20-12.5 MG per tablet Take 2 tablets by mouth once daily 23   PATRICK Edge Sa   cloNIDine (CATAPRES) 0.2 MG tablet Take 1 tablet by mouth 2 times daily 23   PATRICK Edeg Sa   NIFEdipine (ADALAT CC) 60 MG extended release tablet Take 1 tablet by mouth daily 23   PATRICK Edge Sa   metFORMIN (GLUCOPHAGE) 500 MG tablet TAKE ONE TABLET BY MOUTH TWICE A DAY WITH MEAL(S) 23   PATRICK Edge Sa   methocarbamol (ROBAXIN) 750 MG tablet Take 1 tablet by mouth daily as needed (muscle spasm)  Patient not taking: Reported on 2023   PATRICK Edge Sa   HYDROcodone-acetaminophen (NORCO) 5-325 MG per tablet TAKE ONE TABLET BY MOUTH EVERY 8 HOURS 22   Historical Provider, MD   diclofenac

## 2023-04-20 NOTE — PERIOP NOTE
TRANSFER - OUT REPORT:    Verbal report given to David RN on Toni Escudero  being transferred to [facility] for routine progression of patient care       Report consisted of patient's Situation, Background, Assessment and   Recommendations(SBAR). Information from the following report(s) Nurse Handoff Report, Adult Overview, Surgery Report, MAR, and Cardiac Rhythm SR  was reviewed with the receiving nurse. [place] Assessment: No data recorded  Lines:   Peripheral IV 04/20/23 Right;Posterior Hand (Active)   Site Assessment Clean, dry & intact 04/20/23 1258   Line Status Infusing 04/20/23 1258   Phlebitis Assessment No symptoms 04/20/23 1258   Infiltration Assessment 0 04/20/23 1258   Alcohol Cap Used No 04/20/23 1258   Dressing Status Clean, dry & intact 04/20/23 1258   Dressing Type Transparent 04/20/23 1258        Opportunity for questions and clarification was provided.       Patient transported with:  O2 @ 3lpm

## 2023-04-20 NOTE — RT PROTOCOL NOTE
authorization. ___________________  Date    _____________________________________        ________________________  Signature of Patient or Parent (of minor patient,                  Relationship (if other than Patient)  FCI parent) if applicable. Closest Relative,  Guardian or legal Representative    _____________________________________  Witness  Legal representative is defined as person named by the court as a guardian, executor or , or having power of  specifically set forth to authorize this action.     [phone]   Consent and Release for Home Ventilator

## 2023-04-20 NOTE — ANESTHESIA POSTPROCEDURE EVALUATION
Department of Anesthesiology  Postprocedure Note    Patient: Adrian Hodgson  MRN: [id number]  YOB: 1965  Date of evaluation: 4/20/2023      Procedure Summary     Date: 04/20/23 Room / Location: [facility] [place] MAIN OR 04 / [facility] [place] [place]IN [place]    Anesthesia Start: 0843 Anesthesia Stop: 0614    Procedure: KNEE TOTAL ARTHROPLASTY ROBOTIC Right (Right: Knee) Diagnosis:       Osteoarthritis of right knee      (Osteoarthritis of right knee [M17.11])    Surgeons: Deuce Barron MD Responsible Provider: Myah Benavidez MD    Anesthesia Type: general ASA Status: 3          Anesthesia Type: No value filed.     Brennen Phase I: Brennen Score: 7    Brennen Phase II:        Anesthesia Post Evaluation    Patient location during evaluation: PACU  Patient participation: complete - patient participated  Level of consciousness: awake and alert  Airway patency: patent  Nausea & Vomiting: no nausea and no vomiting  Complications: no  Cardiovascular status: hemodynamically stable  Respiratory status: acceptable, nonlabored ventilation and spontaneous ventilation  Hydration status: euvolemic  Comments: /74   Pulse 81   Temp 98.8 °F (37.1 °C) (Temporal)   Resp 16   Ht 6' 1\" (1.854 m)   Wt (!) 339 lb 4.6 oz (153.9 kg)   SpO2 100%   BMI 44.76 kg/m²     Multimodal analgesia pain management approach

## 2023-04-20 NOTE — DISCHARGE INSTRUCTIONS
if your new knee is uncemented. After your knee has healed enough, you can do more strenuous activities with caution. You can golf, but you may want to use a golf cart for some time. And don't wear shoes with spikes. You can bike on a flat road or on a stationary bike. Talk to your doctor before biking uphill. Your doctor may suggest that you stay away from activities that put stress on your knee. These include tennis, badminton, contact sports like football, jumping (such as in basketball), jogging, and running. Avoid activities where you might fall. Do not sit for more than 1 hour at a time. Get up and walk around for a while before you sit again. If you must sit for a long time, prop up your leg with a chair or footstool. This will help you avoid swelling. Ask your doctor when you can drive again. It may take several weeks after knee replacement surgery before it's safe for you to drive. When you get into a car, sit on the edge of the seat. Then pull in your legs, and turn to face the front. You should be able to do many everyday activities 3 to 6 weeks after your surgery. You will probably need to take 4 to 16 weeks off from work. When you can go back to work depends on the type of work you do and how you feel. Ask your doctor when it is okay for you to have sex. For 12 weeks, do not lift anything heavier than 10 pounds and do not lift weights. Diet    By the time you leave the hospital, you should be eating your normal diet. If your stomach is upset, try bland, low-fat foods like plain rice, broiled chicken, toast, and yogurt. Your doctor may suggest that you take iron and vitamin supplements. Drink plenty of fluids (unless your doctor tells you not to). Eat healthy foods, and watch your portion sizes. Try to stay at your ideal weight. Too much weight puts more stress on your new knee.      You may notice that your bowel movements are not regular right after your

## 2023-04-20 NOTE — ANESTHESIA PROCEDURE NOTES
Airway  Date/Time: 4/20/2023 10:51 AM  Urgency: elective    Airway not difficult    General Information and Staff    Patient location during procedure: OR  Anesthesiologist: Stevan Napier MD  Resident/CRNA: EUGENE Neumann - CRNA  Performed: resident/CRNA     Indications and Patient Condition  Indications for airway management: anesthesia  Spontaneous Ventilation: absent  Sedation level: deep  Preoxygenated: yes  Patient position: sniffing  MILS not maintained throughout  Mask difficulty assessment: vent by bag mask + OA or adjuvant +/- NMBA    Final Airway Details  Final airway type: endotracheal airway      Successful airway: ETT  Cuffed: yes   Successful intubation technique: video laryngoscopy  Facilitating devices/methods: intubating stylet  Endotracheal tube insertion site: oral  ETT size (mm): 8.0  Cormack-Lehane Classification: grade I - full view of glottis  Placement verified by: chest auscultation and capnometry   Measured from: lips  ETT to lips (cm): 24  Number of attempts at approach: 1  Ventilation between attempts: bag mask  Number of other approaches attempted: 0    Additional Comments  Glydescope intubation.   no

## 2023-04-21 VITALS
BODY MASS INDEX: 41.75 KG/M2 | OXYGEN SATURATION: 95 % | TEMPERATURE: 98.1 F | DIASTOLIC BLOOD PRESSURE: 93 MMHG | HEIGHT: 73 IN | SYSTOLIC BLOOD PRESSURE: 145 MMHG | HEART RATE: 93 BPM | RESPIRATION RATE: 18 BRPM | WEIGHT: 315 LBS

## 2023-04-21 LAB
HCT VFR BLD AUTO: 38.6 % (ref 41.1–50.3)
HGB BLD-MCNC: 12.3 G/DL (ref 13.6–17.2)

## 2023-04-21 PROCEDURE — 36415 COLL VENOUS BLD VENIPUNCTURE: CPT

## 2023-04-21 PROCEDURE — 6370000000 HC RX 637 (ALT 250 FOR IP): Performed by: ORTHOPAEDIC SURGERY

## 2023-04-21 PROCEDURE — 97530 THERAPEUTIC ACTIVITIES: CPT

## 2023-04-21 PROCEDURE — 6360000002 HC RX W HCPCS: Performed by: PHYSICIAN ASSISTANT

## 2023-04-21 PROCEDURE — 85014 HEMATOCRIT: CPT

## 2023-04-21 PROCEDURE — 6370000000 HC RX 637 (ALT 250 FOR IP): Performed by: PHYSICIAN ASSISTANT

## 2023-04-21 PROCEDURE — 85018 HEMOGLOBIN: CPT

## 2023-04-21 RX ORDER — ASPIRIN 81 MG/1
81 TABLET ORAL 2 TIMES DAILY
Qty: 30 TABLET | Refills: 3 | Status: SHIPPED | OUTPATIENT
Start: 2023-04-21 | End: 2023-05-25

## 2023-04-21 RX ADMIN — ASPIRIN 81 MG: 81 TABLET, COATED ORAL at 08:25

## 2023-04-21 RX ADMIN — ACETAMINOPHEN 1000 MG: 500 TABLET, FILM COATED ORAL at 11:43

## 2023-04-21 RX ADMIN — ACETAMINOPHEN 1000 MG: 500 TABLET, FILM COATED ORAL at 05:39

## 2023-04-21 RX ADMIN — OXYCODONE 10 MG: 5 TABLET ORAL at 11:43

## 2023-04-21 RX ADMIN — OXYCODONE 10 MG: 5 TABLET ORAL at 03:44

## 2023-04-21 RX ADMIN — ALLOPURINOL 200 MG: 100 TABLET ORAL at 08:26

## 2023-04-21 RX ADMIN — ROSUVASTATIN CALCIUM 10 MG: 5 TABLET, FILM COATED ORAL at 08:26

## 2023-04-21 RX ADMIN — NIFEDIPINE 60 MG: 30 TABLET, EXTENDED RELEASE ORAL at 08:25

## 2023-04-21 RX ADMIN — LISINOPRIL 40 MG: 20 TABLET ORAL at 08:25

## 2023-04-21 RX ADMIN — METFORMIN HYDROCHLORIDE 500 MG: 500 TABLET ORAL at 08:26

## 2023-04-21 RX ADMIN — Medication 400 MG: at 08:25

## 2023-04-21 RX ADMIN — SENNOSIDES AND DOCUSATE SODIUM 1 TABLET: 50; 8.6 TABLET ORAL at 08:26

## 2023-04-21 RX ADMIN — CLONIDINE HYDROCHLORIDE 0.2 MG: 0.1 TABLET ORAL at 08:25

## 2023-04-21 RX ADMIN — HYDROCHLOROTHIAZIDE 25 MG: 25 TABLET ORAL at 08:25

## 2023-04-21 RX ADMIN — OXYCODONE 10 MG: 5 TABLET ORAL at 08:25

## 2023-04-21 RX ADMIN — Medication 3000 MG: at 03:45

## 2023-04-21 RX ADMIN — LABETALOL HYDROCHLORIDE 100 MG: 200 TABLET, FILM COATED ORAL at 08:26

## 2023-04-21 ASSESSMENT — PAIN SCALES - GENERAL: PAINLEVEL_OUTOF10: 8

## 2023-04-21 NOTE — PROGRESS NOTES
04/20/23 1555   Treatment   Treatment Type IS   Oxygen Therapy/Pulse Ox   O2 Therapy Oxygen   $Oxygen $Daily Charge   O2 Device Nasal cannula   O2 Flow Rate (L/min) 2 L/min   Heart Rate (!) 103   SpO2 100 %   $Pulse Oximeter $Spot check (single)   Incentive Spirometry Tx   Treatment Effort Assisted by RT   Achieved Volume (mL) 1200 mL     Patient achieved 1200  MI/sec on IS. Patient encouraged to do 10 breaths every hour while awake-patient agreed and demonstrated. No shortness of breath or distress noted. BS are clear b/l. [facility] notes reviewed- pt has home CPAP. Water provided for humidity.
ACUTE PHYSICAL THERAPY GOALS:   (Developed with and agreed upon by patient and/or caregiver.)  GOALS (1-4 days):  (1.)Mr. Tequila Garduno will move from supine to sit and sit to supine  in bed with MODIFIED INDEPENDENCE. (2.)Mr. Tequila Garduno will transfer from bed to chair and chair to bed with MODIFIED INDEPENDENCE using the least restrictive device. (3.)Mr. Tequila Garduno will ambulate with MODIFIED INDEPENDENCE for 250 feet with the least restrictive device. (4.)Mr. Tequila Garduno will increase left knee ROM to 0-115°.       PHYSICAL THERAPY JOINT CAMP: TOTAL KNEE ARTHROPLASTY Daily Note and PM  (Link to Caseload Tracking: PT Visit Days : 2  Acknowledge Orders  Time In/Out  PT Charge Capture  Rehab Caseload Tracker  Episode   Suhas Paz is a 61 y.o. male   PRIMARY DIAGNOSIS: Osteoarthritis of right knee  Osteoarthritis of right knee [M17.11]  Primary osteoarthritis of right knee [M17.11]  Procedure(s) (LRB):  KNEE TOTAL ARTHROPLASTY ROBOTIC Right (Right)  1 Day Post-Op  Reason for Referral: Pain in Right Knee (M25.561)  Stiffness of Right Knee, Not elsewhere classified (M25.661)  Difficulty in walking, Not elsewhere classified (R26.2)  Other abnormalities of gait and mobility (R26.89)  Outpatient in a bed: Payor: ABSOLUTE TOTAL C[employer] MEDICAID / Plan: ABSOLUTE TOTAL [employer] MEDICAID / Product Type: *No Product type* /     REHAB RECOMMENDATIONS:   Recommendation to date pending progress:  Setting:  Home Health Therapy    Equipment:    Rolling Walker - heavy duty if possible     RANGE OF MOTION:   Right Knee Flexion: R Knee Flexion (0-145): 86  Right Knee Extension: R Knee Extension (0): 5     GAIT: I Mod I S SBA CGA Min Mod Max Total  NT x2 Comments:   Level of Assistance [] [] [] [x] [x] [] [] [] [] [] []            Weightbearing Status  Right Lower Extremity Weight Bearing: Weight Bearing As Tolerated    Distance  130 (+ 130) feet    Gait Quality Antalgic    DME Rolling Walker     Stairs      Ramp     I=Independent, Mod I=Modified
Had two therapy sessions today. Prescriptions were sent to pharmacy. Has follow up appt scheduled with Dr Ashely Blackwell. Home health arranged for therapy. Discharge instructions given. Going to car via wheelchair.
O2 removed, pt placed on continuous 02 monitor for sleep.
OCCUPATIONAL THERAPY Initial Assessment and PM      (Link to Caseload Tracking: OT Visit Days: 1  OT Orders   Time  OT Charge Capture  Rehab Caseload Tracker  Episode     Moreno Harrell is a 61 y.o. male   PRIMARY DIAGNOSIS: Osteoarthritis of right knee  Osteoarthritis of right knee [M17.11]  Primary osteoarthritis of right knee [M17.11]  Procedure(s) (LRB):  KNEE TOTAL ARTHROPLASTY ROBOTIC Right (Right)  Day of Surgery  Reason for Referral: Pain in Right Knee (M25.561)  Stiffness of Right Knee, Not elsewhere classified (M25.661)  Other lack of cordination (R27.8)  Difficulty in walking, Not elsewhere classified (R26.2)  Other abnormalities of gait and mobility (R26.89)  Outpatient in a bed: Payor: ABSOLUTE TOTAL C[employer] MEDICAID / Plan: ABSOLUTE TOTAL C[employer] MEDICAID / Product Type: *No Product type* /     ASSESSMENT:     REHAB RECOMMENDATIONS:   Recommendation to date pending progress:  Setting:  Home Health Therapy    Equipment:    Rolling Walker     ASSESSMENT:  Mr. Marion Nyhan is s/p right TKA and presents with decreased independence with functional mobility and activities of daily living as compared to baseline level of function and safety. Patient would benefit from skilled Occupational Therapy to maximize independence and safety with self-care task and functional mobility. Patient supine in bed using the urinal. He did exercise with PT. He transferred to Eob and rested. He was assisted with donning underwear. He stood min x 2 and ambulated into the hallway with min assist. He returned to recliner and was set up with all his needs. He plans to spend the night. Will see in am for full ADL session.        [address] [facility] [address] [address] AM-PAC 6 Clicks Daily Activity Inpatient Short Form:    AM-PAC Daily Activity - Inpatient   How much help is needed for putting on and taking off regular lower body clothing?: A Lot  How much help is needed for bathing (which includes washing, rinsing, drying)?: A Little  How much help is needed
Patient received resting in bed. Shift assessment completed. Family member at bedside visiting, will monitor.
Received patient to room 311. Patient is alert and oriented with family at bedside. Assisted patient to a comfortable position, instructed on call bell and bed controls.
TRANSFER - IN REPORT:    Verbal report received from Idris Zayas RN on Tacho Rader  being received from PACU for routine post-op      Report consisted of patient's Situation, Background, Assessment and   Recommendations(SBAR). Information from the following report(s) Nurse Handoff Report was reviewed with the receiving nurse. Opportunity for questions and clarification was provided. Assessment completed upon patient's arrival to unit and care assumed.
Benign hypertension with CKD (chronic kidney disease) stage III (HCC)    Bilateral chronic knee pain    Chronic kidney disease (CKD) stage G3a/A1, moderately decreased glomerular filtration rate (GFR) between 45-59 mL/min/1.73 square meter and albuminuria creatinine ratio less than 30 mg/g (HCC)    Osteoarthritis of right knee    Degeneration of lumbar intervertebral disc    Lumbosacral spondylosis without myelopathy    Primary osteoarthritis of right knee       Status Post Procedure(s) (LRB):  KNEE TOTAL ARTHROPLASTY ROBOTIC Right (Right)        Plan: Continue Physical Therapy, discharge home anticipated.    Signed By: Miryam Taylor MD
Independent, S=Supervision, SBA=Standby Assistance, CGA=Contact Guard Assistance,   Min=Minimal Assistance, Mod=Moderate Assistance, Max=Maximal Assistance, Total=Total Assistance, NT=Not Tested    ASSESSMENT:   ASSESSMENT:  Mr. Maite Hernandez is a 61 y.o. male POD #0 s/p R TKA. Upon PT evaluation, pt exhibits expected post-operative strength, balance, and ROM deficits resulting in limited independence with functional mobility. At baseline, pt was independent with all mobility. Pt is now requiring RW and min A for transfers/ambulation      Pt will require HHPT and heavy duty at discharge. Pt will continue to benefit from skilled PT to address above impairments and maximize functional independence prior to discharge. 4/21 supine upon arrival. Participated well with therapy. Performs and review R knee HEP (see below) with guidance. Work on bed mobility as follows:supine>eob with CGA and verbal cues for hand placement. Sit>stand with CGA-SBA with RW in front. Ambulated 100 ft using RW with CGA-SBA while working on reciprocal gait pattern. Rested few min in the olmedo. Ambulated another 100 ft back to the room using RW with CGA-SBA. Return to the chair with needs in reach and instructed to call for assists, before getting up. Will see pt @ 1:00 for another round of therapy. Outcome Measure:   KOOS-JR:     JR AMBER.  KNEE SURVEY (Continued) KOJr JACK. Knee Survey Score   3/28/2023 22       SUBJECTIVE:   Mr. Maite Hernandez agreeable    Home Environment/Prior Level of Function Lives With: Alone  Type of Home: Apartment  Home Layout: One level  Home Access: Level entry  Bathroom Shower/Tub: Tub/Shower unit  Bathroom Toilet: Standard  Receives Help From: Friend(s), Family  ADL Assistance: Independent  Homemaking Assistance: Independent  Ambulation Assistance: Independent  Transfer Assistance: Independent  Occupation: On disability    OBJECTIVE:     PAIN: VITAL SIGNS: LINES/DRAINS:   Pre Treatment: 3/10         Post Treatment:
Ability, Decreased Strength, Decreased Transfer Abilities, and Increased Pain   INTERVENTIONS PLANNED:   (Benefits and precautions of physical therapy have been discussed with the patient.)  Therapeutic Activity, Therapeutic Exercise/HEP, Gait Training, Modalities, and Education       TREATMENT:   EVALUATION: MODERATE COMPLEXITY: (Untimed Charge)    TREATMENT:   Co-Treatment PT/OT necessary due to patient's decreased overall endurance/tolerance levels, as well as need for high level skilled assistance to complete functional transfers/mobility and functional tasks  Therapeutic Activity (15 Minutes): Therapeutic activity included Rolling, Supine to Sit, Scooting, Lateral Scooting, Transfer Training, Ambulation on level ground, Sitting balance , Standing balance, and education to improve functional Activity tolerance, Balance, Coordination, Mobility, and Strength. Therapeutic Exercise (10 Minutes): Therapeutic exercises noted below to improve functional activity tolerance, AROM, strength, and mobility.      TREATMENT GRID:  THERAPEUTIC  EXERCISES: DATE:  4/20 DATE:   DATE:      AM PM AM PM AM PM    [] [x] [] [] [] []   Ankle Pumps  10       Quad Sets  10       Gluteal Sets  10       Hip Abd/ADduction  10       Straight Leg Raises  deferred       Knee Slides  10       Short Arc Quads  10       Chair Slides  10                         B = bilateral; AA = active assistive; A = active; P = passive      EDUCATION: Education Given To: Patient  Education Provided: Role of Therapy, Plan of Care, Home Exercise Program, Fall Prevention Strategies, Equipment, Transfer Training  Education Method: Demonstration, Verbal, Teach Back, Printed Information/Hand-outs  Barriers to Learning: None  Education Outcome: Verbalized understanding, Demonstrated understanding  EDUCATION:  [x] Home Exercises  [x] Fall Precautions  [x] No Pillow Under Knee  [] D/C Instruction Review [] Cryocuff  [x] Walker Management/Safety  [] Adaptive Equipment as
comes first.    ACUTE OCCUPATIONAL THERAPY GOALS:   (Developed with and agreed upon by patient and/or caregiver.)    GOALS:   DISCHARGE GOALS (in preparation for going home/rehab):  3 days  1. Mr. Yemi Murphy will perform lower body dressing activity with minimal assist required to demonstrate improved functional mobility and safety. -GOAL MET 4/21/23     2. Mr. Yemi Murphy will perform bathing activity with minimal assist required to demonstrate improved functional mobility and safety. -GOAL MET 4/21/23     3. Mr. Yemi Murphy will perform toileting activity with  contact guard assist to demonstrate improved functional mobility and safety. -GOAL MET 4/21/23     4. Mr. Yemi Murphy will perform all functional transfers transfer with contact guard assist to demonstrate improved functional mobility and safety. -GOAL MET 4/21/23       PROBLEM LIST:   (Skilled intervention is medically necessary to address:)  Decreased ADL/Functional Activities  Decreased Activity Tolerance  Decreased Balance  Decreased Coordination  Decreased Gait Ability  Decreased Safety Awareness  Decreased Strength  Decreased Transfer Abilities  Increased Pain   INTERVENTIONS PLANNED:   (Benefits and precautions of occupational therapy have been discussed with the patient.)  Self Care Training  Therapeutic Activity  Therapeutic Exercise/HEP  Neuromuscular Re-education  Education         TREATMENT:          TREATMENT: SELF CARE: (45 minutes):   Procedure(s) (per grid) utilized to improve and/or restore self-care/home management as related to dressing, bathing, toileting, grooming, self feeding, and functional mobility . Required minimal visual, verbal, manual, and tactile cueing to facilitate activities of daily living skills, compensatory activities, and OT poc, .     AFTER TREATMENT PRECAUTIONS: Bed/Chair Locked, Call light within reach, Chair, Needs within reach, RN notified, and Visitors at bedside    INTERDISCIPLINARY COLLABORATION:  RN/ PCT, PT/ PTA, and OT/

## 2023-04-22 ENCOUNTER — HOME CARE VISIT (OUTPATIENT)
Dept: SCHEDULING | Facility: HOME HEALTH | Age: 58
End: 2023-04-22
Payer: MEDICAID

## 2023-04-22 PROCEDURE — G0151 HHCP-SERV OF PT,EA 15 MIN: HCPCS

## 2023-04-23 VITALS
TEMPERATURE: 98 F | SYSTOLIC BLOOD PRESSURE: 134 MMHG | DIASTOLIC BLOOD PRESSURE: 88 MMHG | HEART RATE: 78 BPM | RESPIRATION RATE: 17 BRPM | OXYGEN SATURATION: 98 %

## 2023-04-23 ASSESSMENT — ENCOUNTER SYMPTOMS
DYSPNEA ACTIVITY LEVEL: AFTER AMBULATING MORE THAN 20 FT
PAIN LOCATION - PAIN QUALITY: SHARP

## 2023-04-24 ENCOUNTER — HOME CARE VISIT (OUTPATIENT)
Dept: SCHEDULING | Facility: HOME HEALTH | Age: 58
End: 2023-04-24
Payer: MEDICAID

## 2023-04-24 PROCEDURE — G0157 HHC PT ASSISTANT EA 15: HCPCS

## 2023-04-25 VITALS
TEMPERATURE: 97 F | SYSTOLIC BLOOD PRESSURE: 134 MMHG | HEART RATE: 78 BPM | RESPIRATION RATE: 18 BRPM | OXYGEN SATURATION: 98 % | DIASTOLIC BLOOD PRESSURE: 82 MMHG

## 2023-04-25 ASSESSMENT — ENCOUNTER SYMPTOMS: PAIN LOCATION - PAIN QUALITY: SHARP

## 2023-04-26 ENCOUNTER — HOME CARE VISIT (OUTPATIENT)
Dept: SCHEDULING | Facility: HOME HEALTH | Age: 58
End: 2023-04-26
Payer: MEDICAID

## 2023-04-26 VITALS
HEART RATE: 88 BPM | RESPIRATION RATE: 17 BRPM | OXYGEN SATURATION: 97 % | SYSTOLIC BLOOD PRESSURE: 132 MMHG | TEMPERATURE: 98 F | DIASTOLIC BLOOD PRESSURE: 84 MMHG

## 2023-04-26 PROCEDURE — G0157 HHC PT ASSISTANT EA 15: HCPCS

## 2023-04-26 PROCEDURE — G0151 HHCP-SERV OF PT,EA 15 MIN: HCPCS

## 2023-04-26 ASSESSMENT — ENCOUNTER SYMPTOMS: PAIN LOCATION - PAIN QUALITY: SHARP

## 2023-04-28 ENCOUNTER — HOME CARE VISIT (OUTPATIENT)
Dept: SCHEDULING | Facility: HOME HEALTH | Age: 58
End: 2023-04-28
Payer: MEDICAID

## 2023-04-28 PROCEDURE — G0157 HHC PT ASSISTANT EA 15: HCPCS

## 2023-04-30 VITALS
DIASTOLIC BLOOD PRESSURE: 84 MMHG | SYSTOLIC BLOOD PRESSURE: 134 MMHG | TEMPERATURE: 98 F | OXYGEN SATURATION: 97 % | RESPIRATION RATE: 17 BRPM | HEART RATE: 88 BPM

## 2023-04-30 ASSESSMENT — ENCOUNTER SYMPTOMS: PAIN LOCATION - PAIN QUALITY: SHARP

## 2023-05-01 ENCOUNTER — HOME CARE VISIT (OUTPATIENT)
Dept: SCHEDULING | Facility: HOME HEALTH | Age: 58
End: 2023-05-01
Payer: MEDICAID

## 2023-05-01 PROCEDURE — G0157 HHC PT ASSISTANT EA 15: HCPCS

## 2023-05-02 ENCOUNTER — TELEPHONE (OUTPATIENT)
Dept: ORTHOPEDIC SURGERY | Age: 58
End: 2023-05-02

## 2023-05-02 VITALS
SYSTOLIC BLOOD PRESSURE: 124 MMHG | DIASTOLIC BLOOD PRESSURE: 72 MMHG | RESPIRATION RATE: 18 BRPM | OXYGEN SATURATION: 97 % | TEMPERATURE: 97 F | HEART RATE: 83 BPM

## 2023-05-02 DIAGNOSIS — M17.11 PRIMARY OSTEOARTHRITIS OF RIGHT KNEE: Primary | ICD-10-CM

## 2023-05-02 ASSESSMENT — ENCOUNTER SYMPTOMS: PAIN LOCATION - PAIN QUALITY: SHARP

## 2023-05-03 ENCOUNTER — HOME CARE VISIT (OUTPATIENT)
Dept: SCHEDULING | Facility: HOME HEALTH | Age: 58
End: 2023-05-03
Payer: MEDICAID

## 2023-05-03 VITALS
HEART RATE: 98 BPM | DIASTOLIC BLOOD PRESSURE: 80 MMHG | RESPIRATION RATE: 16 BRPM | TEMPERATURE: 98.2 F | SYSTOLIC BLOOD PRESSURE: 132 MMHG | OXYGEN SATURATION: 97 %

## 2023-05-03 PROCEDURE — G0157 HHC PT ASSISTANT EA 15: HCPCS

## 2023-05-03 RX ORDER — OXYCODONE HYDROCHLORIDE 5 MG/1
5 TABLET ORAL
Qty: 30 TABLET | Refills: 0 | Status: SHIPPED | OUTPATIENT
Start: 2023-05-03 | End: 2023-05-08

## 2023-05-05 ENCOUNTER — HOME CARE VISIT (OUTPATIENT)
Dept: SCHEDULING | Facility: HOME HEALTH | Age: 58
End: 2023-05-05
Payer: MEDICAID

## 2023-05-05 VITALS
SYSTOLIC BLOOD PRESSURE: 130 MMHG | TEMPERATURE: 97.4 F | DIASTOLIC BLOOD PRESSURE: 80 MMHG | OXYGEN SATURATION: 98 % | HEART RATE: 83 BPM | RESPIRATION RATE: 16 BRPM

## 2023-05-05 PROCEDURE — G0157 HHC PT ASSISTANT EA 15: HCPCS

## 2023-05-05 ASSESSMENT — ENCOUNTER SYMPTOMS: PAIN LOCATION - PAIN QUALITY: ACHES

## 2023-05-09 ENCOUNTER — HOME CARE VISIT (OUTPATIENT)
Dept: SCHEDULING | Facility: HOME HEALTH | Age: 58
End: 2023-05-09
Payer: MEDICAID

## 2023-05-09 VITALS
SYSTOLIC BLOOD PRESSURE: 138 MMHG | HEART RATE: 78 BPM | DIASTOLIC BLOOD PRESSURE: 80 MMHG | TEMPERATURE: 98.1 F | RESPIRATION RATE: 16 BRPM | OXYGEN SATURATION: 97 %

## 2023-05-09 PROCEDURE — G0157 HHC PT ASSISTANT EA 15: HCPCS

## 2023-05-09 ASSESSMENT — ENCOUNTER SYMPTOMS: PAIN LOCATION - PAIN QUALITY: ACHES

## 2023-05-11 ENCOUNTER — HOME CARE VISIT (OUTPATIENT)
Dept: SCHEDULING | Facility: HOME HEALTH | Age: 58
End: 2023-05-11
Payer: MEDICAID

## 2023-05-11 VITALS
HEART RATE: 82 BPM | DIASTOLIC BLOOD PRESSURE: 78 MMHG | SYSTOLIC BLOOD PRESSURE: 132 MMHG | OXYGEN SATURATION: 97 % | TEMPERATURE: 98.1 F | RESPIRATION RATE: 17 BRPM

## 2023-05-11 PROCEDURE — G0151 HHCP-SERV OF PT,EA 15 MIN: HCPCS

## 2023-05-11 ASSESSMENT — ENCOUNTER SYMPTOMS
PAIN LOCATION - PAIN QUALITY: THROBBING
CONSTIPATION: 1

## 2023-05-19 ENCOUNTER — HOSPITAL ENCOUNTER (OUTPATIENT)
Dept: PHYSICAL THERAPY | Age: 58
Setting detail: RECURRING SERIES
Discharge: HOME | End: 2023-05-22
Payer: MEDICAID

## 2023-05-19 PROCEDURE — 97161 PT EVAL LOW COMPLEX 20 MIN: CPT

## 2023-05-22 ENCOUNTER — OFFICE VISIT (OUTPATIENT)
Dept: ORTHOPEDIC SURGERY | Age: 58
End: 2023-05-22

## 2023-05-22 DIAGNOSIS — Z96.651 STATUS POST TOTAL RIGHT KNEE REPLACEMENT: Primary | ICD-10-CM

## 2023-05-22 PROCEDURE — 99024 POSTOP FOLLOW-UP VISIT: CPT | Performed by: PHYSICIAN ASSISTANT

## 2023-05-22 RX ORDER — OXYCODONE HYDROCHLORIDE 5 MG/1
5 TABLET ORAL
Qty: 30 TABLET | Refills: 0 | Status: SHIPPED | OUTPATIENT
Start: 2023-05-22 | End: 2023-05-27

## 2023-05-22 NOTE — PROGRESS NOTES
Name: Elina Oconnor  YOB: 1965  Gender: male  MRN: [id number]      Current Outpatient Medications:     polyethylene glycol (GLYCOLAX) 17 GM/SCOOP powder, Take 17 g by mouth daily. , Disp: , Rfl:     aspirin 81 MG EC tablet, Take 1 tablet by mouth 2 times daily for 68 doses Indications: DVT ppx, Disp: 30 tablet, Rfl: 3    apixaban (ELIQUIS) 5 MG TABS tablet, Take 1 tablet by mouth twice daily, Disp: 60 tablet, Rfl: 11    allopurinol (ZYLOPRIM) 100 MG tablet, Take 2 tablets by mouth daily, Disp: 30 tablet, Rfl: 11    vitamin D (ERGOCALCIFEROL) 1.25 MG (78777 UT) CAPS capsule, Take 1 capsule by mouth once a week, Disp: 12 capsule, Rfl: 3    rosuvastatin (CRESTOR) 10 MG tablet, Take 1 tablet by mouth daily, Disp: 30 tablet, Rfl: 11    magnesium oxide (MAG-OX) 400 (240 Mg) MG tablet, Take 1 tablet by mouth twice daily, Disp: 180 tablet, Rfl: 3    labetalol (NORMODYNE) 100 MG tablet, Take 1 tablet by mouth 2 times daily, Disp: 60 tablet, Rfl: 11    lisinopril-hydroCHLOROthiazide (PRINZIDE;ZESTORETIC) 20-12.5 MG per tablet, Take 2 tablets by mouth once daily, Disp: 60 tablet, Rfl: 11    cloNIDine (CATAPRES) 0.2 MG tablet, Take 1 tablet by mouth 2 times daily, Disp: 60 tablet, Rfl: 11    NIFEdipine (ADALAT CC) 60 MG extended release tablet, Take 1 tablet by mouth daily, Disp: 30 tablet, Rfl: 11    metFORMIN (GLUCOPHAGE) 500 MG tablet, TAKE ONE TABLET BY MOUTH TWICE A DAY WITH MEAL(S), Disp: 60 tablet, Rfl: 11    methocarbamol (ROBAXIN) 750 MG tablet, Take 1 tablet by mouth daily as needed (muscle spasm), Disp: 30 tablet, Rfl: 5    HYDROcodone-acetaminophen (NORCO) 5-325 MG per tablet, TAKE ONE TABLET BY MOUTH EVERY 8 HOURS, Disp: , Rfl:     diclofenac sodium (VOLTAREN) 1 % GEL, Apply to affected joints twice daily, Disp: 50 g, Rfl: 3    Spacer/Aero-Holding Chambers (AEROCHAMBER PLUS MOLLY-VU W/MASK) MISC, 1 Units by Does not apply route as needed (shortness of breath), Disp: 1 each, Rfl: 0  No Known

## 2023-05-24 ENCOUNTER — HOSPITAL ENCOUNTER (OUTPATIENT)
Dept: PHYSICAL THERAPY | Age: 58
Setting detail: RECURRING SERIES
Discharge: HOME | End: 2023-05-27
Payer: MEDICAID

## 2023-05-24 PROCEDURE — 97110 THERAPEUTIC EXERCISES: CPT

## 2023-05-24 PROCEDURE — 97140 MANUAL THERAPY 1/> REGIONS: CPT

## 2023-05-24 NOTE — PROGRESS NOTES
Ranjan Blood  : 1965  Primary: [employer] (Medicaid Managed)  Secondary:  [address] @ [address] [place] [address]  Phone: [phone]  Fax: [phone] Plan Frequency: 1x to 2x/week  Plan of Care/Certification Expiration Date: 23      >PT Visit Info:  Plan Frequency: 1x to 2x/week  Plan of Care/Certification Expiration Date: 23      Visit Count:  Visit count could not be calculated. Make sure you are using a visit which is associated with an episode. OUTPATIENT PHYSICAL THERAPY:OP NOTE TYPE: Treatment Note 2023       Episode  }Appt Desk             Treatment Diagnosis:  Stiffness of Right Hip, Not elsewhere classified (M25.651)  Pain in Right Knee (M25.561)  Difficulty in walking, Not elsewhere classified (R26.2)  Medical/Referring Diagnosis:  Status post right knee replacement [O02.257]  Referring Physician:  Mirta Lowe MD MD Orders:  PT Eval and Treat   Date of Onset:  Onset Date: 23     Allergies:   Patient has no known allergies. Restrictions/Precautions:  Restrictions/Precautions: Surgical protocol  Right Lower Extremity Weight Bearing: Weight Bearing As Tolerated  Left Lower Extremity Weight Bearing: Weight Bearing As Tolerated     Interventions Planned (Treatment may consist of any combination of the following):    Current Treatment Recommendations: Strengthening; ROM; Balance training; Functional mobility training; Endurance training; Gait training; Manual; Home exercise program; Modalities; Therapeutic activities     >Subjective Comments:   Knee is OK today  >Initial:   Moderate   /10>Post Session:     no increase   /10  Medications Last Reviewed:  2023  Updated Objective Findings:  Findings from initial evaluation unless otherwise noted:    Observation  Pt walking using front-wheeled walker, slow wide based/short step length gait, leaning heavily onto the walker for support.  He performs

## 2023-05-26 ENCOUNTER — HOSPITAL ENCOUNTER (OUTPATIENT)
Dept: PHYSICAL THERAPY | Age: 58
Setting detail: RECURRING SERIES
End: 2023-05-26
Payer: MEDICAID

## 2023-05-29 ENCOUNTER — APPOINTMENT (OUTPATIENT)
Dept: PHYSICAL THERAPY | Age: 58
End: 2023-05-29
Payer: MEDICAID

## 2023-05-31 ENCOUNTER — HOSPITAL ENCOUNTER (OUTPATIENT)
Dept: PHYSICAL THERAPY | Age: 58
Setting detail: RECURRING SERIES
Discharge: HOME | End: 2023-06-03
Payer: MEDICAID

## 2023-05-31 PROCEDURE — 97110 THERAPEUTIC EXERCISES: CPT

## 2023-05-31 NOTE — PROGRESS NOTES
[address]Timmonse Mancini  : 1965  Primary: [employer] (Medicaid Managed)  Secondary:  [address] @ [address] [place] [address]  Phone: [phone]  Fax: [phone] Plan Frequency: 1x to 2x/week  Plan of Care/Certification Expiration Date: 23      >PT Visit Info:  Plan Frequency: 1x to 2x/week  Plan of Care/Certification Expiration Date: 23      Visit Count:  Visit count could not be calculated. Make sure you are using a visit which is associated with an episode. OUTPATIENT PHYSICAL THERAPY:OP NOTE TYPE: Treatment Note 2023       Episode  }Appt Desk             Treatment Diagnosis:  Stiffness of Right Hip, Not elsewhere classified (M25.651)  Pain in Right Knee (M25.561)  Difficulty in walking, Not elsewhere classified (R26.2)  Medical/Referring Diagnosis:  Status post right knee replacement [D04.446]  Referring Physician:  Prashanth Ivory MD MD Orders:  PT Eval and Treat   Date of Onset:  Onset Date: 23     Allergies:   Patient has no known allergies. Restrictions/Precautions:  Restrictions/Precautions: Surgical protocol  Right Lower Extremity Weight Bearing: Weight Bearing As Tolerated  Left Lower Extremity Weight Bearing: Weight Bearing As Tolerated     Interventions Planned (Treatment may consist of any combination of the following):    Current Treatment Recommendations: Strengthening; ROM; Balance training; Functional mobility training; Endurance training; Gait training; Manual; Home exercise program; Modalities; Therapeutic activities     >Subjective Comments:   Knee is sore/stiff. Has been icing knee twice a day for an hour each time to control swelling.    >Initial:   Moderate   /10>Post Session:     no increase   /10  Medications Last Reviewed:  2023  Updated Objective Findings:  Findings from initial evaluation unless otherwise noted:    Observation  Pt walking using front-wheeled walker, slow wide based/short

## 2023-06-02 ENCOUNTER — HOSPITAL ENCOUNTER (OUTPATIENT)
Dept: PHYSICAL THERAPY | Age: 58
Setting detail: RECURRING SERIES
Discharge: HOME | End: 2023-06-05
Payer: MEDICAID

## 2023-06-02 PROCEDURE — 97110 THERAPEUTIC EXERCISES: CPT

## 2023-06-02 PROCEDURE — 97140 MANUAL THERAPY 1/> REGIONS: CPT

## 2023-06-07 ENCOUNTER — HOSPITAL ENCOUNTER (OUTPATIENT)
Dept: PHYSICAL THERAPY | Age: 58
Setting detail: RECURRING SERIES
Discharge: HOME | End: 2023-06-10
Payer: MEDICAID

## 2023-06-07 PROCEDURE — 97110 THERAPEUTIC EXERCISES: CPT

## 2023-06-07 PROCEDURE — 97016 VASOPNEUMATIC DEVICE THERAPY: CPT

## 2023-06-07 PROCEDURE — 97140 MANUAL THERAPY 1/> REGIONS: CPT

## 2023-06-07 NOTE — PROGRESS NOTES
220Timmonse Mancini  : 1965  Primary: [employer] (Medicaid Managed)  Secondary:  [address] @ [address] [facility] [address]  Phone: [phone]  Fax: [phone] Plan Frequency: 1x to 2x/week  Plan of Care/Certification Expiration Date: 23      >PT Visit Info:  Plan Frequency: 1x to 2x/week  Plan of Care/Certification Expiration Date: 23      Visit Count:  2    OUTPATIENT PHYSICAL THERAPY:OP NOTE TYPE: Treatment Note 2023       Episode  }Appt Desk             Treatment Diagnosis:  Stiffness of Right Hip, Not elsewhere classified (M25.651)  Pain in Right Knee (M25.561)  Difficulty in walking, Not elsewhere classified (R26.2)  Medical/Referring Diagnosis:  Status post right knee replacement [G49.908]  Referring Physician:  Dior Kay MD MD Orders:  PT Eval and Treat   Date of Onset:  Onset Date: 23     Allergies:   Patient has no known allergies. Restrictions/Precautions:  Restrictions/Precautions: Surgical protocol  Right Lower Extremity Weight Bearing: Weight Bearing As Tolerated  Left Lower Extremity Weight Bearing: Weight Bearing As Tolerated     Interventions Planned (Treatment may consist of any combination of the following):    Current Treatment Recommendations: Strengthening; ROM; Balance training; Functional mobility training; Endurance training; Gait training; Manual; Home exercise program; Modalities; Therapeutic activities     >Subjective Comments:   Pt reports more edema around the R ankle. He reports walking more and he denies pain or redness in the R calf. >Initial:   Moderate   /10>Post Session:     no increase   /10  Medications Last Reviewed:  2023  Updated Objective Findings:  Findings from initial evaluation unless otherwise noted:    Observation  Pt walking using front-wheeled walker, slow wide based/short step length gait, leaning heavily onto the walker for support.  He performs supine-sit

## 2023-06-09 DIAGNOSIS — M79.89 PAIN AND SWELLING OF RIGHT LOWER LEG: Primary | ICD-10-CM

## 2023-06-09 DIAGNOSIS — M79.661 PAIN AND SWELLING OF RIGHT LOWER LEG: Primary | ICD-10-CM

## 2023-06-16 ENCOUNTER — HOSPITAL ENCOUNTER (OUTPATIENT)
Dept: PHYSICAL THERAPY | Age: 58
Setting detail: RECURRING SERIES
Discharge: HOME | End: 2023-06-19
Payer: MEDICAID

## 2023-06-16 PROCEDURE — 97110 THERAPEUTIC EXERCISES: CPT

## 2023-06-20 ENCOUNTER — HOSPITAL ENCOUNTER (OUTPATIENT)
Dept: PHYSICAL THERAPY | Age: 58
Setting detail: RECURRING SERIES
Discharge: HOME | End: 2023-06-23
Payer: MEDICAID

## 2023-06-20 PROCEDURE — 97110 THERAPEUTIC EXERCISES: CPT

## 2023-06-20 NOTE — PROGRESS NOTES
[place]    Future Appointments   Date Time Provider Errol Dunlap   6/23/2023  2:00 PM Brianda Spangler, [facility]   6/26/2023  2:45 PM Brianda Spangler, [facility]   6/30/2023 11:45 AM Naila Carroll, PT [facility]   7/5/2023 11:00 AM Naila Carroll, PT [facility]   7/7/2023 10:15 AM Brianda Spangler, PTA [facility]   7/12/2023 11:00 AM Brianda Spangler, PTA [facility]   7/14/2023 11:00 AM Naila Carroll, PT [facility]   7/19/2023 11:00 AM Brianda Spangler, PTA [facility]   7/21/2023 10:15 AM Brianda Spangler, PTA [facility]   8/4/2023  3:00 PM [place] DO [facility]   10/13/2023  1:30 PM PV[facility]   10/17/2023  2:00 PM PATRICK Cortez [facility]

## 2023-06-23 ENCOUNTER — HOSPITAL ENCOUNTER (OUTPATIENT)
Dept: PHYSICAL THERAPY | Age: 58
Setting detail: RECURRING SERIES
Discharge: HOME | End: 2023-06-26
Payer: MEDICAID

## 2023-06-23 DIAGNOSIS — I12.9 BENIGN HYPERTENSION WITH CKD (CHRONIC KIDNEY DISEASE) STAGE III (HCC): ICD-10-CM

## 2023-06-23 DIAGNOSIS — N18.30 BENIGN HYPERTENSION WITH CKD (CHRONIC KIDNEY DISEASE) STAGE III (HCC): ICD-10-CM

## 2023-06-23 PROCEDURE — 97016 VASOPNEUMATIC DEVICE THERAPY: CPT

## 2023-06-23 PROCEDURE — 97110 THERAPEUTIC EXERCISES: CPT

## 2023-06-23 RX ORDER — NIFEDIPINE 60 MG/1
TABLET, FILM COATED, EXTENDED RELEASE ORAL
Qty: 30 TABLET | Refills: 0 | OUTPATIENT
Start: 2023-06-23

## 2023-06-26 ENCOUNTER — HOSPITAL ENCOUNTER (OUTPATIENT)
Dept: PHYSICAL THERAPY | Age: 58
Setting detail: RECURRING SERIES
Discharge: HOME | End: 2023-06-29
Payer: MEDICAID

## 2023-06-26 PROCEDURE — 97016 VASOPNEUMATIC DEVICE THERAPY: CPT

## 2023-06-26 PROCEDURE — 97110 THERAPEUTIC EXERCISES: CPT

## 2023-06-30 ENCOUNTER — HOSPITAL ENCOUNTER (OUTPATIENT)
Dept: PHYSICAL THERAPY | Age: 58
Setting detail: RECURRING SERIES
End: 2023-06-30
Payer: MEDICAID

## 2023-06-30 PROCEDURE — 97016 VASOPNEUMATIC DEVICE THERAPY: CPT

## 2023-06-30 PROCEDURE — 97110 THERAPEUTIC EXERCISES: CPT

## 2023-07-05 ENCOUNTER — HOSPITAL ENCOUNTER (OUTPATIENT)
Dept: PHYSICAL THERAPY | Age: 58
Setting detail: RECURRING SERIES
Discharge: HOME | End: 2023-07-08
Payer: MEDICAID

## 2023-07-05 PROCEDURE — 97016 VASOPNEUMATIC DEVICE THERAPY: CPT

## 2023-07-05 PROCEDURE — 97110 THERAPEUTIC EXERCISES: CPT

## 2023-07-07 ENCOUNTER — HOSPITAL ENCOUNTER (OUTPATIENT)
Dept: PHYSICAL THERAPY | Age: 58
Setting detail: RECURRING SERIES
Discharge: HOME | End: 2023-07-10
Payer: MEDICAID

## 2023-07-07 PROCEDURE — 97110 THERAPEUTIC EXERCISES: CPT

## 2023-07-07 PROCEDURE — 97016 VASOPNEUMATIC DEVICE THERAPY: CPT

## 2023-07-12 ENCOUNTER — HOSPITAL ENCOUNTER (OUTPATIENT)
Dept: PHYSICAL THERAPY | Age: 58
Setting detail: RECURRING SERIES
Discharge: HOME | End: 2023-07-15
Payer: MEDICAID

## 2023-07-12 PROCEDURE — 97016 VASOPNEUMATIC DEVICE THERAPY: CPT

## 2023-07-12 PROCEDURE — 97110 THERAPEUTIC EXERCISES: CPT

## 2023-07-12 NOTE — PROGRESS NOTES
220Timmonse Mancini  : 1965  Primary: [employer] (Medicaid Managed)  Secondary:  [address] @ [address] [facility] [address]  Phone: [phone]  Fax: [phone] Plan Frequency: 1x to 2x/week  Plan of Care/Certification Expiration Date: 23      >PT Visit Info:  Plan Frequency: 1x to 2x/week  Plan of Care/Certification Expiration Date: 23      Visit Count:  12    OUTPATIENT PHYSICAL THERAPY:OP NOTE TYPE:  Treatment Note 2023       Episode  }Appt Desk             Treatment Diagnosis:  Stiffness of Right Hip, Not elsewhere classified (M25.651)  Pain in Right Knee (M25.561)  Difficulty in walking, Not elsewhere classified (R26.2)  Medical/Referring Diagnosis:  Status post right knee replacement [N21.205]  Referring Physician:  Garcia Daly MD MD Orders:  PT Eval and Treat   Date of Onset:  Onset Date: 23     Allergies:   Patient has no known allergies. Restrictions/Precautions:  Restrictions/Precautions: Surgical protocol  Right Lower Extremity Weight Bearing: Weight Bearing As Tolerated  Left Lower Extremity Weight Bearing: Weight Bearing As Tolerated     Interventions Planned (Treatment may consist of any combination of the following):    Current Treatment Recommendations: Strengthening; ROM; Balance training; Functional mobility training; Endurance training; Gait training; Manual; Home exercise program; Modalities; Therapeutic activities     >Subjective Comments:   Pt reports increased stiffness. Pt expressed frustration with the knee pain and progress and states \"I don't think it's ever going to get to 120. \"  >Initial:   3/10 R knee >Post Session:     Pt reported increased pain during stretching.   Medications Last Reviewed:  2023  Updated Objective Findings:  Findings from initial evaluation unless otherwise noted:    Observation  Pt walking using front-wheeled walker, slow wide based/short step length gait,

## 2023-07-14 ENCOUNTER — HOSPITAL ENCOUNTER (OUTPATIENT)
Dept: PHYSICAL THERAPY | Age: 58
Setting detail: RECURRING SERIES
End: 2023-07-14
Payer: MEDICAID

## 2023-07-19 ENCOUNTER — HOSPITAL ENCOUNTER (OUTPATIENT)
Dept: PHYSICAL THERAPY | Age: 58
Setting detail: RECURRING SERIES
Discharge: HOME | End: 2023-07-22
Payer: MEDICAID

## 2023-07-19 PROCEDURE — 97016 VASOPNEUMATIC DEVICE THERAPY: CPT

## 2023-07-19 PROCEDURE — 97110 THERAPEUTIC EXERCISES: CPT

## 2023-07-19 NOTE — PROGRESS NOTES
[address]Timmonse Mancini  : 1965  Primary: [employer] (Medicaid Managed)  Secondary:  [address] @ [address] [facility] [address]  Phone: [phone]  Fax: [phone] Plan Frequency: 1x to 2x/week  Plan of Care/Certification Expiration Date: 23      >PT Visit Info:  Plan Frequency: 1x to 2x/week  Plan of Care/Certification Expiration Date: 23      Visit Count:  13    OUTPATIENT PHYSICAL THERAPY:OP NOTE TYPE:  Treatment Note 2023       Episode  }Appt Desk             Treatment Diagnosis:  Stiffness of Right Hip, Not elsewhere classified (M25.651)  Pain in Right Knee (M25.561)  Difficulty in walking, Not elsewhere classified (R26.2)  Medical/Referring Diagnosis:  Status post right knee replacement [Q82.930]  Referring Physician:  Andrew Dobbs MD MD Orders:  PT Eval and Treat   Date of Onset:  Onset Date: 23     Allergies:   Patient has no known allergies. Restrictions/Precautions:  Restrictions/Precautions: Surgical protocol  Right Lower Extremity Weight Bearing: Weight Bearing As Tolerated  Left Lower Extremity Weight Bearing: Weight Bearing As Tolerated     Interventions Planned (Treatment may consist of any combination of the following):    Current Treatment Recommendations: Strengthening; ROM; Balance training; Functional mobility training; Endurance training; Gait training; Manual; Home exercise program; Modalities; Therapeutic activities     >Subjective Comments:   Pt reports moderate pain. >Initial:   4-5/10 R knee >Post Session:     Pt reported increased pain during stretching. Medications Last Reviewed:  2023  Updated Objective Findings:  Findings from initial evaluation unless otherwise noted:    Observation  Pt walking using front-wheeled walker, slow wide based/short step length gait, leaning heavily onto the walker for support.  He performs supine-sit transfers with min assist of one, sit-supine with

## 2023-07-20 ENCOUNTER — HOSPITAL ENCOUNTER (OUTPATIENT)
Dept: PHYSICAL THERAPY | Age: 58
Setting detail: RECURRING SERIES
Discharge: HOME | End: 2023-07-23
Payer: MEDICAID

## 2023-07-20 PROCEDURE — 97110 THERAPEUTIC EXERCISES: CPT

## 2023-07-20 PROCEDURE — 97140 MANUAL THERAPY 1/> REGIONS: CPT

## 2023-07-20 NOTE — PROGRESS NOTES
tactile cues to improve independence and safety with daily activities . THERAPEUTIC EXERCISE: (see below for minutes): Exercises per grid below to improve mobility, strength, balance and coordination. Required minimal verbal and manual cues to promote proper body alignment, promote proper body posture and promote proper body mechanics. Progressed resistance, range, repetitions and complexity of movement as indicated. MANUAL THERAPY: (see below for minutes): Joint mobilization and Soft tissue mobilization was utilized and necessary because of the patient's restricted joint motion, painful spasm, loss of articular motion and restricted motion of soft tissue. MODALITIES: (see below for minutes): to decrease pain   SELF CARE: (see below for minutes): Procedure(s) (per grid) utilized to improve and/or restore self-care/home management as related to dressing, bathing and grooming. Required minimal verbal cueing to facilitate activities of daily living skills and compensatory activities    Goals: (Goals have been discussed and agreed upon with patient.)  Short-Term Functional Goals: Time Frame: 4 weeks  Independent performance of home exercise program (MET)  Restore normal knee ROM flexion/extension (Partially met)  Improve quadriceps strength 15%+ (MET)  Discharge Goals: Time Frame: 8 weeks  Restore pain-free, normal gait (without assistive device) and squat capacity (progressing)  Improve single leg balance to 60\" or better (Progressing)  Improve LEFS / KOOS  Jr  score to 85% or better to reflect return to normal ADL capacity. (Progressing)    Date: 6/16/23 (visit 8/PN) 6/20/23 (visit 9) 6/23/23 (visit 10) 6/26/23: visit 11 6/30/23 (visit 12) PN 7/5/23 (visit 13)  7/7/23 (visit 14)  7/12/23 (visit 15) 7/19/23 (visit 16) 7/20/23 (visit 17)   Modalities:   15 min 15 min 15 min 15 min 15 min  15 min 15 min    To R knee   Gameready to R LE with LE elevated.  Low compression, coldest setting R knee gameraeady vaso/cryo x

## 2023-07-21 ENCOUNTER — APPOINTMENT (OUTPATIENT)
Dept: PHYSICAL THERAPY | Age: 58
End: 2023-07-21
Payer: MEDICAID

## 2023-07-24 ENCOUNTER — APPOINTMENT (OUTPATIENT)
Dept: PHYSICAL THERAPY | Age: 58
End: 2023-07-24
Payer: MEDICAID

## 2023-07-26 ENCOUNTER — HOSPITAL ENCOUNTER (OUTPATIENT)
Dept: PHYSICAL THERAPY | Age: 58
Setting detail: RECURRING SERIES
Discharge: HOME | End: 2023-07-29
Payer: MEDICAID

## 2023-07-26 PROCEDURE — 97110 THERAPEUTIC EXERCISES: CPT

## 2023-07-26 ASSESSMENT — PAIN SCALES - GENERAL: PAINLEVEL_OUTOF10: 3

## 2023-07-26 NOTE — PROGRESS NOTES
[address]Timmonse Mancini  : 1965  Primary: [employer] (Medicaid Managed)  Secondary:  [address] @ [address] [facility] [address]  Phone: [phone]  Fax: [phone] Plan Frequency: 1x to 2x/week  Plan of Care/Certification Expiration Date: 23      >PT Visit Info:  Plan Frequency: 1x to 2x/week  Plan of Care/Certification Expiration Date: 23      Visit Count:  15    OUTPATIENT PHYSICAL THERAPY:OP NOTE TYPE:  Treatment Note 2023       Episode  }Appt Desk             Treatment Diagnosis:  Stiffness of Right Hip, Not elsewhere classified (M25.651)  Pain in Right Knee (M25.561)  Difficulty in walking, Not elsewhere classified (R26.2)  Medical/Referring Diagnosis:  Status post right knee replacement [L30.172]  Referring Physician:  María Viramontes MD MD Orders:  PT Eval and Treat   Date of Onset:  Onset Date: 23     Allergies:   Patient has no known allergies. Restrictions/Precautions:  Restrictions/Precautions: Surgical protocol  Right Lower Extremity Weight Bearing: Weight Bearing As Tolerated  Left Lower Extremity Weight Bearing: Weight Bearing As Tolerated     Interventions Planned (Treatment may consist of any combination of the following):    Current Treatment Recommendations: Strengthening; ROM; Balance training; Functional mobility training; Endurance training; Gait training; Manual; Home exercise program; Modalities; Therapeutic activities     >Subjective Comments:  patient reports that his knee is still stiff.  >Initial:   3/10 R knee >Post Session: 5/10        Medications Last Reviewed:  2023  Updated Objective Findings:  Findings from initial evaluation unless otherwise noted:    Observation  Pt walking using front-wheeled walker, slow wide based/short step length gait, leaning heavily onto the walker for support. He performs supine-sit transfers with min assist of one, sit-supine with supervision.  Sit to stand

## 2023-07-27 ENCOUNTER — APPOINTMENT (OUTPATIENT)
Dept: PHYSICAL THERAPY | Age: 58
End: 2023-07-27
Payer: MEDICAID

## 2023-08-01 ENCOUNTER — HOSPITAL ENCOUNTER (OUTPATIENT)
Dept: PHYSICAL THERAPY | Age: 58
Setting detail: RECURRING SERIES
Discharge: HOME | End: 2023-08-04
Payer: MEDICAID

## 2023-08-01 PROCEDURE — 97110 THERAPEUTIC EXERCISES: CPT

## 2023-08-01 NOTE — PROGRESS NOTES
[place]    Exercises  - Supine Heel Slide with Strap (Mirrored)  - 2 x daily - 7 x weekly - 5 sets - 30 second hold  - Supine Knee Extension Stretch on Towel Roll  - 2 x daily - 7 x weekly - 2 minute hold  - Active Straight Leg Raise with Quad Set  - 2 x daily - 7 x weekly - 3 sets - 10 reps  - Seated Knee Flexion AAROM  - 2 x daily - 7 x weekly - 3 sets - 30 second hold  - Mini Squat with Counter Support  - 2 x daily - 7 x weekly - 30 reps  - Standing Heel Raises  - 2 x daily - 7 x weekly - 3 sets - 30 reps  - Standing Knee Flexion  - 2 x daily - 7 x weekly - 3 sets - 10 reps  6/16/23: OK to transition off of walker and start using cane - pt instructed in proper cane self fitting and side of use, sequencing . Treatment/Session Summary:    >Treatment Assessment: Pt is still limited into knee flexion, which contributes to the pain during exercise. He demonstrates R quadriceps weakness during step ups. Communication/Consultation:  None today  Equipment provided today:  None  Recommendations/Intent for next treatment session: Next visit will focus on TKR /LE strength and gait training, modalities and manual therapy to modulate pain and improve knee mobility.      >Total Treatment Billable Duration:  40 minutes  Time In: 1015  Time Out: [address] Noelle Beaver, FELIX       Charge Capture  }Post Session Pain  PT Visit Info  [address] [place] Portal  MD Guidelines  Scanned Media  Benefits  MyChart    Future Appointments   Date Time Provider [address]   8/3/2023  4:15 PM Lajune Manifold, PT [facility]   8/4/2023  3:00 PM Eduardo Moore DO [facility]   8/7/2023  2:00 PM [facility]   8/11/2023  2:00 PM Lajune Manifold, PT [facility]   8/16/2023  2:45 PM Lajune Manifold, PT [facility]   8/18/2023  2:45 PM [facility]   8/22/2023 10:15 AM Meli Hess PTA [facility]   8/25/2023  2:00 PM Lajune Manifold, PT [facility]   9/1/2023  2:45 PM Meli Hess, PTA [facility]

## 2023-08-03 ENCOUNTER — HOSPITAL ENCOUNTER (OUTPATIENT)
Dept: PHYSICAL THERAPY | Age: 58
Setting detail: RECURRING SERIES
Discharge: HOME | End: 2023-08-06
Payer: MEDICAID

## 2023-08-03 PROCEDURE — 97110 THERAPEUTIC EXERCISES: CPT

## 2023-08-03 NOTE — PROGRESS NOTES
[address]Timmonse Mancini  : 1965  Primary: [employer] (Medicaid Managed)  Secondary:  [address] @ [address] [address] [facility]  [facility] [address] [place] [address]  Phone: [phone]  Fax: [phone] Plan Frequency: 1x to 2x/week  Plan of Care/Certification Expiration Date: 23      >PT Visit Info:  Plan Frequency: 1x to 2x/week  Plan of Care/Certification Expiration Date: 23      Visit Count:  17    OUTPATIENT PHYSICAL THERAPY:OP NOTE TYPE: Progress Report /  Treatment Note 8/3/2023       Episode  }Appt Desk             Treatment Diagnosis:  Stiffness of Right Hip, Not elsewhere classified (M25.651)  Pain in Right Knee (M25.561)  Difficulty in walking, Not elsewhere classified (R26.2)  Medical/Referring Diagnosis:  Status post right knee replacement [C66.170]  Referring Physician:  Mann Lynn MD MD Orders:  PT Eval and Treat   Date of Onset:  Onset Date: 23     Allergies:   Patient has no known allergies. Restrictions/Precautions:  Restrictions/Precautions: Surgical protocol  Right Lower Extremity Weight Bearing: Weight Bearing As Tolerated  Left Lower Extremity Weight Bearing: Weight Bearing As Tolerated     Interventions Planned (Treatment may consist of any combination of the following):    Current Treatment Recommendations: Strengthening; ROM; Balance training; Functional mobility training; Endurance training; Gait training; Manual; Home exercise program; Modalities; Therapeutic activities     >Subjective Comments:  Pt  reporting ongoing intermittent knee swelling but notices that his walking is better-  recent visit with his family was with family members commenting on his improved walking. He continues to use cane but intermittently finds that he's able to go without it and sometimes forgets to use it.      >Initial:   5/10 R knee >Post Session: Pt reported increased R knee pain post treatment.   Updated Objective Findings:  Findings from initial

## 2023-08-07 ENCOUNTER — HOSPITAL ENCOUNTER (OUTPATIENT)
Dept: PHYSICAL THERAPY | Age: 58
Setting detail: RECURRING SERIES
Discharge: HOME | End: 2023-08-10
Payer: MEDICAID

## 2023-08-07 PROCEDURE — 97110 THERAPEUTIC EXERCISES: CPT

## 2023-08-08 ENCOUNTER — APPOINTMENT (OUTPATIENT)
Dept: PHYSICAL THERAPY | Age: 58
End: 2023-08-08
Payer: MEDICAID

## 2023-08-10 ENCOUNTER — APPOINTMENT (OUTPATIENT)
Dept: PHYSICAL THERAPY | Age: 58
End: 2023-08-10
Payer: MEDICAID

## 2023-08-11 ENCOUNTER — APPOINTMENT (OUTPATIENT)
Dept: PHYSICAL THERAPY | Age: 58
End: 2023-08-11
Payer: MEDICAID

## 2023-08-11 ENCOUNTER — HOSPITAL ENCOUNTER (OUTPATIENT)
Dept: PHYSICAL THERAPY | Age: 58
Setting detail: RECURRING SERIES
Discharge: HOME | End: 2023-08-14
Payer: MEDICAID

## 2023-08-11 PROCEDURE — 97110 THERAPEUTIC EXERCISES: CPT

## 2023-08-11 NOTE — PROGRESS NOTES
help knee flex Sit to stand 3x10 from mat table R knee extn stretch 2 min R SLR's 3 x 8 @ 3#98 SLR's R 20 and 10 rep    Heel raises x30 with UE support  Seated knee flexwith band  Blue 2x15 Seated hamstring curl machine 3x10 B, 30#  R SLR\"s 20x, 10x (fatigue)  Bilat LAQ's: 4 x 8 @ 75# Wall squats w/ ball behind back: 40x    Slantboard stretch 1 min hold on level 1 incline  Sit to stand on ground 2x10,  On foam 1x10 Step ups 3x10 R, 6 in Bilat LAQ's: 3 x 8 @ 75# Bilat HS curls 4 x 8 @ 4x 8 LAQ's: 4 x 10@ 50# DL    Lateral band walk x 4laps black CLX in // bars    Bilat HS curls: 4 x 8 @ 37.5# Slantboard stretch: 2 min HS curls 4 x 10 @ 37.5# DL         Lateral step ups: 9\" 3 x 8  DL calf raises x 30          6\" forward step downs R - 1 rep (attempted)  Slantboard stretch x 2 min bilat calves. // bar tandem walking no hands x 5 laps          3\" airex narrow base balance 2 min          Tandem stand balance 1 min    Proprioceptive Activities:                                        Manual Therapy:  10 min     4 min     Soft tissue/edema massage to R LE with LE elevated post stretching     Tib-fem glides /distraction gr. 3 x 2 min          Ant knee STM to decrease swelling x 2 min   Functional Activities:                                                  Access Code: [email or web address]  URL: [email or web address]  Date: 05/31/2023  Prepared by: Lilia Rojas    Exercises  - Supine Heel Slide with Strap (Mirrored)  - 2 x daily - 7 x weekly - 5 sets - 30 second hold  - Supine Knee Extension Stretch on Towel Roll  - 2 x daily - 7 x weekly - 2 minute hold  - Active Straight Leg Raise with Quad Set  - 2 x daily - 7 x weekly - 3 sets - 10 reps  - Seated Knee Flexion AAROM  - 2 x daily - 7 x weekly - 3 sets - 30 second hold  - Mini Squat with Counter Support  - 2 x daily - 7 x weekly - 30 reps  - Standing Heel Raises  - 2 x daily - 7 x weekly - 3 sets - 30 reps  - Standing Knee Flexion  - 2 x daily - 7 x weekly - 3

## 2023-08-15 ENCOUNTER — APPOINTMENT (OUTPATIENT)
Dept: PHYSICAL THERAPY | Age: 58
End: 2023-08-15
Payer: MEDICAID

## 2023-08-16 ENCOUNTER — HOSPITAL ENCOUNTER (OUTPATIENT)
Dept: PHYSICAL THERAPY | Age: 58
Setting detail: RECURRING SERIES
Discharge: HOME | End: 2023-08-19
Payer: MEDICAID

## 2023-08-16 PROCEDURE — 97110 THERAPEUTIC EXERCISES: CPT

## 2023-08-17 ENCOUNTER — APPOINTMENT (OUTPATIENT)
Dept: PHYSICAL THERAPY | Age: 58
End: 2023-08-17
Payer: MEDICAID

## 2023-08-18 ENCOUNTER — HOSPITAL ENCOUNTER (OUTPATIENT)
Dept: PHYSICAL THERAPY | Age: 58
Setting detail: RECURRING SERIES
Discharge: HOME | End: 2023-08-21
Payer: MEDICAID

## 2023-08-18 PROCEDURE — 97110 THERAPEUTIC EXERCISES: CPT

## 2023-08-22 ENCOUNTER — HOSPITAL ENCOUNTER (OUTPATIENT)
Dept: PHYSICAL THERAPY | Age: 58
Setting detail: RECURRING SERIES
Discharge: HOME | End: 2023-08-25
Payer: MEDICAID

## 2023-08-22 PROCEDURE — 97110 THERAPEUTIC EXERCISES: CPT

## 2023-08-22 NOTE — PROGRESS NOTES
Supine stretching into knee extension and then flexion with therapist providing over pressure R heel slides/end range stretch 2 min R knee extn stretch x 2 min  Supine knee overpressure stretch x 3 min Standign DL calf raises on slant board: 3 x 10 to 15. Heel raises x30     Heel raises x30    R LE step ups, 6 in box x15 with UE support  Contract/relax in sitting position to help knee flex Sit to stand 3x10 from mat table R knee extn stretch 2 min R SLR's 3 x 8 @ 3#98 SLR's R 20 and 10 rep Slantboard stretch 1 min Slant board stretch x 1 min  Slant board stretch x 1 min hold    Heel raises x30 with UE support  Seated knee flexwith band  Blue 2x15 Seated hamstring curl machine 3x10 B, 30#  R SLR\"s 20x, 10x (fatigue)  Bilat LAQ's: 4 x 8 @ 75# Wall squats w/ ball behind back: 40x Leg press DL @ 120# 4 x 10  Double leg press  80# x10  90# x10  100# x10 Lateral band walk x 4 laps gray in // bars    Slantboard stretch 1 min hold on level 1 incline  Sit to stand on ground 2x10,  On foam 1x10 Step ups 3x10 R, 6 in Bilat LAQ's: 3 x 8 @ 75# Bilat HS curls 4 x 8 @ 4x 8 LAQ's: 4 x 10@ 50# DL R knee extn stretch x 2 min Sit to stand from successively lower mat 3x10 Sit to stand from mat 3x10    Lateral band walk x 4laps black CLX in // bars    Bilat HS curls: 4 x 8 @ 37.5# Slantboard stretch: 2 min HS curls 4 x 10 @ 37.5# DL R SLR's: 5 x 8 @ 4#  Hamstring curls with gray band 2x10 R Forward and lateral step up on 6 inch box, 3x10 R LE with UE support         Lateral step ups: 9\" 3 x 8  DL calf raises x 30  LAQ's: 4 x 8 @ 62.5# DL  LAQ with 7# cuff 3x10 LAQ 10# 3x10         6\" forward step downs R - 1 rep (attempted)  Slantboard stretch x 2 min bilat calves. Hamstring curls. 42.5# 4 x 8 DL            // bar tandem walking no hands x 5 laps  Tandem balance: 1 min each L and R foot leading           3\" airex narrow base balance 2 min  Swiss ball mini squats.  20x           Tandem stand balance 1 min       Proprioceptive

## 2023-08-24 ENCOUNTER — APPOINTMENT (OUTPATIENT)
Dept: PHYSICAL THERAPY | Age: 58
End: 2023-08-24
Payer: MEDICAID

## 2023-08-25 ENCOUNTER — HOSPITAL ENCOUNTER (OUTPATIENT)
Dept: PHYSICAL THERAPY | Age: 58
Setting detail: RECURRING SERIES
Discharge: HOME | End: 2023-08-28
Payer: MEDICAID

## 2023-08-25 PROCEDURE — 97110 THERAPEUTIC EXERCISES: CPT

## 2023-08-29 ENCOUNTER — APPOINTMENT (OUTPATIENT)
Dept: PHYSICAL THERAPY | Age: 58
End: 2023-08-29
Payer: MEDICAID

## 2023-08-31 ENCOUNTER — APPOINTMENT (OUTPATIENT)
Dept: PHYSICAL THERAPY | Age: 58
End: 2023-08-31
Payer: MEDICAID

## 2023-09-01 ENCOUNTER — HOSPITAL ENCOUNTER (OUTPATIENT)
Dept: PHYSICAL THERAPY | Age: 58
Setting detail: RECURRING SERIES
Discharge: HOME | End: 2023-09-04
Payer: MEDICAID

## 2023-09-01 PROCEDURE — 97110 THERAPEUTIC EXERCISES: CPT

## 2023-09-05 ENCOUNTER — HOSPITAL ENCOUNTER (OUTPATIENT)
Dept: PHYSICAL THERAPY | Age: 58
Setting detail: RECURRING SERIES
Discharge: HOME | End: 2023-09-08
Payer: MEDICAID

## 2023-09-05 PROCEDURE — 97110 THERAPEUTIC EXERCISES: CPT

## 2023-09-07 ENCOUNTER — HOSPITAL ENCOUNTER (OUTPATIENT)
Dept: PHYSICAL THERAPY | Age: 58
Setting detail: RECURRING SERIES
Discharge: HOME | End: 2023-09-10
Payer: MEDICAID

## 2023-09-07 PROCEDURE — 97110 THERAPEUTIC EXERCISES: CPT

## 2023-09-07 NOTE — PROGRESS NOTES
raises x30     Heel raises x30 LAQ's: 5x 8 @ 55# DL LAQ 3x10, 10# R LE LAQ 10# 3x10 R LE Knee extension machine 25# 3x10 B    R knee extn stretch 2 min R SLR's 3 x 8 @ 3#98 SLR's R 20 and 10 rep Slantboard stretch 1 min Slant board stretch x 1 min  Slant board stretch x 1 min hold Seated HS curls : 40# 5 x 8 DL  Sit to stand from mat 2x10 Sit to stand from mat table 2x10 Supine knee extension stretch x 2 min with ankle on bolster    R SLR\"s 20x, 10x (fatigue)  Bilat LAQ's: 4 x 8 @ 75# Wall squats w/ ball behind back: 40x Leg press DL @ 120# 4 x 10  Double leg press  80# x10  90# x10  100# x10 Lateral band walk x 4 laps gray in // bars Sit stand from mat : 3 x 10  Step ups 3x10, 6 in box Forward and lateral step ups 3x10, 6 in with UE support Forward and lateral step ups 3x10, 6in with intermittent UE support    Bilat LAQ's: 3 x 8 @ 75# Bilat HS curls 4 x 8 @ 4x 8 LAQ's: 4 x 10@ 50# DL R knee extn stretch x 2 min Sit to stand from successively lower mat 3x10 Sit to stand from mat 3x10 Slantboard stretch: 2 min SLR 3x10 Heel raises x30 Heel raises x30    Bilat HS curls: 4 x 8 @ 37.5# Slantboard stretch: 2 min HS curls 4 x 10 @ 37.5# DL R SLR's: 5 x 8 @ 4#  Hamstring curls with gray band 2x10 R Forward and lateral step up on 6 inch box, 3x10 R LE with UE support Lateral stepping: 20 x 4 L/R  Incline board stretch 1 min hold Incline board stretch x 1 min hold     Lateral step ups: 9\" 3 x 8  DL calf raises x 30  LAQ's: 4 x 8 @ 62.5# DL  LAQ with 7# cuff 3x10 LAQ 10# 3x10    Hamstring curls seated with blue band 2x10     6\" forward step downs R - 1 rep (attempted)  Slantboard stretch x 2 min bilat calves. Hamstring curls. 42.5# 4 x 8 DL            // bar tandem walking no hands x 5 laps  Tandem balance: 1 min each L and R foot leading           3\" airex narrow base balance 2 min  Swiss ball mini squats.  20x           Tandem stand balance 1 min           Proprioceptive Activities:

## 2023-09-12 ENCOUNTER — HOSPITAL ENCOUNTER (OUTPATIENT)
Dept: PHYSICAL THERAPY | Age: 58
Setting detail: RECURRING SERIES
Discharge: HOME | End: 2023-09-15
Payer: MEDICAID

## 2023-09-12 PROCEDURE — 97110 THERAPEUTIC EXERCISES: CPT

## 2023-09-13 ENCOUNTER — HOSPITAL ENCOUNTER (OUTPATIENT)
Dept: PHYSICAL THERAPY | Age: 58
Setting detail: RECURRING SERIES
Discharge: HOME | End: 2023-09-16
Payer: MEDICAID

## 2023-09-13 PROCEDURE — 97110 THERAPEUTIC EXERCISES: CPT

## 2023-09-13 NOTE — PROGRESS NOTES
skills and compensatory activities    Goals: (Goals have been discussed and agreed upon with patient.)  Short-Term Functional Goals: Time Frame: 4 weeks  Independent performance of home exercise program (MET)  Restore normal knee ROM flexion/extension (Partially met)  Improve quadriceps strength 15%+ (MET)  Discharge Goals: Time Frame: 8 weeks  Restore pain-free, normal gait (without assistive device) and squat capacity (progressing)  Improve single leg balance to 60\" or better (Progressing)  Improve LEFS / KOOS  Jr  score to 85% or better to reflect return to normal ADL capacity. (Progressing)    Date: 9/1/23 (visit 27) PN 9/5/23 (visit 28) 9/7/23 (visit 29) 9/12/23 (visit 30 )  9/13/23 (Visit 31)   Modalities:         To R knee                        Therapeutic Exercise: 40 min 40 min 40 min 40 min 40 min    Stepper bike x6 min level 6 Stepper bike x 6 min level 6 Stepper bike level 4 x 6 min Upright bike x 7 min Upright bike x 7 min    Seated knee flexion stretch with tibial distraction Seated knee flexion stretch with tibial distraction Seated knee flexion stretch with tibial distraction Seated knee extn DL 4  x 8 @ 37.5# Seated knee ext 2 x 30 9# R    LAQ 3x10, 10# R LE LAQ 10# 3x10 R LE Knee extension machine 25# 3x10 B Seated hamstring curls DL @ 37.5# 4 x 8      Sit to stand from mat 2x10 Sit to stand from mat table 2x10 Supine knee extension stretch x 2 min with ankle on bolster Rockerboard ap axis: side to side tipping DL x 2 min, level board x 2 min     Step ups 3x10, 6 in box Forward and lateral step ups 3x10, 6 in with UE support Forward and lateral step ups 3x10, 6in with intermittent UE support Slantboard stretch x 2 min Slantboard stretch x 2 min    SLR 3x10 Heel raises x30 Heel raises x30 R lateral step ups x 10 (9\")  R lateral step ups x 20 (9\")     Incline board stretch 1 min hold Incline board stretch x 1 min hold  R fwd step ups x 20 (9\")      Hamstring curls seated with blue band 2x10  STS x 30

## 2023-09-19 ENCOUNTER — HOSPITAL ENCOUNTER (OUTPATIENT)
Dept: PHYSICAL THERAPY | Age: 58
Setting detail: RECURRING SERIES
Discharge: HOME | End: 2023-09-22
Payer: MEDICAID

## 2023-09-19 PROCEDURE — 97110 THERAPEUTIC EXERCISES: CPT

## 2023-09-19 NOTE — PROGRESS NOTES
stretch x 2 min 6\" anterior stepdowns/back x 8    SLR 3x10 Heel raises x30 Heel raises x30 R lateral step ups x 10 (9\")  R lateral step ups x 20 (9\") Single leg balance R x 60\"      Incline board stretch 1 min hold Incline board stretch x 1 min hold  R fwd step ups x 20 (9\") DL calf raises on slant x 25      Hamstring curls seated with blue band 2x10  STS x 30 from mat table Slantboard stretch x 2 min        Marching, soldier march, ham curl march in // bars x 3         SLS, tandem B x 3 to fatigue                      Proprioceptive Activities:                                    Manual Therapy:                           Functional Activities:                                             Access Code: Marques Mckeon  URL: [email or web address]  Date: 05/31/2023  Prepared by: Kat Browning    Exercises  - Supine Heel Slide with Strap (Mirrored)  - 2 x daily - 7 x weekly - 5 sets - 30 second hold  - Supine Knee Extension Stretch on Towel Roll  - 2 x daily - 7 x weekly - 2 minute hold  - Active Straight Leg Raise with Quad Set  - 2 x daily - 7 x weekly - 3 sets - 10 reps  - Seated Knee Flexion AAROM  - 2 x daily - 7 x weekly - 3 sets - 30 second hold  - Mini Squat with Counter Support  - 2 x daily - 7 x weekly - 30 reps  - Standing Heel Raises  - 2 x daily - 7 x weekly - 3 sets - 30 reps  - Standing Knee Flexion  - 2 x daily - 7 x weekly - 3 sets - 10 reps  6/16/23: OK to transition off of walker and start using cane - pt instructed in proper cane self fitting and side of use, sequencing . Treatment/Session Summary:    >Treatment Assessment: Ready for discharge - plateau - LTG's all at least partially met.    Communication/Consultation:  None today  Equipment provided today:  None  Recommendations/Intent for next treatment session: Discharge to home program.     >Total Treatment Billable Duration:  40 minutes  Time In: 1530  Time Out: [address], PT       Charge Capture  }Post Session Pain  PT

## 2023-09-20 ENCOUNTER — OFFICE VISIT (OUTPATIENT)
Age: 58
End: 2023-09-20
Payer: MEDICAID

## 2023-09-20 VITALS
SYSTOLIC BLOOD PRESSURE: 136 MMHG | HEART RATE: 84 BPM | DIASTOLIC BLOOD PRESSURE: 80 MMHG | BODY MASS INDEX: 41.75 KG/M2 | WEIGHT: 315 LBS | HEIGHT: 73 IN

## 2023-09-20 DIAGNOSIS — Z86.711 HISTORY OF PULMONARY EMBOLISM: ICD-10-CM

## 2023-09-20 DIAGNOSIS — Z87.39 HISTORY OF GOUT: ICD-10-CM

## 2023-09-20 DIAGNOSIS — I12.9 BENIGN HYPERTENSION WITH CKD (CHRONIC KIDNEY DISEASE) STAGE III (HCC): Primary | ICD-10-CM

## 2023-09-20 DIAGNOSIS — E11.22 TYPE 2 DIABETES MELLITUS WITH STAGE 3A CHRONIC KIDNEY DISEASE, WITHOUT LONG-TERM CURRENT USE OF INSULIN (HCC): ICD-10-CM

## 2023-09-20 DIAGNOSIS — E55.9 VITAMIN D DEFICIENCY: ICD-10-CM

## 2023-09-20 DIAGNOSIS — E78.5 DYSLIPIDEMIA: ICD-10-CM

## 2023-09-20 DIAGNOSIS — I35.1 NONRHEUMATIC AORTIC VALVE REGURGITATION: ICD-10-CM

## 2023-09-20 DIAGNOSIS — I12.9 BENIGN HYPERTENSION WITH CKD (CHRONIC KIDNEY DISEASE) STAGE III (HCC): ICD-10-CM

## 2023-09-20 DIAGNOSIS — N18.31 TYPE 2 DIABETES MELLITUS WITH STAGE 3A CHRONIC KIDNEY DISEASE, WITHOUT LONG-TERM CURRENT USE OF INSULIN (HCC): ICD-10-CM

## 2023-09-20 DIAGNOSIS — I50.32 DIASTOLIC CHF, CHRONIC (HCC): Primary | ICD-10-CM

## 2023-09-20 DIAGNOSIS — N18.30 BENIGN HYPERTENSION WITH CKD (CHRONIC KIDNEY DISEASE) STAGE III (HCC): Primary | ICD-10-CM

## 2023-09-20 DIAGNOSIS — G47.33 OSA ON CPAP: ICD-10-CM

## 2023-09-20 DIAGNOSIS — Z99.89 OSA ON CPAP: ICD-10-CM

## 2023-09-20 DIAGNOSIS — N18.30 BENIGN HYPERTENSION WITH CKD (CHRONIC KIDNEY DISEASE) STAGE III (HCC): ICD-10-CM

## 2023-09-20 PROCEDURE — 99214 OFFICE O/P EST MOD 30 MIN: CPT | Performed by: INTERNAL MEDICINE

## 2023-09-20 PROCEDURE — 93000 ELECTROCARDIOGRAM COMPLETE: CPT | Performed by: INTERNAL MEDICINE

## 2023-09-20 NOTE — PROGRESS NOTES
congestive heart failure. Diagnoses and all orders for this visit:    Diastolic CHF, chronic (HCC)  -     EKG 12 Lead    Benign hypertension with CKD (chronic kidney disease) stage III (HCC)    Nonrheumatic aortic valve regurgitation    ROXY on CPAP    History of pulmonary embolism          PLAN:      1. HTN:  Needs ongoing better diet and exercising. Follow K and Cr. On diuretics. doing well on HCTZ. On BB>           Instructed patient to follow low sodium diet. Monitor BP at home, will review at follow up. Aim for at least 30 minutes moderate physical activity at least 5 days a week. If needed, work on stress reduction and lifestyle modification. 2. ROXY:  Stay on CPAP. Stressed today. 3. DHF:  EF normal.  Follow AO in the future. On BB now. 4. CKD: seeing renal as well. Renal has been adjusted BP meds. 5. AI: echo ok, check in 2 yrs. Follow AO and AV. 6. PE:  back on AC per heme, follow. Echo ok. Patient has been instructed and agrees to call our office with any issues or other concerns related to their cardiac condition(s) and/or complaint(s). Return in about 6 months (around 3/20/2024).        Juan Pablo Peacock, DO  9/20/2023

## 2023-10-13 ENCOUNTER — NURSE ONLY (OUTPATIENT)
Dept: FAMILY MEDICINE CLINIC | Facility: CLINIC | Age: 58
End: 2023-10-13

## 2023-10-13 DIAGNOSIS — E78.5 DYSLIPIDEMIA: ICD-10-CM

## 2023-10-13 DIAGNOSIS — E11.22 TYPE 2 DIABETES MELLITUS WITH STAGE 3A CHRONIC KIDNEY DISEASE, WITHOUT LONG-TERM CURRENT USE OF INSULIN (HCC): ICD-10-CM

## 2023-10-13 DIAGNOSIS — E55.9 VITAMIN D DEFICIENCY: ICD-10-CM

## 2023-10-13 DIAGNOSIS — Z87.39 HISTORY OF GOUT: ICD-10-CM

## 2023-10-13 DIAGNOSIS — N18.31 TYPE 2 DIABETES MELLITUS WITH STAGE 3A CHRONIC KIDNEY DISEASE, WITHOUT LONG-TERM CURRENT USE OF INSULIN (HCC): ICD-10-CM

## 2023-10-13 DIAGNOSIS — N18.30 BENIGN HYPERTENSION WITH CKD (CHRONIC KIDNEY DISEASE) STAGE III (HCC): ICD-10-CM

## 2023-10-13 DIAGNOSIS — I12.9 BENIGN HYPERTENSION WITH CKD (CHRONIC KIDNEY DISEASE) STAGE III (HCC): ICD-10-CM

## 2023-10-13 LAB
25(OH)D3 SERPL-MCNC: 81.4 NG/ML (ref 30–100)
ALBUMIN SERPL-MCNC: 3.6 G/DL (ref 3.5–5)
ALBUMIN/GLOB SERPL: 0.8 (ref 0.4–1.6)
ALP SERPL-CCNC: 112 U/L (ref 50–136)
ALT SERPL-CCNC: 35 U/L (ref 12–65)
ANION GAP SERPL CALC-SCNC: 4 MMOL/L (ref 2–11)
AST SERPL-CCNC: 19 U/L (ref 15–37)
BILIRUB SERPL-MCNC: 0.3 MG/DL (ref 0.2–1.1)
BUN SERPL-MCNC: 23 MG/DL (ref 6–23)
CALCIUM SERPL-MCNC: 9.8 MG/DL (ref 8.3–10.4)
CHLORIDE SERPL-SCNC: 107 MMOL/L (ref 101–110)
CHOLEST SERPL-MCNC: 85 MG/DL
CO2 SERPL-SCNC: 29 MMOL/L (ref 21–32)
CREAT SERPL-MCNC: 1.6 MG/DL (ref 0.8–1.5)
CREAT UR-MCNC: 96 MG/DL
EST. AVERAGE GLUCOSE BLD GHB EST-MCNC: 143 MG/DL
GLOBULIN SER CALC-MCNC: 4.3 G/DL (ref 2.8–4.5)
GLUCOSE SERPL-MCNC: 123 MG/DL (ref 65–100)
HBA1C MFR BLD: 6.6 % (ref 4.8–5.6)
HDLC SERPL-MCNC: 44 MG/DL (ref 40–60)
HDLC SERPL: 1.9
LDLC SERPL CALC-MCNC: 25.8 MG/DL
MICROALBUMIN UR-MCNC: 8.59 MG/DL
MICROALBUMIN/CREAT UR-RTO: 89 MG/G (ref 0–30)
POTASSIUM SERPL-SCNC: 4.2 MMOL/L (ref 3.5–5.1)
PROT SERPL-MCNC: 7.9 G/DL (ref 6.3–8.2)
SODIUM SERPL-SCNC: 140 MMOL/L (ref 133–143)
TRIGL SERPL-MCNC: 76 MG/DL (ref 35–150)
URATE SERPL-MCNC: 5.3 MG/DL (ref 2.6–6)
VLDLC SERPL CALC-MCNC: 15.2 MG/DL (ref 6–23)

## 2023-10-16 PROBLEM — M17.11 PRIMARY OSTEOARTHRITIS OF RIGHT KNEE: Status: RESOLVED | Noted: 2023-04-20 | Resolved: 2023-10-16

## 2023-10-16 PROBLEM — M17.11 OSTEOARTHRITIS OF RIGHT KNEE: Status: RESOLVED | Noted: 2022-12-12 | Resolved: 2023-10-16

## 2023-10-16 NOTE — PROGRESS NOTES
Philip Atkins (: 1965) is a 60 y.o. male, an established patient, is here for evaluation of the following chief complaint(s):  Chief Complaint   Patient presents with    Hypertension    Results    Back Pain          ASSESSMENT/PLAN:  Elia Benavides was seen today for hypertension, results and back pain. Diagnoses and all orders for this visit:    Benign hypertension with CKD (chronic kidney disease) stage III (HCC)  -     cloNIDine (CATAPRES) 0.2 MG tablet; Take 1 tablet by mouth 2 times daily  -     labetalol (NORMODYNE) 100 MG tablet; Take 1 tablet by mouth 2 times daily  -     lisinopril-hydroCHLOROthiazide (PRINZIDE;ZESTORETIC) 20-12.5 MG per tablet; Take 2 tablets by mouth once daily  -     NIFEdipine (ADALAT CC) 60 MG extended release tablet; Take 1 tablet by mouth daily  -     Basic Metabolic Panel; Future    Type 2 diabetes mellitus with stage 3a chronic kidney disease, without long-term current use of insulin ([facility])  -     Basic Metabolic Panel; Future  -     Hemoglobin A1C; Future    Dyslipidemia  -     rosuvastatin (CRESTOR) 10 MG tablet; Take 1 tablet by mouth daily    History of gout    History of pulmonary embolism    Vitamin D deficiency    Abdominal aortic aneurysm (AAA) without rupture, unspecified part ([address])  -     Vascular AAA screening; Future    Type 2 diabetes mellitus with stage 3 chronic kidney disease, without long-term current use of insulin, unspecified whether stage 3a or 3b CKD ([facility])  -     metFORMIN (GLUCOPHAGE) 500 MG tablet; TAKE ONE TABLET BY MOUTH TWICE A DAY WITH MEAL(S)    Other orders  -     Tdap (ADACEL) 5-2-15.5 LF-MCG/0.5 injection; Inject 0.5 mLs into the muscle once for 1 dose      I reviewed recent lab results w/  Lauren Lindsay Ash. Uric acid is treated to goal.  Taking Allopurinol 100mg daily for gout prevention. Vit D level is treated to goal.  Vitamin D Deficiency treated w/ Vit D2 50,000 iu once weekly. Diabetes is well controlled.   Currently prescribed:   Key

## 2023-10-17 ENCOUNTER — OFFICE VISIT (OUTPATIENT)
Dept: FAMILY MEDICINE CLINIC | Facility: CLINIC | Age: 58
End: 2023-10-17
Payer: MEDICAID

## 2023-10-17 VITALS
HEART RATE: 81 BPM | OXYGEN SATURATION: 97 % | HEIGHT: 73 IN | BODY MASS INDEX: 41.75 KG/M2 | SYSTOLIC BLOOD PRESSURE: 130 MMHG | TEMPERATURE: 98 F | DIASTOLIC BLOOD PRESSURE: 80 MMHG | RESPIRATION RATE: 16 BRPM | WEIGHT: 315 LBS

## 2023-10-17 DIAGNOSIS — N18.30 TYPE 2 DIABETES MELLITUS WITH STAGE 3 CHRONIC KIDNEY DISEASE, WITHOUT LONG-TERM CURRENT USE OF INSULIN, UNSPECIFIED WHETHER STAGE 3A OR 3B CKD (HCC): ICD-10-CM

## 2023-10-17 DIAGNOSIS — E11.22 TYPE 2 DIABETES MELLITUS WITH STAGE 3A CHRONIC KIDNEY DISEASE, WITHOUT LONG-TERM CURRENT USE OF INSULIN (HCC): ICD-10-CM

## 2023-10-17 DIAGNOSIS — E78.5 DYSLIPIDEMIA: ICD-10-CM

## 2023-10-17 DIAGNOSIS — N18.31 TYPE 2 DIABETES MELLITUS WITH STAGE 3A CHRONIC KIDNEY DISEASE, WITHOUT LONG-TERM CURRENT USE OF INSULIN (HCC): ICD-10-CM

## 2023-10-17 DIAGNOSIS — I71.40 ABDOMINAL AORTIC ANEURYSM (AAA) WITHOUT RUPTURE, UNSPECIFIED PART (HCC): ICD-10-CM

## 2023-10-17 DIAGNOSIS — Z86.711 HISTORY OF PULMONARY EMBOLISM: ICD-10-CM

## 2023-10-17 DIAGNOSIS — Z87.39 HISTORY OF GOUT: ICD-10-CM

## 2023-10-17 DIAGNOSIS — E55.9 VITAMIN D DEFICIENCY: ICD-10-CM

## 2023-10-17 DIAGNOSIS — E11.22 TYPE 2 DIABETES MELLITUS WITH STAGE 3 CHRONIC KIDNEY DISEASE, WITHOUT LONG-TERM CURRENT USE OF INSULIN, UNSPECIFIED WHETHER STAGE 3A OR 3B CKD (HCC): ICD-10-CM

## 2023-10-17 DIAGNOSIS — I12.9 BENIGN HYPERTENSION WITH CKD (CHRONIC KIDNEY DISEASE) STAGE III (HCC): Primary | ICD-10-CM

## 2023-10-17 DIAGNOSIS — N18.30 BENIGN HYPERTENSION WITH CKD (CHRONIC KIDNEY DISEASE) STAGE III (HCC): Primary | ICD-10-CM

## 2023-10-17 PROCEDURE — 99214 OFFICE O/P EST MOD 30 MIN: CPT | Performed by: PHYSICIAN ASSISTANT

## 2023-10-17 PROCEDURE — 3044F HG A1C LEVEL LT 7.0%: CPT | Performed by: PHYSICIAN ASSISTANT

## 2023-10-17 RX ORDER — NIFEDIPINE 60 MG/1
60 TABLET, FILM COATED, EXTENDED RELEASE ORAL DAILY
Qty: 30 TABLET | Refills: 5 | Status: SHIPPED | OUTPATIENT
Start: 2023-10-17

## 2023-10-17 RX ORDER — CLONIDINE HYDROCHLORIDE 0.2 MG/1
0.2 TABLET ORAL 2 TIMES DAILY
Qty: 60 TABLET | Refills: 5 | Status: SHIPPED | OUTPATIENT
Start: 2023-10-17

## 2023-10-17 RX ORDER — ROSUVASTATIN CALCIUM 10 MG/1
10 TABLET, COATED ORAL DAILY
Qty: 30 TABLET | Refills: 5 | Status: SHIPPED | OUTPATIENT
Start: 2023-10-17

## 2023-10-17 RX ORDER — LISINOPRIL AND HYDROCHLOROTHIAZIDE 20; 12.5 MG/1; MG/1
TABLET ORAL
Qty: 60 TABLET | Refills: 5 | Status: SHIPPED | OUTPATIENT
Start: 2023-10-17

## 2023-10-17 RX ORDER — LABETALOL 100 MG/1
100 TABLET, FILM COATED ORAL 2 TIMES DAILY
Qty: 60 TABLET | Refills: 5 | Status: SHIPPED | OUTPATIENT
Start: 2023-10-17

## 2023-10-17 SDOH — ECONOMIC STABILITY: INCOME INSECURITY: HOW HARD IS IT FOR YOU TO PAY FOR THE VERY BASICS LIKE FOOD, HOUSING, MEDICAL CARE, AND HEATING?: SOMEWHAT HARD

## 2023-10-17 SDOH — ECONOMIC STABILITY: FOOD INSECURITY: WITHIN THE PAST 12 MONTHS, THE FOOD YOU BOUGHT JUST DIDN'T LAST AND YOU DIDN'T HAVE MONEY TO GET MORE.: SOMETIMES TRUE

## 2023-10-17 SDOH — ECONOMIC STABILITY: HOUSING INSECURITY
IN THE LAST 12 MONTHS, WAS THERE A TIME WHEN YOU DID NOT HAVE A STEADY PLACE TO SLEEP OR SLEPT IN A SHELTER (INCLUDING NOW)?: NO

## 2023-10-17 SDOH — ECONOMIC STABILITY: FOOD INSECURITY: WITHIN THE PAST 12 MONTHS, YOU WORRIED THAT YOUR FOOD WOULD RUN OUT BEFORE YOU GOT MONEY TO BUY MORE.: NEVER TRUE

## 2023-10-27 ENCOUNTER — HOSPITAL ENCOUNTER (OUTPATIENT)
Dept: ULTRASOUND IMAGING | Age: 58
End: 2023-10-27
Payer: MEDICAID

## 2023-10-27 DIAGNOSIS — I71.40 ABDOMINAL AORTIC ANEURYSM (AAA) WITHOUT RUPTURE, UNSPECIFIED PART (HCC): ICD-10-CM

## 2023-10-27 PROCEDURE — 76706 US ABDL AORTA SCREEN AAA: CPT

## 2023-12-27 ENCOUNTER — OFFICE VISIT (OUTPATIENT)
Dept: FAMILY MEDICINE CLINIC | Facility: CLINIC | Age: 58
End: 2023-12-27
Payer: MEDICAID

## 2023-12-27 VITALS
BODY MASS INDEX: 41.75 KG/M2 | HEIGHT: 73 IN | SYSTOLIC BLOOD PRESSURE: 130 MMHG | DIASTOLIC BLOOD PRESSURE: 84 MMHG | HEART RATE: 96 BPM | WEIGHT: 315 LBS | TEMPERATURE: 98.1 F | RESPIRATION RATE: 18 BRPM | OXYGEN SATURATION: 96 %

## 2023-12-27 DIAGNOSIS — M25.572 ACUTE LEFT ANKLE PAIN: Primary | ICD-10-CM

## 2023-12-27 PROCEDURE — 99212 OFFICE O/P EST SF 10 MIN: CPT

## 2023-12-27 NOTE — PATIENT INSTRUCTIONS
I would purchase compression stockings for your legs for the swelling. You can find these at any pharmacy. I would elevate your legs when you are at home. I would recommend watching how you are standing when you are at work- be sure not to shift your full weight onto your left leg. Take breaks when you can. Continue with your medications.

## 2024-01-16 DIAGNOSIS — Z87.39 HISTORY OF GOUT: ICD-10-CM

## 2024-01-16 RX ORDER — ALLOPURINOL 100 MG/1
200 TABLET ORAL DAILY
Qty: 60 TABLET | Refills: 0 | Status: SHIPPED | OUTPATIENT
Start: 2024-01-16

## 2024-02-03 DIAGNOSIS — Z86.711 HISTORY OF PULMONARY EMBOLISM: ICD-10-CM

## 2024-02-06 DIAGNOSIS — Z86.711 HISTORY OF PULMONARY EMBOLISM: ICD-10-CM

## 2024-02-07 DIAGNOSIS — Z86.711 HISTORY OF PULMONARY EMBOLISM: ICD-10-CM

## 2024-02-07 NOTE — TELEPHONE ENCOUNTER
Pt is requesting a refill for apixaban 5 mg    Pharmacy [address] [facility]

## 2024-02-07 NOTE — TELEPHONE ENCOUNTER
MEDICATION REFILL REQUEST      Name of Medication:  Eliquis  Dose:  5 mg  Frequency:  BID  Quantity:  60  Days' supply:  30 with refills      Pharmacy Name/Location:  [facility]-[phone]  Please call in ,pt is out

## 2024-02-14 DIAGNOSIS — Z87.39 HISTORY OF GOUT: ICD-10-CM

## 2024-02-14 RX ORDER — ALLOPURINOL 100 MG/1
200 TABLET ORAL DAILY
Qty: 60 TABLET | Refills: 0 | OUTPATIENT
Start: 2024-02-14

## 2024-02-16 ENCOUNTER — TELEPHONE (OUTPATIENT)
Dept: FAMILY MEDICINE CLINIC | Facility: CLINIC | Age: 59
End: 2024-02-16

## 2024-03-05 RX ORDER — MAGNESIUM OXIDE TAB 400 MG (241.3 MG ELEMENTAL MG) 400 (241.3 MG) MG
400 TAB ORAL 2 TIMES DAILY
Qty: 60 TABLET | Refills: 0 | Status: SHIPPED | OUTPATIENT
Start: 2024-03-05

## 2024-03-22 ENCOUNTER — OFFICE VISIT (OUTPATIENT)
Age: 59
End: 2024-03-22
Payer: MEDICAID

## 2024-03-22 VITALS
HEIGHT: 72 IN | BODY MASS INDEX: 42.66 KG/M2 | DIASTOLIC BLOOD PRESSURE: 94 MMHG | HEART RATE: 80 BPM | WEIGHT: 315 LBS | SYSTOLIC BLOOD PRESSURE: 146 MMHG

## 2024-03-22 DIAGNOSIS — N18.30 BENIGN HYPERTENSION WITH CKD (CHRONIC KIDNEY DISEASE) STAGE III (HCC): Primary | ICD-10-CM

## 2024-03-22 DIAGNOSIS — I35.1 NONRHEUMATIC AORTIC VALVE REGURGITATION: ICD-10-CM

## 2024-03-22 DIAGNOSIS — I50.32 DIASTOLIC CHF, CHRONIC (HCC): ICD-10-CM

## 2024-03-22 DIAGNOSIS — I12.9 BENIGN HYPERTENSION WITH CKD (CHRONIC KIDNEY DISEASE) STAGE III (HCC): Primary | ICD-10-CM

## 2024-03-22 DIAGNOSIS — E78.5 DYSLIPIDEMIA: ICD-10-CM

## 2024-03-22 DIAGNOSIS — G47.33 OSA ON CPAP: ICD-10-CM

## 2024-03-22 PROCEDURE — 99214 OFFICE O/P EST MOD 30 MIN: CPT | Performed by: INTERNAL MEDICINE

## 2024-03-22 RX ORDER — LANOLIN ALCOHOL/MO/W.PET/CERES
400 CREAM (GRAM) TOPICAL 2 TIMES DAILY
Qty: 180 TABLET | Refills: 3 | Status: SHIPPED | OUTPATIENT
Start: 2024-03-22

## 2024-03-22 NOTE — PROGRESS NOTES
[address] [facility], [place] [address]  PHONE: [phone]     24    NAME:  Gerardo Mancini  : 1965  MRN: [id number]       SUBJECTIVE:   Gerardo Mancini is a 58 y.o. male seen for a follow up visit regarding the following:     Chief Complaint   Patient presents with    Congestive Heart Failure       HPI:   Here for eval of severe HTN and LVH.     On CPAP, complaint now.     Echo  and 2020: normal EF, mild to mod AI   2020 Admission for acute PE, HTN emergency.   Echo 2022: normal EF, mild AI, AO 4.2cm     No new angina, CP, ALEJANDRA.  More home stress.  BP better at home.  Working on low salt diet.    Still on NOAC.   Doing well on anticoagulation treatment as reviewed today, no bleeding issues or excessive bruising noted.          Compliant with meds and CPAP.     Patient denies recent history of orthopnea, PND, excessive dizziness and/or syncope.      Declined weight loss surgery in the past.        Past Medical History, Past Surgical History, Family history, Social History, and Medications were all reviewed with the patient today and updated as necessary.     Current Outpatient Medications   Medication Sig Dispense Refill    allopurinol (ZYLOPRIM) 100 MG tablet Take 2 tablets by mouth daily 60 tablet 3    apixaban (ELIQUIS) 5 MG TABS tablet Take 1 tablet by mouth twice daily 60 tablet 11    cloNIDine (CATAPRES) 0.2 MG tablet Take 1 tablet by mouth 2 times daily 60 tablet 5    labetalol (NORMODYNE) 100 MG tablet Take 1 tablet by mouth 2 times daily 60 tablet 5    lisinopril-hydroCHLOROthiazide (PRINZIDE;ZESTORETIC) 20-12.5 MG per tablet Take 2 tablets by mouth once daily 60 tablet 5    metFORMIN (GLUCOPHAGE) 500 MG tablet TAKE ONE TABLET BY MOUTH TWICE A DAY WITH MEAL(S) 60 tablet 5    NIFEdipine (ADALAT CC) 60 MG extended release tablet Take 1 tablet by mouth daily 30 tablet 5    rosuvastatin (CRESTOR) 10 MG tablet Take 1 tablet by mouth daily 30 tablet 5    polyethylene glycol

## 2024-04-12 ENCOUNTER — NURSE ONLY (OUTPATIENT)
Dept: FAMILY MEDICINE CLINIC | Facility: CLINIC | Age: 59
End: 2024-04-12

## 2024-04-12 DIAGNOSIS — E11.22 TYPE 2 DIABETES MELLITUS WITH STAGE 3A CHRONIC KIDNEY DISEASE, WITHOUT LONG-TERM CURRENT USE OF INSULIN (HCC): ICD-10-CM

## 2024-04-12 DIAGNOSIS — N18.30 BENIGN HYPERTENSION WITH CKD (CHRONIC KIDNEY DISEASE) STAGE III (HCC): ICD-10-CM

## 2024-04-12 DIAGNOSIS — I12.9 BENIGN HYPERTENSION WITH CKD (CHRONIC KIDNEY DISEASE) STAGE III (HCC): ICD-10-CM

## 2024-04-12 DIAGNOSIS — N18.31 TYPE 2 DIABETES MELLITUS WITH STAGE 3A CHRONIC KIDNEY DISEASE, WITHOUT LONG-TERM CURRENT USE OF INSULIN (HCC): ICD-10-CM

## 2024-04-12 LAB
ANION GAP SERPL CALC-SCNC: 4 MMOL/L (ref 2–11)
BUN SERPL-MCNC: 17 MG/DL (ref 6–23)
CALCIUM SERPL-MCNC: 9.9 MG/DL (ref 8.3–10.4)
CHLORIDE SERPL-SCNC: 102 MMOL/L (ref 103–113)
CO2 SERPL-SCNC: 29 MMOL/L (ref 21–32)
CREAT SERPL-MCNC: 1.6 MG/DL (ref 0.8–1.5)
EST. AVERAGE GLUCOSE BLD GHB EST-MCNC: 143 MG/DL
GLUCOSE SERPL-MCNC: 127 MG/DL (ref 65–100)
HBA1C MFR BLD: 6.6 % (ref 4.8–5.6)
POTASSIUM SERPL-SCNC: 3.7 MMOL/L (ref 3.5–5.1)
SODIUM SERPL-SCNC: 135 MMOL/L (ref 136–146)

## 2024-04-18 NOTE — PROGRESS NOTES
Gerardo Mancini (: 1965) is a 58 y.o. male, an established patient, is here for evaluation of the following chief complaint(s):  Chief Complaint   Patient presents with    Hypertension    Diabetes    Results    Pain below left breast    Hyperlipidemia        ASSESSMENT/PLAN:  Gerardo was seen today for hypertension, diabetes, results, pain below left breast and hyperlipidemia.    Diagnoses and all orders for this visit:    Benign hypertension with CKD (chronic kidney disease) stage III ([facility])  -     cloNIDine (CATAPRES) 0.2 MG tablet; Take 1 tablet by mouth 2 times daily  -     labetalol (NORMODYNE) 100 MG tablet; Take 1 tablet by mouth 2 times daily  -     lisinopril-hydroCHLOROthiazide (PRINZIDE;ZESTORETIC) 20-12.5 MG per tablet; Take 2 tablets by mouth once daily  -     NIFEdipine (ADALAT CC) 60 MG extended release tablet; Take 1 tablet by mouth daily  -     Basic Metabolic Panel; Future    Type 2 diabetes mellitus with stage 3a chronic kidney disease, without long-term current use of insulin ([facility])  -     Basic Metabolic Panel; Future  -     Hemoglobin A1C; Future    Dyslipidemia  -     rosuvastatin (CRESTOR) 10 MG tablet; Take 1 tablet by mouth daily    Vitamin D deficiency  -     vitamin D (ERGOCALCIFEROL) 1.25 MG (72858 UT) CAPS capsule; Take 1 capsule by mouth once a week  -     Vitamin D 25 Hydroxy; Future    Tinea pedis of both feet  -     ketoconazole (NIZORAL) 2 % cream; Apply topically daily.    Type 2 diabetes mellitus with stage 3 chronic kidney disease, without long-term current use of insulin, unspecified whether stage 3a or 3b CKD ([facility])  -     metFORMIN (GLUCOPHAGE) 500 MG tablet; TAKE ONE TABLET BY MOUTH TWICE A DAY WITH MEAL(S)    Chronic midline low back pain without sciatica  -     methocarbamol (ROBAXIN) 750 MG tablet; Take 1 tablet by mouth daily as needed (muscle spasm)      I reviewed recent lab results w/  Mr. Mancini.      Diabetes is well controlled.  Currently prescribed:   Diabetic

## 2024-04-19 ENCOUNTER — OFFICE VISIT (OUTPATIENT)
Dept: FAMILY MEDICINE CLINIC | Facility: CLINIC | Age: 59
End: 2024-04-19
Payer: MEDICAID

## 2024-04-19 VITALS
HEART RATE: 79 BPM | OXYGEN SATURATION: 95 % | TEMPERATURE: 97.8 F | RESPIRATION RATE: 16 BRPM | HEIGHT: 72 IN | BODY MASS INDEX: 42.66 KG/M2 | WEIGHT: 315 LBS

## 2024-04-19 DIAGNOSIS — E11.22 TYPE 2 DIABETES MELLITUS WITH STAGE 3A CHRONIC KIDNEY DISEASE, WITHOUT LONG-TERM CURRENT USE OF INSULIN (HCC): ICD-10-CM

## 2024-04-19 DIAGNOSIS — N18.30 TYPE 2 DIABETES MELLITUS WITH STAGE 3 CHRONIC KIDNEY DISEASE, WITHOUT LONG-TERM CURRENT USE OF INSULIN, UNSPECIFIED WHETHER STAGE 3A OR 3B CKD (HCC): ICD-10-CM

## 2024-04-19 DIAGNOSIS — N18.30 BENIGN HYPERTENSION WITH CKD (CHRONIC KIDNEY DISEASE) STAGE III (HCC): Primary | ICD-10-CM

## 2024-04-19 DIAGNOSIS — I12.9 BENIGN HYPERTENSION WITH CKD (CHRONIC KIDNEY DISEASE) STAGE III (HCC): Primary | ICD-10-CM

## 2024-04-19 DIAGNOSIS — M54.50 CHRONIC MIDLINE LOW BACK PAIN WITHOUT SCIATICA: ICD-10-CM

## 2024-04-19 DIAGNOSIS — G89.29 CHRONIC MIDLINE LOW BACK PAIN WITHOUT SCIATICA: ICD-10-CM

## 2024-04-19 DIAGNOSIS — B35.3 TINEA PEDIS OF BOTH FEET: ICD-10-CM

## 2024-04-19 DIAGNOSIS — E55.9 VITAMIN D DEFICIENCY: ICD-10-CM

## 2024-04-19 DIAGNOSIS — N18.31 TYPE 2 DIABETES MELLITUS WITH STAGE 3A CHRONIC KIDNEY DISEASE, WITHOUT LONG-TERM CURRENT USE OF INSULIN (HCC): ICD-10-CM

## 2024-04-19 DIAGNOSIS — E78.5 DYSLIPIDEMIA: ICD-10-CM

## 2024-04-19 DIAGNOSIS — E11.22 TYPE 2 DIABETES MELLITUS WITH STAGE 3 CHRONIC KIDNEY DISEASE, WITHOUT LONG-TERM CURRENT USE OF INSULIN, UNSPECIFIED WHETHER STAGE 3A OR 3B CKD (HCC): ICD-10-CM

## 2024-04-19 PROCEDURE — 3044F HG A1C LEVEL LT 7.0%: CPT | Performed by: PHYSICIAN ASSISTANT

## 2024-04-19 PROCEDURE — 99214 OFFICE O/P EST MOD 30 MIN: CPT | Performed by: PHYSICIAN ASSISTANT

## 2024-04-19 RX ORDER — KETOCONAZOLE 20 MG/G
CREAM TOPICAL
Qty: 30 G | Refills: 1 | Status: SHIPPED | OUTPATIENT
Start: 2024-04-19

## 2024-04-19 RX ORDER — CLONIDINE HYDROCHLORIDE 0.2 MG/1
0.2 TABLET ORAL 2 TIMES DAILY
Qty: 60 TABLET | Refills: 5 | Status: SHIPPED | OUTPATIENT
Start: 2024-04-19

## 2024-04-19 RX ORDER — ROSUVASTATIN CALCIUM 10 MG/1
10 TABLET, COATED ORAL DAILY
Qty: 30 TABLET | Refills: 5 | Status: SHIPPED | OUTPATIENT
Start: 2024-04-19

## 2024-04-19 RX ORDER — METHOCARBAMOL 750 MG/1
750 TABLET, FILM COATED ORAL DAILY PRN
Qty: 30 TABLET | Refills: 5 | Status: SHIPPED | OUTPATIENT
Start: 2024-04-19

## 2024-04-19 RX ORDER — LISINOPRIL AND HYDROCHLOROTHIAZIDE 20; 12.5 MG/1; MG/1
TABLET ORAL
Qty: 60 TABLET | Refills: 5 | Status: SHIPPED | OUTPATIENT
Start: 2024-04-19

## 2024-04-19 RX ORDER — ERGOCALCIFEROL 1.25 MG/1
50000 CAPSULE ORAL WEEKLY
Qty: 12 CAPSULE | Refills: 3 | Status: SHIPPED | OUTPATIENT
Start: 2024-04-19

## 2024-04-19 RX ORDER — LABETALOL 100 MG/1
100 TABLET, FILM COATED ORAL 2 TIMES DAILY
Qty: 60 TABLET | Refills: 5 | Status: SHIPPED | OUTPATIENT
Start: 2024-04-19

## 2024-04-19 RX ORDER — ASPIRIN 81 MG/1
81 TABLET ORAL
COMMUNITY
Start: 2023-04-21

## 2024-04-19 ASSESSMENT — PATIENT HEALTH QUESTIONNAIRE - PHQ9
1. LITTLE INTEREST OR PLEASURE IN DOING THINGS: NOT AT ALL
SUM OF ALL RESPONSES TO PHQ QUESTIONS 1-9: 0
SUM OF ALL RESPONSES TO PHQ9 QUESTIONS 1 & 2: 0
2. FEELING DOWN, DEPRESSED OR HOPELESS: NOT AT ALL
SUM OF ALL RESPONSES TO PHQ QUESTIONS 1-9: 0

## 2024-05-25 NOTE — PROGRESS NOTES
Pt arrives to ED c/o SOB x 2 days. Hx a-fib. HR variable in triage; .  
daily - 7 x weekly - 3 sets - 30 second hold  - Mini Squat with Counter Support  - 2 x daily - 7 x weekly - 30 reps  - Standing Heel Raises  - 2 x daily - 7 x weekly - 3 sets - 30 reps  - Standing Knee Flexion  - 2 x daily - 7 x weekly - 3 sets - 10 reps  6/16/23: OK to transition off of walker and start using cane - pt instructed in proper cane self fitting and side of use, sequencing . Treatment/Session Summary:    >Treatment Assessment: Pt's knee ROM has improved but continues to demonstrate a slight extensor lag with SLRs. Continue POC. Communication/Consultation:  None today  Equipment provided today:  None  Recommendations/Intent for next treatment session: Continue PT 2x/week. Next visit will focus on TKR /LE strength and gait training, step training, single leg balance activities.      >Total Treatment Billable Duration:  40 minutes  Time In: 8586  Time Out: 175 Jessica Beaver, PTA       Charge Capture  }Post Session Pain  PT Visit Info  YouMail Portal  MD Guidelines  Scanned Media  Benefits  MyChart    Future Appointments   Date Time Provider [address]   9/5/2023  2:45 PM Adonay Shelton PTA [facility]   9/7/2023  2:45 PM Adonay Shelton PTA [facility]   9/20/2023  1:15 PM DO ELENA Faustin [facility]   10/13/2023  1:30 PM [facility]   10/17/2023  2:00 PM PATRICK Cortez [facility]

## 2024-06-19 DIAGNOSIS — Z87.39 HISTORY OF GOUT: ICD-10-CM

## 2024-06-19 NOTE — TELEPHONE ENCOUNTER
Name from pharmacy: Allopurinol 100 MG Oral Tablet         Will file in chart as: allopurinol (ZYLOPRIM) 100 MG tablet    Sig: Take 2 tablets by mouth once daily    Disp: 60 tablet    Refills: 0    Start: 6/19/2024    Class: Normal    Non-formulary For: History of gout    Last ordered: 4 months ago (2/19/2024) by Idalmis Kirkland MD    Last refill: 6/4/2024    Rx #: [id number]       To be filled at: [address] -  [phone] -  [phone]     Last written 2/19/2024 # 60 with 3 refills takes BID  Next appt 10/4/2024

## 2024-06-20 RX ORDER — ALLOPURINOL 100 MG/1
200 TABLET ORAL DAILY
Qty: 180 TABLET | Refills: 1 | Status: SHIPPED | OUTPATIENT
Start: 2024-06-20

## 2024-07-24 ENCOUNTER — OFFICE VISIT (OUTPATIENT)
Age: 59
End: 2024-07-24
Payer: MEDICAID

## 2024-07-24 VITALS
SYSTOLIC BLOOD PRESSURE: 132 MMHG | BODY MASS INDEX: 42.66 KG/M2 | WEIGHT: 315 LBS | HEIGHT: 72 IN | DIASTOLIC BLOOD PRESSURE: 88 MMHG | HEART RATE: 92 BPM

## 2024-07-24 DIAGNOSIS — I50.32 DIASTOLIC CHF, CHRONIC (HCC): Primary | ICD-10-CM

## 2024-07-24 DIAGNOSIS — I35.1 NONRHEUMATIC AORTIC VALVE REGURGITATION: ICD-10-CM

## 2024-07-24 DIAGNOSIS — N18.30 BENIGN HYPERTENSION WITH CKD (CHRONIC KIDNEY DISEASE) STAGE III (HCC): ICD-10-CM

## 2024-07-24 DIAGNOSIS — G47.33 OSA ON CPAP: ICD-10-CM

## 2024-07-24 DIAGNOSIS — R06.02 SHORTNESS OF BREATH: ICD-10-CM

## 2024-07-24 DIAGNOSIS — E78.5 DYSLIPIDEMIA: ICD-10-CM

## 2024-07-24 DIAGNOSIS — I12.9 BENIGN HYPERTENSION WITH CKD (CHRONIC KIDNEY DISEASE) STAGE III (HCC): ICD-10-CM

## 2024-07-24 PROCEDURE — 99214 OFFICE O/P EST MOD 30 MIN: CPT | Performed by: INTERNAL MEDICINE

## 2024-07-24 NOTE — PROGRESS NOTES
[address] [facility], [place] [address]  PHONE: [phone]     24    NAME:  Gerardo Mancini  : 1965  MRN: [id number]       SUBJECTIVE:   Gerardo Mancini is a 59 y.o. male seen for a follow up visit regarding the following:     Chief Complaint   Patient presents with    Congestive Heart Failure    Follow-up       HPI: Here for eval of severe HTN and LVH.     On CPAP, complaint now.     Echo  and 2020: normal EF, mild to mod AI   2020 Admission for acute PE, HTN emergency.   Echo 2022: normal EF, mild AI, AO 4.2cm     No new angina, CP, ALEJANDRA.    More exercise now, active on job.  \"I am moving\".    Still on NOAC.   Doing well on anticoagulation treatment as reviewed today, no bleeding issues or excessive bruising noted.          Compliant with meds and CPAP.     Patient denies recent history of orthopnea, PND, excessive dizziness and/or syncope.      Declined weight loss surgery in the past.           Past Medical History, Past Surgical History, Family history, Social History, and Medications were all reviewed with the patient today and updated as necessary.     Current Outpatient Medications   Medication Sig Dispense Refill    allopurinol (ZYLOPRIM) 100 MG tablet Take 2 tablets by mouth once daily 180 tablet 1    cloNIDine (CATAPRES) 0.2 MG tablet Take 1 tablet by mouth 2 times daily 60 tablet 5    labetalol (NORMODYNE) 100 MG tablet Take 1 tablet by mouth 2 times daily 60 tablet 5    lisinopril-hydroCHLOROthiazide (PRINZIDE;ZESTORETIC) 20-12.5 MG per tablet Take 2 tablets by mouth once daily 60 tablet 5    metFORMIN (GLUCOPHAGE) 500 MG tablet TAKE ONE TABLET BY MOUTH TWICE A DAY WITH MEAL(S) 60 tablet 5    methocarbamol (ROBAXIN) 750 MG tablet Take 1 tablet by mouth daily as needed (muscle spasm) 30 tablet 5    NIFEdipine (ADALAT CC) 60 MG extended release tablet Take 1 tablet by mouth daily 30 tablet 5    rosuvastatin (CRESTOR) 10 MG tablet Take 1 tablet by mouth daily 30 tablet

## 2024-09-26 ENCOUNTER — LAB (OUTPATIENT)
Dept: FAMILY MEDICINE CLINIC | Facility: CLINIC | Age: 59
End: 2024-09-26

## 2024-09-26 DIAGNOSIS — E55.9 VITAMIN D DEFICIENCY: ICD-10-CM

## 2024-09-26 DIAGNOSIS — N18.30 BENIGN HYPERTENSION WITH CKD (CHRONIC KIDNEY DISEASE) STAGE III (HCC): ICD-10-CM

## 2024-09-26 DIAGNOSIS — E11.22 TYPE 2 DIABETES MELLITUS WITH STAGE 3A CHRONIC KIDNEY DISEASE, WITHOUT LONG-TERM CURRENT USE OF INSULIN (HCC): ICD-10-CM

## 2024-09-26 DIAGNOSIS — N18.31 TYPE 2 DIABETES MELLITUS WITH STAGE 3A CHRONIC KIDNEY DISEASE, WITHOUT LONG-TERM CURRENT USE OF INSULIN (HCC): ICD-10-CM

## 2024-09-26 DIAGNOSIS — I12.9 BENIGN HYPERTENSION WITH CKD (CHRONIC KIDNEY DISEASE) STAGE III (HCC): ICD-10-CM

## 2024-09-26 LAB
25(OH)D3 SERPL-MCNC: 59.9 NG/ML (ref 30–100)
ANION GAP SERPL CALC-SCNC: 8 MMOL/L (ref 9–18)
BUN SERPL-MCNC: 19 MG/DL (ref 8–23)
CALCIUM SERPL-MCNC: 9.8 MG/DL (ref 8.8–10.2)
CHLORIDE SERPL-SCNC: 103 MMOL/L (ref 98–107)
CO2 SERPL-SCNC: 29 MMOL/L (ref 20–28)
CREAT SERPL-MCNC: 1.59 MG/DL (ref 0.8–1.3)
EST. AVERAGE GLUCOSE BLD GHB EST-MCNC: 156 MG/DL
GLUCOSE SERPL-MCNC: 150 MG/DL (ref 70–99)
HBA1C MFR BLD: 7.1 % (ref 0–5.6)
POTASSIUM SERPL-SCNC: 4.6 MMOL/L (ref 3.5–5.1)
SODIUM SERPL-SCNC: 140 MMOL/L (ref 136–145)

## 2024-10-03 NOTE — PROGRESS NOTES
xrays dated 12/12/22 demonstrated AVN of bilateal hips.  Will place a referral for him to see ortho for worsening hip pain.     He's lost 15 lbs since last August.  Encouraged continued weight loss efforts.      Using CPAP regularly for tx of ROXY.     He declines ALL vaccinations.       Followed by Cardiology for CHF, HTN, Nonrheumatic aortic valve regurg., h/o PE   Followed by Nephrology for Stage G3a/A1 CKD.  Followed by pain mgt for low back pain.  Prescribed Norco and Robaxin.    Follow Up    Please have him sign a release for prior medical records from [facility]--need copy of last eye exam visit note  24 weeks HTN/DM Mgt w/ labs prior to visit      SUBJECTIVE/OBJECTIVE:  HPI    At his visit On 4/19/24, the following was discussed:     He has bilat. T. Pedis. Will prescribe ketoconazole cream to treat.     He has been experiencing intermittent pain (appears to be musculoskeletal) on his left flank. He will usually massage the area w/ his thumb to relieve it. He is not aware of any aggravating factors. Currently takes Robaxin for other muscle spasms. Will have him try adding Magnesium 400-500 mg BID and if he continues to have this same pain after a couple of weeks, we may consider imaging.     At today's visit:     Patient does not check BP at home.  Denies having CP,  SOB,  HA or dizziness.    Pain in right groin ongoing for quite a while.  Yeah we just so you will start working on that I am glad that it away last month and yeah yeah when it was just last time for a little while blood pressure is up today 1 yeah right out of any blood pressure medicines have you oh really okay okay okay let me read your so medicines to you to maybe 1 abnormal sound from the room to show you what would you want to see you me to read them to you to help you are not    Vitals:    10/04/24 1102 10/04/24 1136   BP: (!) 150/100 130/88   Pulse: 71    Resp: 16    Temp: 98.1 °F (36.7 °C)    TempSrc: Oral    SpO2: 98%    Weight: (!)

## 2024-10-04 ENCOUNTER — FOLLOWUP TELEPHONE ENCOUNTER (OUTPATIENT)
Dept: DIABETES SERVICES | Age: 59
End: 2024-10-04

## 2024-10-04 ENCOUNTER — OFFICE VISIT (OUTPATIENT)
Dept: FAMILY MEDICINE CLINIC | Facility: CLINIC | Age: 59
End: 2024-10-04

## 2024-10-04 VITALS
RESPIRATION RATE: 16 BRPM | WEIGHT: 315 LBS | SYSTOLIC BLOOD PRESSURE: 130 MMHG | DIASTOLIC BLOOD PRESSURE: 88 MMHG | OXYGEN SATURATION: 98 % | TEMPERATURE: 98.1 F | HEIGHT: 72 IN | BODY MASS INDEX: 42.66 KG/M2 | HEART RATE: 71 BPM

## 2024-10-04 DIAGNOSIS — N18.30 BENIGN HYPERTENSION WITH CKD (CHRONIC KIDNEY DISEASE) STAGE III (HCC): Primary | ICD-10-CM

## 2024-10-04 DIAGNOSIS — Z87.39 HISTORY OF GOUT: ICD-10-CM

## 2024-10-04 DIAGNOSIS — E11.22 TYPE 2 DIABETES MELLITUS WITH STAGE 3A CHRONIC KIDNEY DISEASE, WITHOUT LONG-TERM CURRENT USE OF INSULIN (HCC): ICD-10-CM

## 2024-10-04 DIAGNOSIS — G89.29 CHRONIC MIDLINE LOW BACK PAIN WITHOUT SCIATICA: ICD-10-CM

## 2024-10-04 DIAGNOSIS — E11.22 TYPE 2 DIABETES MELLITUS WITH STAGE 3 CHRONIC KIDNEY DISEASE, WITHOUT LONG-TERM CURRENT USE OF INSULIN, UNSPECIFIED WHETHER STAGE 3A OR 3B CKD (HCC): ICD-10-CM

## 2024-10-04 DIAGNOSIS — N18.30 TYPE 2 DIABETES MELLITUS WITH STAGE 3 CHRONIC KIDNEY DISEASE, WITHOUT LONG-TERM CURRENT USE OF INSULIN, UNSPECIFIED WHETHER STAGE 3A OR 3B CKD (HCC): ICD-10-CM

## 2024-10-04 DIAGNOSIS — E55.9 VITAMIN D DEFICIENCY: ICD-10-CM

## 2024-10-04 DIAGNOSIS — N18.31 TYPE 2 DIABETES MELLITUS WITH STAGE 3A CHRONIC KIDNEY DISEASE, WITHOUT LONG-TERM CURRENT USE OF INSULIN (HCC): ICD-10-CM

## 2024-10-04 DIAGNOSIS — M54.50 CHRONIC MIDLINE LOW BACK PAIN WITHOUT SCIATICA: ICD-10-CM

## 2024-10-04 DIAGNOSIS — E78.5 DYSLIPIDEMIA: ICD-10-CM

## 2024-10-04 DIAGNOSIS — M25.551 RIGHT HIP PAIN: ICD-10-CM

## 2024-10-04 DIAGNOSIS — M87.00 AVN (AVASCULAR NECROSIS OF BONE): ICD-10-CM

## 2024-10-04 DIAGNOSIS — I12.9 BENIGN HYPERTENSION WITH CKD (CHRONIC KIDNEY DISEASE) STAGE III (HCC): Primary | ICD-10-CM

## 2024-10-04 RX ORDER — CLONIDINE HYDROCHLORIDE 0.2 MG/1
0.2 TABLET ORAL 2 TIMES DAILY
Qty: 60 TABLET | Refills: 5 | Status: SHIPPED | OUTPATIENT
Start: 2024-10-04

## 2024-10-04 RX ORDER — ALLOPURINOL 100 MG/1
200 TABLET ORAL DAILY
Qty: 60 TABLET | Refills: 5 | Status: SHIPPED | OUTPATIENT
Start: 2024-10-04

## 2024-10-04 RX ORDER — LABETALOL 100 MG/1
100 TABLET, FILM COATED ORAL 2 TIMES DAILY
Qty: 60 TABLET | Refills: 5 | Status: SHIPPED | OUTPATIENT
Start: 2024-10-04

## 2024-10-04 RX ORDER — ROSUVASTATIN CALCIUM 10 MG/1
10 TABLET, COATED ORAL DAILY
Qty: 30 TABLET | Refills: 5 | Status: SHIPPED | OUTPATIENT
Start: 2024-10-04

## 2024-10-04 RX ORDER — LISINOPRIL AND HYDROCHLOROTHIAZIDE 12.5; 2 MG/1; MG/1
TABLET ORAL
Qty: 60 TABLET | Refills: 5 | Status: SHIPPED | OUTPATIENT
Start: 2024-10-04

## 2024-10-04 NOTE — TELEPHONE ENCOUNTER
Called patient to tell them about our free diabetes education program. Unable to speak to patient. Left message for patient to call back at [phone] or [phone].  Type 2.

## 2024-10-14 ENCOUNTER — TELEPHONE (OUTPATIENT)
Dept: FAMILY MEDICINE CLINIC | Facility: CLINIC | Age: 59
End: 2024-10-14

## 2024-10-17 ENCOUNTER — TELEPHONE (OUTPATIENT)
Dept: DIABETES SERVICES | Age: 59
End: 2024-10-17

## 2024-10-17 NOTE — TELEPHONE ENCOUNTER
Type 2.  Called patient about free Diabetes education Basic session scheduled for today telephonic at 2:30 PM.  Called both numbers, one in his file and one provided when appointment set up.  Unable to speak with patient and unable to leave a message.  [phone]. [phone].

## 2024-10-21 ENCOUNTER — OFFICE VISIT (OUTPATIENT)
Dept: ORTHOPEDIC SURGERY | Age: 59
End: 2024-10-21
Payer: MEDICAID

## 2024-10-21 DIAGNOSIS — M16.11 PRIMARY OSTEOARTHRITIS OF RIGHT HIP: ICD-10-CM

## 2024-10-21 DIAGNOSIS — M47.816 LUMBAR SPONDYLOSIS: ICD-10-CM

## 2024-10-21 DIAGNOSIS — D16.21 BENIGN NEOPLASM OF LONG BONE OF RIGHT LOWER EXTREMITY: ICD-10-CM

## 2024-10-21 DIAGNOSIS — M25.551 RIGHT HIP PAIN: Primary | ICD-10-CM

## 2024-10-21 PROCEDURE — 99214 OFFICE O/P EST MOD 30 MIN: CPT | Performed by: ORTHOPAEDIC SURGERY

## 2024-10-21 NOTE — PROGRESS NOTES
Not asked     Physically Abused: Not asked     Sexually Abused: Not asked   Housing Stability: Unknown (10/17/2023)    Housing Stability Vital Sign     Unable to Pay for Housing in the Last Year: Not on file     Number of Places Lived in the Last Year: Not on file     Unstable Housing in the Last Year: No                  PHYSICAL EXAMINATION:   The patient is alert and oriented, in no distress.     The cervical, thoracic and lumbar spine are without compromising deformity. The upper extremities demonstrate reasonable tone, strength and function bilaterally.  The lower extremities are as described below.  Circulation is normal with palpable pedal pulses bilaterally and no edema.  Skin of the leg is normal with no chronic stasis disease bilaterally.  Sensation is intact to light touch bilaterally.  Gait is noted to be with a slight trendelenburg and antalgia.   right hip range of motion is 0-90 degrees of flexion and 20 degrees on abduction and adduction.  There is 15 degrees internal rotation and 25 degrees of external rotation with pain in groin     Limb lengths are equal.  Straight leg test is negative bilaterally.  Stability is normal bilaterally.  Muscular strength is intact bilaterally.  There is no lymphadenopathy in the groin or popliteal regions bilaterally.  Their judgment and insight are normal.  They are oriented to time, place and person.  Their memory is good and the mood and affect appropriate.         XRAYS:   AP pelvis and lateral views of hip are reviewed.  There is joint space loss, eburnated bone and osteophyte formation of the hip.  X-ray impression: Osteoarthritis of the right hip    AP and lateral views of the lumbar spine reveal reveal lumbar DJD.  X-ray impression:  Lumbar spine DJD    IMPRESSION:     Diagnosis Orders   1. Right hip pain  XR HIP 2-3 VW W PELVIS RIGHT    XR LUMBAR SPINE (2-3 VIEWS)      2. Primary osteoarthritis of right hip        3. Lumbar spondylosis

## 2024-11-06 ENCOUNTER — TELEPHONE (OUTPATIENT)
Dept: ORTHOPEDIC SURGERY | Age: 59
End: 2024-11-06

## 2024-11-06 NOTE — TELEPHONE ENCOUNTER
Call and  LVM  if  you  dont  catch  this patient    He  would l wallace  to  cancel the  [facility]  MRI  and be sent  to an open air  facility

## 2024-11-07 NOTE — TELEPHONE ENCOUNTER
Spoke with pt to let him know that in [place] they do not have open air MRI available with contrast. I have checked with our office and I was told the only one they could find was in [place].  I did offer medication that Dr Blake could send into pharmacy for him to relax him for the MRI and that he will need a  and he declined. States he was told that his head will be out of the machine and he will just try it.

## 2024-11-18 ENCOUNTER — TELEPHONE (OUTPATIENT)
Dept: FAMILY MEDICINE CLINIC | Facility: CLINIC | Age: 59
End: 2024-11-18

## 2024-11-18 DIAGNOSIS — G47.33 OSA ON CPAP: Primary | ICD-10-CM

## 2024-11-18 NOTE — TELEPHONE ENCOUNTER
Pt stated that he has been unable to get his C-Pap supplies for the past 6 month. Pt is requesting a call back

## 2024-11-18 NOTE — TELEPHONE ENCOUNTER
Patient is requesting a referral for another pulmonologist.  Stated that he does not recall when he saw  and has not been able to get anyone to answer the phone at his office.   Patient is needing C-pap supplies.

## 2024-11-19 NOTE — TELEPHONE ENCOUNTER
Patient stated that he received a call from [facility] today and told him that provider does not match?

## 2024-11-20 ENCOUNTER — HOSPITAL ENCOUNTER (OUTPATIENT)
Dept: MRI IMAGING | Age: 59
Discharge: HOME | End: 2024-11-23
Attending: ORTHOPAEDIC SURGERY
Payer: MEDICAID

## 2024-11-20 DIAGNOSIS — M16.11 PRIMARY OSTEOARTHRITIS OF RIGHT HIP: ICD-10-CM

## 2024-11-20 PROCEDURE — A9579 GAD-BASE MR CONTRAST NOS,1ML: HCPCS | Performed by: ORTHOPAEDIC SURGERY

## 2024-11-20 PROCEDURE — 6360000004 HC RX CONTRAST MEDICATION: Performed by: ORTHOPAEDIC SURGERY

## 2024-11-20 PROCEDURE — 73723 MRI JOINT LWR EXTR W/O&W/DYE: CPT

## 2024-11-20 RX ADMIN — GADOTERIDOL 30 ML: 279.3 INJECTION, SOLUTION INTRAVENOUS at 16:13

## 2024-11-22 NOTE — TELEPHONE ENCOUNTER
Marquita,     Mr. Mancini has been having difficulty getting through to your office.  He is a prior patient of Dr. Erazo's and is needing an appointment so he can get his CPAP supplies.  Would you mind reaching out to him to get him scheduled?     Thank you so much,     PATRICK Cortez

## 2024-11-22 NOTE — TELEPHONE ENCOUNTER
Ok.  I'll contact the  at [facility] to see what can be done to get him scheduled with Dr. Mondragon's office asap.

## 2024-11-27 ENCOUNTER — TELEPHONE (OUTPATIENT)
Dept: ORTHOPEDIC SURGERY | Age: 59
End: 2024-11-27

## 2024-12-03 ENCOUNTER — OFFICE VISIT (OUTPATIENT)
Dept: ORTHOPEDIC SURGERY | Age: 59
End: 2024-12-03
Payer: MEDICAID

## 2024-12-03 DIAGNOSIS — M87.052 AVASCULAR NECROSIS OF BONES OF BOTH HIPS: Primary | ICD-10-CM

## 2024-12-03 DIAGNOSIS — M25.551 RIGHT HIP PAIN: ICD-10-CM

## 2024-12-03 DIAGNOSIS — M87.051 AVASCULAR NECROSIS OF BONES OF BOTH HIPS: Primary | ICD-10-CM

## 2024-12-03 PROCEDURE — 99213 OFFICE O/P EST LOW 20 MIN: CPT | Performed by: PHYSICIAN ASSISTANT

## 2024-12-03 NOTE — PROGRESS NOTES
CC: Follow-up on right hip pain, MRI review    HPI: Patient returns today for follow-up on right hip pain which he reports is moderate in severity with some worsening.  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  Associated patchy marrow edema of the right femoral head and neck with small joint effusion were noted along with benign 4 cm enchondroma of the proximal right femur just below the intertrochanteric line per radiologist impression.  Incidentally, very large right scrotal hydrocele measuring 18 cm was noted on MRI as well.  No other new complaints.    ROS: As per HPI; otherwise, 10 point review of systems is negative.    PE: Patient is alert and oriented, in no acute distress.  Ambulates with antalgic gait.  Examination of right hip reveals hip joint flexion and rotation mildly limited due to right groin pain.  Leg lengths are approximately equal.  Neurovascular intact throughout.    Assessment: Right hip OA/AVN    Plan: Reviewed MRI findings with the patient.  As noted, he is becoming more functionally limited by his right hip OA/AVN.  We discussed that end treatment would be right RENAY.  The potential risk and benefits of surgery were discussed with the patient, all questions answered, and he has elected to proceed with scheduling right RENAY with Dr. Blake in May.  He is taking Norco for pain which he receives from pain management.  He plans to address his right hydrocele with his PCP at follow-up in the next couple of weeks as he does have some scrotal discomfort at times and will follow-up with us in late April or early May for preop visit.  He was instructed to call with any acute worsening of symptoms.

## 2024-12-05 ENCOUNTER — TELEPHONE (OUTPATIENT)
Dept: FAMILY MEDICINE CLINIC | Facility: CLINIC | Age: 59
End: 2024-12-05

## 2024-12-05 DIAGNOSIS — N43.3 RIGHT HYDROCELE: Primary | ICD-10-CM

## 2024-12-05 NOTE — TELEPHONE ENCOUNTER
I see that the MRI of his hip (Ordered by Dr. Blake/ortho) incidentally shows: a very large right scrotal  hydrocele measuring 18 cm.  Please let Mr. Mancini know that I'll place a referral to Urology for him.

## 2024-12-05 NOTE — TELEPHONE ENCOUNTER
Patient has an appt 12/12/2024 and is requesting that you review his MRI results to discuss, and so that if he has to have a referral that can already be done when he comes in.

## 2024-12-09 DIAGNOSIS — E11.22 TYPE 2 DIABETES MELLITUS WITH STAGE 3A CHRONIC KIDNEY DISEASE, WITHOUT LONG-TERM CURRENT USE OF INSULIN (HCC): Primary | ICD-10-CM

## 2024-12-09 DIAGNOSIS — N18.31 TYPE 2 DIABETES MELLITUS WITH STAGE 3A CHRONIC KIDNEY DISEASE, WITHOUT LONG-TERM CURRENT USE OF INSULIN (HCC): Primary | ICD-10-CM

## 2024-12-10 ENCOUNTER — LAB (OUTPATIENT)
Dept: FAMILY MEDICINE CLINIC | Facility: CLINIC | Age: 59
End: 2024-12-10

## 2024-12-10 ENCOUNTER — TELEPHONE (OUTPATIENT)
Dept: UROLOGY | Age: 59
End: 2024-12-10

## 2024-12-10 DIAGNOSIS — E11.22 TYPE 2 DIABETES MELLITUS WITH STAGE 3A CHRONIC KIDNEY DISEASE, WITHOUT LONG-TERM CURRENT USE OF INSULIN (HCC): ICD-10-CM

## 2024-12-10 DIAGNOSIS — N18.31 TYPE 2 DIABETES MELLITUS WITH STAGE 3A CHRONIC KIDNEY DISEASE, WITHOUT LONG-TERM CURRENT USE OF INSULIN (HCC): ICD-10-CM

## 2024-12-10 LAB
ANION GAP SERPL CALC-SCNC: 10 MMOL/L (ref 7–16)
BUN SERPL-MCNC: 20 MG/DL (ref 8–23)
CALCIUM SERPL-MCNC: 9.8 MG/DL (ref 8.8–10.2)
CHLORIDE SERPL-SCNC: 102 MMOL/L (ref 98–107)
CO2 SERPL-SCNC: 27 MMOL/L (ref 20–29)
CREAT SERPL-MCNC: 1.58 MG/DL (ref 0.8–1.3)
GLUCOSE SERPL-MCNC: 102 MG/DL (ref 70–99)
POTASSIUM SERPL-SCNC: 4.4 MMOL/L (ref 3.5–5.1)
SODIUM SERPL-SCNC: 139 MMOL/L (ref 136–145)

## 2024-12-10 NOTE — TELEPHONE ENCOUNTER
New patient appt on  1/7/25 @ 945am with Dian in the [place] office. Time and location confirmed with patient.

## 2024-12-12 ENCOUNTER — OFFICE VISIT (OUTPATIENT)
Dept: FAMILY MEDICINE CLINIC | Facility: CLINIC | Age: 59
End: 2024-12-12

## 2024-12-12 VITALS
RESPIRATION RATE: 18 BRPM | WEIGHT: 315 LBS | HEART RATE: 76 BPM | OXYGEN SATURATION: 96 % | DIASTOLIC BLOOD PRESSURE: 98 MMHG | HEIGHT: 72 IN | BODY MASS INDEX: 42.66 KG/M2 | SYSTOLIC BLOOD PRESSURE: 167 MMHG | TEMPERATURE: 97.6 F

## 2024-12-12 DIAGNOSIS — N18.30 BENIGN HYPERTENSION WITH CKD (CHRONIC KIDNEY DISEASE) STAGE III (HCC): Primary | ICD-10-CM

## 2024-12-12 DIAGNOSIS — E66.813 CLASS 3 SEVERE OBESITY DUE TO EXCESS CALORIES WITH SERIOUS COMORBIDITY AND BODY MASS INDEX (BMI) OF 40.0 TO 44.9 IN ADULT: ICD-10-CM

## 2024-12-12 DIAGNOSIS — E66.01 CLASS 3 SEVERE OBESITY DUE TO EXCESS CALORIES WITH SERIOUS COMORBIDITY AND BODY MASS INDEX (BMI) OF 40.0 TO 44.9 IN ADULT: ICD-10-CM

## 2024-12-12 DIAGNOSIS — I12.9 BENIGN HYPERTENSION WITH CKD (CHRONIC KIDNEY DISEASE) STAGE III (HCC): Primary | ICD-10-CM

## 2025-01-07 ENCOUNTER — OFFICE VISIT (OUTPATIENT)
Dept: UROLOGY | Age: 60
End: 2025-01-07
Payer: MEDICAID

## 2025-01-07 DIAGNOSIS — N43.3 RIGHT HYDROCELE: Primary | ICD-10-CM

## 2025-01-07 LAB
BILIRUBIN, URINE, POC: NEGATIVE
BLOOD URINE, POC: NEGATIVE
GLUCOSE URINE, POC: NEGATIVE MG/DL
KETONES, URINE, POC: NEGATIVE MG/DL
LEUKOCYTE ESTERASE, URINE, POC: NEGATIVE
NITRITE, URINE, POC: NEGATIVE
PH, URINE, POC: 6.5 (ref 4.6–8)
PROTEIN,URINE, POC: NORMAL MG/DL
SPECIFIC GRAVITY, URINE, POC: 1.01 (ref 1–1.03)
URINALYSIS CLARITY, POC: NORMAL
URINALYSIS COLOR, POC: NORMAL
UROBILINOGEN, POC: NORMAL MG/DL

## 2025-01-07 PROCEDURE — 81003 URINALYSIS AUTO W/O SCOPE: CPT | Performed by: UROLOGY

## 2025-01-07 PROCEDURE — 99204 OFFICE O/P NEW MOD 45 MIN: CPT | Performed by: UROLOGY

## 2025-01-07 ASSESSMENT — ENCOUNTER SYMPTOMS
BLOOD IN STOOL: 0
BACK PAIN: 0
DIARRHEA: 0
NAUSEA: 0
EYE PAIN: 0
ABDOMINAL PAIN: 0
VOMITING: 0
SHORTNESS OF BREATH: 0
EYE DISCHARGE: 0
INDIGESTION: 0
HEARTBURN: 0
WHEEZING: 0
COUGH: 0
CONSTIPATION: 0
SKIN LESIONS: 0

## 2025-01-07 NOTE — PROGRESS NOTES
[facility] UROLOGY  [address] [place] [address]  [phone]          Gerardo Mancini  : 1965    Chief Complaint   Patient presents with    Hydrocele    New Patient          HPI     Gerardo Mancini is a 59 y.o. male referred in  in regards to large RIGHT hydrocele.  He has multiple medical comorbidities including CRI, OSAS, DM, obesity, avascular B femoral heads.  He has R hip replacement scheduled with Dr. Blake in .  MRI 24 revealed the 18 cm R hydrocele.  He says it has been present several months.  It is relatively asymptomatic.  He is on eliquis.  Patient can think of no other instigating or exacerbating events.      Past Medical History:   Diagnosis Date    AAA (abdominal aortic aneurysm) ([facility])     Allergic rhinitis due to animal (cat) (dog) hair and dander 14    Asthma     as a child    Chronic kidney disease     Diastolic CHF, chronic ([facility]) 2016    DVT (deep venous thrombosis) ([facility])     right leg- currently on Eliquis    Dyslipidemia     Hypertension     ROXY on CPAP     Study done 2012; AHI 86.7, RDI 86.7 (same)    Pulmonary embolism ([facility]) 2022    Tinea unguium 2019    bilateral--all nails    Vitamin D deficiency      Past Surgical History:   Procedure Laterality Date    AMPUTATION Right     Right index finger happened at work    COLONOSCOPY  2016    COLONOSCOPY N/A 2022    COLONOSCOPY/ BMI 44 performed by Bubba Malagon MD at [facility] ENDOSCOPY    COLONOSCOPY N/A 2016    COLONOSCOPY / BMI 47 performed by Bubba Malagon MD at [facility] ENDOSCOPY    COLONOSCOPY FLX DX W/COLLJ SPEC WHEN PFRMD  2022         COLSC FLX W/REMOVAL LESION BY HOT BX FORCEPS  2016         HEENT      ear surgery during childhood    PRE-MALIGNANT / BENIGN SKIN LESION EXCISION Right     Behind right ear    TONSILLECTOMY      TOTAL KNEE ARTHROPLASTY Right 2023    KNEE TOTAL ARTHROPLASTY ROBOTIC Right performed by Nico Blake MD at [facility]

## 2025-01-08 NOTE — PROGRESS NOTES
Gerardo Mancini (: 1965) is a 59 y.o. male, an established patient, is here for evaluation of the following chief complaint(s):  Chief Complaint   Patient presents with    Hypertension                 ASSESSMENT/PLAN:  Gerardo \"Odell" was seen today for hypertension.    Diagnoses and all orders for this visit:    Benign hypertension with CKD (chronic kidney disease) stage III (HCC)    Anxiety      BP today is: high.   Currently prescribed:   Hypertension Medications       Antiadrenergic Antihypertensives       cloNIDine (CATAPRES) 0.2 MG tablet Take 1 tablet by mouth 2 times daily       Antihypertensive Combinations       lisinopril-hydroCHLOROthiazide (PRINZIDE;ZESTORETIC) 20-12.5 MG per tablet Take 2 tablets by mouth once daily       Alpha-Beta Blockers       labetalol (NORMODYNE) 100 MG tablet Take 1 tablet by mouth 2 times daily   Patient states that his blood pressure is elevated due to stress.  He says that he worries about a lot of \"things\".  He has problems with his hip and has upcoming surgery in May.  He also has a hydrocele in his right testicle.  He has been told that he has to postpone treatment of the hydrocele pending his upcoming hip surgery in May and is unhappy about the postponement.  He declines consideration of a antianxiety medication.  He states that the antianxiety medication will not fix his \"problems\".  He denies any chest pain, blurry vision or headaches.  He continues the above medicines as directed.  F/u w/ cardiology on  as scheduled.      Seen by urology on 2025 for large right hydrocele.  He will plan to return following hip replacement surgery and have hydrocele reassessed at that time.  He has his hip replacement surgery in May.  He reports no pain w/ his hydrocele.    -- I recommended that he contact urology and let them know if he starts to develop any pain or increased discomfort with the hydrocele prior to May.    Using CPAP regularly for tx of ROXY.  He reports that

## 2025-01-09 ENCOUNTER — OFFICE VISIT (OUTPATIENT)
Dept: FAMILY MEDICINE CLINIC | Facility: CLINIC | Age: 60
End: 2025-01-09

## 2025-01-09 VITALS
BODY MASS INDEX: 42.66 KG/M2 | OXYGEN SATURATION: 97 % | RESPIRATION RATE: 16 BRPM | HEIGHT: 72 IN | WEIGHT: 315 LBS | SYSTOLIC BLOOD PRESSURE: 152 MMHG | HEART RATE: 65 BPM | TEMPERATURE: 97.7 F | DIASTOLIC BLOOD PRESSURE: 112 MMHG

## 2025-01-09 DIAGNOSIS — N18.30 BENIGN HYPERTENSION WITH CKD (CHRONIC KIDNEY DISEASE) STAGE III (HCC): Primary | ICD-10-CM

## 2025-01-09 DIAGNOSIS — I12.9 BENIGN HYPERTENSION WITH CKD (CHRONIC KIDNEY DISEASE) STAGE III (HCC): Primary | ICD-10-CM

## 2025-01-09 DIAGNOSIS — F41.9 ANXIETY: ICD-10-CM

## 2025-01-09 SDOH — ECONOMIC STABILITY: FOOD INSECURITY: WITHIN THE PAST 12 MONTHS, YOU WORRIED THAT YOUR FOOD WOULD RUN OUT BEFORE YOU GOT MONEY TO BUY MORE.: SOMETIMES TRUE

## 2025-01-09 SDOH — ECONOMIC STABILITY: FOOD INSECURITY: WITHIN THE PAST 12 MONTHS, THE FOOD YOU BOUGHT JUST DIDN'T LAST AND YOU DIDN'T HAVE MONEY TO GET MORE.: SOMETIMES TRUE

## 2025-01-09 ASSESSMENT — PATIENT HEALTH QUESTIONNAIRE - PHQ9
SUM OF ALL RESPONSES TO PHQ QUESTIONS 1-9: 2
2. FEELING DOWN, DEPRESSED OR HOPELESS: SEVERAL DAYS
SUM OF ALL RESPONSES TO PHQ QUESTIONS 1-9: 2
SUM OF ALL RESPONSES TO PHQ9 QUESTIONS 1 & 2: 2
1. LITTLE INTEREST OR PLEASURE IN DOING THINGS: SEVERAL DAYS

## 2025-01-13 ENCOUNTER — TELEPHONE (OUTPATIENT)
Dept: FAMILY MEDICINE CLINIC | Facility: CLINIC | Age: 60
End: 2025-01-13

## 2025-01-13 NOTE — TELEPHONE ENCOUNTER
Patient wants a referral for a sleep study and would like someone reach out to him about this. [phone]

## 2025-01-13 NOTE — TELEPHONE ENCOUNTER
Please remind him that he already has an appointment scheduled with Sleep Medicine on 5/22/25.    Unfortunately, I do not have the ability to get him in to be seen any further.  However, he can try calling their office at .[phone]  and make a request to be placed on their cancellation list.  If they have a cancellation between now and 5/22/25, they will offer him a sooner appointment.     (We also tried referring him to [facility], but they did not take his insurance).

## 2025-01-28 ENCOUNTER — OFFICE VISIT (OUTPATIENT)
Age: 60
End: 2025-01-28
Payer: MEDICAID

## 2025-01-28 VITALS
WEIGHT: 315 LBS | BODY MASS INDEX: 42.66 KG/M2 | SYSTOLIC BLOOD PRESSURE: 148 MMHG | HEIGHT: 72 IN | DIASTOLIC BLOOD PRESSURE: 108 MMHG | HEART RATE: 66 BPM

## 2025-01-28 DIAGNOSIS — I12.9 BENIGN HYPERTENSION WITH CKD (CHRONIC KIDNEY DISEASE) STAGE III (HCC): ICD-10-CM

## 2025-01-28 DIAGNOSIS — G47.33 OSA ON CPAP: ICD-10-CM

## 2025-01-28 DIAGNOSIS — E78.5 DYSLIPIDEMIA: ICD-10-CM

## 2025-01-28 DIAGNOSIS — I50.32 DIASTOLIC CHF, CHRONIC (HCC): Primary | ICD-10-CM

## 2025-01-28 DIAGNOSIS — I35.1 NONRHEUMATIC AORTIC VALVE REGURGITATION: ICD-10-CM

## 2025-01-28 DIAGNOSIS — N18.30 BENIGN HYPERTENSION WITH CKD (CHRONIC KIDNEY DISEASE) STAGE III (HCC): ICD-10-CM

## 2025-01-28 PROCEDURE — 93000 ELECTROCARDIOGRAM COMPLETE: CPT | Performed by: INTERNAL MEDICINE

## 2025-01-28 PROCEDURE — 99214 OFFICE O/P EST MOD 30 MIN: CPT | Performed by: INTERNAL MEDICINE

## 2025-01-28 NOTE — PROGRESS NOTES
[address] [facility], [place] [address]  PHONE: [phone]     25    NAME:  Gerardo Mancini  : 1965  MRN: [id number]       SUBJECTIVE:   Gerardo Mancini is a 59 y.o. male seen for a follow up visit regarding the following:     Chief Complaint   Patient presents with    Congestive Heart Failure    Follow-up       HPI: Here for eval of severe HTN and LVH.       On CPAP, complaint now.     Echo  and 2020: normal EF, mild to mod AI   2020 Admission for acute PE, HTN emergency.   Echo 2022 and 2024: normal EF, mild AI, AO 4.2cm     No new angina, CP, ALEJANDRA.    No edema, palp.    Needs hip replacement, in may.   Still on NOAC.   Doing well on anticoagulation treatment as reviewed today, no bleeding issues or excessive bruising noted.          Compliant with meds and CPAP.     Patient denies recent history of orthopnea, PND, excessive dizziness and/or syncope.      Declined weight loss surgery in the past.        Past Medical History, Past Surgical History, Family history, Social History, and Medications were all reviewed with the patient today and updated as necessary.     Current Outpatient Medications   Medication Sig Dispense Refill    rosuvastatin (CRESTOR) 10 MG tablet Take 1 tablet by mouth daily 30 tablet 5    metFORMIN (GLUCOPHAGE) 500 MG tablet TAKE ONE TABLET BY MOUTH TWICE A DAY WITH MEAL(S) 60 tablet 5    lisinopril-hydroCHLOROthiazide (PRINZIDE;ZESTORETIC) 20-12.5 MG per tablet Take 2 tablets by mouth once daily 60 tablet 5    labetalol (NORMODYNE) 100 MG tablet Take 1 tablet by mouth 2 times daily 60 tablet 5    cloNIDine (CATAPRES) 0.2 MG tablet Take 1 tablet by mouth 2 times daily 60 tablet 5    allopurinol (ZYLOPRIM) 100 MG tablet Take 2 tablets by mouth daily 60 tablet 5    methocarbamol (ROBAXIN) 750 MG tablet Take 1 tablet by mouth daily as needed (muscle spasm) 30 tablet 5    vitamin D (ERGOCALCIFEROL) 1.25 MG (70645 UT) CAPS capsule Take 1 capsule by mouth once

## 2025-01-30 DIAGNOSIS — M16.11 PRIMARY OSTEOARTHRITIS OF RIGHT HIP: Primary | ICD-10-CM

## 2025-02-14 NOTE — PROGRESS NOTES
[facility]  [address]  [place] [address]  [phone]    Patient Name:  Gerardo Mancini  YOB: 1965      Office Visit 2/18/2025    CHIEF COMPLAINT:    Chief Complaint   Patient presents with    Sleep Apnea       HISTORY OF PRESENT ILLNESS:      The patient presents to re-establish care for management of obstructive sleep apnea. PMH includes ROXY, AAA, CKD, dCHF, DVT, HTN and obesity. Patient's sleep study in 2020 revealed AHI of 51.9 with lowest desaturation 59%. He underwent titration study which revealed CPAP of [id number] was required. He is prescribed cpap therapy with a humidifier set at 10-15cm with a full face mask. Most recent download reveals AHI on PAP therapy is 3.0, leak is 11.0 and the hourly usage is 6 hours 5 minutes nightly. The overall use is 1538 hours with days greater than four hours at 232/365.  Patient had to stop using CPAP in September due to supply issues.    Patient denies any known snoring on PAP therapy.  Does have vivid dreams but denies any parasomnias.  Does mention some RLS symptoms.  He is mostly falling asleep easily but will awaken 4-5 times nightly.  He states he is typically going to bed around 8 PM and will awaken 4-5 AM when working, other days he will wake around 6 AM.  He does toss and turn throughout the night.  He is not always rested in the morning, does have daytime fatigue and will nap on occasion.  Current Wesley score is 20/24.  Does endorse dry mouth with PAP therapy.  When discussing CPAP he feels that the pressure is too low.  He is maxing out at the pressure range so we will increase today.    Denies any over-the-counter sleep aids.  Denies any tobacco.  Denies any alcohol or caffeine.  He is taking Norco as needed.    Sleep study results reviewed as well as CPAP download.  We did discuss adjusting CPAP pressures as well as changing mask as he is struggling with that mask that he is currently using.  Overall

## 2025-02-18 ENCOUNTER — OFFICE VISIT (OUTPATIENT)
Dept: SLEEP MEDICINE | Age: 60
End: 2025-02-18
Payer: MEDICAID

## 2025-02-18 VITALS
HEIGHT: 72 IN | BODY MASS INDEX: 42.53 KG/M2 | HEART RATE: 67 BPM | SYSTOLIC BLOOD PRESSURE: 158 MMHG | TEMPERATURE: 97.3 F | OXYGEN SATURATION: 97 % | WEIGHT: 314 LBS | RESPIRATION RATE: 18 BRPM | DIASTOLIC BLOOD PRESSURE: 104 MMHG

## 2025-02-18 DIAGNOSIS — G47.33 OSA (OBSTRUCTIVE SLEEP APNEA): Primary | ICD-10-CM

## 2025-02-18 DIAGNOSIS — G47.00 PERSISTENT DISORDER OF INITIATING OR MAINTAINING SLEEP: ICD-10-CM

## 2025-02-18 DIAGNOSIS — G47.10 HYPERSOMNIA: ICD-10-CM

## 2025-02-18 DIAGNOSIS — G47.34 NOCTURNAL HYPOXEMIA: ICD-10-CM

## 2025-02-18 DIAGNOSIS — E66.01 CLASS 3 SEVERE OBESITY DUE TO EXCESS CALORIES WITH SERIOUS COMORBIDITY AND BODY MASS INDEX (BMI) OF 40.0 TO 44.9 IN ADULT: ICD-10-CM

## 2025-02-18 DIAGNOSIS — E66.813 CLASS 3 SEVERE OBESITY DUE TO EXCESS CALORIES WITH SERIOUS COMORBIDITY AND BODY MASS INDEX (BMI) OF 40.0 TO 44.9 IN ADULT: ICD-10-CM

## 2025-02-18 PROCEDURE — 99204 OFFICE O/P NEW MOD 45 MIN: CPT | Performed by: STUDENT IN AN ORGANIZED HEALTH CARE EDUCATION/TRAINING PROGRAM

## 2025-02-18 ASSESSMENT — SLEEP AND FATIGUE QUESTIONNAIRES
HOW LIKELY ARE YOU TO NOD OFF OR FALL ASLEEP WHILE WATCHING TV: MODERATE CHANCE OF DOZING
HOW LIKELY ARE YOU TO NOD OFF OR FALL ASLEEP IN A CAR, WHILE STOPPED FOR A FEW MINUTES IN TRAFFIC: SLIGHT CHANCE OF DOZING
HOW LIKELY ARE YOU TO NOD OFF OR FALL ASLEEP WHILE SITTING AND TALKING TO SOMEONE: MODERATE CHANCE OF DOZING
ESS TOTAL SCORE: 20
HOW LIKELY ARE YOU TO NOD OFF OR FALL ASLEEP WHILE SITTING INACTIVE IN A PUBLIC PLACE: HIGH CHANCE OF DOZING
HOW LIKELY ARE YOU TO NOD OFF OR FALL ASLEEP WHILE SITTING AND READING: HIGH CHANCE OF DOZING
HOW LIKELY ARE YOU TO NOD OFF OR FALL ASLEEP WHILE SITTING QUIETLY AFTER LUNCH WITHOUT ALCOHOL: HIGH CHANCE OF DOZING
HOW LIKELY ARE YOU TO NOD OFF OR FALL ASLEEP WHILE LYING DOWN TO REST IN THE AFTERNOON WHEN CIRCUMSTANCES PERMIT: HIGH CHANCE OF DOZING
HOW LIKELY ARE YOU TO NOD OFF OR FALL ASLEEP WHEN YOU ARE A PASSENGER IN A CAR FOR AN HOUR WITHOUT A BREAK: HIGH CHANCE OF DOZING

## 2025-02-28 DIAGNOSIS — Z86.711 HISTORY OF PULMONARY EMBOLISM: ICD-10-CM

## 2025-02-28 NOTE — TELEPHONE ENCOUNTER
Requested Prescriptions     Pending Prescriptions Disp Refills    apixaban (ELIQUIS) 5 MG TABS tablet [Pharmacy Med Name: Eliquis 5 MG Oral Tablet] 60 tablet 0     Sig: Take 1 tablet by mouth twice daily     Rx verified last 1/28/25

## 2025-04-03 ENCOUNTER — LAB (OUTPATIENT)
Dept: FAMILY MEDICINE CLINIC | Facility: CLINIC | Age: 60
End: 2025-04-03

## 2025-04-03 DIAGNOSIS — N18.30 BENIGN HYPERTENSION WITH CKD (CHRONIC KIDNEY DISEASE) STAGE III (HCC): ICD-10-CM

## 2025-04-03 DIAGNOSIS — E78.5 DYSLIPIDEMIA: ICD-10-CM

## 2025-04-03 DIAGNOSIS — E55.9 VITAMIN D DEFICIENCY: ICD-10-CM

## 2025-04-03 DIAGNOSIS — N18.31 TYPE 2 DIABETES MELLITUS WITH STAGE 3A CHRONIC KIDNEY DISEASE, WITHOUT LONG-TERM CURRENT USE OF INSULIN (HCC): ICD-10-CM

## 2025-04-03 DIAGNOSIS — E11.22 TYPE 2 DIABETES MELLITUS WITH STAGE 3A CHRONIC KIDNEY DISEASE, WITHOUT LONG-TERM CURRENT USE OF INSULIN (HCC): ICD-10-CM

## 2025-04-03 DIAGNOSIS — I12.9 BENIGN HYPERTENSION WITH CKD (CHRONIC KIDNEY DISEASE) STAGE III (HCC): ICD-10-CM

## 2025-04-03 DIAGNOSIS — Z87.39 HISTORY OF GOUT: ICD-10-CM

## 2025-04-03 LAB
25(OH)D3 SERPL-MCNC: 60.3 NG/ML (ref 30–100)
ALBUMIN SERPL-MCNC: 3.7 G/DL (ref 3.2–4.6)
ALBUMIN/GLOB SERPL: 1.1 (ref 1–1.9)
ALP SERPL-CCNC: 105 U/L (ref 40–129)
ALT SERPL-CCNC: 25 U/L (ref 8–55)
ANION GAP SERPL CALC-SCNC: 10 MMOL/L (ref 7–16)
AST SERPL-CCNC: 19 U/L (ref 15–37)
BILIRUB SERPL-MCNC: 0.3 MG/DL (ref 0–1.2)
BUN SERPL-MCNC: 27 MG/DL (ref 8–23)
CALCIUM SERPL-MCNC: 10 MG/DL (ref 8.8–10.2)
CHLORIDE SERPL-SCNC: 102 MMOL/L (ref 98–107)
CHOLEST SERPL-MCNC: 88 MG/DL (ref 0–200)
CO2 SERPL-SCNC: 27 MMOL/L (ref 20–29)
CREAT SERPL-MCNC: 1.91 MG/DL (ref 0.8–1.3)
CREAT UR-MCNC: 114 MG/DL (ref 39–259)
EST. AVERAGE GLUCOSE BLD GHB EST-MCNC: 143 MG/DL
GLOBULIN SER CALC-MCNC: 3.5 G/DL (ref 2.3–3.5)
GLUCOSE SERPL-MCNC: 87 MG/DL (ref 70–99)
HBA1C MFR BLD: 6.6 % (ref 0–5.6)
HDLC SERPL-MCNC: 35 MG/DL (ref 40–60)
HDLC SERPL: 2.5 (ref 0–5)
LDLC SERPL CALC-MCNC: 32 MG/DL (ref 0–100)
MICROALBUMIN UR-MCNC: 2.13 MG/DL (ref 0–20)
MICROALBUMIN/CREAT UR-RTO: 19 MG/G (ref 0–30)
POTASSIUM SERPL-SCNC: 4.7 MMOL/L (ref 3.5–5.1)
PROT SERPL-MCNC: 7.2 G/DL (ref 6.3–8.2)
SODIUM SERPL-SCNC: 139 MMOL/L (ref 136–145)
TRIGL SERPL-MCNC: 101 MG/DL (ref 0–150)
URATE SERPL-MCNC: 5.9 MG/DL (ref 3.9–8.2)
VLDLC SERPL CALC-MCNC: 20 MG/DL (ref 6–23)

## 2025-04-04 ENCOUNTER — RESULTS FOLLOW-UP (OUTPATIENT)
Dept: FAMILY MEDICINE CLINIC | Facility: CLINIC | Age: 60
End: 2025-04-04

## 2025-04-07 DIAGNOSIS — Z86.711 HISTORY OF PULMONARY EMBOLISM: ICD-10-CM

## 2025-04-08 RX ORDER — APIXABAN 5 MG/1
5 TABLET, FILM COATED ORAL 2 TIMES DAILY
Qty: 180 TABLET | Refills: 3 | Status: SHIPPED | OUTPATIENT
Start: 2025-04-08 | End: 2025-04-10 | Stop reason: SDUPTHER

## 2025-04-08 NOTE — TELEPHONE ENCOUNTER
Requested Prescriptions     Pending Prescriptions Disp Refills    ELIQUIS 5 MG TABS tablet [Pharmacy Med Name: Eliquis 5 MG Oral Tablet] 180 tablet 3     Sig: Take 1 tablet by mouth twice daily     Verified rx. Last OV 01/28/25. Erx to pharm on file.

## 2025-04-09 PROBLEM — I71.40 ABDOMINAL AORTIC ANEURYSM (AAA) 3.0 CM TO 5.5 CM IN DIAMETER IN MALE: Status: ACTIVE | Noted: 2022-02-23

## 2025-04-09 NOTE — ASSESSMENT & PLAN NOTE
Lipids are well-controlled.  Currently prescribed:   Lipid Lowering Medications       HMG CoA Reductase Inhibitors       rosuvastatin (CRESTOR) 10 MG tablet Take 1 tablet by mouth daily       Orders:    rosuvastatin (CRESTOR) 10 MG tablet; Take 1 tablet by mouth daily

## 2025-04-09 NOTE — ASSESSMENT & PLAN NOTE
Diabetes is well controlled.    Currently prescribed:   Diabetic Medications       Biguanides       metFORMIN (GLUCOPHAGE) 500 MG tablet TAKE ONE TABLET BY MOUTH TWICE A DAY WITH MEAL(S)     Will add Jardiance for CKD G3b/A1. & d/c Metformin.     Orders:    empagliflozin (JARDIANCE) 10 MG tablet; Take 1 tablet by mouth daily    Albumin/Creatinine Ratio, Urine; Future    Basic Metabolic Panel; Future    Hemoglobin A1C; Future

## 2025-04-09 NOTE — ASSESSMENT & PLAN NOTE
Chronic, at goal (stable), continue current treatment plan    Orders:    vitamin D (ERGOCALCIFEROL) 1.25 MG (23463 UT) CAPS capsule; Take 1 capsule by mouth once a week

## 2025-04-09 NOTE — PROGRESS NOTES
Status:   Future     Expected Date:   4/10/2025     Expiration Date:   4/10/2026       An electronic signature was used to authenticate this note.  -- Kasandra Serrano-PATRICK Rodríguez     Part of this note was written by using a voice dictation software. The note has been proof read but may still contain some grammatical/other typographical errors.    l

## 2025-04-09 NOTE — ASSESSMENT & PLAN NOTE
Chronic, not at goal (unstable), Scheduled for a right hip replacement on 5/13/25.     Orders:    lidocaine (LIDODERM) 5 %; Place 1 patch onto the skin daily 12 hours on, 12 hours off to right hip region

## 2025-04-09 NOTE — ASSESSMENT & PLAN NOTE
Chronic, not at goal (unstable), prescribed lidocaine patches, 1 daily x 12 hrs.  For right hip.

## 2025-04-09 NOTE — ASSESSMENT & PLAN NOTE
BP today is: elevated.   Currently prescribed:   Hypertension Medications       Antiadrenergic Antihypertensives       cloNIDine (CATAPRES) 0.2 MG tablet Take 1 tablet by mouth 2 times daily       Antihypertensive Combinations       lisinopril-hydroCHLOROthiazide (PRINZIDE;ZESTORETIC) 20-12.5 MG per tablet Take 2 tablets by mouth once daily       Alpha-Beta Blockers       labetalol (NORMODYNE) 100 MG tablet Take 1 tablet by mouth 2 times daily   Seen by Nephrology on 4/18/25 for CKD G3a/A1. Added Amlodipine at visit due to elev. BP.    --he hasn't started this yet b/c he wanted to discuss at today's visit.    Pt to add Amlodipine 10 mg (written by Nephrology recently).  Re-eval in 12 weeks.   Patient is resistant to adding additional blood pressure medications, but I explained to him that with his upcoming hip replacement surgery, he will need to have well-controlled blood pressure in order to have the surgery.  Stressed importance of good blood pressure control, low-salt diet and continued weight loss efforts.  He has lost about 20 pounds over the last 6 months.       Orders:    cloNIDine (CATAPRES) 0.2 MG tablet; Take 1 tablet by mouth 2 times daily    labetalol (NORMODYNE) 100 MG tablet; Take 1 tablet by mouth 2 times daily    lisinopril-hydroCHLOROthiazide (PRINZIDE;ZESTORETIC) 20-12.5 MG per tablet; Take 2 tablets by mouth once daily    Albumin/Creatinine Ratio, Urine; Future    Basic Metabolic Panel; Future

## 2025-04-09 NOTE — ASSESSMENT & PLAN NOTE
Pt had a CT of his chest on 2/13/22 noting: There is aneurysmal dilatation of the ascending aorta measuring up to 4 cm.    AAA Screening then performed on 10/27/23 stating no aneurysm present.   --need to re-eval.     Orders:    Vascular AAA screening; Future

## 2025-04-10 ENCOUNTER — OFFICE VISIT (OUTPATIENT)
Dept: FAMILY MEDICINE CLINIC | Facility: CLINIC | Age: 60
End: 2025-04-10
Payer: MEDICAID

## 2025-04-10 ENCOUNTER — TELEPHONE (OUTPATIENT)
Age: 60
End: 2025-04-10

## 2025-04-10 VITALS
SYSTOLIC BLOOD PRESSURE: 152 MMHG | BODY MASS INDEX: 42.53 KG/M2 | HEART RATE: 68 BPM | DIASTOLIC BLOOD PRESSURE: 102 MMHG | RESPIRATION RATE: 16 BRPM | TEMPERATURE: 98.2 F | HEIGHT: 72 IN | OXYGEN SATURATION: 100 % | WEIGHT: 314 LBS

## 2025-04-10 DIAGNOSIS — M54.50 CHRONIC MIDLINE LOW BACK PAIN WITHOUT SCIATICA: ICD-10-CM

## 2025-04-10 DIAGNOSIS — I12.9 BENIGN HYPERTENSION WITH CKD (CHRONIC KIDNEY DISEASE) STAGE III (HCC): Primary | ICD-10-CM

## 2025-04-10 DIAGNOSIS — M25.572 CHRONIC PAIN OF LEFT ANKLE: ICD-10-CM

## 2025-04-10 DIAGNOSIS — E78.5 DYSLIPIDEMIA: ICD-10-CM

## 2025-04-10 DIAGNOSIS — Z86.711 HISTORY OF PULMONARY EMBOLISM: ICD-10-CM

## 2025-04-10 DIAGNOSIS — M25.551 RIGHT HIP PAIN: ICD-10-CM

## 2025-04-10 DIAGNOSIS — M16.11 PRIMARY OSTEOARTHRITIS OF RIGHT HIP: ICD-10-CM

## 2025-04-10 DIAGNOSIS — Z87.39 HISTORY OF GOUT: ICD-10-CM

## 2025-04-10 DIAGNOSIS — E11.22 TYPE 2 DIABETES MELLITUS WITH STAGE 3A CHRONIC KIDNEY DISEASE, WITHOUT LONG-TERM CURRENT USE OF INSULIN (HCC): ICD-10-CM

## 2025-04-10 DIAGNOSIS — G89.29 CHRONIC MIDLINE LOW BACK PAIN WITHOUT SCIATICA: ICD-10-CM

## 2025-04-10 DIAGNOSIS — N18.31 TYPE 2 DIABETES MELLITUS WITH STAGE 3A CHRONIC KIDNEY DISEASE, WITHOUT LONG-TERM CURRENT USE OF INSULIN (HCC): ICD-10-CM

## 2025-04-10 DIAGNOSIS — E66.813 CLASS 3 SEVERE OBESITY DUE TO EXCESS CALORIES WITH SERIOUS COMORBIDITY AND BODY MASS INDEX (BMI) OF 40.0 TO 44.9 IN ADULT: ICD-10-CM

## 2025-04-10 DIAGNOSIS — N18.30 BENIGN HYPERTENSION WITH CKD (CHRONIC KIDNEY DISEASE) STAGE III (HCC): Primary | ICD-10-CM

## 2025-04-10 DIAGNOSIS — G89.29 CHRONIC PAIN OF LEFT ANKLE: ICD-10-CM

## 2025-04-10 DIAGNOSIS — E55.9 VITAMIN D DEFICIENCY: ICD-10-CM

## 2025-04-10 DIAGNOSIS — I71.40 ABDOMINAL AORTIC ANEURYSM (AAA) WITHOUT RUPTURE, UNSPECIFIED PART: ICD-10-CM

## 2025-04-10 PROBLEM — N18.32 CHRONIC KIDNEY DISEASE (CKD) STAGE G3B/A1, MODERATELY DECREASED GLOMERULAR FILTRATION RATE (GFR) BETWEEN 30-44 ML/MIN/1.73 SQUARE METER AND ALBUMINURIA CREATININE RATIO LESS THAN 30 MG/G (HCC): Status: ACTIVE | Noted: 2020-11-30

## 2025-04-10 PROCEDURE — 99215 OFFICE O/P EST HI 40 MIN: CPT | Performed by: PHYSICIAN ASSISTANT

## 2025-04-10 PROCEDURE — 3044F HG A1C LEVEL LT 7.0%: CPT | Performed by: PHYSICIAN ASSISTANT

## 2025-04-10 PROCEDURE — G2211 COMPLEX E/M VISIT ADD ON: HCPCS | Performed by: PHYSICIAN ASSISTANT

## 2025-04-10 RX ORDER — LISINOPRIL AND HYDROCHLOROTHIAZIDE 12.5; 2 MG/1; MG/1
TABLET ORAL
Qty: 60 TABLET | Refills: 2 | Status: SHIPPED | OUTPATIENT
Start: 2025-04-10

## 2025-04-10 RX ORDER — ALLOPURINOL 100 MG/1
200 TABLET ORAL DAILY
Qty: 60 TABLET | Refills: 2 | Status: SHIPPED | OUTPATIENT
Start: 2025-04-10

## 2025-04-10 RX ORDER — METHOCARBAMOL 750 MG/1
750 TABLET, FILM COATED ORAL DAILY PRN
Qty: 30 TABLET | Refills: 2 | Status: SHIPPED | OUTPATIENT
Start: 2025-04-10

## 2025-04-10 RX ORDER — LIDOCAINE 50 MG/G
1 PATCH TOPICAL DAILY
Qty: 30 PATCH | Refills: 5 | Status: SHIPPED | OUTPATIENT
Start: 2025-04-10 | End: 2025-10-07

## 2025-04-10 RX ORDER — CLONIDINE HYDROCHLORIDE 0.2 MG/1
0.2 TABLET ORAL 2 TIMES DAILY
Qty: 60 TABLET | Refills: 2 | Status: SHIPPED | OUTPATIENT
Start: 2025-04-10

## 2025-04-10 RX ORDER — LABETALOL 100 MG/1
100 TABLET, FILM COATED ORAL 2 TIMES DAILY
Qty: 60 TABLET | Refills: 2 | Status: SHIPPED | OUTPATIENT
Start: 2025-04-10

## 2025-04-10 RX ORDER — ERGOCALCIFEROL 1.25 MG/1
50000 CAPSULE, LIQUID FILLED ORAL WEEKLY
Qty: 12 CAPSULE | Refills: 0 | Status: SHIPPED | OUTPATIENT
Start: 2025-04-10

## 2025-04-10 RX ORDER — ROSUVASTATIN CALCIUM 10 MG/1
10 TABLET, COATED ORAL DAILY
Qty: 30 TABLET | Refills: 2 | Status: SHIPPED | OUTPATIENT
Start: 2025-04-10

## 2025-04-10 RX ORDER — LISINOPRIL AND HYDROCHLOROTHIAZIDE 12.5; 2 MG/1; MG/1
TABLET ORAL
Qty: 60 TABLET | Refills: 2 | Status: SHIPPED | OUTPATIENT
Start: 2025-04-10 | End: 2025-04-10

## 2025-04-10 NOTE — ASSESSMENT & PLAN NOTE
Will provide him w/ a temporary supply of Eliquis to cover him until he can get this refilled through cardiology.     Orders:    apixaban (ELIQUIS) 5 MG TABS tablet; Take 1 tablet by mouth 2 times daily

## 2025-04-10 NOTE — TELEPHONE ENCOUNTER
MEDICATION REFILL REQUEST      Name of Medication:  Eliquis   Dose:  5 mg  Frequency:  twice a day   Quantity:    Days' supply:        Pharmacy Name/Location:  [facility] on [address]/ please call in today , patient has been out for 3 weeks now. Please call patient when called in

## 2025-04-10 NOTE — ASSESSMENT & PLAN NOTE
Chronic, at goal (stable), continue current treatment plan    Orders:    methocarbamol (ROBAXIN) 750 MG tablet; Take 1 tablet by mouth daily as needed (muscle spasm)

## 2025-04-10 NOTE — ASSESSMENT & PLAN NOTE
Chronic, at goal (stable), continue current treatment plan    Orders:    allopurinol (ZYLOPRIM) 100 MG tablet; Take 2 tablets by mouth daily

## 2025-04-15 ENCOUNTER — RESULTS FOLLOW-UP (OUTPATIENT)
Dept: FAMILY MEDICINE CLINIC | Facility: CLINIC | Age: 60
End: 2025-04-15

## 2025-04-15 ENCOUNTER — HOSPITAL ENCOUNTER (OUTPATIENT)
Dept: GENERAL RADIOLOGY | Age: 60
Discharge: HOME | End: 2025-04-17
Payer: MEDICAID

## 2025-04-15 ENCOUNTER — TELEPHONE (OUTPATIENT)
Dept: ORTHOPEDIC SURGERY | Age: 60
End: 2025-04-15

## 2025-04-15 DIAGNOSIS — M25.472 LEFT ANKLE SWELLING: Primary | ICD-10-CM

## 2025-04-15 DIAGNOSIS — M25.572 CHRONIC PAIN OF LEFT ANKLE: ICD-10-CM

## 2025-04-15 DIAGNOSIS — G89.29 CHRONIC PAIN OF LEFT ANKLE: ICD-10-CM

## 2025-04-15 PROCEDURE — 73610 X-RAY EXAM OF ANKLE: CPT

## 2025-04-15 NOTE — TELEPHONE ENCOUNTER
Tried calling patient to let him know I had to move his PO appt from 6/13 to 6/16 due to CHK being in surgery that day

## 2025-04-21 ENCOUNTER — TELEPHONE (OUTPATIENT)
Dept: FAMILY MEDICINE CLINIC | Facility: CLINIC | Age: 60
End: 2025-04-21

## 2025-04-21 ENCOUNTER — PREP FOR PROCEDURE (OUTPATIENT)
Dept: ORTHOPEDIC SURGERY | Age: 60
End: 2025-04-21

## 2025-04-21 DIAGNOSIS — Z01.818 PREOP EXAMINATION: Primary | ICD-10-CM

## 2025-04-21 RX ORDER — SODIUM CHLORIDE 0.9 % (FLUSH) 0.9 %
5-40 SYRINGE (ML) INJECTION EVERY 12 HOURS SCHEDULED
Status: CANCELLED | OUTPATIENT
Start: 2025-04-21

## 2025-04-21 RX ORDER — SODIUM CHLORIDE 0.9 % (FLUSH) 0.9 %
5-40 SYRINGE (ML) INJECTION PRN
Status: CANCELLED | OUTPATIENT
Start: 2025-04-21

## 2025-04-21 RX ORDER — SODIUM CHLORIDE 9 MG/ML
INJECTION, SOLUTION INTRAVENOUS PRN
Status: CANCELLED | OUTPATIENT
Start: 2025-04-21

## 2025-04-22 ENCOUNTER — HOSPITAL ENCOUNTER (OUTPATIENT)
Dept: REHABILITATION | Age: 60
Discharge: HOME | End: 2025-04-25
Payer: MEDICAID

## 2025-04-22 ENCOUNTER — HOSPITAL ENCOUNTER (OUTPATIENT)
Dept: SURGERY | Age: 60
Discharge: HOME | End: 2025-04-25
Payer: MEDICAID

## 2025-04-22 ENCOUNTER — TELEPHONE (OUTPATIENT)
Dept: SURGERY | Age: 60
End: 2025-04-22

## 2025-04-22 VITALS
BODY MASS INDEX: 41.49 KG/M2 | HEART RATE: 62 BPM | DIASTOLIC BLOOD PRESSURE: 89 MMHG | HEIGHT: 73 IN | OXYGEN SATURATION: 97 % | RESPIRATION RATE: 18 BRPM | TEMPERATURE: 97.7 F | WEIGHT: 313.05 LBS | SYSTOLIC BLOOD PRESSURE: 158 MMHG

## 2025-04-22 DIAGNOSIS — Z01.818 PREOP EXAMINATION: ICD-10-CM

## 2025-04-22 LAB
ALBUMIN SERPL-MCNC: 3.7 G/DL (ref 3.2–4.6)
ALBUMIN/GLOB SERPL: 0.9 (ref 1–1.9)
ALP SERPL-CCNC: 105 U/L (ref 40–129)
ALT SERPL-CCNC: 22 U/L (ref 8–55)
ANION GAP SERPL CALC-SCNC: 11 MMOL/L (ref 7–16)
APTT PPP: 31.3 SEC (ref 23.3–37.4)
AST SERPL-CCNC: 19 U/L (ref 15–37)
BASOPHILS # BLD: 0.04 K/UL (ref 0–0.2)
BASOPHILS NFR BLD: 0.6 % (ref 0–2)
BILIRUB SERPL-MCNC: 0.4 MG/DL (ref 0–1.2)
BUN SERPL-MCNC: 27 MG/DL (ref 8–23)
CALCIUM SERPL-MCNC: 9.9 MG/DL (ref 8.8–10.2)
CHLORIDE SERPL-SCNC: 101 MMOL/L (ref 98–107)
CO2 SERPL-SCNC: 27 MMOL/L (ref 20–29)
CREAT SERPL-MCNC: 1.88 MG/DL (ref 0.8–1.3)
DIFFERENTIAL METHOD BLD: ABNORMAL
EOSINOPHIL # BLD: 0.73 K/UL (ref 0–0.8)
EOSINOPHIL NFR BLD: 10.6 % (ref 0.5–7.8)
ERYTHROCYTE [DISTWIDTH] IN BLOOD BY AUTOMATED COUNT: 13.8 % (ref 11.9–14.6)
EST. AVERAGE GLUCOSE BLD GHB EST-MCNC: 143 MG/DL
GLOBULIN SER CALC-MCNC: 3.9 G/DL (ref 2.3–3.5)
GLUCOSE SERPL-MCNC: 129 MG/DL (ref 70–99)
HBA1C MFR BLD: 6.6 % (ref 0–5.6)
HCT VFR BLD AUTO: 40.2 % (ref 41.1–50.3)
HGB BLD-MCNC: 12.7 G/DL (ref 13.6–17.2)
IMM GRANULOCYTES # BLD AUTO: 0.02 K/UL (ref 0–0.5)
IMM GRANULOCYTES NFR BLD AUTO: 0.3 % (ref 0–5)
INR PPP: 1.2
LYMPHOCYTES # BLD: 2.51 K/UL (ref 0.5–4.6)
LYMPHOCYTES NFR BLD: 36.6 % (ref 13–44)
MCH RBC QN AUTO: 26.3 PG (ref 26.1–32.9)
MCHC RBC AUTO-ENTMCNC: 31.6 G/DL (ref 31.4–35)
MCV RBC AUTO: 83.2 FL (ref 82–102)
MONOCYTES # BLD: 0.59 K/UL (ref 0.1–1.3)
MONOCYTES NFR BLD: 8.6 % (ref 4–12)
MRSA DNA SPEC QL NAA+PROBE: NOT DETECTED
NEUTS SEG # BLD: 2.97 K/UL (ref 1.7–8.2)
NEUTS SEG NFR BLD: 43.3 % (ref 43–78)
NRBC # BLD: 0 K/UL (ref 0–0.2)
PLATELET # BLD AUTO: 248 K/UL (ref 150–450)
PMV BLD AUTO: 10.1 FL (ref 9.4–12.3)
POTASSIUM SERPL-SCNC: 4.6 MMOL/L (ref 3.5–5.1)
PROT SERPL-MCNC: 7.5 G/DL (ref 6.3–8.2)
PROTHROMBIN TIME: 15.9 SEC (ref 11.3–14.9)
RBC # BLD AUTO: 4.83 M/UL (ref 4.23–5.6)
S AUREUS CPE NOSE QL NAA+PROBE: NOT DETECTED
SODIUM SERPL-SCNC: 139 MMOL/L (ref 136–145)
WBC # BLD AUTO: 6.9 K/UL (ref 4.3–11.1)

## 2025-04-22 PROCEDURE — 97161 PT EVAL LOW COMPLEX 20 MIN: CPT

## 2025-04-22 PROCEDURE — 94760 N-INVAS EAR/PLS OXIMETRY 1: CPT

## 2025-04-22 PROCEDURE — 87641 MR-STAPH DNA AMP PROBE: CPT

## 2025-04-22 PROCEDURE — 83036 HEMOGLOBIN GLYCOSYLATED A1C: CPT

## 2025-04-22 PROCEDURE — 85025 COMPLETE CBC W/AUTO DIFF WBC: CPT

## 2025-04-22 PROCEDURE — 85610 PROTHROMBIN TIME: CPT

## 2025-04-22 PROCEDURE — 80053 COMPREHEN METABOLIC PANEL: CPT

## 2025-04-22 PROCEDURE — 85730 THROMBOPLASTIN TIME PARTIAL: CPT

## 2025-04-22 PROCEDURE — 98960 EDU&TRN PT SELF-MGMT NQHP 1: CPT

## 2025-04-22 RX ORDER — AMLODIPINE BESYLATE 10 MG/1
10 TABLET ORAL DAILY
COMMUNITY

## 2025-04-22 RX ORDER — MAGNESIUM OXIDE TAB 400 MG (241.3 MG ELEMENTAL MG) 400 (241.3 MG) MG
400 TAB ORAL 2 TIMES DAILY
Qty: 180 TABLET | Refills: 3 | Status: SHIPPED | OUTPATIENT
Start: 2025-04-22

## 2025-04-22 ASSESSMENT — PROMIS GLOBAL HEALTH SCALE
TO WHAT EXTENT ARE YOU ABLE TO CARRY OUT YOUR EVERYDAY PHYSICAL ACTIVITIES SUCH AS WALKING, CLIMBING STAIRS, CARRYING GROCERIES, OR MOVING A CHAIR [ON A SCALE OF 1 (NOT AT ALL) TO 5 (COMPLETELY)]?: A LITTLE
IN THE PAST 7 DAYS, HOW OFTEN HAVE YOU BEEN BOTHERED BY EMOTIONAL PROBLEMS, SUCH AS FEELING ANXIOUS, DEPRESSED, OR IRRITABLE [ON A SCALE FROM 1 (NEVER) TO 5 (ALWAYS)]?: OFTEN
IN GENERAL, WOULD YOU SAY YOUR QUALITY OF LIFE IS...[ON A SCALE OF 1 (POOR) TO 5 (EXCELLENT)]: FAIR
IN GENERAL, HOW WOULD YOU RATE YOUR PHYSICAL HEALTH [ON A SCALE OF 1 (POOR) TO 5 (EXCELLENT)]?: FAIR
HOW IS THE PROMIS V1.1 BEING ADMINISTERED?: PAPER
SUM OF RESPONSES TO QUESTIONS 2, 4, 5, & 10: 9
WHO IS THE PERSON COMPLETING THE PROMIS V1.1 SURVEY?: SELF
IN THE PAST 7 DAYS, HOW WOULD YOU RATE YOUR PAIN ON AVERAGE [ON A SCALE FROM 0 (NO PAIN) TO 10 (WORST IMAGINABLE PAIN)]?: 8
SUM OF RESPONSES TO QUESTIONS 3, 6, 7, & 8: 15
IN GENERAL, HOW WOULD YOU RATE YOUR SATISFACTION WITH YOUR SOCIAL ACTIVITIES AND RELATIONSHIPS [ON A SCALE OF 1 (POOR) TO 5 (EXCELLENT)]?: GOOD
IN GENERAL, HOW WOULD YOU RATE YOUR MENTAL HEALTH, INCLUDING YOUR MOOD AND YOUR ABILITY TO THINK [ON A SCALE OF 1 (POOR) TO 5 (EXCELLENT)]?: FAIR
IN GENERAL, PLEASE RATE HOW WELL YOU CARRY OUT YOUR USUAL SOCIAL ACTIVITIES (INCLUDES ACTIVITIES AT HOME, AT WORK, AND IN YOUR COMMUNITY, AND RESPONSIBILITIES AS A PARENT, CHILD, SPOUSE, EMPLOYEE, FRIEND, ETC) [ON A SCALE OF 1 (POOR) TO 5 (EXCELLENT)]?: GOOD
IN THE PAST 7 DAYS, HOW WOULD YOU RATE YOUR FATIGUE ON AVERAGE [ON A SCALE FROM 1 (NONE) TO 5 (VERY SEVERE)]?: MODERATE
IN GENERAL, WOULD YOU SAY YOUR HEALTH IS...[ON A SCALE OF 1 (POOR) TO 5 (EXCELLENT)]: POOR

## 2025-04-22 ASSESSMENT — HOOS JR
HOOS JR RAW SCORE: 16
GOING UP OR DOWN STAIRS: SEVERE
HOOS JR RAW SCORE: 16
BENDING TO THE FLOOR TO PICK UP OBJECT: SEVERE
RISING FROM SITTING: MODERATE
HOOS JR TOTAL INTERVAL SCORE: 39.902
SITTING: MODERATE
LYING IN BED (TURNING OVER, MAINTAINING HIP POSITION): SEVERE
WALKING ON UNEVEN SURFACE: SEVERE

## 2025-04-22 ASSESSMENT — PULMONARY FUNCTION TESTS
FEV1 (%PREDICTED): 54
FEV1 (LITERS): 1.73

## 2025-04-22 ASSESSMENT — PAIN SCALES - GENERAL: PAINLEVEL_OUTOF10: 9

## 2025-04-22 ASSESSMENT — PAIN DESCRIPTION - DESCRIPTORS: DESCRIPTORS: ACHING;BURNING;THROBBING

## 2025-04-22 ASSESSMENT — PAIN DESCRIPTION - LOCATION: LOCATION: GROIN;HIP

## 2025-04-22 ASSESSMENT — PAIN DESCRIPTION - ORIENTATION: ORIENTATION: RIGHT;ANTERIOR;INNER;OUTER;POSTERIOR

## 2025-04-22 NOTE — TELEPHONE ENCOUNTER
Please let him know that he does not appear to have any bony deformity or arthritis in his ankle.  Since he has so much swelling, I am more concerned about some possible ligament damage and will be placing a referral for him to see ortho.     PATRICK Cortez

## 2025-04-22 NOTE — TELEPHONE ENCOUNTER
I spoke with patient who voices understanding and acceptance of these results/advice and will call back if any further questions or concerns.

## 2025-04-22 NOTE — TELEPHONE ENCOUNTER
MEDICATION REFILL REQUEST      Name of Medication:  Magnesium -Oxide  Dose:  400 mg  Frequency:  BID  Quantity:  180  Days' supply:  90 with 3 refills      Pharmacy Name/Location:  [facility]-[phone]

## 2025-04-22 NOTE — PROGRESS NOTES
04/22/25 0822   Treatment   Treatment Type Bedside spirometry   Breath Sounds   Breath Sounds Bilateral Diminished   Oxygen Therapy/Pulse Ox   O2 Therapy Room air   Pulse 62   SpO2 97 %   Pulse Oximeter Device Mode Intermittent   $Pulse Oximeter $Spot check (single)   Bedside Spirometry   FEV-1/Actual (Liters) 1.73 Liters   FEV-1/Predicted (Liters) 54 Liters     Initial respiratory Assessment completed with pt. Pt was interviewed and evaluated in [facility] prior to surgery.  Patient ID:  Gerardo Mancini  [id number]  59 y.o.  1965  Surgeon: Dr. Blake  Date of Surgery: [unfilled]5/13/2025  Procedure: Total Right Hip Arthroplasty  Primary Care Physician: Kasandra Mims [phone]  Specialists:[facility] SLEEP Alligator 2/18/2025    Pt taught proper COUGH technique  IS REVIEWED WITH PT AS WELL AS BENEFITS OF USING IS IN SEDENTARY PTS.  DIAPHRAGMATIC BREATHING EXERCISE INSTRUCTIONS GIVEN    History of smoking:   DENIES                 Quit date:         Secondhand smoke:DENIES    Past procedures with Oxygen desaturation or delayed awakening:DENIES     Respiratory history:HX OF ASTHMA  ROXY- CPAP                                 SOB  ON EXERTION                                    Respiratory meds:  DENIES    FAMILY PRESENT:            WIFE  PAST SLEEP STUDY:        YES                7/13/2020 AHI 51.9 SAT 59%  HX OF ROXY:                        YES                      ROXY assessment:     DANGERS OF UNTREATED ROXY EXPLAINED TO PT.                                          SLEEPS ON SIDE       PHYSICAL EXAM   Body mass index is 41.3 kg/m².   Vitals:    04/22/25 0822   BP:    Pulse: (P) 62   Resp:    Temp:    SpO2: (P) 97%     Neck circumference:  50    cm    Loud snoring:                                                 YES            Witnessed apnea or wakening gasping or choking:            APNEA  Awakens with headaches:                                               YES  Morning or daytime tiredness/

## 2025-04-22 NOTE — PERIOP NOTE
Protime results to be reviewed by anesthesia-charge nurse to f/u. All other lab results, listed below, are within anesthesia guidelines, including creatinine which is within 0.3 of old value. Labs automatically routed to ordering provider via Epic documentation.       Latest Reference Range & Units 04/22/25 08:13   Sodium 136 - 145 mmol/L 139   Potassium 3.5 - 5.1 mmol/L 4.6   Chloride 98 - 107 mmol/L 101   CARBON DIOXIDE 20 - 29 mmol/L 27   BUN,BUNPL 8 - 23 MG/DL 27 (H)   Creatinine 0.80 - 1.30 MG/DL 1.88 (H)   Anion Gap 7 - 16 mmol/L 11   Est, Glom Filt Rate >60 ml/min/1.73m2 40 (L)   Glucose 70 - 99 mg/dL 129 (H)   Calcium 8.8 - 10.2 MG/DL 9.9   Albumin/Globulin Ratio 1.0 - 1.9   0.9 (L)   Total Protein 6.3 - 8.2 g/dL 7.5   Albumin 3.2 - 4.6 g/dL 3.7   Globulin 2.3 - 3.5 g/dL 3.9 (H)   Alkaline Phosphatase 40 - 129 U/L 105   ALT 8 - 55 U/L 22   AST 15 - 37 U/L 19   Total Bilirubin 0.0 - 1.2 MG/DL 0.4   Hemoglobin A1C 0 - 5.6 % 6.6 (H)   eAG (mg/dL) mg/dL 143   WBC 4.3 - 11.1 K/uL 6.9   RBC 4.23 - 5.6 M/uL 4.83   Hemoglobin Quant 13.6 - 17.2 g/dL 12.7 (L)   Hematocrit 41.1 - 50.3 % 40.2 (L)   MCV 82.0 - 102.0 FL 83.2   MCH 26.1 - 32.9 PG 26.3   MCHC 31.4 - 35.0 g/dL 31.6   MPV 9.4 - 12.3 FL 10.1   RDW 11.9 - 14.6 % 13.8   Platelet Count 150 - 450 K/uL 248   Neutrophils % 43.0 - 78.0 % 43.3   Lymphocyte % 13.0 - 44.0 % 36.6   Monocytes % 4.0 - 12.0 % 8.6   Eosinophils % 0.5 - 7.8 % 10.6 (H)   Basophils % 0.0 - 2.0 % 0.6   Neutrophils Absolute 1.70 - 8.20 K/UL 2.97   Lymphocytes Absolute 0.50 - 4.60 K/UL 2.51   Monocytes Absolute 0.10 - 1.30 K/UL 0.59   Eosinophils Absolute 0.00 - 0.80 K/UL 0.73   Basophils Absolute 0.00 - 0.20 K/UL 0.04   Differential Type -   AUTOMATED   Immature Granulocytes % 0.0 - 5.0 % 0.3   Nucleated Red Blood Cells 0.0 - 0.2 K/uL 0.00   Immature Granulocytes Absolute 0.0 - 0.5 K/UL 0.02   Prothrombin Time 11.3 - 14.9 sec 15.9 (H)   INR -   1.2   aPTT 23.3 - 37.4 SEC 31.3   MRSA/STAPH AUREUS

## 2025-04-22 NOTE — PERIOP NOTE
Patient verified name and .    Order for consent was found in EHR and does match case posting; patient verified.     Type 3 surgery, JOINT assessment complete.    Labs per surgeon: CBC,CMP, A1C, PT/PTT; results pending.   Labs per anesthesia protocol: no additional  EKG: Completed 25; results within anesthesia guidelines.     Cardiology office visit with surgical clearance (25) located in EHR. Eliquis hold was not addressed during visit. Telephone Encounter sent to [facility] Cardiology requesting clearance to hold Eliquis 3 days prior to surgery. Charge nurse to f/u.     Nephrology office note (25), Urology office note (25), Echo (24) located in EHR if needed.     MRSA/MSSA swab collected per policy. MD to consult pharmacy to dose Vanc if appropriate.     Hospital approved surgical skin cleanser and instructions to return bottle on DOS given per hospital policy.    Patient provided with handouts including Guide to Surgery, Pain Management, Preventing Surgical Site Infections, and [facility] Anesthesia Brochure.    Patient answered medical/surgical history questions at their best of ability. All prior to admission medications documented in Epic. Original medication prescription bottle was not visualized during patient appointment.     Patient instructed to hold all over the counter vitamins 3 weeks prior to surgery and NSAIDS 21 days prior to surgery.     Patient instructed to continue prescribed Mg up until the DOS per anesthesia protocol.     Patient instructed to hold Eliquis and Jardiance 3 days prior to surgery per anesthesia protocol.     Patient teach back successful and patient demonstrates knowledge of instruction.

## 2025-04-22 NOTE — TELEPHONE ENCOUNTER
CARDIAC CLEARANCE    Surgical, Procedural, or Medication Clearance    Physician or Practice Requesting Clearance: [facility]  : Suha Rinaldi RN  Contact Phone Number: [phone]  Contact Fax Number: [phone]  Date of Surgery/Procedure: 5/13/25  Type of Surgery or Procedure: Right RENAY  Type of Anesthesia: SPINAL  Medication to hold: Eliquis  Requested # of days to hold: 3 days (72 hours)

## 2025-04-22 NOTE — CARE COORDINATION
[facility] Case Management note:  Patient screened in Prehab for discharge planning needs. Patient scheduled for a future total joint replacement.  We discussed Home Health and equipment needed after surgery. List of Home Health providers offered.  Patient w/o preference towards provider.  We will arrange Home health on the day of surgery.  Pt does not qualify for a walker under his ins as he was given one in last 5 yrs. He hopes to borrow but needs to make sure it can handle over 300 lbs. May request HD BSC.

## 2025-04-22 NOTE — PERIOP NOTE
PLEASE CONTINUE TAKING ALL PRESCRIPTION MEDICATIONS UP TO THE DAY OF SURGERY UNLESS OTHERWISE DIRECTED BELOW.     DISCONTINUE all over the counter vitamins, supplements, and herbals 21 days prior to surgery. DISCONTINUE Non-Steroidal Anti-Inflammatory (NSAIDS) such as Advil, Ibuprofen, Motrin, Naproxen, and Aleve 21 days prior to surgery.      *IT IS OK TO TAKE TYLENOL, Allergy medication, and indigestion medications*     Prescription medications to HOLD     Hold Eliquis 3 days (72 hours) prior to surgery    Hold Jardiance 3 days prior to surgery           CONTINUE all other prescriptions like normal up until the day of surgery--TAKE ONLY THE BELOW MEDICATIONS THE DAY OF SURGERY.    Labetalol                   Allopurinol    Amlodipine                Hydrocodone-acetaminophen if needed   Clonidine                   Methocarbamol if needed      Comments       Bring CPAP, Dynahex soap, and incentive spirometer back to the hospital.          Please do not bring home medications with you on the day of surgery unless otherwise directed by your nurse.  If you are instructed to bring home medications, please give them to your nurse as they will be administered by the nursing staff.     If you have any questions, please call [facility] [phone].     A copy of this note was provided to the patient for reference.

## 2025-04-22 NOTE — PROGRESS NOTES
Gerardo Mancini  : 1965  Primary: [employer]  Secondary:  [facility] [place] [address]  [phone]      Physical Therapy Prehab Evaluation Summary:2025   Time In/Out   PT Charge Capture  Episode     MEDICAL/REFERRING DIAGNOSIS: Unilateral primary osteoarthritis, right hip [M16.11]  REFERRING PHYSICIAN: Nico Blake,*    Treatment Diagnosis:   Pain in Right Hip (M25.551)  Stiffness of Right Hip, Not elsewhere classified (M25.651)    DATE OF SURGERY: 25  Assessment:   COMMENTS:  Mr. Mancini is present for a Prehab Physical Therapy Assessment for their upcoming right RENAY . They are here with his girlfriend . After discussing the surgical admission options and discharge plans, they are planning on discharging after one night in the hospital.    He has had a R TKA. He has a cane to use at DC but will need a RW prior to DC. His girlfriend and sister will offer assist at DC.    PROBLEM LIST:   (Impacting functional limitations):  Mr. Mancini presents with the following lower extremity(s) problems:  Strength  Range of Motion  Home Exercise Program  Pain INTERVENTIONS PLANNED:   (Benefits and precautions of physical therapy have been discussed with the patient.)  Home Exercise Program  Educational Discussion       GOALS: (Goals have been discussed and agreed upon with patient.)  Discharge Goals: Time Frame: 1 Day  Patient will demonstrate independence with a home exercise program designed to increase strength, range of motion, and pain control to minimize functional deficits and optimize patient for total joint replacement.    Subjective:   Past Medical History/Comorbidities:   Mr. Mancini  has a past medical history of AAA (abdominal aortic aneurysm), Allergic rhinitis due to animal (cat) (dog) hair and dander, Asthma, Chronic kidney disease, Diastolic CHF, chronic (HCC), DVT (deep venous thrombosis) ([facility]), Dyslipidemia,

## 2025-05-05 ENCOUNTER — OFFICE VISIT (OUTPATIENT)
Dept: ORTHOPEDIC SURGERY | Age: 60
End: 2025-05-05

## 2025-05-05 DIAGNOSIS — M87.051 AVASCULAR NECROSIS OF BONES OF BOTH HIPS (HCC): ICD-10-CM

## 2025-05-05 DIAGNOSIS — M16.11 PRIMARY OSTEOARTHRITIS OF RIGHT HIP: Primary | ICD-10-CM

## 2025-05-05 DIAGNOSIS — M87.052 AVASCULAR NECROSIS OF BONES OF BOTH HIPS (HCC): ICD-10-CM

## 2025-05-05 PROCEDURE — PREOPEXAM PRE-OP EXAM: Performed by: PHYSICIAN ASSISTANT

## 2025-05-05 RX ORDER — HYDROMORPHONE HYDROCHLORIDE 2 MG/1
2 TABLET ORAL EVERY 4 HOURS PRN
Qty: 30 TABLET | Refills: 0 | Status: SHIPPED | OUTPATIENT
Start: 2025-05-05 | End: 2025-05-08

## 2025-05-05 RX ORDER — ONDANSETRON 4 MG/1
4 TABLET, FILM COATED ORAL EVERY 8 HOURS PRN
Qty: 20 TABLET | Refills: 0 | Status: SHIPPED | OUTPATIENT
Start: 2025-05-05

## 2025-05-05 RX ORDER — METHOCARBAMOL 750 MG/1
750 TABLET, FILM COATED ORAL 4 TIMES DAILY PRN
Qty: 30 TABLET | Refills: 0 | Status: SHIPPED | OUTPATIENT
Start: 2025-05-05 | End: 2025-05-15

## 2025-05-05 NOTE — PROGRESS NOTES
Name: Gerardo Mancini  YOB: 1965  Gender: male  MRN: [id number]      Current Outpatient Medications:     amLODIPine (NORVASC) 10 MG tablet, Take 1 tablet by mouth daily, Disp: , Rfl:     MAGNESIUM-OXIDE 400 (240 Mg) MG tablet, Take 1 tablet by mouth twice daily, Disp: 180 tablet, Rfl: 3    lidocaine (LIDODERM) 5 %, Place 1 patch onto the skin daily 12 hours on, 12 hours off to right hip region (Patient not taking: Reported on 4/22/2025), Disp: 30 patch, Rfl: 5    empagliflozin (JARDIANCE) 10 MG tablet, Take 1 tablet by mouth daily, Disp: 30 tablet, Rfl: 2    allopurinol (ZYLOPRIM) 100 MG tablet, Take 2 tablets by mouth daily, Disp: 60 tablet, Rfl: 2    cloNIDine (CATAPRES) 0.2 MG tablet, Take 1 tablet by mouth 2 times daily, Disp: 60 tablet, Rfl: 2    labetalol (NORMODYNE) 100 MG tablet, Take 1 tablet by mouth 2 times daily, Disp: 60 tablet, Rfl: 2    methocarbamol (ROBAXIN) 750 MG tablet, Take 1 tablet by mouth daily as needed (muscle spasm), Disp: 30 tablet, Rfl: 2    rosuvastatin (CRESTOR) 10 MG tablet, Take 1 tablet by mouth daily (Patient taking differently: Take 1 tablet by mouth at bedtime), Disp: 30 tablet, Rfl: 2    vitamin D (ERGOCALCIFEROL) 1.25 MG (18009 UT) CAPS capsule, Take 1 capsule by mouth once a week (Patient taking differently: Take 1 capsule by mouth once a week Every Saturday), Disp: 12 capsule, Rfl: 0    apixaban (ELIQUIS) 5 MG TABS tablet, Take 1 tablet by mouth 2 times daily, Disp: 30 tablet, Rfl: 0    lisinopril-hydroCHLOROthiazide (PRINZIDE;ZESTORETIC) 20-12.5 MG per tablet, Take 2 tablets by mouth once daily, Disp: 60 tablet, Rfl: 2    HYDROcodone-acetaminophen (NORCO) 5-325 MG per tablet, TAKE ONE TABLET BY MOUTH EVERY 8 HOURS, Disp: , Rfl:     Spacer/Aero-Holding Chambers (AEROCHAMBER PLUS MOLLY-VU W/MASK) MISC, 1 Units by Does not apply route as needed (shortness of breath), Disp: 1 each, Rfl: 0  No Known Allergies    Pre-op  exam Right RENAY  CC:  right hip pain    History:

## 2025-05-05 NOTE — H&P
[facility]  History and Physical Exam    Patient ID:  Gerardo Mancini  [id number]    59 y.o.  1965    Today: May 5, 2025    Vitals Signs: Reviewed as noted in medical record.    Allergies: No Known Allergies    CC: Right hip pain    HPI:  Pt complains of right hip pain and difficulty ambulating.     Relevant PMH:   Past Medical History:   Diagnosis Date    AAA (abdominal aortic aneurysm)     Per pcp note (4/10/25) \"Pt had a CT of his chest on 2/13/22 noting: There is aneurysmal dilatation of the ascending aorta measuring up to 4 cm.   AAA Screening then performed on 10/27/23 stating no aneurysm present..\"    Allergic rhinitis due to animal (cat) (dog) hair and dander 11/19/14    Asthma     as a child    Chronic kidney disease     Chronic midline low back pain without sciatica     CKD (chronic kidney disease)     stage 3a, followed by nephrology    Diastolic CHF, chronic ([facility]) 6/1/2016    Echo (8/29/24) EF 55-60%, followed by Cardiology    DVT (deep venous thrombosis) ([facility]) 2021    right leg- currently on Eliquis    Dyslipidemia     managed with medication    History of gout     managed with medication    Hypertension     managed with medication    Nonrheumatic aortic valve regurgitation     Followed by Cardiology    ROXY on CPAP     Study done 4/17/2012; AHI 86.7, RDI 86.7 (same)    Osteoarthritis     Pulmonary embolism ([facility]) 02/2022    on Eliquis    Right hydrocele     Followed by Urology    Tinea unguium 2/5/2019    bilateral--all nails    Type 2 diabetes mellitus with stage 3a chronic kidney disease, without long-term current use of insulin ([facility])     Does not check BS, patient states \"prediabetic.\" Takes Jardiance. A1c 6.6 on 4/22/25    Vitamin D deficiency     weekly medication       Objective:                    HEENT: NC/AT                   Lungs:  clear                   Heart:   rrr                   Abdomen: soft                   Extremities:  Groin pain with rom of right hip

## 2025-05-12 ENCOUNTER — ANESTHESIA EVENT (OUTPATIENT)
Dept: SURGERY | Age: 60
End: 2025-05-12
Payer: MEDICAID

## 2025-05-13 ENCOUNTER — ANESTHESIA (OUTPATIENT)
Dept: SURGERY | Age: 60
End: 2025-05-13
Payer: MEDICAID

## 2025-05-13 ENCOUNTER — HOSPITAL ENCOUNTER (OUTPATIENT)
Age: 60
Discharge: HOME HEALTH | End: 2025-05-14
Attending: ORTHOPAEDIC SURGERY | Admitting: ORTHOPAEDIC SURGERY
Payer: MEDICAID

## 2025-05-13 ENCOUNTER — APPOINTMENT (OUTPATIENT)
Dept: GENERAL RADIOLOGY | Age: 60
End: 2025-05-13
Attending: ORTHOPAEDIC SURGERY
Payer: MEDICAID

## 2025-05-13 DIAGNOSIS — Z86.711 HISTORY OF PULMONARY EMBOLISM: ICD-10-CM

## 2025-05-13 DIAGNOSIS — M16.11 OSTEOARTHRITIS OF RIGHT HIP, UNSPECIFIED OSTEOARTHRITIS TYPE: Primary | ICD-10-CM

## 2025-05-13 LAB
GLUCOSE BLD STRIP.AUTO-MCNC: 135 MG/DL (ref 65–100)
GLUCOSE BLD STRIP.AUTO-MCNC: 149 MG/DL (ref 65–100)
SERVICE CMNT-IMP: ABNORMAL
SERVICE CMNT-IMP: ABNORMAL

## 2025-05-13 PROCEDURE — 82962 GLUCOSE BLOOD TEST: CPT

## 2025-05-13 PROCEDURE — 6360000002 HC RX W HCPCS: Performed by: PHYSICIAN ASSISTANT

## 2025-05-13 PROCEDURE — 6370000000 HC RX 637 (ALT 250 FOR IP): Performed by: ANESTHESIOLOGY

## 2025-05-13 PROCEDURE — 2720000010 HC SURG SUPPLY STERILE: Performed by: ORTHOPAEDIC SURGERY

## 2025-05-13 PROCEDURE — 3700000000 HC ANESTHESIA ATTENDED CARE: Performed by: ORTHOPAEDIC SURGERY

## 2025-05-13 PROCEDURE — 2500000003 HC RX 250 WO HCPCS: Performed by: PHYSICIAN ASSISTANT

## 2025-05-13 PROCEDURE — 2580000003 HC RX 258: Performed by: ANESTHESIOLOGY

## 2025-05-13 PROCEDURE — C1776 JOINT DEVICE (IMPLANTABLE): HCPCS | Performed by: ORTHOPAEDIC SURGERY

## 2025-05-13 PROCEDURE — C1713 ANCHOR/SCREW BN/BN,TIS/BN: HCPCS | Performed by: ORTHOPAEDIC SURGERY

## 2025-05-13 PROCEDURE — 6370000000 HC RX 637 (ALT 250 FOR IP): Performed by: ORTHOPAEDIC SURGERY

## 2025-05-13 PROCEDURE — 6370000000 HC RX 637 (ALT 250 FOR IP): Performed by: PHYSICIAN ASSISTANT

## 2025-05-13 PROCEDURE — 94761 N-INVAS EAR/PLS OXIMETRY MLT: CPT

## 2025-05-13 PROCEDURE — 3600000005 HC SURGERY LEVEL 5 BASE: Performed by: ORTHOPAEDIC SURGERY

## 2025-05-13 PROCEDURE — 97165 OT EVAL LOW COMPLEX 30 MIN: CPT

## 2025-05-13 PROCEDURE — 97530 THERAPEUTIC ACTIVITIES: CPT

## 2025-05-13 PROCEDURE — 72170 X-RAY EXAM OF PELVIS: CPT

## 2025-05-13 PROCEDURE — 2500000003 HC RX 250 WO HCPCS

## 2025-05-13 PROCEDURE — 27130 TOTAL HIP ARTHROPLASTY: CPT | Performed by: PHYSICIAN ASSISTANT

## 2025-05-13 PROCEDURE — 7100000000 HC PACU RECOVERY - FIRST 15 MIN: Performed by: ORTHOPAEDIC SURGERY

## 2025-05-13 PROCEDURE — 97535 SELF CARE MNGMENT TRAINING: CPT

## 2025-05-13 PROCEDURE — 2500000003 HC RX 250 WO HCPCS: Performed by: ORTHOPAEDIC SURGERY

## 2025-05-13 PROCEDURE — 2709999900 HC NON-CHARGEABLE SUPPLY: Performed by: ORTHOPAEDIC SURGERY

## 2025-05-13 PROCEDURE — 27130 TOTAL HIP ARTHROPLASTY: CPT | Performed by: ORTHOPAEDIC SURGERY

## 2025-05-13 PROCEDURE — 7100000001 HC PACU RECOVERY - ADDTL 15 MIN: Performed by: ORTHOPAEDIC SURGERY

## 2025-05-13 PROCEDURE — 6360000002 HC RX W HCPCS: Performed by: ORTHOPAEDIC SURGERY

## 2025-05-13 PROCEDURE — 2500000003 HC RX 250 WO HCPCS: Performed by: NURSE ANESTHETIST, CERTIFIED REGISTERED

## 2025-05-13 PROCEDURE — 6360000002 HC RX W HCPCS: Performed by: ANESTHESIOLOGY

## 2025-05-13 PROCEDURE — 3600000015 HC SURGERY LEVEL 5 ADDTL 15MIN: Performed by: ORTHOPAEDIC SURGERY

## 2025-05-13 PROCEDURE — 2700000000 HC OXYGEN THERAPY PER DAY

## 2025-05-13 PROCEDURE — 6360000002 HC RX W HCPCS

## 2025-05-13 PROCEDURE — 3700000001 HC ADD 15 MINUTES (ANESTHESIA): Performed by: ORTHOPAEDIC SURGERY

## 2025-05-13 PROCEDURE — 97161 PT EVAL LOW COMPLEX 20 MIN: CPT

## 2025-05-13 DEVICE — STEM FEM SZ 6 L107MM 12/14 TAPR STD OFFSET HIP DUOFIX CLLRD: Type: IMPLANTABLE DEVICE | Site: HIP | Status: FUNCTIONAL

## 2025-05-13 DEVICE — IMPLANTABLE DEVICE: Type: IMPLANTABLE DEVICE | Site: HIP | Status: FUNCTIONAL

## 2025-05-13 DEVICE — SHELL ACET CMNTLS 58 MM 3 HOLE STRL EMPHASYS LTX: Type: IMPLANTABLE DEVICE | Site: HIP | Status: FUNCTIONAL

## 2025-05-13 DEVICE — SCREW BNE L25MM DIA6.5MM CANC HIP S STL GRIPTION FULL THRD: Type: IMPLANTABLE DEVICE | Site: HIP | Status: FUNCTIONAL

## 2025-05-13 DEVICE — HIP H2 TOT ADV OTHER HD IMPL CAPPED SYNTHES: Type: IMPLANTABLE DEVICE | Site: HIP | Status: FUNCTIONAL

## 2025-05-13 RX ORDER — NALOXONE HYDROCHLORIDE 0.4 MG/ML
INJECTION, SOLUTION INTRAMUSCULAR; INTRAVENOUS; SUBCUTANEOUS PRN
Status: DISCONTINUED | OUTPATIENT
Start: 2025-05-13 | End: 2025-05-13 | Stop reason: HOSPADM

## 2025-05-13 RX ORDER — METHOCARBAMOL 750 MG/1
750 TABLET, FILM COATED ORAL DAILY PRN
Status: DISCONTINUED | OUTPATIENT
Start: 2025-05-13 | End: 2025-05-13

## 2025-05-13 RX ORDER — FENTANYL CITRATE 50 UG/ML
INJECTION, SOLUTION INTRAMUSCULAR; INTRAVENOUS
Status: DISCONTINUED | OUTPATIENT
Start: 2025-05-13 | End: 2025-05-13 | Stop reason: SDUPTHER

## 2025-05-13 RX ORDER — SODIUM CHLORIDE 0.9 % (FLUSH) 0.9 %
5-40 SYRINGE (ML) INJECTION EVERY 12 HOURS SCHEDULED
Status: DISCONTINUED | OUTPATIENT
Start: 2025-05-13 | End: 2025-05-14 | Stop reason: HOSPADM

## 2025-05-13 RX ORDER — KETAMINE HCL IN NACL, ISO-OSM 20 MG/2 ML
SYRINGE (ML) INJECTION
Status: DISCONTINUED | OUTPATIENT
Start: 2025-05-13 | End: 2025-05-13 | Stop reason: SDUPTHER

## 2025-05-13 RX ORDER — IBUPROFEN 600 MG/1
1 TABLET ORAL PRN
Status: DISCONTINUED | OUTPATIENT
Start: 2025-05-13 | End: 2025-05-13 | Stop reason: HOSPADM

## 2025-05-13 RX ORDER — SODIUM CHLORIDE 0.9 % (FLUSH) 0.9 %
5-40 SYRINGE (ML) INJECTION PRN
Status: DISCONTINUED | OUTPATIENT
Start: 2025-05-13 | End: 2025-05-13 | Stop reason: HOSPADM

## 2025-05-13 RX ORDER — LANOLIN ALCOHOL/MO/W.PET/CERES
400 CREAM (GRAM) TOPICAL 2 TIMES DAILY
Status: DISCONTINUED | OUTPATIENT
Start: 2025-05-13 | End: 2025-05-14 | Stop reason: HOSPADM

## 2025-05-13 RX ORDER — SODIUM CHLORIDE 9 MG/ML
INJECTION, SOLUTION INTRAVENOUS PRN
Status: DISCONTINUED | OUTPATIENT
Start: 2025-05-13 | End: 2025-05-13 | Stop reason: HOSPADM

## 2025-05-13 RX ORDER — OXYCODONE HYDROCHLORIDE 5 MG/1
10 TABLET ORAL EVERY 4 HOURS PRN
Status: DISCONTINUED | OUTPATIENT
Start: 2025-05-13 | End: 2025-05-14

## 2025-05-13 RX ORDER — ASPIRIN 81 MG/1
81 TABLET ORAL 2 TIMES DAILY
Status: DISCONTINUED | OUTPATIENT
Start: 2025-05-13 | End: 2025-05-14 | Stop reason: HOSPADM

## 2025-05-13 RX ORDER — SODIUM CHLORIDE 0.9 % (FLUSH) 0.9 %
5-40 SYRINGE (ML) INJECTION PRN
Status: DISCONTINUED | OUTPATIENT
Start: 2025-05-13 | End: 2025-05-14 | Stop reason: HOSPADM

## 2025-05-13 RX ORDER — ONDANSETRON 2 MG/ML
4 INJECTION INTRAMUSCULAR; INTRAVENOUS EVERY 6 HOURS PRN
Status: DISCONTINUED | OUTPATIENT
Start: 2025-05-13 | End: 2025-05-14 | Stop reason: HOSPADM

## 2025-05-13 RX ORDER — DEXAMETHASONE SODIUM PHOSPHATE 10 MG/ML
INJECTION, SOLUTION INTRA-ARTICULAR; INTRALESIONAL; INTRAMUSCULAR; INTRAVENOUS; SOFT TISSUE
Status: DISCONTINUED | OUTPATIENT
Start: 2025-05-13 | End: 2025-05-13 | Stop reason: SDUPTHER

## 2025-05-13 RX ORDER — HYDROMORPHONE HYDROCHLORIDE 1 MG/ML
0.5 INJECTION, SOLUTION INTRAMUSCULAR; INTRAVENOUS; SUBCUTANEOUS
Status: DISCONTINUED | OUTPATIENT
Start: 2025-05-13 | End: 2025-05-13 | Stop reason: SDUPTHER

## 2025-05-13 RX ORDER — LIDOCAINE HYDROCHLORIDE 10 MG/ML
1 INJECTION, SOLUTION INFILTRATION; PERINEURAL
Status: DISCONTINUED | OUTPATIENT
Start: 2025-05-13 | End: 2025-05-13 | Stop reason: HOSPADM

## 2025-05-13 RX ORDER — HYDROCHLOROTHIAZIDE 25 MG/1
25 TABLET ORAL DAILY
Status: DISCONTINUED | OUTPATIENT
Start: 2025-05-13 | End: 2025-05-14 | Stop reason: HOSPADM

## 2025-05-13 RX ORDER — ACETAMINOPHEN 500 MG
1000 TABLET ORAL ONCE
Status: COMPLETED | OUTPATIENT
Start: 2025-05-13 | End: 2025-05-13

## 2025-05-13 RX ORDER — DEXTROSE MONOHYDRATE 100 MG/ML
INJECTION, SOLUTION INTRAVENOUS CONTINUOUS PRN
Status: DISCONTINUED | OUTPATIENT
Start: 2025-05-13 | End: 2025-05-13 | Stop reason: HOSPADM

## 2025-05-13 RX ORDER — NEOSTIGMINE METHYLSULFATE 1 MG/ML
INJECTION, SOLUTION INTRAVENOUS
Status: DISCONTINUED | OUTPATIENT
Start: 2025-05-13 | End: 2025-05-13 | Stop reason: SDUPTHER

## 2025-05-13 RX ORDER — SODIUM CHLORIDE, SODIUM LACTATE, POTASSIUM CHLORIDE, CALCIUM CHLORIDE 600; 310; 30; 20 MG/100ML; MG/100ML; MG/100ML; MG/100ML
INJECTION, SOLUTION INTRAVENOUS CONTINUOUS
Status: DISCONTINUED | OUTPATIENT
Start: 2025-05-13 | End: 2025-05-13 | Stop reason: HOSPADM

## 2025-05-13 RX ORDER — ONDANSETRON 2 MG/ML
INJECTION INTRAMUSCULAR; INTRAVENOUS
Status: DISCONTINUED | OUTPATIENT
Start: 2025-05-13 | End: 2025-05-13 | Stop reason: SDUPTHER

## 2025-05-13 RX ORDER — GLYCOPYRROLATE 0.2 MG/ML
INJECTION INTRAMUSCULAR; INTRAVENOUS
Status: DISCONTINUED | OUTPATIENT
Start: 2025-05-13 | End: 2025-05-13 | Stop reason: SDUPTHER

## 2025-05-13 RX ORDER — ROPIVACAINE HYDROCHLORIDE 2 MG/ML
INJECTION, SOLUTION EPIDURAL; INFILTRATION; PERINEURAL PRN
Status: DISCONTINUED | OUTPATIENT
Start: 2025-05-13 | End: 2025-05-13 | Stop reason: ALTCHOICE

## 2025-05-13 RX ORDER — SODIUM CHLORIDE 9 MG/ML
INJECTION, SOLUTION INTRAVENOUS CONTINUOUS
Status: DISCONTINUED | OUTPATIENT
Start: 2025-05-13 | End: 2025-05-14 | Stop reason: HOSPADM

## 2025-05-13 RX ORDER — OXYCODONE HYDROCHLORIDE 5 MG/1
5 TABLET ORAL
Status: COMPLETED | OUTPATIENT
Start: 2025-05-13 | End: 2025-05-13

## 2025-05-13 RX ORDER — SODIUM CHLORIDE 0.9 % (FLUSH) 0.9 %
5-40 SYRINGE (ML) INJECTION EVERY 12 HOURS SCHEDULED
Status: DISCONTINUED | OUTPATIENT
Start: 2025-05-13 | End: 2025-05-13 | Stop reason: HOSPADM

## 2025-05-13 RX ORDER — MIDAZOLAM HYDROCHLORIDE 1 MG/ML
INJECTION, SOLUTION INTRAMUSCULAR; INTRAVENOUS
Status: DISCONTINUED | OUTPATIENT
Start: 2025-05-13 | End: 2025-05-13 | Stop reason: SDUPTHER

## 2025-05-13 RX ORDER — LIDOCAINE HYDROCHLORIDE 20 MG/ML
INJECTION, SOLUTION EPIDURAL; INFILTRATION; INTRACAUDAL; PERINEURAL
Status: DISCONTINUED | OUTPATIENT
Start: 2025-05-13 | End: 2025-05-13 | Stop reason: SDUPTHER

## 2025-05-13 RX ORDER — PROPOFOL 10 MG/ML
INJECTION, EMULSION INTRAVENOUS
Status: DISCONTINUED | OUTPATIENT
Start: 2025-05-13 | End: 2025-05-13 | Stop reason: SDUPTHER

## 2025-05-13 RX ORDER — DIPHENHYDRAMINE HYDROCHLORIDE 50 MG/ML
12.5 INJECTION, SOLUTION INTRAMUSCULAR; INTRAVENOUS
Status: DISCONTINUED | OUTPATIENT
Start: 2025-05-13 | End: 2025-05-13 | Stop reason: HOSPADM

## 2025-05-13 RX ORDER — ACETAMINOPHEN 325 MG/1
650 TABLET ORAL EVERY 6 HOURS
Status: DISCONTINUED | OUTPATIENT
Start: 2025-05-13 | End: 2025-05-14 | Stop reason: HOSPADM

## 2025-05-13 RX ORDER — METHOCARBAMOL 750 MG/1
750 TABLET, FILM COATED ORAL 3 TIMES DAILY PRN
Status: DISCONTINUED | OUTPATIENT
Start: 2025-05-13 | End: 2025-05-14 | Stop reason: HOSPADM

## 2025-05-13 RX ORDER — VANCOMYCIN HYDROCHLORIDE 1 G/20ML
INJECTION, POWDER, LYOPHILIZED, FOR SOLUTION INTRAVENOUS PRN
Status: DISCONTINUED | OUTPATIENT
Start: 2025-05-13 | End: 2025-05-13 | Stop reason: ALTCHOICE

## 2025-05-13 RX ORDER — POLYETHYLENE GLYCOL 3350 17 G/17G
17 POWDER, FOR SOLUTION ORAL DAILY PRN
Status: DISCONTINUED | OUTPATIENT
Start: 2025-05-13 | End: 2025-05-14 | Stop reason: HOSPADM

## 2025-05-13 RX ORDER — HYDROMORPHONE HYDROCHLORIDE 1 MG/ML
1 INJECTION, SOLUTION INTRAMUSCULAR; INTRAVENOUS; SUBCUTANEOUS EVERY 4 HOURS PRN
Status: DISCONTINUED | OUTPATIENT
Start: 2025-05-13 | End: 2025-05-14 | Stop reason: HOSPADM

## 2025-05-13 RX ORDER — SENNA AND DOCUSATE SODIUM 50; 8.6 MG/1; MG/1
1 TABLET, FILM COATED ORAL 2 TIMES DAILY
Status: DISCONTINUED | OUTPATIENT
Start: 2025-05-13 | End: 2025-05-14 | Stop reason: HOSPADM

## 2025-05-13 RX ORDER — HYDROMORPHONE HYDROCHLORIDE 2 MG/ML
INJECTION, SOLUTION INTRAMUSCULAR; INTRAVENOUS; SUBCUTANEOUS
Status: DISCONTINUED | OUTPATIENT
Start: 2025-05-13 | End: 2025-05-13 | Stop reason: SDUPTHER

## 2025-05-13 RX ORDER — LISINOPRIL 20 MG/1
40 TABLET ORAL DAILY
Status: DISCONTINUED | OUTPATIENT
Start: 2025-05-13 | End: 2025-05-14 | Stop reason: HOSPADM

## 2025-05-13 RX ORDER — EPHEDRINE SULFATE/0.9% NACL/PF 50 MG/5 ML
SYRINGE (ML) INTRAVENOUS
Status: DISCONTINUED | OUTPATIENT
Start: 2025-05-13 | End: 2025-05-13 | Stop reason: SDUPTHER

## 2025-05-13 RX ORDER — ALLOPURINOL 100 MG/1
200 TABLET ORAL DAILY
Status: DISCONTINUED | OUTPATIENT
Start: 2025-05-14 | End: 2025-05-14 | Stop reason: HOSPADM

## 2025-05-13 RX ORDER — MIDAZOLAM HYDROCHLORIDE 2 MG/2ML
2 INJECTION, SOLUTION INTRAMUSCULAR; INTRAVENOUS
Status: DISCONTINUED | OUTPATIENT
Start: 2025-05-13 | End: 2025-05-13 | Stop reason: HOSPADM

## 2025-05-13 RX ORDER — ONDANSETRON 2 MG/ML
4 INJECTION INTRAMUSCULAR; INTRAVENOUS
Status: DISCONTINUED | OUTPATIENT
Start: 2025-05-13 | End: 2025-05-13 | Stop reason: HOSPADM

## 2025-05-13 RX ORDER — PROCHLORPERAZINE EDISYLATE 5 MG/ML
5 INJECTION INTRAMUSCULAR; INTRAVENOUS
Status: DISCONTINUED | OUTPATIENT
Start: 2025-05-13 | End: 2025-05-13 | Stop reason: HOSPADM

## 2025-05-13 RX ORDER — AMLODIPINE BESYLATE 10 MG/1
10 TABLET ORAL DAILY
Status: DISCONTINUED | OUTPATIENT
Start: 2025-05-14 | End: 2025-05-14 | Stop reason: HOSPADM

## 2025-05-13 RX ORDER — FENTANYL CITRATE 50 UG/ML
25 INJECTION, SOLUTION INTRAMUSCULAR; INTRAVENOUS EVERY 5 MIN PRN
Status: DISCONTINUED | OUTPATIENT
Start: 2025-05-13 | End: 2025-05-13 | Stop reason: HOSPADM

## 2025-05-13 RX ORDER — LISINOPRIL AND HYDROCHLOROTHIAZIDE 12.5; 2 MG/1; MG/1
2 TABLET ORAL DAILY
Status: DISCONTINUED | OUTPATIENT
Start: 2025-05-13 | End: 2025-05-13 | Stop reason: SDUPTHER

## 2025-05-13 RX ORDER — ONDANSETRON 4 MG/1
4 TABLET, ORALLY DISINTEGRATING ORAL EVERY 8 HOURS PRN
Status: DISCONTINUED | OUTPATIENT
Start: 2025-05-13 | End: 2025-05-14 | Stop reason: HOSPADM

## 2025-05-13 RX ORDER — CLONIDINE HYDROCHLORIDE 0.1 MG/1
0.2 TABLET ORAL 2 TIMES DAILY
Status: DISCONTINUED | OUTPATIENT
Start: 2025-05-13 | End: 2025-05-14 | Stop reason: HOSPADM

## 2025-05-13 RX ORDER — SODIUM CHLORIDE 9 MG/ML
INJECTION, SOLUTION INTRAVENOUS PRN
Status: DISCONTINUED | OUTPATIENT
Start: 2025-05-13 | End: 2025-05-14 | Stop reason: HOSPADM

## 2025-05-13 RX ORDER — HYDROMORPHONE HYDROCHLORIDE 2 MG/1
1 TABLET ORAL EVERY 4 HOURS PRN
Refills: 0 | Status: DISCONTINUED | OUTPATIENT
Start: 2025-05-13 | End: 2025-05-13

## 2025-05-13 RX ORDER — ROCURONIUM BROMIDE 10 MG/ML
INJECTION, SOLUTION INTRAVENOUS
Status: DISCONTINUED | OUTPATIENT
Start: 2025-05-13 | End: 2025-05-13 | Stop reason: SDUPTHER

## 2025-05-13 RX ORDER — LABETALOL 100 MG/1
100 TABLET, FILM COATED ORAL 2 TIMES DAILY
Status: DISCONTINUED | OUTPATIENT
Start: 2025-05-13 | End: 2025-05-14 | Stop reason: HOSPADM

## 2025-05-13 RX ADMIN — HYDROMORPHONE HYDROCHLORIDE 0.5 MG: 1 INJECTION, SOLUTION INTRAMUSCULAR; INTRAVENOUS; SUBCUTANEOUS at 12:12

## 2025-05-13 RX ADMIN — Medication 3 MG: at 11:03

## 2025-05-13 RX ADMIN — SODIUM CHLORIDE, PRESERVATIVE FREE 10 ML: 5 INJECTION INTRAVENOUS at 20:37

## 2025-05-13 RX ADMIN — HYDROMORPHONE HYDROCHLORIDE 0.5 MG: 1 INJECTION, SOLUTION INTRAMUSCULAR; INTRAVENOUS; SUBCUTANEOUS at 11:58

## 2025-05-13 RX ADMIN — HYDROCHLOROTHIAZIDE 25 MG: 25 TABLET ORAL at 15:07

## 2025-05-13 RX ADMIN — Medication 1000 MG: at 09:40

## 2025-05-13 RX ADMIN — OXYCODONE HYDROCHLORIDE 10 MG: 5 TABLET ORAL at 20:34

## 2025-05-13 RX ADMIN — Medication 3000 MG: at 09:40

## 2025-05-13 RX ADMIN — ONDANSETRON 4 MG: 2 INJECTION INTRAMUSCULAR; INTRAVENOUS at 10:56

## 2025-05-13 RX ADMIN — GLYCOPYRROLATE 0.4 MG: 0.2 INJECTION INTRAMUSCULAR; INTRAVENOUS at 11:03

## 2025-05-13 RX ADMIN — Medication 20 MG: at 09:28

## 2025-05-13 RX ADMIN — ACETAMINOPHEN 650 MG: 325 TABLET, FILM COATED ORAL at 22:53

## 2025-05-13 RX ADMIN — PROPOFOL 150 MG: 10 INJECTION, EMULSION INTRAVENOUS at 09:28

## 2025-05-13 RX ADMIN — LABETALOL HYDROCHLORIDE 100 MG: 100 TABLET, FILM COATED ORAL at 20:34

## 2025-05-13 RX ADMIN — MIDAZOLAM 2 MG: 1 INJECTION INTRAMUSCULAR; INTRAVENOUS at 09:23

## 2025-05-13 RX ADMIN — ACETAMINOPHEN 1000 MG: 500 TABLET, FILM COATED ORAL at 08:01

## 2025-05-13 RX ADMIN — OXYCODONE HYDROCHLORIDE 5 MG: 5 TABLET ORAL at 12:28

## 2025-05-13 RX ADMIN — LISINOPRIL 40 MG: 20 TABLET ORAL at 15:07

## 2025-05-13 RX ADMIN — SODIUM CHLORIDE, SODIUM LACTATE, POTASSIUM CHLORIDE, AND CALCIUM CHLORIDE: 600; 310; 30; 20 INJECTION, SOLUTION INTRAVENOUS at 10:02

## 2025-05-13 RX ADMIN — METHOCARBAMOL 750 MG: 750 TABLET ORAL at 22:53

## 2025-05-13 RX ADMIN — Medication 10 MG: at 11:18

## 2025-05-13 RX ADMIN — Medication 400 MG: at 20:34

## 2025-05-13 RX ADMIN — ROCURONIUM BROMIDE 50 MG: 10 INJECTION, SOLUTION INTRAVENOUS at 09:28

## 2025-05-13 RX ADMIN — SODIUM CHLORIDE, SODIUM LACTATE, POTASSIUM CHLORIDE, AND CALCIUM CHLORIDE: 600; 310; 30; 20 INJECTION, SOLUTION INTRAVENOUS at 08:11

## 2025-05-13 RX ADMIN — Medication 1000 MG: at 10:56

## 2025-05-13 RX ADMIN — FENTANYL CITRATE 100 MCG: 50 INJECTION, SOLUTION INTRAMUSCULAR; INTRAVENOUS at 09:25

## 2025-05-13 RX ADMIN — CEFAZOLIN 3000 MG: 10 INJECTION, POWDER, FOR SOLUTION INTRAVENOUS at 17:47

## 2025-05-13 RX ADMIN — OXYCODONE HYDROCHLORIDE 10 MG: 5 TABLET ORAL at 15:47

## 2025-05-13 RX ADMIN — HYDROMORPHONE HYDROCHLORIDE 1 MG: 1 INJECTION, SOLUTION INTRAMUSCULAR; INTRAVENOUS; SUBCUTANEOUS at 14:03

## 2025-05-13 RX ADMIN — DEXAMETHASONE SODIUM PHOSPHATE 8 MG: 10 INJECTION INTRAMUSCULAR; INTRAVENOUS at 09:42

## 2025-05-13 RX ADMIN — HYDROMORPHONE HYDROCHLORIDE 0.5 MG: 1 INJECTION, SOLUTION INTRAMUSCULAR; INTRAVENOUS; SUBCUTANEOUS at 11:50

## 2025-05-13 RX ADMIN — SENNOSIDES, DOCUSATE SODIUM 1 TABLET: 50; 8.6 TABLET, FILM COATED ORAL at 20:34

## 2025-05-13 RX ADMIN — HYDROMORPHONE HYDROCHLORIDE 1 MG: 2 INJECTION INTRAMUSCULAR; INTRAVENOUS; SUBCUTANEOUS at 10:07

## 2025-05-13 RX ADMIN — LIDOCAINE HYDROCHLORIDE 100 MG: 20 INJECTION, SOLUTION EPIDURAL; INFILTRATION; INTRACAUDAL; PERINEURAL at 09:28

## 2025-05-13 RX ADMIN — ACETAMINOPHEN 650 MG: 325 TABLET, FILM COATED ORAL at 17:47

## 2025-05-13 RX ADMIN — PROPOFOL 50 MG: 10 INJECTION, EMULSION INTRAVENOUS at 09:34

## 2025-05-13 RX ADMIN — METHOCARBAMOL 750 MG: 750 TABLET ORAL at 14:05

## 2025-05-13 RX ADMIN — CLONIDINE HYDROCHLORIDE 0.2 MG: 0.1 TABLET ORAL at 20:34

## 2025-05-13 RX ADMIN — Medication 400 MG: at 15:07

## 2025-05-13 RX ADMIN — ASPIRIN 81 MG: 81 TABLET, COATED ORAL at 20:34

## 2025-05-13 RX ADMIN — Medication 20 MG: at 10:02

## 2025-05-13 ASSESSMENT — HOOS JR
HOOS JR RAW SCORE: 8
BENDING TO THE FLOOR TO PICK UP OBJECT: MILD
HOOS JR TOTAL INTERVAL SCORE: 64.664
WALKING ON UNEVEN SURFACE: MILD
HOOS JR RAW SCORE: 8
GOING UP OR DOWN STAIRS: MILD
LYING IN BED (TURNING OVER, MAINTAINING HIP POSITION): MODERATE
SITTING: MODERATE
RISING FROM SITTING: MILD

## 2025-05-13 ASSESSMENT — PAIN DESCRIPTION - ORIENTATION
ORIENTATION: RIGHT

## 2025-05-13 ASSESSMENT — PAIN DESCRIPTION - LOCATION
LOCATION: HIP

## 2025-05-13 ASSESSMENT — PAIN DESCRIPTION - FREQUENCY
FREQUENCY: CONTINUOUS

## 2025-05-13 ASSESSMENT — PAIN DESCRIPTION - ONSET
ONSET: ON-GOING

## 2025-05-13 ASSESSMENT — PAIN SCALES - GENERAL
PAINLEVEL_OUTOF10: 2
PAINLEVEL_OUTOF10: 9
PAINLEVEL_OUTOF10: 6
PAINLEVEL_OUTOF10: 4
PAINLEVEL_OUTOF10: 3
PAINLEVEL_OUTOF10: 8
PAINLEVEL_OUTOF10: 9
PAINLEVEL_OUTOF10: 10
PAINLEVEL_OUTOF10: 6
PAINLEVEL_OUTOF10: 6
PAINLEVEL_OUTOF10: 8
PAINLEVEL_OUTOF10: 6
PAINLEVEL_OUTOF10: 9
PAINLEVEL_OUTOF10: 5
PAINLEVEL_OUTOF10: 6

## 2025-05-13 ASSESSMENT — PAIN DESCRIPTION - DESCRIPTORS
DESCRIPTORS: BURNING
DESCRIPTORS: ACHING
DESCRIPTORS: ACHING
DESCRIPTORS: ACHING;SORE;THROBBING
DESCRIPTORS: ACHING;SORE
DESCRIPTORS: ACHING
DESCRIPTORS: BURNING
DESCRIPTORS: ACHING;SORE
DESCRIPTORS: BURNING
DESCRIPTORS: SPASM

## 2025-05-13 ASSESSMENT — PAIN DESCRIPTION - PAIN TYPE
TYPE: SURGICAL PAIN

## 2025-05-13 ASSESSMENT — PAIN - FUNCTIONAL ASSESSMENT
PAIN_FUNCTIONAL_ASSESSMENT: 0-10
PAIN_FUNCTIONAL_ASSESSMENT: PREVENTS OR INTERFERES SOME ACTIVE ACTIVITIES AND ADLS
PAIN_FUNCTIONAL_ASSESSMENT: PREVENTS OR INTERFERES SOME ACTIVE ACTIVITIES AND ADLS

## 2025-05-13 NOTE — CARE COORDINATION
CM note:    Chart reviewed for updates. CM met with pt at bedside, introduced role, confirmed demographics and discussed d/c planning. Patient is a 59 y.o. year old male admitted for Right RENAY . Patient plans to return home on discharge. Order received to arrange home health. Patient without preference towards agency. Referral sent to [facility] by [facility]. Patient  has a cane and CM was able to give him a loaner walker from rehab. Patient requesting we arrange a walker. Pt without preference towards provider. Referral sent to [facility]. Equipment delivered to the hospital room prior to discharge. Pt lives with sig other in a house. His new address is : [address] [place] [address]. He is insured and has a PCP. Plan is for pt to discharge home tomorrow. Sig other will provide transport. No further CM needs.     05/13/25 9613   Service Assessment   Patient Orientation Alert and Oriented   Cognition Alert   History Provided By Patient;Significant Other   Primary Caregiver Self   Accompanied By/Relationship Sig other   Support Systems Spouse/Significant Other   Patient's Healthcare Decision Maker is: Legal Next of Kin  (Sister)   PCP Verified by CM Yes   Last Visit to PCP Within last year   Prior Functional Level Independent in ADLs/IADLs   Current Functional Level Independent in ADLs/IADLs   Financial Resources Medicaid   Social/Functional History   Home Equipment Cane   Prior Level of Assist for ADLs Independent   Ambulation Assistance Independent   Occupation On disability   Discharge Planning   Type of Residence House   Living Arrangements Alone   Current Services Prior To Admission Durable Medical Equipment   Current DME Prior to Arrival Cane   Potential Assistance Needed Durable Medical Equipment   Potential DME Needed Bedside Commode   DME Ordered? Bedside commode   Type of Home Care Services PT   Services At/After Discharge   Transition of Care Consult (CM Consult) Home

## 2025-05-13 NOTE — PERIOP NOTE
Latest Reference Range & Units 05/13/25 11:47   POC Glucose 65 - 100 mg/dL 149 (H)   (H): Data is abnormally high

## 2025-05-13 NOTE — PERIOP NOTE
TRANSFER - OUT REPORT:    Verbal report given to Lina Villarreal RN on Gerardo Mancini  being transferred to [facility] for routine progression of patient care       Report consisted of patient’s Situation, Background, Assessment and   Recommendations(SBAR).     Information from the following report(s) Nurse Handoff Report, Intake/Output, MAR, and Cardiac Rhythm NSR  was reviewed with the receiving nurse.    Lines:   Peripheral IV 05/13/25 Left;Posterior Hand (Active)   Site Assessment Clean, dry & intact 05/13/25 1135   Line Status Infusing 05/13/25 1135   Line Care Connections checked and tightened 05/13/25 1135   Phlebitis Assessment No symptoms 05/13/25 1135   Infiltration Assessment 0 05/13/25 1135   Dressing Status Clean, dry & intact 05/13/25 1135   Dressing Type Transparent 05/13/25 1135        Opportunity for questions and clarification was provided.      Patient transported with:   O2 @ 3 liters        Patient and family given floor number and nurses name.  Family updated re: pt status after security code verified.

## 2025-05-13 NOTE — ANESTHESIA POSTPROCEDURE EVALUATION
Department of Anesthesiology  Postprocedure Note    Patient: Gerardo Mancini  MRN: [id number]  YOB: 1965  Date of evaluation: 5/13/2025    Procedure Summary       Date: 05/13/25 Room / Location: [facility] MAIN OR 06 / [facility] MAIN OR    Anesthesia Start: 0916 Anesthesia Stop: 1138    Procedure: HIP TOTAL ARTHROPLASTY-Right (Right: Hip) Diagnosis:       Osteoarthritis of right hip      (Osteoarthritis of right hip [M16.11])    Surgeons: Nico Blake MD Responsible Provider: Ashlie Hudson MD    Anesthesia Type: general ASA Status: 3            Anesthesia Type: No value filed.    Brennen Phase I: Brennen Score: 8    Brennen Phase II:      Anesthesia Post Evaluation    Patient location during evaluation: PACU  Patient participation: complete - patient participated  Level of consciousness: awake and alert  Pain scale: pain adequately controlled.  Airway patency: patent  Nausea & Vomiting: no nausea and no vomiting  Cardiovascular status: hemodynamically stable  Respiratory status: acceptable  Hydration status: euvolemic  Multimodal analgesia pain management approach  Pain management: adequate    No notable events documented.

## 2025-05-13 NOTE — OP NOTE
.Total Hip Procedure Note               Gerardo Mancnii, [id number], 1965    Pre-operative Diagnosis: Hip Arthritis Osteoarthritis of right hip [M16.11]       Post-operative Diagnosis: Same     Procedure: Total Hip Arthroplasty     BMI: Body mass index is 41.26 kg/m²..      Location: [facility]      Surgeon: Nico Blake MD      Assistant: PATRICK Coronado    Anesthesia GET  BMI:Body mass index is 41.26 kg/m².      EBL: 200 cc     Procedure: Total Hip Arthroplasty    The complexity of the total joint surgery requires the use of a first assistant for positioning, retraction and assistance in closure.  PATRICK Quispe was this assistant.    This BMI for this patient is Body mass index is 41.26 kg/m².. BMI's between 30 and 38.9 are considered obese, while a BMI over 39 indicates the patient is morbidly obese. Obese and Morbidly obese patients make exposure and retraction extremely difficult. The large exposure and contracted tissues requyired 60% more OR time and double the blood loss.   The patient's size and surgical complexity makes implantation and proper insertion of total joint implants more difficult requiring a skilled first assistant Gerardo Mancini was brought to the operating room and positioned on the operating table. Anesthesia was administered.  IV antibiotics were administered, along with IV transexamic acid. A time out identifying the patient, procedure, operative side and surgeon was administered and charted by the OR Nurse.      Gerardo Mancini was positioned in the lateral decubitus position. The limb was prepped and draped in the usual sterile fashion. The Posterior approach was utilized to expose the hip. The incision was carried through the subcutaneous tissue and underlying fascia with hemostasis obtained using the bovie cauterization. A Charmley retractor was inserted. The short external rotators were divided at their insertion. The sciatic nerve was palpated and identified. Next,

## 2025-05-13 NOTE — H&P
Levothyroxine 100mcg qam   The patient has end stage arthritis of the right hip. The patient was see and examined and there are no changes to the patient's orthopedic condition. They have tried conservative treatment for this condition; including antiinflammatories and lifestyle modifications and have failed. The necessity for the joint replacement is still present, and the H&P from the office is still current. The patient is admitted today forProcedure(s) (LRB):  HIP TOTAL ARTHROPLASTY-Right (Right) .

## 2025-05-14 VITALS
HEIGHT: 73 IN | SYSTOLIC BLOOD PRESSURE: 137 MMHG | WEIGHT: 312.7 LBS | OXYGEN SATURATION: 100 % | RESPIRATION RATE: 17 BRPM | DIASTOLIC BLOOD PRESSURE: 89 MMHG | HEART RATE: 90 BPM | BODY MASS INDEX: 41.44 KG/M2 | TEMPERATURE: 99.3 F

## 2025-05-14 PROCEDURE — 6360000002 HC RX W HCPCS: Performed by: PHYSICIAN ASSISTANT

## 2025-05-14 PROCEDURE — 6370000000 HC RX 637 (ALT 250 FOR IP): Performed by: ORTHOPAEDIC SURGERY

## 2025-05-14 PROCEDURE — 2500000003 HC RX 250 WO HCPCS: Performed by: PHYSICIAN ASSISTANT

## 2025-05-14 PROCEDURE — 97535 SELF CARE MNGMENT TRAINING: CPT

## 2025-05-14 PROCEDURE — 6370000000 HC RX 637 (ALT 250 FOR IP): Performed by: PHYSICIAN ASSISTANT

## 2025-05-14 PROCEDURE — 97530 THERAPEUTIC ACTIVITIES: CPT

## 2025-05-14 RX ORDER — ASPIRIN 81 MG/1
81 TABLET ORAL 2 TIMES DAILY
Qty: 6 TABLET | Refills: 0
Start: 2025-05-14 | End: 2025-05-17

## 2025-05-14 RX ORDER — HYDROMORPHONE HYDROCHLORIDE 2 MG/1
2 TABLET ORAL EVERY 4 HOURS PRN
Refills: 0 | Status: DISCONTINUED | OUTPATIENT
Start: 2025-05-14 | End: 2025-05-14 | Stop reason: HOSPADM

## 2025-05-14 RX ORDER — HYDROMORPHONE HYDROCHLORIDE 2 MG/1
2 TABLET ORAL EVERY 4 HOURS PRN
Qty: 30 TABLET | Refills: 0
Start: 2025-05-14 | End: 2025-05-19

## 2025-05-14 RX ADMIN — HYDROCHLOROTHIAZIDE 25 MG: 25 TABLET ORAL at 08:52

## 2025-05-14 RX ADMIN — ONDANSETRON 4 MG: 4 TABLET, ORALLY DISINTEGRATING ORAL at 10:53

## 2025-05-14 RX ADMIN — OXYCODONE HYDROCHLORIDE 10 MG: 5 TABLET ORAL at 05:50

## 2025-05-14 RX ADMIN — ACETAMINOPHEN 650 MG: 325 TABLET, FILM COATED ORAL at 05:50

## 2025-05-14 RX ADMIN — AMLODIPINE BESYLATE 10 MG: 10 TABLET ORAL at 08:52

## 2025-05-14 RX ADMIN — OXYCODONE HYDROCHLORIDE 10 MG: 5 TABLET ORAL at 01:42

## 2025-05-14 RX ADMIN — LISINOPRIL 40 MG: 20 TABLET ORAL at 08:51

## 2025-05-14 RX ADMIN — Medication 400 MG: at 08:51

## 2025-05-14 RX ADMIN — CLONIDINE HYDROCHLORIDE 0.2 MG: 0.1 TABLET ORAL at 08:51

## 2025-05-14 RX ADMIN — ALLOPURINOL 200 MG: 100 TABLET ORAL at 08:51

## 2025-05-14 RX ADMIN — HYDROMORPHONE HYDROCHLORIDE 2 MG: 2 TABLET ORAL at 13:40

## 2025-05-14 RX ADMIN — LABETALOL HYDROCHLORIDE 100 MG: 100 TABLET, FILM COATED ORAL at 08:52

## 2025-05-14 RX ADMIN — SENNOSIDES, DOCUSATE SODIUM 1 TABLET: 50; 8.6 TABLET, FILM COATED ORAL at 08:52

## 2025-05-14 RX ADMIN — CEFAZOLIN 3000 MG: 10 INJECTION, POWDER, FOR SOLUTION INTRAVENOUS at 01:42

## 2025-05-14 RX ADMIN — EMPAGLIFLOZIN 10 MG: 10 TABLET, FILM COATED ORAL at 08:52

## 2025-05-14 RX ADMIN — ASPIRIN 81 MG: 81 TABLET, COATED ORAL at 08:52

## 2025-05-14 RX ADMIN — HYDROMORPHONE HYDROCHLORIDE 2 MG: 2 TABLET ORAL at 09:41

## 2025-05-14 ASSESSMENT — PAIN SCALES - GENERAL
PAINLEVEL_OUTOF10: 6
PAINLEVEL_OUTOF10: 6
PAINLEVEL_OUTOF10: 3
PAINLEVEL_OUTOF10: 6
PAINLEVEL_OUTOF10: 6
PAINLEVEL_OUTOF10: 2
PAINLEVEL_OUTOF10: 4

## 2025-05-14 ASSESSMENT — PAIN DESCRIPTION - ORIENTATION
ORIENTATION: RIGHT

## 2025-05-14 ASSESSMENT — PAIN DESCRIPTION - LOCATION
LOCATION: HIP

## 2025-05-14 ASSESSMENT — PAIN DESCRIPTION - DESCRIPTORS
DESCRIPTORS: ACHING;SHARP;THROBBING
DESCRIPTORS: ACHING
DESCRIPTORS: ACHING
DESCRIPTORS: ACHING;SORE;THROBBING

## 2025-05-14 ASSESSMENT — PAIN - FUNCTIONAL ASSESSMENT
PAIN_FUNCTIONAL_ASSESSMENT: PREVENTS OR INTERFERES SOME ACTIVE ACTIVITIES AND ADLS
PAIN_FUNCTIONAL_ASSESSMENT: PREVENTS OR INTERFERES SOME ACTIVE ACTIVITIES AND ADLS

## 2025-05-14 NOTE — PLAN OF CARE
Problem: Pain  Goal: Verbalizes/displays adequate comfort level or baseline comfort level  5/13/2025 2239 by Brie Tovar RN  Outcome: Progressing  5/13/2025 1314 by Lina Lara RN  Outcome: Progressing  Flowsheets  Taken 5/13/2025 1205 by Lina Diaz RN  Verbalizes/displays adequate comfort level or baseline comfort level: Assess pain using appropriate pain scale  Taken 5/13/2025 1200 by Lina Diaz RN  Verbalizes/displays adequate comfort level or baseline comfort level: Assess pain using appropriate pain scale  Taken 5/13/2025 1158 by Lina Diaz RN  Verbalizes/displays adequate comfort level or baseline comfort level:   Encourage patient to monitor pain and request assistance   Assess pain using appropriate pain scale   Administer analgesics based on type and severity of pain and evaluate response  Taken 5/13/2025 1150 by Lina Diaz RN  Verbalizes/displays adequate comfort level or baseline comfort level:   Encourage patient to monitor pain and request assistance   Assess pain using appropriate pain scale   Administer analgesics based on type and severity of pain and evaluate response     Problem: Safety - Adult  Goal: Free from fall injury  5/13/2025 2239 by Brie Tovar RN  Outcome: Progressing  5/13/2025 1314 by Lina Lara RN  Outcome: Progressing     Problem: Chronic Conditions and Co-morbidities  Goal: Patient's chronic conditions and co-morbidity symptoms are monitored and maintained or improved  5/13/2025 2239 by Brie Tovar RN  Outcome: Progressing  5/13/2025 1314 by Lina Lara RN  Outcome: Progressing     Problem: Discharge Planning  Goal: Discharge to home or other facility with appropriate resources  5/13/2025 2239 by Brie Tovar RN  Outcome: Progressing  5/13/2025 1314 by Lina Lara RN  Outcome: Progressing     Problem: ABCDS Injury Assessment  Goal: Absence of physical injury  Outcome: Progressing

## 2025-05-14 NOTE — PROGRESS NOTES
05/13/25 2045   Oxygen Therapy/Pulse Ox   O2 Therapy Oxygen   O2 Device Nasal cannula   O2 Flow Rate (L/min) 3 L/min   Pulse (!) 106   Respirations 18   SpO2 98 %   Pulse Oximeter Device Mode Continuous     Pt has home CPAP at the bedside. Will call with any needs. Pt states he may not wear the CPAP and opt to wear the nasal cannula overnight instead.  
4 Eyes Skin Assessment     NAME:  Gerardo Mancini  YOB: 1965  MEDICAL RECORD NUMBER:  [id number]    The patient is being assessed for  Admission    I agree that at least one RN has performed a thorough Head to Toe Skin Assessment on the patient. ALL assessment sites listed below have been assessed.      Areas assessed by both nurses:    Head, Face, Ears, Shoulders, Back, Chest, Arms, Elbows, Hands, Sacrum. Buttock, Coccyx, Ischium, and Legs. Feet and Heels        Does the Patient have a Wound? No noted wound(s)       Agusto Prevention initiated by RN: Yes  Wound Care Orders initiated by RN: No    Pressure Injury (Stage 3,4, Unstageable, DTI, NWPT, and Complex wounds) if present, place Wound referral order by RN under : No    New Ostomies, if present place, Ostomy referral order under : No     Nurse 1 eSignature: Electronically signed by Lina Lara RN on 5/13/25 at 1:14 PM EDT    **SHARE this note so that the co-signing nurse can place an eSignature**    Nurse 2 eSignature: Electronically signed by MAYLIN SPIVEY RN on 5/13/25 at 1:15 PM EDT   
ACUTE PHYSICAL THERAPY GOALS:   (Developed with and agreed upon by patient and/or caregiver.)  GOALS (1-4 days):  (1.)Mr. Mancini will move from supine to sit and sit to supine  in bed with INDEPENDENT.    (2.)Mr. Mancini will transfer from bed to chair and chair to bed with INDEPENDENT using the least restrictive device.    (3.)Mr. Mancini will ambulate with INDEPENDENT for 200 feet with the least restrictive device.   (4.)Mr. Mancini will ambulate up/down 0 steps .  (5.)Mr. Mancini will state/observe RENAY precautions with 0 verbal cues.  ________________________________________________________________________________________________      PHYSICAL THERAPY: TOTAL HIP ARTHROPLASTY Daily Note and AM  (Link to Caseload Tracking: PT Visit Days : 1  Acknowledge Orders  Time In/Out  PT Charge Capture  Rehab Caseload Tracker  Episode   Gerardo Mancini is a 59 y.o. male   PRIMARY DIAGNOSIS: Osteoarthritis of right hip  Osteoarthritis of right hip [M16.11]  Osteoarthritis of right hip, unspecified osteoarthritis type [M16.11]  Procedure(s) (LRB):  HIP TOTAL ARTHROPLASTY-Right (Right)  1 Day Post-Op  Reason for Referral: Pain in Right Hip (M25.551)  Stiffness of Right Hip, Not elsewhere classified (M25.651)  Difficulty in walking, Not elsewhere classified (R26.2)  Other abnormalities of gait and mobility (R26.89)  Outpatient in a bed: Payor: ABSOLUTE TOTAL C[employer] MEDICAID / Plan: ABSOLUTE TOTAL C[employer] MEDICAID / Product Type: *No Product type* /     REHAB RECOMMENDATIONS:   Recommendation to date pending progress:  Setting:  Home Health Therapy    Equipment:    Rolling Walker     GAIT: I Mod I S SBA CGA Min Mod Max Total  NT x2 Comments:   Level of Assistance [] [] [] [x] [] [] [] [] [] [] []    Weightbearing Status   full    Distance  125 feet    Gait Quality Antalgic and Decreased step length    DME Rolling Walker     Stairs      Ramp     I=Independent, Mod I=Modified Independent, S=Supervision, SBA=Standby Assistance, CGA=Contact Guard 
ACUTE PHYSICAL THERAPY GOALS:   (Developed with and agreed upon by patient and/or caregiver.)  GOALS (1-4 days):  (1.)Mr. Mancini will move from supine to sit and sit to supine  in bed with INDEPENDENT.    (2.)Mr. Mancini will transfer from bed to chair and chair to bed with INDEPENDENT using the least restrictive device.    (3.)Mr. Mancini will ambulate with INDEPENDENT for 200 feet with the least restrictive device.   (4.)Mr. Mancini will ambulate up/down 0 steps .  (5.)Mr. Mancini will state/observe RENAY precautions with 0 verbal cues.  ________________________________________________________________________________________________      PHYSICAL THERAPY: TOTAL HIP ARTHROPLASTY Initial Assessment, Daily Note, and PM  (Link to Caseload Tracking: PT Visit Days : 1  Acknowledge Orders  Time In/Out  PT Charge Capture  Rehab Caseload Tracker  Episode   Gerardo Mancini is a 59 y.o. male   PRIMARY DIAGNOSIS: Osteoarthritis of right hip  Osteoarthritis of right hip [M16.11]  Osteoarthritis of right hip, unspecified osteoarthritis type [M16.11]  Procedure(s) (LRB):  HIP TOTAL ARTHROPLASTY-Right (Right)  * Day of Surgery *  Reason for Referral: Pain in Right Hip (M25.551)  Stiffness of Right Hip, Not elsewhere classified (M25.651)  Difficulty in walking, Not elsewhere classified (R26.2)  Other abnormalities of gait and mobility (R26.89)  Outpatient in a bed: Payor: ABSOLUTE TOTAL C[employer] MEDICAID / Plan: ABSOLUTE TOTAL C[employer] MEDICAID / Product Type: *No Product type* /     REHAB RECOMMENDATIONS:   Recommendation to date pending progress:  Setting:  Home Health Therapy    Equipment:    Rolling Walker     GAIT: I Mod I S SBA CGA Min Mod Max Total  NT x2 Comments:   Level of Assistance [] [] [] [] [x] [] [] [] [] [] []    Weightbearing Status   full    Distance  75 feet    Gait Quality Antalgic and Decreased step length    DME Rolling Walker     Stairs      Ramp     I=Independent, Mod I=Modified Independent, S=Supervision, SBA=Standby 
May 14, 2025         Post Op day: 1 Day Post-Op   Admit Diagnosis: Osteoarthritis of right hip [M16.11]  Osteoarthritis of right hip, unspecified osteoarthritis type [M16.11]  LAB:    Recent Results (from the past 24 hours)   POCT Glucose    Collection Time: 25  8:14 AM   Result Value Ref Range    POC Glucose 135 (H) 65 - 100 mg/dL    Performed by: Eddie    POCT Glucose    Collection Time: 25 11:47 AM   Result Value Ref Range    POC Glucose 149 (H) 65 - 100 mg/dL    Performed by: Jacob      Vital Signs:    Patient Vitals for the past 8 hrs:   BP Temp Temp src Pulse Resp SpO2   25 0734 137/89 99.3 °F (37.4 °C) Oral 90 17 100 %   25 0620 -- -- -- -- 18 --   25 0212 -- -- -- -- 18 --     Temp (24hrs), Av.4 °F (36.9 °C), Min:97.9 °F (36.6 °C), Max:99.3 °F (37.4 °C)    Pain Control:      Subjective: Doing well, normal recovery experienced.     Objective:  No Acute Distress, Alert and Oriented, Neurovascular exam is normal       Assessment:   Patient Active Problem List   Diagnosis    Dyslipidemia    Mild intermittent asthma without complication    History of pulmonary embolism    Arthritis, lumbar spine    Type 2 diabetes mellitus with stage 3a chronic kidney disease, without long-term current use of insulin ([facility])    Primary osteoarthritis of both knees    Nuclear sclerotic cataract of both eyes    History of gout    ROXY on CPAP    Vitamin D deficiency    Nonrheumatic aortic valve regurgitation    Allergic rhinitis due to animal (cat) (dog) hair and dander    Presbyopia    Diastolic CHF, chronic ([facility])    Chronic midline low back pain without sciatica    Chronic right shoulder pain    Avascular necrosis of bones of both hips ([facility])    Benign hypertension with CKD (chronic kidney disease) stage III (HCC)    Bilateral chronic knee pain    Chronic kidney disease (CKD) stage G3b/A1, moderately decreased glomerular filtration rate (GFR) between 30-44 mL/min/1.73 square meter 
OCCUPATIONAL THERAPY Initial Assessment, Daily Note, and PM      (Link to Caseload Tracking: OT Visit Days: 1  OT Orders   Time  OT Charge Capture  Rehab Caseload Tracker  Episode     Gerardo Mancini is a 59 y.o. male   PRIMARY DIAGNOSIS: Osteoarthritis of right hip  Osteoarthritis of right hip [M16.11]  Osteoarthritis of right hip, unspecified osteoarthritis type [M16.11]  Procedure(s) (LRB):  HIP TOTAL ARTHROPLASTY-Right (Right)  * Day of Surgery *  Reason for Referral: Pain in Right Hip (M25.551)  Stiffness of Right Hip, Not elsewhere classified (M25.651)  Other lack of cordination (R27.8)  Difficulty in walking, Not elsewhere classified (R26.2)  Other abnormalities of gait and mobility (R26.89)  Outpatient in a bed: Payor: ABSOLUTE TOTAL CARE MEDICAID / Plan: ABSOLUTE TOTAL CARE MEDICAID / Product Type: *No Product type* /     ASSESSMENT:     REHAB RECOMMENDATIONS:   Recommendation to date pending progress:  Setting:  Home Health Therapy    Equipment:    None     ASSESSMENT:  Mr. Mancini is s/p right RENAY and presents with decreased independence with functional mobility and activities of daily living as compared to baseline level of function and safety. Patient would benefit from skilled Occupational Therapy to maximize independence and safety with self-care task and functional mobility.   Patient supine in bed educated on OT plan of care.  He had been up to the bathroom prior with nursing but had returned to bed due to increased pain. His girl friend assisted donning his underwear partially in bed.  He was min/mod assist supine to sit. He stood with min and was assisted with clothing management. He ambulated to the hallway and back with min assist. He sat in recliner and performed exercise with PT. He was set up in recliner and educated on OT plan of care, discharge planning, adl task performance, post op showering, walker use. He plans to spend the night and discharge home tomorrow with good support. OT will 
Patient achieved 2000 MI/sec on IS. Patient encouraged to do 10 breaths every hour while awake-patient agreed and demonstrated. No shortness of breath or distress noted. BS are clear b/l.   [facility] notes reviewed-  continuous Sat monitor order noted and placed on pt.   Pt has home CPAP, water provided for humidity.  
TRANSFER - IN REPORT:    Verbal report received from ULISSES Bergeron on Gerardo Mancini  being received from PACU for routine post-op      Report consisted of patient's Situation, Background, Assessment and   Recommendations(SBAR).     Information from the following report(s) Nurse Handoff Report was reviewed with the receiving nurse.    Opportunity for questions and clarification was provided.      Assessment completed upon patient's arrival to unit and care assumed.    
putting on and taking off regular lower body clothing?: A Lot  How much help is needed for bathing (which includes washing, rinsing, drying)?: A Little  How much help is needed for toileting (which includes using toilet, bedpan, or urinal)?: A Little  How much help is needed for putting on and taking off regular upper body clothing?: None  How much help is needed for taking care of personal grooming?: None  How much help for eating meals?: None  AM-PAC Inpatient Daily Activity Raw Score: 20  AM-PAC Inpatient ADL T-Scale Score : 42.03  ADL Inpatient CMS 0-100% Score: 38.32  ADL Inpatient CMS G-Code Modifier : CJ     SUBJECTIVE:     Mr. Mancini stated he was ready to shower      Social/Functional   Lives With: Alone, Significant other  Type of Home: Apartment  Home Layout: One level  Home Access: Level entry  Bathroom Shower/Tub: Tub/Shower unit  Bathroom Toilet: Standard  Home Equipment: Cane  Receives Help From: Family, Friend(s)  Prior Level of Assist for ADLs: Independent  Prior Level of Assist for Homemaking: Independent  Prior Level of Assist for Ambulation: Independent community ambulator, with or without device  Prior Level of Assist for Transfers: Independent  Active : Yes  Occupation: On disability    OBJECTIVE:     LINES / DRAINS / AIRWAY: NA    RESTRICTIONS/PRECAUTIONS:   Fall  Posterior hip precautions    PAIN: VITALS / O2:   Pre Treatment:    Complained of pain but did not rate  shower    Post Treatment:  complained of pain but did not rate.   Reported he felt better after showering Vitals          Oxygen   RA     GROSS EVALUATION: INTACT IMPAIRED   (See Comments)   UE AROM [x] []   UE PROM [] []   Strength []  General weakness     Posture / Balance []  Contact guard assist in standing with RW   Sensation []     Coordination []    Contact guard assist in standing with RW   Tone []       Edema []    Activity Tolerance []    Fair with mobilty and ADLs having a lot of pain   Hand Dominance R [] L []

## 2025-05-14 NOTE — FLOWSHEET NOTE
05/14/25 1351   AVS Reviewed   AVS & discharge instructions reviewed with patient and/or representative? Yes   Reviewed instructions with Patient   Level of Understanding Questions answered     Iv removed without complications.

## 2025-05-20 DIAGNOSIS — M16.11 PRIMARY OSTEOARTHRITIS OF RIGHT HIP: Primary | ICD-10-CM

## 2025-05-20 RX ORDER — HYDROMORPHONE HYDROCHLORIDE 2 MG/1
2 TABLET ORAL
Qty: 30 TABLET | Refills: 0 | Status: SHIPPED | OUTPATIENT
Start: 2025-05-20 | End: 2025-05-25

## 2025-05-20 NOTE — TELEPHONE ENCOUNTER
He is requesting a refill on hydromorphone to [facility] on [address].

## 2025-05-23 DIAGNOSIS — M16.11 PRIMARY OSTEOARTHRITIS OF RIGHT HIP: Primary | ICD-10-CM

## 2025-05-23 DIAGNOSIS — K59.03 DRUG-INDUCED CONSTIPATION: ICD-10-CM

## 2025-05-23 NOTE — TELEPHONE ENCOUNTER
Spoke with Sabi she will let pt know to  Movantik script today that Rodger Aburto will send to pharmacy

## 2025-05-23 NOTE — TELEPHONE ENCOUNTER
He has been struggling constipation. He has done all that they have recommended and is still not having productive bowel movements. What do you suggest? Please give her a call.

## 2025-06-02 ENCOUNTER — TELEPHONE (OUTPATIENT)
Dept: ORTHOPEDIC SURGERY | Age: 60
End: 2025-06-02

## 2025-06-02 DIAGNOSIS — M16.11 PRIMARY OSTEOARTHRITIS OF RIGHT HIP: Primary | ICD-10-CM

## 2025-06-04 RX ORDER — HYDROMORPHONE HYDROCHLORIDE 2 MG/1
2 TABLET ORAL
Qty: 30 TABLET | Refills: 0 | Status: SHIPPED | OUTPATIENT
Start: 2025-06-04 | End: 2025-06-09

## 2025-06-16 ENCOUNTER — OFFICE VISIT (OUTPATIENT)
Dept: ORTHOPEDIC SURGERY | Age: 60
End: 2025-06-16

## 2025-06-16 DIAGNOSIS — Z96.641 STATUS POST TOTAL HIP REPLACEMENT, RIGHT: ICD-10-CM

## 2025-06-16 DIAGNOSIS — M16.11 PRIMARY OSTEOARTHRITIS OF RIGHT HIP: Primary | ICD-10-CM

## 2025-06-16 PROCEDURE — 99024 POSTOP FOLLOW-UP VISIT: CPT | Performed by: PHYSICIAN ASSISTANT

## 2025-06-16 NOTE — PROGRESS NOTES
Name: Gerardo Mancini  YOB: 1965  Gender: male  MRN: [id number]      Current Outpatient Medications:     naloxegol (MOVANTIK) 12.5 MG TABS tablet, Take 1 tablet by mouth every morning (before breakfast), Disp: 5 tablet, Rfl: 0    aspirin 81 MG EC tablet, Take 1 tablet by mouth in the morning and at bedtime for 3 days, Disp: 6 tablet, Rfl: 0    ondansetron (ZOFRAN) 4 MG tablet, Take 1 tablet by mouth every 8 hours as needed for Nausea or Vomiting, Disp: 20 tablet, Rfl: 0    amLODIPine (NORVASC) 10 MG tablet, Take 1 tablet by mouth daily, Disp: , Rfl:     MAGNESIUM-OXIDE 400 (240 Mg) MG tablet, Take 1 tablet by mouth twice daily, Disp: 180 tablet, Rfl: 3    empagliflozin (JARDIANCE) 10 MG tablet, Take 1 tablet by mouth daily, Disp: 30 tablet, Rfl: 2    allopurinol (ZYLOPRIM) 100 MG tablet, Take 2 tablets by mouth daily, Disp: 60 tablet, Rfl: 2    cloNIDine (CATAPRES) 0.2 MG tablet, Take 1 tablet by mouth 2 times daily, Disp: 60 tablet, Rfl: 2    labetalol (NORMODYNE) 100 MG tablet, Take 1 tablet by mouth 2 times daily, Disp: 60 tablet, Rfl: 2    rosuvastatin (CRESTOR) 10 MG tablet, Take 1 tablet by mouth daily (Patient taking differently: Take 1 tablet by mouth at bedtime), Disp: 30 tablet, Rfl: 2    vitamin D (ERGOCALCIFEROL) 1.25 MG (45962 UT) CAPS capsule, Take 1 capsule by mouth once a week (Patient taking differently: Take 1 capsule by mouth once a week Every Saturday), Disp: 12 capsule, Rfl: 0    apixaban (ELIQUIS) 5 MG TABS tablet, Take 1 tablet by mouth 2 times daily, Disp: 30 tablet, Rfl: 0    lisinopril-hydroCHLOROthiazide (PRINZIDE;ZESTORETIC) 20-12.5 MG per tablet, Take 2 tablets by mouth once daily, Disp: 60 tablet, Rfl: 2    Spacer/Aero-Holding Chambers (AEROCHAMBER PLUS MOLLY-VU W/MASK) MISC, 1 Units by Does not apply route as needed (shortness of breath), Disp: 1 each, Rfl: 0  No Known Allergies    Post-op right RENAY    The patient returns now post right total hip arthroplasty.  Their pain is

## 2025-06-19 ENCOUNTER — LAB (OUTPATIENT)
Dept: FAMILY MEDICINE CLINIC | Facility: CLINIC | Age: 60
End: 2025-06-19

## 2025-06-19 DIAGNOSIS — N18.30 BENIGN HYPERTENSION WITH CKD (CHRONIC KIDNEY DISEASE) STAGE III (HCC): ICD-10-CM

## 2025-06-19 DIAGNOSIS — N18.31 TYPE 2 DIABETES MELLITUS WITH STAGE 3A CHRONIC KIDNEY DISEASE, WITHOUT LONG-TERM CURRENT USE OF INSULIN (HCC): ICD-10-CM

## 2025-06-19 DIAGNOSIS — E11.22 TYPE 2 DIABETES MELLITUS WITH STAGE 3A CHRONIC KIDNEY DISEASE, WITHOUT LONG-TERM CURRENT USE OF INSULIN (HCC): ICD-10-CM

## 2025-06-19 DIAGNOSIS — I12.9 BENIGN HYPERTENSION WITH CKD (CHRONIC KIDNEY DISEASE) STAGE III (HCC): ICD-10-CM

## 2025-06-19 LAB
ANION GAP SERPL CALC-SCNC: 12 MMOL/L (ref 7–16)
BUN SERPL-MCNC: 23 MG/DL (ref 8–23)
CALCIUM SERPL-MCNC: 9.9 MG/DL (ref 8.8–10.2)
CHLORIDE SERPL-SCNC: 100 MMOL/L (ref 98–107)
CO2 SERPL-SCNC: 24 MMOL/L (ref 20–29)
CREAT SERPL-MCNC: 2.03 MG/DL (ref 0.8–1.3)
CREAT UR-MCNC: 168 MG/DL (ref 39–259)
EST. AVERAGE GLUCOSE BLD GHB EST-MCNC: 155 MG/DL
GLUCOSE SERPL-MCNC: 107 MG/DL (ref 70–99)
HBA1C MFR BLD: 7 % (ref 0–5.6)
MICROALBUMIN UR-MCNC: 5.02 MG/DL (ref 0–20)
MICROALBUMIN/CREAT UR-RTO: 30 MG/G (ref 0–30)
POTASSIUM SERPL-SCNC: 3.9 MMOL/L (ref 3.5–5.1)
SODIUM SERPL-SCNC: 136 MMOL/L (ref 136–145)

## 2025-06-20 ENCOUNTER — RESULTS FOLLOW-UP (OUTPATIENT)
Dept: FAMILY MEDICINE CLINIC | Facility: CLINIC | Age: 60
End: 2025-06-20

## 2025-06-23 DIAGNOSIS — E55.9 VITAMIN D DEFICIENCY: ICD-10-CM

## 2025-06-23 RX ORDER — ERGOCALCIFEROL 1.25 MG/1
50000 CAPSULE, LIQUID FILLED ORAL WEEKLY
Qty: 12 CAPSULE | Refills: 0 | OUTPATIENT
Start: 2025-06-23

## 2025-06-25 NOTE — PROGRESS NOTES
Gerardo Mancini (: 1965) is a 60 y.o. male, an established patient, is here for evaluation of the following chief complaint(s):  Chief Complaint   Patient presents with    Hypertension    Diabetes    Chronic Kidney Disease    Results          ASSESSMENT/PLAN:     Diagnosis Orders   1. Benign hypertension with CKD (chronic kidney disease) stage III ([facility])  cloNIDine (CATAPRES) 0.2 MG tablet    labetalol (NORMODYNE) 100 MG tablet    lisinopril-hydroCHLOROthiazide (PRINZIDE;ZESTORETIC) 20-12.5 MG per tablet    Comprehensive Metabolic Panel    Albumin/Creatinine Ratio, Urine      2. Type 2 diabetes mellitus with stage 3b chronic kidney disease, without long-term current use of insulin ([facility])  empagliflozin (JARDIANCE) 10 MG tablet    Comprehensive Metabolic Panel    Lipid Panel    Albumin/Creatinine Ratio, Urine    Hemoglobin A1C      3. Class 3 severe obesity due to excess calories with serious comorbidity and body mass index (BMI) of 40.0 to 44.9 in adult ([facility])        4. History of right hip replacement        5. Dyslipidemia  rosuvastatin (CRESTOR) 10 MG tablet    Comprehensive Metabolic Panel    Lipid Panel      6. Vitamin D deficiency  vitamin D (ERGOCALCIFEROL) 1.25 MG (04715 UT) CAPS capsule    Vitamin D 25 Hydroxy      7. History of gout  allopurinol (ZYLOPRIM) 100 MG tablet    Uric Acid        Assessment & Plan  1. Blood pressure management.  - Blood pressure readings are currently low at 100/68.  - Dosage of lisinopril/hydrochlorothiazide will be reduced to one tablet daily.  - Continued weight loss may necessitate further adjustments to the medication regimen.  - Monitoring of blood pressure and symptoms will continue.    2. Chronic kidney disease.  - Kidney function is deteriorating with a current filtration rate of 37.  - Advised to increase water intake to 64 ounces daily and limit salty foods.  - Referral to a nephrologist will be considered if kidney function remains low.  - Monitoring of kidney

## 2025-06-26 ENCOUNTER — OFFICE VISIT (OUTPATIENT)
Dept: FAMILY MEDICINE CLINIC | Facility: CLINIC | Age: 60
End: 2025-06-26
Payer: MEDICAID

## 2025-06-26 VITALS
HEART RATE: 75 BPM | BODY MASS INDEX: 40.53 KG/M2 | SYSTOLIC BLOOD PRESSURE: 100 MMHG | OXYGEN SATURATION: 95 % | HEIGHT: 73 IN | RESPIRATION RATE: 14 BRPM | TEMPERATURE: 98.8 F | DIASTOLIC BLOOD PRESSURE: 68 MMHG | WEIGHT: 305.8 LBS

## 2025-06-26 DIAGNOSIS — I12.9 BENIGN HYPERTENSION WITH CKD (CHRONIC KIDNEY DISEASE) STAGE III (HCC): Primary | ICD-10-CM

## 2025-06-26 DIAGNOSIS — E78.5 DYSLIPIDEMIA: ICD-10-CM

## 2025-06-26 DIAGNOSIS — N43.3 HYDROCELE, RIGHT: ICD-10-CM

## 2025-06-26 DIAGNOSIS — N18.32 TYPE 2 DIABETES MELLITUS WITH STAGE 3B CHRONIC KIDNEY DISEASE, WITHOUT LONG-TERM CURRENT USE OF INSULIN (HCC): ICD-10-CM

## 2025-06-26 DIAGNOSIS — N18.30 BENIGN HYPERTENSION WITH CKD (CHRONIC KIDNEY DISEASE) STAGE III (HCC): Primary | ICD-10-CM

## 2025-06-26 DIAGNOSIS — E66.813 CLASS 3 SEVERE OBESITY DUE TO EXCESS CALORIES WITH SERIOUS COMORBIDITY AND BODY MASS INDEX (BMI) OF 40.0 TO 44.9 IN ADULT (HCC): ICD-10-CM

## 2025-06-26 DIAGNOSIS — Z87.39 HISTORY OF GOUT: ICD-10-CM

## 2025-06-26 DIAGNOSIS — E11.22 TYPE 2 DIABETES MELLITUS WITH STAGE 3B CHRONIC KIDNEY DISEASE, WITHOUT LONG-TERM CURRENT USE OF INSULIN (HCC): ICD-10-CM

## 2025-06-26 DIAGNOSIS — E55.9 VITAMIN D DEFICIENCY: ICD-10-CM

## 2025-06-26 DIAGNOSIS — Z96.641 HISTORY OF RIGHT HIP REPLACEMENT: ICD-10-CM

## 2025-06-26 PROBLEM — M87.052 AVASCULAR NECROSIS OF BONE OF HIP, LEFT (HCC): Status: ACTIVE | Noted: 2020-07-15

## 2025-06-26 PROBLEM — M25.551 RIGHT HIP PAIN: Status: RESOLVED | Noted: 2024-10-04 | Resolved: 2025-06-26

## 2025-06-26 PROBLEM — M16.11 OSTEOARTHRITIS OF RIGHT HIP, UNSPECIFIED OSTEOARTHRITIS TYPE: Status: RESOLVED | Noted: 2025-05-13 | Resolved: 2025-06-26

## 2025-06-26 PROBLEM — G89.29 CHRONIC PAIN OF LEFT KNEE: Status: ACTIVE | Noted: 2017-04-13

## 2025-06-26 PROBLEM — M25.562 CHRONIC PAIN OF LEFT KNEE: Status: ACTIVE | Noted: 2017-04-13

## 2025-06-26 PROBLEM — M17.12 PRIMARY OSTEOARTHRITIS OF LEFT KNEE: Status: ACTIVE | Noted: 2017-04-13

## 2025-06-26 PROBLEM — M87.00 AVN (AVASCULAR NECROSIS OF BONE) (HCC): Status: RESOLVED | Noted: 2024-10-04 | Resolved: 2025-06-26

## 2025-06-26 PROCEDURE — 99214 OFFICE O/P EST MOD 30 MIN: CPT | Performed by: PHYSICIAN ASSISTANT

## 2025-06-26 PROCEDURE — 3051F HG A1C>EQUAL 7.0%<8.0%: CPT | Performed by: PHYSICIAN ASSISTANT

## 2025-06-26 RX ORDER — ALLOPURINOL 100 MG/1
200 TABLET ORAL DAILY
Qty: 60 TABLET | Refills: 2 | Status: SHIPPED | OUTPATIENT
Start: 2025-06-26

## 2025-06-26 RX ORDER — LISINOPRIL AND HYDROCHLOROTHIAZIDE 12.5; 2 MG/1; MG/1
1 TABLET ORAL DAILY
Qty: 30 TABLET | Refills: 5 | Status: SHIPPED | OUTPATIENT
Start: 2025-06-26

## 2025-06-26 RX ORDER — CLONIDINE HYDROCHLORIDE 0.2 MG/1
0.2 TABLET ORAL 2 TIMES DAILY
Qty: 60 TABLET | Refills: 5 | Status: SHIPPED | OUTPATIENT
Start: 2025-06-26

## 2025-06-26 RX ORDER — ERGOCALCIFEROL 1.25 MG/1
50000 CAPSULE, LIQUID FILLED ORAL WEEKLY
Qty: 12 CAPSULE | Refills: 1 | Status: SHIPPED | OUTPATIENT
Start: 2025-06-26

## 2025-06-26 RX ORDER — LABETALOL 100 MG/1
100 TABLET, FILM COATED ORAL 2 TIMES DAILY
Qty: 60 TABLET | Refills: 5 | Status: SHIPPED | OUTPATIENT
Start: 2025-06-26

## 2025-06-26 RX ORDER — ROSUVASTATIN CALCIUM 10 MG/1
10 TABLET, COATED ORAL DAILY
Qty: 30 TABLET | Refills: 5 | Status: SHIPPED | OUTPATIENT
Start: 2025-06-26

## 2025-06-26 NOTE — ASSESSMENT & PLAN NOTE
BP today is: low.    Currently prescribed:   Hypertension Medications       Calcium Channel Blockers       amLODIPine (NORVASC) 10 MG tablet Take 1 tablet by mouth daily       Antiadrenergic Antihypertensives       cloNIDine (CATAPRES) 0.2 MG tablet Take 1 tablet by mouth 2 times daily       Antihypertensive Combinations       lisinopril-hydroCHLOROthiazide (PRINZIDE;ZESTORETIC) 20-12.5 MG per tablet Take 2 tablets by mouth once daily       Alpha-Beta Blockers       labetalol (NORMODYNE) 100 MG tablet Take 1 tablet by mouth 2 times daily   Reduce Lisinopril/HCTZ 20/12.5 mg to once daily.      eGFR is down to 37.  Mr. Mancini was informed of the risks of various medications that can be harmful in CKD including, but not limited to, Motrin, Ibuprofen, Advil, Naprosyn and Aleve.  Tylenol is ok (but do not exceed a total of 3000 mg/day).    reduce Red meat like beef and pork, instead substitute with chicken , eggs , turkey and fish . Can take handsize red meat in a week Also encouraged to limit/avoid salt in diet, exercise, and keeping lipids under control.  Advised to drink 60 oz of water each day.    -- If his eGFR does not increase to 40 or higher by next set of labs, he will need a referral to see nephrology          Orders:    cloNIDine (CATAPRES) 0.2 MG tablet; Take 1 tablet by mouth 2 times daily    labetalol (NORMODYNE) 100 MG tablet; Take 1 tablet by mouth 2 times daily    lisinopril-hydroCHLOROthiazide (PRINZIDE;ZESTORETIC) 20-12.5 MG per tablet; Take 1 tablet by mouth daily    Comprehensive Metabolic Panel; Future    Albumin/Creatinine Ratio, Urine; Future

## 2025-06-26 NOTE — ASSESSMENT & PLAN NOTE
Orders:    rosuvastatin (CRESTOR) 10 MG tablet; Take 1 tablet by mouth daily    Comprehensive Metabolic Panel; Future    Lipid Panel; Future

## 2025-06-26 NOTE — ASSESSMENT & PLAN NOTE
Orders:    vitamin D (ERGOCALCIFEROL) 1.25 MG (92742 UT) CAPS capsule; Take 1 capsule by mouth once a week    Vitamin D 25 Hydroxy; Future

## 2025-06-26 NOTE — ASSESSMENT & PLAN NOTE
Orders:    allopurinol (ZYLOPRIM) 100 MG tablet; Take 2 tablets by mouth daily    Uric Acid; Future

## 2025-06-26 NOTE — ASSESSMENT & PLAN NOTE
Diabetes is treated to goal.  Currently prescribed:   Diabetic Medications       Sodium-Glucose Co-Transporter 2 (SGLT2) Inhibitors       empagliflozin (JARDIANCE) 10 MG tablet Take 1 tablet by mouth daily           Orders:    empagliflozin (JARDIANCE) 10 MG tablet; Take 1 tablet by mouth daily    Comprehensive Metabolic Panel; Future    Lipid Panel; Future    Albumin/Creatinine Ratio, Urine; Future    Hemoglobin A1C; Future

## 2025-06-30 PROBLEM — I71.40 AAA (ABDOMINAL AORTIC ANEURYSM): Status: RESOLVED | Noted: 2022-02-23 | Resolved: 2025-06-30

## 2025-07-24 ENCOUNTER — OFFICE VISIT (OUTPATIENT)
Dept: ORTHOPEDIC SURGERY | Age: 60
End: 2025-07-24
Payer: MEDICAID

## 2025-07-24 DIAGNOSIS — M79.672 LEFT FOOT PAIN: Primary | ICD-10-CM

## 2025-07-24 PROCEDURE — 99203 OFFICE O/P NEW LOW 30 MIN: CPT | Performed by: ORTHOPAEDIC SURGERY

## 2025-07-24 PROCEDURE — M5016 MISC CARBON FIBER: HCPCS | Performed by: ORTHOPAEDIC SURGERY

## 2025-07-24 RX ORDER — L-METHYLFOLATE-ALGAE-VIT B12-B6 CAP 3-90.314-2-35 MG 3-90.314-2-35 MG
1 CAP ORAL DAILY
Qty: 30 CAPSULE | Refills: 2 | Status: SHIPPED | OUTPATIENT
Start: 2025-07-24

## 2025-07-24 NOTE — PROGRESS NOTES
Patient was fitted and instructed on a Carbon Fiber Insert for the left foot. Patient read and signed documenting they understand and agree to [facility]'s current DME return policy.   
Foot 2 vw (AP/Oblique) for foot pain   Findings: Mild degenerative changes noted the midfoot and tarsometatarsal joints   Impression: Mild midfoot arthritis   Signature: Matt De Dios MD         Assessment, Diagnosis and Treatment Plan:   I had a thorough discussion with the patient regarding the Treatment Plan    Assessment & Plan  1. Left foot pain: Likely due to neuropathy and arthritis.  - Initiate Metanx for neuropathy.  - Avoid NSAIDs and steroids due to kidney damage and diabetes.  - Provide carbon fiber insert for arthritis pain.  - Consider further interventions if needed.    Matt De Dios MD      XR FOOT LEFT (MIN 3 VIEWS)  Result Date: 7/24/2025  See Progress note in the chart.    Vascular AAA screening  Result Date: 6/30/2025  No evidence of abdominal aortic aneurysm..  Electronically signed by JATINDER BARTHOLOMEW     Past Medical History:   Diagnosis Date    AAA (abdominal aortic aneurysm)     Per pcp note (4/10/25) \"Pt had a CT of his chest on 2/13/22 noting: There is aneurysmal dilatation of the ascending aorta measuring up to 4 cm.   AAA Screening then performed on 10/27/23 stating no aneurysm present..\"    Allergic rhinitis due to animal (cat) (dog) hair and dander 11/19/14    Asthma     as a child    Chronic kidney disease     Chronic midline low back pain without sciatica     CKD (chronic kidney disease)     stage 3a, followed by nephrology    Diastolic CHF, chronic ([facility]) 6/1/2016    Echo (8/29/24) EF 55-60%, followed by Cardiology    DVT (deep venous thrombosis) ([facility]) 2021    right leg- currently on Eliquis    Dyslipidemia     managed with medication    History of gout     managed with medication    Hypertension     managed with medication    Nonrheumatic aortic valve regurgitation     Followed by Cardiology    ROXY on CPAP     Study done 4/17/2012; AHI 86.7, RDI 86.7 (same)    Osteoarthritis     Pulmonary embolism (HCC) 02/2022    on Eliquis    Right hydrocele     Followed by Urology    Tinea

## 2025-07-30 ENCOUNTER — OFFICE VISIT (OUTPATIENT)
Age: 60
End: 2025-07-30
Payer: MEDICAID

## 2025-07-30 ENCOUNTER — TELEPHONE (OUTPATIENT)
Dept: ORTHOPEDIC SURGERY | Age: 60
End: 2025-07-30

## 2025-07-30 VITALS
WEIGHT: 315 LBS | DIASTOLIC BLOOD PRESSURE: 88 MMHG | HEIGHT: 73 IN | BODY MASS INDEX: 41.75 KG/M2 | HEART RATE: 72 BPM | SYSTOLIC BLOOD PRESSURE: 130 MMHG

## 2025-07-30 DIAGNOSIS — I50.32 DIASTOLIC CHF, CHRONIC (HCC): Primary | ICD-10-CM

## 2025-07-30 DIAGNOSIS — N18.30 BENIGN HYPERTENSION WITH CKD (CHRONIC KIDNEY DISEASE) STAGE III (HCC): ICD-10-CM

## 2025-07-30 DIAGNOSIS — G47.33 OSA ON CPAP: ICD-10-CM

## 2025-07-30 DIAGNOSIS — I12.9 BENIGN HYPERTENSION WITH CKD (CHRONIC KIDNEY DISEASE) STAGE III (HCC): ICD-10-CM

## 2025-07-30 DIAGNOSIS — I35.1 NONRHEUMATIC AORTIC VALVE REGURGITATION: ICD-10-CM

## 2025-07-30 PROCEDURE — 99214 OFFICE O/P EST MOD 30 MIN: CPT | Performed by: INTERNAL MEDICINE

## 2025-07-30 RX ORDER — FUROSEMIDE 40 MG/1
40 TABLET ORAL DAILY
Qty: 90 TABLET | Refills: 3 | Status: SHIPPED | OUTPATIENT
Start: 2025-07-30

## 2025-07-30 NOTE — PROGRESS NOTES
[address] [facility], [place] [address]  PHONE: [phone]     25    NAME:  Gerardo Mancini  : 1965  MRN: [id number]       SUBJECTIVE:   Gerardo Mancini is a 60 y.o. male seen for a follow up visit regarding the following:     Chief Complaint   Patient presents with    Congestive Heart Failure    Follow-up       HPI:   Here for eval of severe HTN and LVH.        On CPAP, complaint now.     Echo  and 2020: normal EF, mild to mod AI   2020 Admission for acute PE, HTN emergency.   Echo 2022 and 2024: normal EF, mild AI, AO 4.2cm     No new angina, CP, ALEJANDRA.     More LE edema now, not as active since hip surgery.   No CP, pressure.   Still on NOAC.   Doing well on anticoagulation treatment as reviewed today, no bleeding issues or excessive bruising noted.          Compliant with meds and CPAP.     Patient denies recent history of orthopnea, PND, excessive dizziness and/or syncope.      Declined weight loss surgery in the past.          Past Medical History, Past Surgical History, Family history, Social History, and Medications were all reviewed with the patient today and updated as necessary.     Current Outpatient Medications   Medication Sig Dispense Refill    furosemide (LASIX) 40 MG tablet Take 1 tablet by mouth daily 90 tablet 3    allopurinol (ZYLOPRIM) 100 MG tablet Take 2 tablets by mouth daily 60 tablet 2    cloNIDine (CATAPRES) 0.2 MG tablet Take 1 tablet by mouth 2 times daily 60 tablet 5    empagliflozin (JARDIANCE) 10 MG tablet Take 1 tablet by mouth daily 30 tablet 5    labetalol (NORMODYNE) 100 MG tablet Take 1 tablet by mouth 2 times daily 60 tablet 5    lisinopril-hydroCHLOROthiazide (PRINZIDE;ZESTORETIC) 20-12.5 MG per tablet Take 1 tablet by mouth daily 30 tablet 5    rosuvastatin (CRESTOR) 10 MG tablet Take 1 tablet by mouth daily 30 tablet 5    vitamin D (ERGOCALCIFEROL) 1.25 MG (25750 UT) CAPS capsule Take 1 capsule by mouth once a week 12 capsule 1     No

## 2025-08-08 ENCOUNTER — RESULTS FOLLOW-UP (OUTPATIENT)
Dept: CARDIOLOGY CLINIC | Age: 60
End: 2025-08-08

## 2025-08-08 DIAGNOSIS — I50.32 DIASTOLIC CHF, CHRONIC (HCC): ICD-10-CM

## 2025-08-08 LAB
ALBUMIN SERPL-MCNC: 3.6 G/DL (ref 3.2–4.6)
ALBUMIN/GLOB SERPL: 0.9 (ref 1–1.9)
ALP SERPL-CCNC: 197 U/L (ref 40–129)
ALT SERPL-CCNC: 18 U/L (ref 8–55)
ANION GAP SERPL CALC-SCNC: 10 MMOL/L (ref 7–16)
AST SERPL-CCNC: 19 U/L (ref 15–37)
BILIRUB SERPL-MCNC: 0.4 MG/DL (ref 0–1.2)
BUN SERPL-MCNC: 23 MG/DL (ref 8–23)
CALCIUM SERPL-MCNC: 10.2 MG/DL (ref 8.8–10.2)
CHLORIDE SERPL-SCNC: 101 MMOL/L (ref 98–107)
CO2 SERPL-SCNC: 27 MMOL/L (ref 20–29)
CREAT SERPL-MCNC: 1.82 MG/DL (ref 0.8–1.3)
GLOBULIN SER CALC-MCNC: 3.8 G/DL (ref 2.3–3.5)
GLUCOSE SERPL-MCNC: 108 MG/DL (ref 70–99)
NT PRO BNP: 128 PG/ML (ref 0–125)
POTASSIUM SERPL-SCNC: 4.3 MMOL/L (ref 3.5–5.1)
PROT SERPL-MCNC: 7.4 G/DL (ref 6.3–8.2)
SODIUM SERPL-SCNC: 138 MMOL/L (ref 136–145)

## 2025-08-13 ENCOUNTER — OFFICE VISIT (OUTPATIENT)
Dept: UROLOGY | Age: 60
End: 2025-08-13
Payer: MEDICAID

## 2025-08-13 ENCOUNTER — TELEPHONE (OUTPATIENT)
Dept: UROLOGY | Age: 60
End: 2025-08-13

## 2025-08-13 DIAGNOSIS — N43.3 RIGHT HYDROCELE: Primary | ICD-10-CM

## 2025-08-13 LAB
BILIRUBIN, URINE, POC: NEGATIVE
BLOOD URINE, POC: NEGATIVE
GLUCOSE URINE, POC: 500 MG/DL
KETONES, URINE, POC: NEGATIVE MG/DL
LEUKOCYTE ESTERASE, URINE, POC: ABNORMAL
NITRITE, URINE, POC: POSITIVE
PH, URINE, POC: 5.5 (ref 4.6–8)
PROTEIN,URINE, POC: ABNORMAL MG/DL
SPECIFIC GRAVITY, URINE, POC: 1.02 (ref 1–1.03)
URINALYSIS CLARITY, POC: ABNORMAL
URINALYSIS COLOR, POC: ABNORMAL
UROBILINOGEN, POC: ABNORMAL MG/DL

## 2025-08-13 PROCEDURE — 99214 OFFICE O/P EST MOD 30 MIN: CPT | Performed by: UROLOGY

## 2025-08-13 PROCEDURE — 81003 URINALYSIS AUTO W/O SCOPE: CPT | Performed by: UROLOGY

## 2025-08-18 DIAGNOSIS — I50.32 DIASTOLIC CHF, CHRONIC (HCC): Primary | ICD-10-CM

## 2025-08-18 DIAGNOSIS — I35.1 NONRHEUMATIC AORTIC VALVE REGURGITATION: ICD-10-CM

## 2025-08-19 PROBLEM — N43.3 HYDROCELE, UNSPECIFIED: Status: ACTIVE | Noted: 2025-08-13

## 2025-08-19 RX ORDER — TORSEMIDE 20 MG/1
20 TABLET ORAL DAILY
Qty: 90 TABLET | Refills: 0 | Status: SHIPPED | OUTPATIENT
Start: 2025-08-19

## 2025-08-27 ENCOUNTER — OFFICE VISIT (OUTPATIENT)
Dept: UROLOGY | Age: 60
End: 2025-08-27
Payer: MEDICAID

## 2025-08-27 DIAGNOSIS — N43.3 RIGHT HYDROCELE: Primary | ICD-10-CM

## 2025-08-27 PROCEDURE — 99214 OFFICE O/P EST MOD 30 MIN: CPT | Performed by: UROLOGY

## 2025-08-27 ASSESSMENT — ENCOUNTER SYMPTOMS: BACK PAIN: 0

## 2025-08-28 ENCOUNTER — HOSPITAL ENCOUNTER (OUTPATIENT)
Dept: LAB | Age: 60
Discharge: HOME | End: 2025-08-31
Payer: MEDICAID

## 2025-08-28 ENCOUNTER — ANESTHESIA EVENT (OUTPATIENT)
Dept: SURGERY | Age: 60
End: 2025-08-28
Payer: MEDICAID

## 2025-08-28 DIAGNOSIS — N43.3 RIGHT HYDROCELE: ICD-10-CM

## 2025-08-28 LAB
ANION GAP SERPL CALC-SCNC: 11 MMOL/L (ref 7–16)
BUN SERPL-MCNC: 23 MG/DL (ref 8–23)
CALCIUM SERPL-MCNC: 9.8 MG/DL (ref 8.8–10.2)
CHLORIDE SERPL-SCNC: 101 MMOL/L (ref 98–107)
CO2 SERPL-SCNC: 25 MMOL/L (ref 20–29)
CREAT SERPL-MCNC: 1.96 MG/DL (ref 0.8–1.3)
ERYTHROCYTE [DISTWIDTH] IN BLOOD BY AUTOMATED COUNT: 14.2 % (ref 11.9–14.6)
GLUCOSE SERPL-MCNC: 131 MG/DL (ref 70–99)
HCT VFR BLD AUTO: 39.8 % (ref 41.1–50.3)
HGB BLD-MCNC: 12.5 G/DL (ref 13.6–17.2)
MCH RBC QN AUTO: 25.7 PG (ref 26.1–32.9)
MCHC RBC AUTO-ENTMCNC: 31.4 G/DL (ref 31.4–35)
MCV RBC AUTO: 81.7 FL (ref 82–102)
NRBC # BLD: 0 K/UL (ref 0–0.2)
PLATELET # BLD AUTO: 230 K/UL (ref 150–450)
PMV BLD AUTO: 10 FL (ref 9.4–12.3)
POTASSIUM SERPL-SCNC: 4 MMOL/L (ref 3.5–5.1)
RBC # BLD AUTO: 4.87 M/UL (ref 4.23–5.6)
SODIUM SERPL-SCNC: 137 MMOL/L (ref 136–145)
WBC # BLD AUTO: 7.8 K/UL (ref 4.3–11.1)

## 2025-08-28 PROCEDURE — 80048 BASIC METABOLIC PNL TOTAL CA: CPT

## 2025-08-28 PROCEDURE — 36415 COLL VENOUS BLD VENIPUNCTURE: CPT

## 2025-08-28 PROCEDURE — 85027 COMPLETE CBC AUTOMATED: CPT

## 2025-09-02 ENCOUNTER — TELEPHONE (OUTPATIENT)
Age: 60
End: 2025-09-02

## 2025-09-05 ENCOUNTER — TELEPHONE (OUTPATIENT)
Dept: UROLOGY | Age: 60
End: 2025-09-05

## 2025-09-05 ENCOUNTER — ANESTHESIA (OUTPATIENT)
Dept: SURGERY | Age: 60
End: 2025-09-05
Payer: MEDICAID

## 2025-09-05 ENCOUNTER — TELEPHONE (OUTPATIENT)
Dept: ADMINISTRATIVE | Age: 60
End: 2025-09-05

## 2025-09-05 ENCOUNTER — HOSPITAL ENCOUNTER (OUTPATIENT)
Age: 60
Setting detail: OUTPATIENT SURGERY
Discharge: HOME | End: 2025-09-05
Attending: UROLOGY | Admitting: UROLOGY
Payer: MEDICAID

## 2025-09-05 VITALS
BODY MASS INDEX: 41.08 KG/M2 | TEMPERATURE: 97.5 F | DIASTOLIC BLOOD PRESSURE: 93 MMHG | HEART RATE: 72 BPM | RESPIRATION RATE: 16 BRPM | SYSTOLIC BLOOD PRESSURE: 138 MMHG | WEIGHT: 310 LBS | OXYGEN SATURATION: 94 % | HEIGHT: 73 IN

## 2025-09-05 DIAGNOSIS — N43.3 HYDROCELE, RIGHT: Primary | ICD-10-CM

## 2025-09-05 LAB
GLUCOSE BLD STRIP.AUTO-MCNC: 126 MG/DL (ref 65–100)
GLUCOSE BLD STRIP.AUTO-MCNC: 137 MG/DL (ref 65–100)
POTASSIUM BLD-SCNC: 6.5 MMOL/L (ref 3.5–5.1)
POTASSIUM BLD-SCNC: 7.4 MMOL/L (ref 3.5–5.1)
POTASSIUM SERPL-SCNC: 3.8 MMOL/L (ref 3.5–5.1)
SERVICE CMNT-IMP: ABNORMAL
SERVICE CMNT-IMP: ABNORMAL

## 2025-09-05 PROCEDURE — 3700000000 HC ANESTHESIA ATTENDED CARE: Performed by: UROLOGY

## 2025-09-05 PROCEDURE — 3600000002 HC SURGERY LEVEL 2 BASE: Performed by: UROLOGY

## 2025-09-05 PROCEDURE — 6360000002 HC RX W HCPCS: Performed by: REGISTERED NURSE

## 2025-09-05 PROCEDURE — 82962 GLUCOSE BLOOD TEST: CPT

## 2025-09-05 PROCEDURE — 2709999900 HC NON-CHARGEABLE SUPPLY: Performed by: UROLOGY

## 2025-09-05 PROCEDURE — 6360000002 HC RX W HCPCS: Performed by: ANESTHESIOLOGY

## 2025-09-05 PROCEDURE — 2580000003 HC RX 258: Performed by: ANESTHESIOLOGY

## 2025-09-05 PROCEDURE — 84132 ASSAY OF SERUM POTASSIUM: CPT

## 2025-09-05 PROCEDURE — 2580000003 HC RX 258: Performed by: REGISTERED NURSE

## 2025-09-05 PROCEDURE — 2720000010 HC SURG SUPPLY STERILE: Performed by: UROLOGY

## 2025-09-05 PROCEDURE — 6370000000 HC RX 637 (ALT 250 FOR IP): Performed by: ANESTHESIOLOGY

## 2025-09-05 PROCEDURE — 7100000010 HC PHASE II RECOVERY - FIRST 15 MIN: Performed by: UROLOGY

## 2025-09-05 PROCEDURE — 7100000001 HC PACU RECOVERY - ADDTL 15 MIN: Performed by: UROLOGY

## 2025-09-05 PROCEDURE — 7100000011 HC PHASE II RECOVERY - ADDTL 15 MIN: Performed by: UROLOGY

## 2025-09-05 PROCEDURE — 3600000012 HC SURGERY LEVEL 2 ADDTL 15MIN: Performed by: UROLOGY

## 2025-09-05 PROCEDURE — 3700000001 HC ADD 15 MINUTES (ANESTHESIA): Performed by: UROLOGY

## 2025-09-05 PROCEDURE — 6360000002 HC RX W HCPCS: Performed by: UROLOGY

## 2025-09-05 PROCEDURE — 7100000000 HC PACU RECOVERY - FIRST 15 MIN: Performed by: UROLOGY

## 2025-09-05 PROCEDURE — 55060 REPAIR OF HYDROCELE: CPT | Performed by: UROLOGY

## 2025-09-05 RX ORDER — LIDOCAINE HYDROCHLORIDE 10 MG/ML
1 INJECTION, SOLUTION INFILTRATION; PERINEURAL
Status: DISCONTINUED | OUTPATIENT
Start: 2025-09-05 | End: 2025-09-05 | Stop reason: HOSPADM

## 2025-09-05 RX ORDER — PROCHLORPERAZINE EDISYLATE 5 MG/ML
5 INJECTION INTRAMUSCULAR; INTRAVENOUS
Status: DISCONTINUED | OUTPATIENT
Start: 2025-09-05 | End: 2025-09-05 | Stop reason: HOSPADM

## 2025-09-05 RX ORDER — SODIUM CHLORIDE 9 MG/ML
INJECTION, SOLUTION INTRAVENOUS PRN
Status: DISCONTINUED | OUTPATIENT
Start: 2025-09-05 | End: 2025-09-05 | Stop reason: HOSPADM

## 2025-09-05 RX ORDER — SODIUM CHLORIDE, SODIUM LACTATE, POTASSIUM CHLORIDE, CALCIUM CHLORIDE 600; 310; 30; 20 MG/100ML; MG/100ML; MG/100ML; MG/100ML
INJECTION, SOLUTION INTRAVENOUS CONTINUOUS
Status: DISCONTINUED | OUTPATIENT
Start: 2025-09-05 | End: 2025-09-05 | Stop reason: HOSPADM

## 2025-09-05 RX ORDER — SODIUM CHLORIDE 0.9 % (FLUSH) 0.9 %
5-40 SYRINGE (ML) INJECTION EVERY 12 HOURS SCHEDULED
Status: DISCONTINUED | OUTPATIENT
Start: 2025-09-05 | End: 2025-09-05 | Stop reason: HOSPADM

## 2025-09-05 RX ORDER — PROPOFOL 10 MG/ML
INJECTION, EMULSION INTRAVENOUS
Status: DISCONTINUED | OUTPATIENT
Start: 2025-09-05 | End: 2025-09-05 | Stop reason: SDUPTHER

## 2025-09-05 RX ORDER — SODIUM CHLORIDE 0.9 % (FLUSH) 0.9 %
5-40 SYRINGE (ML) INJECTION PRN
Status: DISCONTINUED | OUTPATIENT
Start: 2025-09-05 | End: 2025-09-05 | Stop reason: HOSPADM

## 2025-09-05 RX ORDER — ONDANSETRON 2 MG/ML
4 INJECTION INTRAMUSCULAR; INTRAVENOUS
Status: DISCONTINUED | OUTPATIENT
Start: 2025-09-05 | End: 2025-09-05 | Stop reason: HOSPADM

## 2025-09-05 RX ORDER — ONDANSETRON 2 MG/ML
INJECTION INTRAMUSCULAR; INTRAVENOUS
Status: DISCONTINUED | OUTPATIENT
Start: 2025-09-05 | End: 2025-09-05 | Stop reason: SDUPTHER

## 2025-09-05 RX ORDER — LIDOCAINE HYDROCHLORIDE 20 MG/ML
INJECTION, SOLUTION EPIDURAL; INFILTRATION; INTRACAUDAL; PERINEURAL
Status: DISCONTINUED | OUTPATIENT
Start: 2025-09-05 | End: 2025-09-05 | Stop reason: SDUPTHER

## 2025-09-05 RX ORDER — SUCCINYLCHOLINE CHLORIDE 20 MG/ML
INJECTION INTRAMUSCULAR; INTRAVENOUS
Status: DISCONTINUED | OUTPATIENT
Start: 2025-09-05 | End: 2025-09-05 | Stop reason: SDUPTHER

## 2025-09-05 RX ORDER — DIPHENHYDRAMINE HYDROCHLORIDE 50 MG/ML
12.5 INJECTION, SOLUTION INTRAMUSCULAR; INTRAVENOUS
Status: DISCONTINUED | OUTPATIENT
Start: 2025-09-05 | End: 2025-09-05 | Stop reason: HOSPADM

## 2025-09-05 RX ORDER — HYDROCODONE BITARTRATE AND ACETAMINOPHEN 5; 325 MG/1; MG/1
1 TABLET ORAL EVERY 6 HOURS PRN
Qty: 12 TABLET | Refills: 0 | Status: SHIPPED | OUTPATIENT
Start: 2025-09-05 | End: 2025-09-08

## 2025-09-05 RX ORDER — MIDAZOLAM HYDROCHLORIDE 2 MG/2ML
2 INJECTION, SOLUTION INTRAMUSCULAR; INTRAVENOUS
Status: DISCONTINUED | OUTPATIENT
Start: 2025-09-05 | End: 2025-09-05 | Stop reason: HOSPADM

## 2025-09-05 RX ORDER — IBUPROFEN 600 MG/1
1 TABLET ORAL PRN
Status: DISCONTINUED | OUTPATIENT
Start: 2025-09-05 | End: 2025-09-05 | Stop reason: HOSPADM

## 2025-09-05 RX ORDER — FENTANYL CITRATE 50 UG/ML
INJECTION, SOLUTION INTRAMUSCULAR; INTRAVENOUS
Status: DISCONTINUED | OUTPATIENT
Start: 2025-09-05 | End: 2025-09-05 | Stop reason: SDUPTHER

## 2025-09-05 RX ORDER — SODIUM CHLORIDE, SODIUM LACTATE, POTASSIUM CHLORIDE, CALCIUM CHLORIDE 600; 310; 30; 20 MG/100ML; MG/100ML; MG/100ML; MG/100ML
INJECTION, SOLUTION INTRAVENOUS
Status: DISCONTINUED | OUTPATIENT
Start: 2025-09-05 | End: 2025-09-05 | Stop reason: SDUPTHER

## 2025-09-05 RX ORDER — OXYCODONE HYDROCHLORIDE 5 MG/1
5 TABLET ORAL
Status: COMPLETED | OUTPATIENT
Start: 2025-09-05 | End: 2025-09-05

## 2025-09-05 RX ORDER — DEXTROSE MONOHYDRATE 100 MG/ML
INJECTION, SOLUTION INTRAVENOUS CONTINUOUS PRN
Status: DISCONTINUED | OUTPATIENT
Start: 2025-09-05 | End: 2025-09-05 | Stop reason: HOSPADM

## 2025-09-05 RX ORDER — ACETAMINOPHEN 500 MG
1000 TABLET ORAL ONCE
Status: COMPLETED | OUTPATIENT
Start: 2025-09-05 | End: 2025-09-05

## 2025-09-05 RX ADMIN — LIDOCAINE HYDROCHLORIDE 100 MG: 20 INJECTION, SOLUTION EPIDURAL; INFILTRATION; INTRACAUDAL; PERINEURAL at 09:21

## 2025-09-05 RX ADMIN — HYDROMORPHONE HYDROCHLORIDE 0.5 MG: 1 INJECTION, SOLUTION INTRAMUSCULAR; INTRAVENOUS; SUBCUTANEOUS at 10:17

## 2025-09-05 RX ADMIN — SODIUM CHLORIDE, SODIUM LACTATE, POTASSIUM CHLORIDE, AND CALCIUM CHLORIDE: .6; .31; .03; .02 INJECTION, SOLUTION INTRAVENOUS at 08:31

## 2025-09-05 RX ADMIN — PROPOFOL 100 MG: 10 INJECTION, EMULSION INTRAVENOUS at 09:24

## 2025-09-05 RX ADMIN — OXYCODONE 5 MG: 5 TABLET ORAL at 10:40

## 2025-09-05 RX ADMIN — FENTANYL CITRATE 100 MCG: 50 INJECTION, SOLUTION INTRAMUSCULAR; INTRAVENOUS at 09:21

## 2025-09-05 RX ADMIN — Medication 200 MG: at 09:25

## 2025-09-05 RX ADMIN — Medication 3000 MG: at 09:29

## 2025-09-05 RX ADMIN — ACETAMINOPHEN 1000 MG: 500 TABLET, FILM COATED ORAL at 08:32

## 2025-09-05 RX ADMIN — PROPOFOL 200 MG: 10 INJECTION, EMULSION INTRAVENOUS at 09:21

## 2025-09-05 RX ADMIN — PROPOFOL 30 MG: 10 INJECTION, EMULSION INTRAVENOUS at 09:41

## 2025-09-05 RX ADMIN — ONDANSETRON 4 MG: 2 INJECTION, SOLUTION INTRAMUSCULAR; INTRAVENOUS at 09:49

## 2025-09-05 RX ADMIN — SODIUM CHLORIDE, SODIUM LACTATE, POTASSIUM CHLORIDE, AND CALCIUM CHLORIDE: 600; 310; 30; 20 INJECTION, SOLUTION INTRAVENOUS at 09:21

## 2025-09-05 ASSESSMENT — PAIN SCALES - GENERAL
PAINLEVEL_OUTOF10: 7
PAINLEVEL_OUTOF10: 2
PAINLEVEL_OUTOF10: 0
PAINLEVEL_OUTOF10: 3

## 2025-09-05 ASSESSMENT — PAIN - FUNCTIONAL ASSESSMENT
PAIN_FUNCTIONAL_ASSESSMENT: 0-10

## 2025-09-05 ASSESSMENT — PAIN DESCRIPTION - LOCATION: LOCATION: SCROTUM

## 2025-09-05 ASSESSMENT — PAIN DESCRIPTION - ORIENTATION: ORIENTATION: RIGHT

## (undated) DEVICE — GOWN SURG 2XL 49 IN LEN POLY POLYETHLYLENE REINF VELC TIE

## (undated) DEVICE — TOTAL HIP PACK: Brand: MEDLINE INDUSTRIES, INC.

## (undated) DEVICE — GLOVE SURG SZ 8.5 L12IN FNGR THK9.8MIL CUF THK7.1MIL

## (undated) DEVICE — Device

## (undated) DEVICE — BIPOLAR SEALER 23-112-1 AQM 6.0: Brand: AQUAMANTYS ®

## (undated) DEVICE — FOAM BUMP, LARGE: Brand: MEDLINE INDUSTRIES, INC.

## (undated) DEVICE — GOWN SURG 2XL 49 IN AAMI LEVEL 3 ORBIS

## (undated) DEVICE — KENDALL RADIOLUCENT FOAM MONITORING ELECTRODE RECTANGULAR SHAPE: Brand: KENDALL

## (undated) DEVICE — SOLUTION IRRIG 1000ML STRL H2O USP PLAS POUR BTL

## (undated) DEVICE — KIT INT FIX FEM TIB CKPT MAKOPLASTY

## (undated) DEVICE — SOLUTION IRRIG 1000ML 0.9% SOD CHL USP POUR PLAS BTL

## (undated) DEVICE — KIT DRP FOR RIO ROBOTIC ARM ASST SYS

## (undated) DEVICE — STRYKER PERFORMANCE SERIES SAGITTAL BLADE: Brand: STRYKER PERFORMANCE SERIES

## (undated) DEVICE — SOLUTION IRRIG 1000ML 09% SOD CHL USP PIC PLAS CONTAINER

## (undated) DEVICE — DRESSING HYDROFIBER AQUACEL AG ADVANTAGE 3.5X12 IN

## (undated) DEVICE — SUTURE ETHILON SZ 2-0 L18IN NONABSORBABLE BLK L26MM PS 3/8 585H

## (undated) DEVICE — SUPPORT SCROT ATHLETIC 38-44 IN LG REG SFT POLYESTER

## (undated) DEVICE — STERILE PRESSURE PROTECTOR PAD® FOR DE MAYO UNIVERSAL DISTRACTOR® (10/CASE): Brand: DE MAYO UNIVERSAL DISTRACTOR®

## (undated) DEVICE — HEWSON SUTURE RETRIEVER: Brand: HEWSON SUTURE RETRIEVER

## (undated) DEVICE — SOLUTION IV 250ML 0.9% SOD CHL PH 5 INJ USP VIAFLX PLAS

## (undated) DEVICE — KIT TRK KNEE PROC VIZADISC

## (undated) DEVICE — HOOD: Brand: FLYTE

## (undated) DEVICE — SUTURE VCRL + SZ 1-0 L36IN ABSRB UD CTX 1/2 CIR TAPR PNT VCP977H

## (undated) DEVICE — SUTURE ETHIBOND EXCEL SZ 5 L30IN NONABSORBABLE GRN L40MM V-37 MB66G

## (undated) DEVICE — COVER,MAYO STAND,XL,STERILE: Brand: MEDLINE

## (undated) DEVICE — YANKAUER,FLEXIBLE HANDLE,REGLR CAPACITY: Brand: MEDLINE INDUSTRIES, INC.

## (undated) DEVICE — GOWN,REINF,POLY,ECL,PP SLV,XL: Brand: MEDLINE

## (undated) DEVICE — BASIC SINGLE BASIN 2-LF: Brand: MEDLINE INDUSTRIES, INC.

## (undated) DEVICE — DRESSING HYDROFIBER AQUACEL AG ADVANTAGE 3.5X14 IN

## (undated) DEVICE — STERILE PVP: Brand: MEDLINE INDUSTRIES, INC.

## (undated) DEVICE — MASK O2 O2 LN L7FT CO2 LN L10FT FEM BG 750ML M CONC ADPT

## (undated) DEVICE — CANNULA NSL ORAL AD FOR CAPNOFLEX CO2 O2 AIRLFE

## (undated) DEVICE — SUTURE CHROMIC GUT SZ 2-0 L27IN ABSRB BRN L26MM SH 1/2 CIR G123H

## (undated) DEVICE — SUTURE ABS ANTIBACT 1-0 CTX 24IN STRATAFIX PDS+ SXPP1A445

## (undated) DEVICE — SOLUTION IRRIG 1000ML H2O PIC PLAS SHATTERPROOF CONTAINER

## (undated) DEVICE — DRAIN SURG PENROSE 0.25X12 IN CLOSED WND DRAINAGE PREM SIL

## (undated) DEVICE — SPONGE LAPAROTOMY W18XL18IN WHITE STRUNG RADIOPAQUE STERILE

## (undated) DEVICE — NEEDLE HYPO 18GA L1.5IN PNK S STL HUB POLYPR SHLD REG BVL

## (undated) DEVICE — SOLUTION IRRIG 3000ML 0.9% SOD CHL USP UROMATIC PLAS CONT

## (undated) DEVICE — SYRINGE MED 10ML LUERLOCK TIP W/O SFTY DISP

## (undated) DEVICE — SUTURE PDS II SZ 1 L96IN ABSRB VLT TP-1 L65MM 1/2 CIR Z880G

## (undated) DEVICE — PIN BNE FIX TEMP L110MM DIA4MM MAKO

## (undated) DEVICE — SUTURE PDS II SZ 1 L36IN ABSRB VLT L48MM CTX 1/2 CIR Z371T

## (undated) DEVICE — TRAY SKIN SCRUB W/4 COMPARTMENT

## (undated) DEVICE — DRAPE,TOP,102X53,STERILE: Brand: MEDLINE

## (undated) DEVICE — PIN BNE FIX TEMP L140MM DIA4MM MAKO

## (undated) DEVICE — BLADE SURG SAW STD S STL OSC W/ SERR EDGE DISP

## (undated) DEVICE — AGENT HEMSTAT 3GM OXIDIZED REGENERATED CELOS ABSRB FOR CONT (ORDER MULTIPLES OF 5EA)

## (undated) DEVICE — CONNECTOR TBNG OD5-7MM O2 END DISP

## (undated) DEVICE — SPONGE GZ KERLIX W4.5INXL4.1YD COT 6 PLY OPN WV STRETCHABLE

## (undated) DEVICE — SUTURE MCRYL SZ 2-0 L27IN ABSRB UD CP-1 1 L36MM 1/2 CIR REV Y266H

## (undated) DEVICE — BLADE CLIPPER GEN PURP NS

## (undated) DEVICE — MEDI-VAC YANKAUER SUCTION HANDLE W/BULBOUS TIP: Brand: CARDINAL HEALTH

## (undated) DEVICE — SYRINGE MED 50ML LUERLOCK TIP

## (undated) DEVICE — DRAPE LAP 102X78X121IN POLYPR SMS STD T INSTR PD CNTOUR

## (undated) DEVICE — 3.0MM X 900MM BALL TIP GUIDE ROD: Brand: TRIGEN

## (undated) DEVICE — SUTURE MONOCRYL SZ 2-0 L27IN ABSRB UD CP-1 1 L36MM 1/2 CIR REV Y266H

## (undated) DEVICE — TOTAL KNEE DR KAVOLUS: Brand: MEDLINE INDUSTRIES, INC.

## (undated) DEVICE — HOOD: Brand: T7PLUS

## (undated) DEVICE — SUTURE VICRYL + SZ 3-0 L27IN ABSRB UD L26MM SH 1/2 CIR VCP416H